# Patient Record
Sex: MALE | Race: WHITE | NOT HISPANIC OR LATINO | Employment: OTHER | URBAN - NONMETROPOLITAN AREA
[De-identification: names, ages, dates, MRNs, and addresses within clinical notes are randomized per-mention and may not be internally consistent; named-entity substitution may affect disease eponyms.]

---

## 2018-01-19 DIAGNOSIS — M25.522 LEFT ELBOW PAIN: Primary | ICD-10-CM

## 2018-01-22 ENCOUNTER — OFFICE VISIT (OUTPATIENT)
Dept: ORTHOPEDIC SURGERY | Facility: CLINIC | Age: 73
End: 2018-01-22

## 2018-01-22 ENCOUNTER — HOSPITAL ENCOUNTER (OUTPATIENT)
Dept: GENERAL RADIOLOGY | Facility: HOSPITAL | Age: 73
Discharge: HOME | End: 2018-01-22
Attending: ORTHOPAEDIC SURGERY | Admitting: ORTHOPAEDIC SURGERY

## 2018-01-22 VITALS
HEART RATE: 61 BPM | SYSTOLIC BLOOD PRESSURE: 169 MMHG | DIASTOLIC BLOOD PRESSURE: 83 MMHG | BODY MASS INDEX: 27.77 KG/M2 | HEIGHT: 72 IN | WEIGHT: 205 LBS

## 2018-01-22 DIAGNOSIS — M77.12 LATERAL EPICONDYLITIS OF LEFT ELBOW: Primary | ICD-10-CM

## 2018-01-22 DIAGNOSIS — M25.522 LEFT ELBOW PAIN: ICD-10-CM

## 2018-01-22 PROCEDURE — 99202 OFFICE O/P NEW SF 15 MIN: CPT | Performed by: ORTHOPAEDIC SURGERY

## 2018-01-22 PROCEDURE — 73080 X-RAY EXAM OF ELBOW: CPT

## 2018-01-22 PROCEDURE — 20550 NJX 1 TENDON SHEATH/LIGAMENT: CPT | Performed by: ORTHOPAEDIC SURGERY

## 2018-01-22 PROCEDURE — 73080 X-RAY EXAM OF ELBOW: CPT | Performed by: RADIOLOGY

## 2018-01-22 RX ORDER — ASPIRIN 81 MG/1
81 TABLET ORAL DAILY
COMMUNITY
End: 2020-08-13

## 2018-01-22 RX ORDER — LIDOCAINE HYDROCHLORIDE 20 MG/ML
2 INJECTION, SOLUTION INFILTRATION; PERINEURAL
Status: COMPLETED | OUTPATIENT
Start: 2018-01-22 | End: 2018-01-22

## 2018-01-22 RX ORDER — PENICILLIN V POTASSIUM 500 MG/1
TABLET ORAL
COMMUNITY
Start: 2018-01-12 | End: 2020-08-13

## 2018-01-22 RX ORDER — METHYLPREDNISOLONE ACETATE 40 MG/ML
40 INJECTION, SUSPENSION INTRA-ARTICULAR; INTRALESIONAL; INTRAMUSCULAR; SOFT TISSUE
Status: COMPLETED | OUTPATIENT
Start: 2018-01-22 | End: 2018-01-22

## 2018-01-22 RX ORDER — LISINOPRIL 40 MG/1
5 TABLET ORAL DAILY
COMMUNITY
Start: 2017-12-08 | End: 2021-06-27

## 2018-01-22 RX ADMIN — METHYLPREDNISOLONE ACETATE 40 MG: 40 INJECTION, SUSPENSION INTRA-ARTICULAR; INTRALESIONAL; INTRAMUSCULAR; SOFT TISSUE at 13:23

## 2018-01-22 RX ADMIN — LIDOCAINE HYDROCHLORIDE 2 ML: 20 INJECTION, SOLUTION INFILTRATION; PERINEURAL at 13:23

## 2018-01-22 NOTE — PROGRESS NOTES
New Patient Visit      Patient: Sandro Argueta  YOB: 1945  Date of Encounter: 01/22/2018      History of Present Illness:     Sandro Argueta is a 72 y.o. male seen today secondary to left elbow pain several months duration with no notable injury.  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  He states that the majority of his pain symptoms jessika to the lateral aspect of his elbow worse upon extension of the wrist.  He has tried relative rest, ice, heat, creams, NSAIDs, as well as a lateral epicondylar strap with some improvement however this seems to be progressively becoming more prominent.  Patient denies any loss of strength or paraesthesias.       Patient Active Problem List   Diagnosis   • Left elbow pain   • Hypertension   • Borderline diabetes     Past Medical History:   Diagnosis Date   • Borderline diabetes    • Hypertension    • Left elbow pain      Past Surgical History:   Procedure Laterality Date   • PACEMAKER IMPLANTATION       Social History     Occupational History   • Not on file.     Social History Main Topics   • Smoking status: Never Smoker   • Smokeless tobacco: Never Used   • Alcohol use No   • Drug use: No   • Sexual activity: Not on file      Social History     Social History Narrative   • No narrative on file     Family History   Problem Relation Age of Onset   • Diabetes Mother    • Diabetes Sister    • Diabetes Brother      Current Outpatient Prescriptions   Medication Sig Dispense Refill   • aspirin 81 MG EC tablet Take 81 mg by mouth Daily.     • lisinopril (PRINIVIL,ZESTRIL) 40 MG tablet      • penicillin v potassium (VEETID) 500 MG tablet        No current facility-administered medications for this visit.      No Known Allergies     Review of Systems   Constitution: Negative.   HENT: Positive for hearing loss.    Eyes: Negative.    Cardiovascular: Negative.   "  Respiratory: Negative.    Endocrine: Negative.    Hematologic/Lymphatic: Negative.    Skin: Negative.    Musculoskeletal: Positive for joint pain.        Pertinent positives listed in HPI   Gastrointestinal: Negative.    Genitourinary: Positive for frequency and urgency.   Neurological: Negative.    Psychiatric/Behavioral: Negative.    Allergic/Immunologic: Negative.        Vitals:    01/22/18 1005   BP: 169/83   BP Location: Right arm   Patient Position: Sitting   Cuff Size: Adult   Pulse: 61   Weight: 93 kg (205 lb)   Height: 182.9 cm (72\")       Physical Exam: 72 y.o. male .  General Appearance:    Well appearing, cooperative, in no acute distress.       Patient is alert and oriented x 3.            Musculoskeletal: Examination the patient's left elbow reveals he has tenderness to palpation along the lateral epicondyle.  Pain with oppositional extension of the wrist.  There is no significant swelling, ecchymosis, erythema.  Patient's for movement of his wrist and digits.  The tight  strength.  Remainder the neurovascular status grossly intact.    Radiology:        X-rays: 3 views left elbow reveals no acute fractures or dislocations.  There are small osteophyte off of the posterior olecranon.  No significant degenerative changes noted    Procedure:  Left lateral epicondylar injection  Date/Time: 1/22/2018 1:23 PM  Performed by: ALEXANDRIA PORTILLO  Authorized by: ALEXANDRIA PORTILLO   Consent: Verbal consent obtained.  Risks and benefits: risks, benefits and alternatives were discussed  Consent given by: patient  Preparation: Patient was prepped and draped in the usual sterile fashion.  Local anesthesia used: yes  Anesthesia: local infiltration    Anesthesia:  Local anesthesia used: yes  Patient tolerance: Patient tolerated the procedure well with no immediate complications  Medications administered: 2 mL lidocaine 2%; 40 mg methylPREDNISolone acetate 40 MG/ML          Assesment:    ICD-10-CM ICD-9-CM "   1. Lateral epicondylitis of left elbow M77.12 726.32         Plan:    Patient tolerated procedure well and was instructed to allow 2-3 weeks to fully evaluate the efficacy injection.  He was instructed to implement relative rest over the next several days.  Patient will return back on an as-needed basis upon any further complication or worsening of his pain or symptoms.  He may continue bracing as this seems to alleviate his pain symptoms.    Discussion and Summary:    Written by Rio Hooks PA-C, acting as scribe for Dr. Newman    CC: Misti Nguyễn MD

## 2020-07-20 ENCOUNTER — HOSPITAL ENCOUNTER (OUTPATIENT)
Dept: RESPIRATORY THERAPY | Facility: HOSPITAL | Age: 75
Discharge: HOME | End: 2020-07-20
Admitting: INTERNAL MEDICINE

## 2020-07-20 ENCOUNTER — TRANSCRIBE ORDERS (OUTPATIENT)
Dept: ADMINISTRATIVE | Facility: HOSPITAL | Age: 75
End: 2020-07-20

## 2020-07-20 DIAGNOSIS — R06.02 SHORTNESS OF BREATH: ICD-10-CM

## 2020-07-20 DIAGNOSIS — R42 DIZZINESS AND GIDDINESS: Primary | ICD-10-CM

## 2020-07-20 PROCEDURE — 93226 XTRNL ECG REC<48 HR SCAN A/R: CPT

## 2020-07-20 PROCEDURE — 93225 XTRNL ECG REC<48 HRS REC: CPT

## 2020-07-21 ENCOUNTER — TRANSCRIBE ORDERS (OUTPATIENT)
Dept: ADMINISTRATIVE | Facility: HOSPITAL | Age: 75
End: 2020-07-21

## 2020-07-21 DIAGNOSIS — R42 DIZZINESS AND GIDDINESS: Primary | ICD-10-CM

## 2020-07-29 ENCOUNTER — HOSPITAL ENCOUNTER (OUTPATIENT)
Dept: CARDIOLOGY | Facility: HOSPITAL | Age: 75
Discharge: HOME | End: 2020-07-29

## 2020-07-29 ENCOUNTER — HOSPITAL ENCOUNTER (OUTPATIENT)
Dept: CARDIOLOGY | Facility: HOSPITAL | Age: 75
Discharge: HOME | End: 2020-07-29
Admitting: INTERNAL MEDICINE

## 2020-07-29 DIAGNOSIS — R06.02 SHORTNESS OF BREATH: ICD-10-CM

## 2020-07-29 DIAGNOSIS — R42 DIZZINESS AND GIDDINESS: ICD-10-CM

## 2020-07-29 PROCEDURE — 93880 EXTRACRANIAL BILAT STUDY: CPT | Performed by: RADIOLOGY

## 2020-07-29 PROCEDURE — 93306 TTE W/DOPPLER COMPLETE: CPT

## 2020-07-29 PROCEDURE — 93880 EXTRACRANIAL BILAT STUDY: CPT

## 2020-07-30 LAB
BH CV ECHO MEAS - % IVS THICK: 1.2 %
BH CV ECHO MEAS - % LVPW THICK: 9.4 %
BH CV ECHO MEAS - ACS: 2.4 CM
BH CV ECHO MEAS - AI DEC SLOPE: 94.2 CM/SEC^2
BH CV ECHO MEAS - AI MAX PG: 26.4 MMHG
BH CV ECHO MEAS - AI MAX VEL: 257 CM/SEC
BH CV ECHO MEAS - AI P1/2T: 799.1 MSEC
BH CV ECHO MEAS - AO MAX PG: 5.4 MMHG
BH CV ECHO MEAS - AO MEAN PG: 3 MMHG
BH CV ECHO MEAS - AO ROOT AREA (BSA CORRECTED): 1.7
BH CV ECHO MEAS - AO ROOT AREA: 10.2 CM^2
BH CV ECHO MEAS - AO ROOT DIAM: 3.6 CM
BH CV ECHO MEAS - AO V2 MAX: 116 CM/SEC
BH CV ECHO MEAS - AO V2 MEAN: 79 CM/SEC
BH CV ECHO MEAS - AO V2 VTI: 24.2 CM
BH CV ECHO MEAS - BSA(HAYCOCK): 2.2 M^2
BH CV ECHO MEAS - BSA: 2.2 M^2
BH CV ECHO MEAS - BZI_BMI: 27.8 KILOGRAMS/M^2
BH CV ECHO MEAS - BZI_METRIC_HEIGHT: 182.9 CM
BH CV ECHO MEAS - BZI_METRIC_WEIGHT: 93 KG
BH CV ECHO MEAS - EDV(CUBED): 128.4 ML
BH CV ECHO MEAS - EDV(MOD-SP4): 115 ML
BH CV ECHO MEAS - EDV(TEICH): 120.7 ML
BH CV ECHO MEAS - EF(CUBED): 86.6 %
BH CV ECHO MEAS - EF(MOD-SP4): 67.7 %
BH CV ECHO MEAS - EF(TEICH): 80 %
BH CV ECHO MEAS - ESV(CUBED): 17.2 ML
BH CV ECHO MEAS - ESV(MOD-SP4): 37.2 ML
BH CV ECHO MEAS - ESV(TEICH): 24.1 ML
BH CV ECHO MEAS - FS: 48.9 %
BH CV ECHO MEAS - IVS/LVPW: 0.91
BH CV ECHO MEAS - IVSD: 1.2 CM
BH CV ECHO MEAS - IVSS: 1.2 CM
BH CV ECHO MEAS - LA DIMENSION: 2.9 CM
BH CV ECHO MEAS - LA/AO: 0.79
BH CV ECHO MEAS - LV DIASTOLIC VOL/BSA (35-75): 53.4 ML/M^2
BH CV ECHO MEAS - LV MASS(C)D: 258.3 GRAMS
BH CV ECHO MEAS - LV MASS(C)DI: 120 GRAMS/M^2
BH CV ECHO MEAS - LV MASS(C)S: 108.1 GRAMS
BH CV ECHO MEAS - LV MASS(C)SI: 50.2 GRAMS/M^2
BH CV ECHO MEAS - LV SYSTOLIC VOL/BSA (12-30): 17.3 ML/M^2
BH CV ECHO MEAS - LVIDD: 5 CM
BH CV ECHO MEAS - LVIDS: 2.6 CM
BH CV ECHO MEAS - LVLD AP4: 7.4 CM
BH CV ECHO MEAS - LVLS AP4: 6.1 CM
BH CV ECHO MEAS - LVOT AREA (M): 3.8 CM^2
BH CV ECHO MEAS - LVOT AREA: 3.8 CM^2
BH CV ECHO MEAS - LVOT DIAM: 2.2 CM
BH CV ECHO MEAS - LVPWD: 1.3 CM
BH CV ECHO MEAS - LVPWS: 1.5 CM
BH CV ECHO MEAS - MV A MAX VEL: 60.8 CM/SEC
BH CV ECHO MEAS - MV E MAX VEL: 48 CM/SEC
BH CV ECHO MEAS - MV E/A: 0.79
BH CV ECHO MEAS - PA ACC TIME: 0.13 SEC
BH CV ECHO MEAS - PA PR(ACCEL): 18.7 MMHG
BH CV ECHO MEAS - RAP SYSTOLE: 10 MMHG
BH CV ECHO MEAS - RVSP: 34.8 MMHG
BH CV ECHO MEAS - SI(AO): 114.4 ML/M^2
BH CV ECHO MEAS - SI(CUBED): 51.7 ML/M^2
BH CV ECHO MEAS - SI(MOD-SP4): 36.1 ML/M^2
BH CV ECHO MEAS - SI(TEICH): 44.9 ML/M^2
BH CV ECHO MEAS - SV(AO): 246.3 ML
BH CV ECHO MEAS - SV(CUBED): 111.2 ML
BH CV ECHO MEAS - SV(MOD-SP4): 77.8 ML
BH CV ECHO MEAS - SV(TEICH): 96.6 ML
BH CV ECHO MEAS - TR MAX VEL: 249 CM/SEC

## 2020-08-13 ENCOUNTER — OFFICE VISIT (OUTPATIENT)
Dept: UROLOGY | Facility: CLINIC | Age: 75
End: 2020-08-13

## 2020-08-13 VITALS — BODY MASS INDEX: 27.17 KG/M2 | TEMPERATURE: 97.8 F | WEIGHT: 200.6 LBS | HEIGHT: 72 IN

## 2020-08-13 DIAGNOSIS — N42.9 DISORDER OF PROSTATE: Primary | ICD-10-CM

## 2020-08-13 DIAGNOSIS — R97.20 ELEVATED PROSTATE SPECIFIC ANTIGEN (PSA): ICD-10-CM

## 2020-08-13 PROCEDURE — 84154 ASSAY OF PSA FREE: CPT | Performed by: UROLOGY

## 2020-08-13 PROCEDURE — 99203 OFFICE O/P NEW LOW 30 MIN: CPT | Performed by: UROLOGY

## 2020-08-13 PROCEDURE — 36415 COLL VENOUS BLD VENIPUNCTURE: CPT | Performed by: UROLOGY

## 2020-08-13 PROCEDURE — 84153 ASSAY OF PSA TOTAL: CPT | Performed by: UROLOGY

## 2020-08-13 NOTE — PROGRESS NOTES
Chief Complaint:          Chief Complaint   Patient presents with   • Elevated PSA     New Patient        HPI:   75 y.o. male referred for evaluation of an elevated PSA noted to be 4.2.  He has no family history of bladder, ovarian, breast or family history of prostate cancer he has no exposure to agent orange he has lifelong frequency.  He has no dysuria.  He is a diabetic.  He is well controlled he has no burning, blood in the urine, discharge, fevers, chills.  His exam was unremarkable have a free and total PSA currently pending at the time of this dictation      Past Medical History:        Past Medical History:   Diagnosis Date   • Borderline diabetes    • Hypertension    • Left elbow pain          Current Meds:     Current Outpatient Medications   Medication Sig Dispense Refill   • lisinopril (PRINIVIL,ZESTRIL) 40 MG tablet      • metFORMIN (GLUCOPHAGE) 500 MG tablet Take 500 mg by mouth 2 (Two) Times a Day With Meals.       No current facility-administered medications for this visit.         Allergies:      No Known Allergies     Past Surgical History:     Past Surgical History:   Procedure Laterality Date   • PACEMAKER IMPLANTATION           Social History:     Social History     Socioeconomic History   • Marital status:      Spouse name: Not on file   • Number of children: Not on file   • Years of education: Not on file   • Highest education level: Not on file   Tobacco Use   • Smoking status: Never Smoker   • Smokeless tobacco: Never Used   Substance and Sexual Activity   • Alcohol use: No   • Drug use: No       Family History:     Family History   Problem Relation Age of Onset   • Diabetes Mother    • Diabetes Sister    • Diabetes Brother        Review of Systems:     Review of Systems   Constitutional: Negative.    HENT: Negative.    Eyes: Negative.    Respiratory: Negative.    Cardiovascular: Negative.    Gastrointestinal: Negative.    Endocrine: Negative.    Musculoskeletal: Negative.     Allergic/Immunologic: Negative.    Neurological: Negative.    Hematological: Negative.    Psychiatric/Behavioral: Negative.        Physical Exam:     Physical Exam   Constitutional: He is oriented to person, place, and time. He appears well-developed and well-nourished.   HENT:   Head: Normocephalic and atraumatic.   Eyes: Pupils are equal, round, and reactive to light. Conjunctivae and EOM are normal.   Neck: Normal range of motion.   Cardiovascular: Normal rate, regular rhythm, normal heart sounds and intact distal pulses.   Pulmonary/Chest: Effort normal and breath sounds normal.   Abdominal: Soft. Bowel sounds are normal.   Genitourinary:   Genitourinary Comments: Soft nontender abdomen with no organomegaly, rigidity, or tenderness.  He has normal external genitalia and uncircumcised phallus with a freely movable foreskin bilaterally descended testes without masses there is no inguinal hernias adenopathy or abnormalities he had good rectal tone and a large smooth firm prostate.  There is no nodularity or any suspicious rectal abnormalities     Musculoskeletal: Normal range of motion.   Neurological: He is alert and oriented to person, place, and time. He has normal reflexes.   Skin: Skin is warm and dry.   Psychiatric: He has a normal mood and affect. His behavior is normal. Judgment and thought content normal.   Nursing note and vitals reviewed.      I have reviewed the following portions of the patient's history: allergies, current medications, past family history, past medical history, past social history, past surgical history, problem list and ROS and confirm it's accurate.      Procedure:       Assessment/Plan:   Elevated prostate specific antigen-we discussed the diagnosis of elevated prostate-specific antigen.  I explained the pathophysiology of PSA.  It is a serine protease that's function in the male reproductive tract is to facilitate the liquefaction of semen.  It is for this reason the body does  not want it freely floating in the serum and why typically bound tightly to albumin.  We discussed why we used both a PSA free and total to determine the need for more aggressive therapy I discussed the normal range.  Additionally it was in the range of 1-4 but more recently has been downgraded to something less than 2 or even approaching 1.  I discussed the risk of family history particularly the fact that the average male has a 14% risk of prostate cancer and that in the face of a positive diagnosis in a father it will tablet and any other first-generation relative continued tablet insofar that a father and brother with prostate cancer will produce almost a 50% risk of prostate cancer.  I discussed the use of the temporal use of PSA as the best option for monitoring.  We also discussed the fact that an elevated PSA is an isolated event does not mean that this is prostate cancer and should not engender worry in this regard. I discussed other things that can elevate PSA including constipation, prostatitis, infection, recent intercourse etc. as well as the risks and benefits associated with this.  Also discussed the fact that this is with a dilutional test and as a consequence of such were present produce slight variations on a single specimen.  Further discussed the risks and benefits of a prostate biopsy as well.            Patient's Body mass index is 27.21 kg/m². BMI is above normal parameters. Recommendations include: educational material.              This document has been electronically signed by KESHA AMBROSE MD August 13, 2020 10:48

## 2020-08-15 LAB
PSA FREE MFR SERPL: 25.9 %
PSA FREE SERPL-MCNC: 1.06 NG/ML
PSA SERPL-MCNC: 4.1 NG/ML (ref 0–4)

## 2020-08-17 PROBLEM — R97.20 ELEVATED PROSTATE SPECIFIC ANTIGEN (PSA): Status: ACTIVE | Noted: 2020-08-17

## 2020-09-14 ENCOUNTER — OFFICE VISIT (OUTPATIENT)
Dept: UROLOGY | Facility: CLINIC | Age: 75
End: 2020-09-14

## 2020-09-14 VITALS — BODY MASS INDEX: 27.22 KG/M2 | TEMPERATURE: 98.3 F | WEIGHT: 201 LBS | HEIGHT: 72 IN

## 2020-09-14 DIAGNOSIS — R97.20 ELEVATED PROSTATE SPECIFIC ANTIGEN (PSA): Primary | ICD-10-CM

## 2020-09-14 PROCEDURE — 99213 OFFICE O/P EST LOW 20 MIN: CPT | Performed by: UROLOGY

## 2020-09-14 NOTE — PROGRESS NOTES
Chief Complaint:          Chief Complaint   Patient presents with   • Elevated PSA       HPI:   75 y.o. male returns today.  His PSA was 2.1 with a free PSA of 25.9%.  It was previously 4.2 he is a controlled diabetic.  He is hypertensive.  This is great news this is his baseline I will see him back in 6 months he is very hard of hearing      Past Medical History:        Past Medical History:   Diagnosis Date   • Borderline diabetes    • Coronary artery disease    • Diabetes mellitus (CMS/HCC)    • Elevated prostate specific antigen (PSA) 8/17/2020   • Hypertension    • Left elbow pain          Current Meds:     Current Outpatient Medications   Medication Sig Dispense Refill   • lisinopril (PRINIVIL,ZESTRIL) 40 MG tablet Take 40 mg by mouth Daily.     • metFORMIN (GLUCOPHAGE) 500 MG tablet Take 500 mg by mouth 2 (Two) Times a Day With Meals.       No current facility-administered medications for this visit.         Allergies:      No Known Allergies     Past Surgical History:     Past Surgical History:   Procedure Laterality Date   • PACEMAKER IMPLANTATION           Social History:     Social History     Socioeconomic History   • Marital status:      Spouse name: Not on file   • Number of children: Not on file   • Years of education: Not on file   • Highest education level: Not on file   Tobacco Use   • Smoking status: Never Smoker   • Smokeless tobacco: Never Used   Substance and Sexual Activity   • Alcohol use: No   • Drug use: No   • Sexual activity: Defer       Family History:     Family History   Problem Relation Age of Onset   • Diabetes Mother    • Diabetes Sister    • Diabetes Brother        Review of Systems:     Review of Systems   Constitutional: Negative.    HENT: Negative.    Eyes: Negative.    Respiratory: Negative.    Cardiovascular: Negative.    Gastrointestinal: Negative.    Endocrine: Negative.    Musculoskeletal: Negative.    Allergic/Immunologic: Negative.    Neurological: Negative.     Hematological: Negative.    Psychiatric/Behavioral: Negative.        Physical Exam:     Physical Exam  Vitals signs and nursing note reviewed.   Constitutional:       Appearance: He is well-developed.   HENT:      Head: Normocephalic and atraumatic.   Eyes:      Conjunctiva/sclera: Conjunctivae normal.      Pupils: Pupils are equal, round, and reactive to light.   Neck:      Musculoskeletal: Normal range of motion.   Cardiovascular:      Rate and Rhythm: Normal rate and regular rhythm.      Heart sounds: Normal heart sounds.   Pulmonary:      Effort: Pulmonary effort is normal.      Breath sounds: Normal breath sounds.   Abdominal:      General: Bowel sounds are normal.      Palpations: Abdomen is soft.   Musculoskeletal: Normal range of motion.   Skin:     General: Skin is warm and dry.   Neurological:      Mental Status: He is alert and oriented to person, place, and time.      Deep Tendon Reflexes: Reflexes are normal and symmetric.   Psychiatric:         Behavior: Behavior normal.         Thought Content: Thought content normal.         Judgment: Judgment normal.         I have reviewed the following portions of the patient's history: allergies, current medications, past family history, past medical history, past social history, past surgical history, problem list and ROS and confirm it's accurate.      Procedure:       Assessment/Plan:   Elevated prostate specific antigen-we discussed the diagnosis of elevated prostate-specific antigen.  I explained the pathophysiology of PSA.  It is a serine protease that's function in the male reproductive tract is to facilitate the liquefaction of semen.  It is for this reason the body does not want it freely floating in the serum and why typically bound tightly to albumin.  We discussed why we used both a PSA free and total to determine the need for more aggressive therapy I discussed the normal range.  Additionally it was in the range of 1-4 but more recently has been  downgraded to something less than 2 or even approaching 1.  I discussed the risk of family history particularly the fact that the average male has a 14% risk of prostate cancer and that in the face of a positive diagnosis in a father it will tablet and any other first-generation relative continued tablet insofar that a father and brother with prostate cancer will produce almost a 50% risk of prostate cancer.  I discussed the use of the temporal use of PSA as the best option for monitoring.  We also discussed the fact that an elevated PSA is an isolated event does not mean that this is prostate cancer and should not engender worry in this regard. I discussed other things that can elevate PSA including constipation, prostatitis, infection, recent intercourse etc. as well as the risks and benefits associated with this.  Also discussed the fact that this is with a dilutional test and as a consequence of such were present produce slight variations on a single specimen.  Further discussed the risks and benefits of a prostate biopsy as well.  His repeat PSA is 4.2 with a free PSA of 26% making prostate cancer and extremely low likelihood             Patient's Body mass index is 27.26 kg/m². BMI is above normal parameters. Recommendations include: educational material.              This document has been electronically signed by KESHA AMBROSE MD September 14, 2020 14:44 EDT

## 2021-02-03 ENCOUNTER — OFFICE VISIT (OUTPATIENT)
Dept: GASTROENTEROLOGY | Facility: CLINIC | Age: 76
End: 2021-02-03

## 2021-02-03 VITALS
HEIGHT: 60 IN | HEART RATE: 77 BPM | BODY MASS INDEX: 39.46 KG/M2 | WEIGHT: 201 LBS | DIASTOLIC BLOOD PRESSURE: 72 MMHG | SYSTOLIC BLOOD PRESSURE: 121 MMHG | TEMPERATURE: 97.1 F

## 2021-02-03 DIAGNOSIS — K21.9 GASTROESOPHAGEAL REFLUX DISEASE, UNSPECIFIED WHETHER ESOPHAGITIS PRESENT: ICD-10-CM

## 2021-02-03 DIAGNOSIS — R10.13 EPIGASTRIC PAIN: Primary | ICD-10-CM

## 2021-02-03 PROCEDURE — 99203 OFFICE O/P NEW LOW 30 MIN: CPT | Performed by: INTERNAL MEDICINE

## 2021-02-03 RX ORDER — OMEPRAZOLE 20 MG/1
20 CAPSULE, DELAYED RELEASE ORAL 2 TIMES DAILY
COMMUNITY
End: 2021-02-03 | Stop reason: SDUPTHER

## 2021-02-03 RX ORDER — OMEPRAZOLE 40 MG/1
40 CAPSULE, DELAYED RELEASE ORAL DAILY
Qty: 30 CAPSULE | Refills: 5 | Status: SHIPPED | OUTPATIENT
Start: 2021-02-03 | End: 2022-01-23 | Stop reason: HOSPADM

## 2021-02-03 NOTE — H&P (VIEW-ONLY)
Subjective     Sandro Argueta is a 75 y.o. male who presents to the office today as a consultation from [facility] for evaluation of Heartburn        History of Present Illness:  The patient states he began having a burning pain in the past week.  He states the only thing different that he has done recently is to take ibuprofen 2 pills every morning for 5 days.  He quit taking this over a week ago.  He denies heartburn.  No nausea or vomiting.  No weight loss.  No hematemesis.  He states about 3 months ago he had passed blood after lifting a 5 gallon bucket of paint.  He has had colonoscopies in the past. The last colonoscopy was about 4 year ago.  He states his urine is darker. He has not been drinking much water.  He has been taking Omeprazole for the past days.  He states up until last night he could not sleep b/c of the pain.  He states the pain has really calmed down since starting the medication.  No trouble swallowing.  His appetite is good but has trouble eating because of pain.      Review of Systems:  Review of Systems   Constitutional: Negative for chills, fatigue and fever.   HENT: Negative for trouble swallowing.    Eyes: Negative.    Respiratory: Negative for cough, choking, chest tightness and shortness of breath.    Cardiovascular: Negative for chest pain.   Gastrointestinal: Positive for abdominal pain and constipation. Negative for abdominal distention, anal bleeding, blood in stool, diarrhea, nausea and vomiting.   Endocrine: Negative.    Genitourinary: Negative for difficulty urinating and dysuria.   Musculoskeletal: Negative for back pain and neck pain.   Skin: Negative.    Allergic/Immunologic: Negative for environmental allergies and food allergies.   Neurological: Negative for dizziness, light-headedness and headaches.   Hematological: Bruises/bleeds easily.   Psychiatric/Behavioral: Negative.        Past Medical History:  Past Medical History:   Diagnosis Date   • Borderline diabetes     "  • Coronary artery disease    • Diabetes mellitus (CMS/HCC)    • Elevated prostate specific antigen (PSA) 8/17/2020   • Hypertension    • Left elbow pain        Past Surgical History:  Past Surgical History:   Procedure Laterality Date   • PACEMAKER IMPLANTATION         Family History:  Family History   Problem Relation Age of Onset   • Diabetes Mother    • Diabetes Sister    • Diabetes Brother        Social History:  Social History     Socioeconomic History   • Marital status:      Spouse name: Not on file   • Number of children: Not on file   • Years of education: Not on file   • Highest education level: Not on file   Tobacco Use   • Smoking status: Never Smoker   • Smokeless tobacco: Never Used   Substance and Sexual Activity   • Alcohol use: No   • Drug use: No   • Sexual activity: Defer       Current Medication List:    Current Outpatient Medications:   •  lisinopril (PRINIVIL,ZESTRIL) 40 MG tablet, Take 40 mg by mouth Daily., Disp: , Rfl:   •  metFORMIN (GLUCOPHAGE) 500 MG tablet, Take 500 mg by mouth 2 (Two) Times a Day With Meals., Disp: , Rfl:   •  omeprazole (priLOSEC) 40 MG capsule, Take 1 capsule by mouth Daily., Disp: 30 capsule, Rfl: 5    Allergies:   Patient has no known allergies.    Vitals:  /72 (BP Location: Left arm, Patient Position: Sitting, Cuff Size: Adult)   Pulse 77   Temp 97.1 °F (36.2 °C)   Ht 72 cm (28.35\")   Wt 91.2 kg (201 lb)   .88 kg/m²     Physical Exam:  Physical Exam  Constitutional:       Appearance: He is normal weight.   HENT:      Head: Normocephalic and atraumatic.      Nose: Nose normal. No congestion or rhinorrhea.   Eyes:      General: No scleral icterus.     Extraocular Movements: Extraocular movements intact.      Conjunctiva/sclera: Conjunctivae normal.      Pupils: Pupils are equal, round, and reactive to light.   Neck:      Musculoskeletal: Normal range of motion and neck supple.   Cardiovascular:      Rate and Rhythm: Normal rate and regular " rhythm.      Pulses: Normal pulses.      Heart sounds: Normal heart sounds.   Pulmonary:      Effort: Pulmonary effort is normal.      Breath sounds: Normal breath sounds.   Abdominal:      General: Abdomen is flat. Bowel sounds are normal. There is no distension.      Palpations: Abdomen is soft. There is no shifting dullness, fluid wave, hepatomegaly, splenomegaly, mass or pulsatile mass.      Tenderness: There is no abdominal tenderness. There is no guarding or rebound.      Hernia: No hernia is present.   Musculoskeletal:         General: No swelling or tenderness.   Skin:     General: Skin is warm and dry.      Coloration: Skin is not jaundiced.   Neurological:      General: No focal deficit present.      Mental Status: He is alert and oriented to person, place, and time.   Psychiatric:         Mood and Affect: Mood normal.         Behavior: Behavior normal.         Results Review:  Lab Results:   No visits with results within 1 Month(s) from this visit.   Latest known visit with results is:   Office Visit on 08/13/2020   Component Date Value Ref Range Status   • PSA 08/13/2020 4.1* 0.0 - 4.0 ng/mL Final    Roche ECLIA methodology.  According to the American U[employer] Association, Serum PSA should  decrease and remain at undetectable levels after radical  prostatectomy. The AUA defines biochemical recurrence as an initial  PSA value 0.2 ng/mL or greater followed by a subsequent confirmatory  PSA value 0.2 ng/mL or greater.  Values obtained with different assay methods or kits cannot be used  interchangeably. Results cannot be interpreted as absolute evidence  of the presence or absence of malignant disease.   • PSA, Free 08/13/2020 1.06  N/A ng/mL Final    Roche ECLIA methodology.   • PSA, Free % 08/13/2020 25.9  % Final    The table below lists the probability of prostate cancer for  men with non-suspicious WEST results and total PSA between  4 and 10 ng/mL, by patient age (Geraldine et al, MANI  1998,  279:[id number]).                    % Free PSA       50-64 yr        65-75 yr                    0.00-10.00%        56%             55%                   10.01-15.00%        24%             35%                   15.01-20.00%        17%             23%                   20.01-25.00%        10%             20%                        >25.00%         5%              9%  Please note:  Geraldine et al did not make specific                recommendations regarding the use of                percent free PSA for any other population                of men.       Assessment/Plan     Visit Diagnoses:    ICD-10-CM ICD-9-CM   1. Epigastric pain  R10.13 789.06   2. Gastroesophageal reflux disease, unspecified whether esophagitis present  K21.9 530.81       Plan:  No orders of the defined types were placed in this encounter.    The patient is doing much better on omeprazole 20 mg p.o. daily.  He has been taking this for 5 days.  He did sleep well last night.  He declines upper endoscopy at this point.  I will prescribe omeprazole 40 mg p.o. every morning 30 minutes before eating.  He will let me know if he has not improved over the next couple of weeks.  At this point, we we will likely proceed with EGD if he is in agreement.  Otherwise, he will follow-up in 4 to 6 weeks.  * Surgery not found *      MEDICATION ISSUES:  Discussed medication options and treatment plan of prescribed medication as well as the risks, benefits, and side effects including potential falls, possible impaired driving and metabolic adversities among others. Patient is agreeable to call the office with any worsening of symptoms or onset of side effects. Patient is agreeable to call 911 or go to the nearest ER should he/she begin having SI/HI.     MEDS ORDERED DURING VISIT:  New Medications Ordered This Visit   Medications   • omeprazole (priLOSEC) 40 MG capsule     Sig: Take 1 capsule by mouth Daily.     Dispense:  30 capsule     Refill:  5       No follow-ups  on file.             This document has been electronically signed by Bela Mcdaniel MD   February 3, 2021 14:21 EST        Part of this note may be an electronic transcription/translation of spoken language to printed text using the Dragon Dictation System.

## 2021-02-08 ENCOUNTER — TELEPHONE (OUTPATIENT)
Dept: GASTROENTEROLOGY | Facility: CLINIC | Age: 76
End: 2021-02-08

## 2021-02-08 ENCOUNTER — LAB (OUTPATIENT)
Dept: LAB | Facility: HOSPITAL | Age: 76
End: 2021-02-08

## 2021-02-08 DIAGNOSIS — K21.9 GASTROESOPHAGEAL REFLUX DISEASE, UNSPECIFIED WHETHER ESOPHAGITIS PRESENT: ICD-10-CM

## 2021-02-08 DIAGNOSIS — R10.13 EPIGASTRIC PAIN: ICD-10-CM

## 2021-02-08 DIAGNOSIS — Z01.818 PREOPERATIVE CLEARANCE: ICD-10-CM

## 2021-02-08 DIAGNOSIS — Z01.818 PREOPERATIVE CLEARANCE: Primary | ICD-10-CM

## 2021-02-08 LAB
FLUAV RNA RESP QL NAA+PROBE: NOT DETECTED
FLUBV RNA RESP QL NAA+PROBE: NOT DETECTED
SARS-COV-2 RNA RESP QL NAA+PROBE: NOT DETECTED

## 2021-02-08 PROCEDURE — C9803 HOPD COVID-19 SPEC COLLECT: HCPCS | Performed by: INTERNAL MEDICINE

## 2021-02-08 PROCEDURE — 87636 SARSCOV2 & INF A&B AMP PRB: CPT | Performed by: INTERNAL MEDICINE

## 2021-02-08 NOTE — TELEPHONE ENCOUNTER
Spoke with patient's son-in-law and scheduled him for EGD on 2-9-21. He is going to [facility] to have a rapid COVID test before 3:30. I spoke with Priscilla in scheduling. And also made the scheduling department aware he was arriving. I told patient he needed to be NPO after midnight.

## 2021-02-08 NOTE — TELEPHONE ENCOUNTER
Patient's son-in-law called (Rosita) and said that Ke was not doing good at all. Since his last visit. He stated he has just felt worse. He had mention that Dr. Griffith had spoke to him about having a EGD. I can put him on for tomorrow if you want me to. I explained to Rosita that he would just have to come for a rapid COVID test today and he said that he would make sure to get him on. Just let me know and please put a case request in for it and I will call Rosita back and get him taken care of.

## 2021-02-09 ENCOUNTER — HOSPITAL ENCOUNTER (OUTPATIENT)
Facility: HOSPITAL | Age: 76
Setting detail: HOSPITAL OUTPATIENT SURGERY
Discharge: HOME | End: 2021-02-09
Attending: INTERNAL MEDICINE | Admitting: INTERNAL MEDICINE

## 2021-02-09 ENCOUNTER — ANESTHESIA (OUTPATIENT)
Dept: PERIOP | Facility: HOSPITAL | Age: 76
End: 2021-02-09

## 2021-02-09 ENCOUNTER — ANESTHESIA EVENT (OUTPATIENT)
Dept: PERIOP | Facility: HOSPITAL | Age: 76
End: 2021-02-09

## 2021-02-09 ENCOUNTER — DOCUMENTATION (OUTPATIENT)
Dept: GASTROENTEROLOGY | Facility: CLINIC | Age: 76
End: 2021-02-09

## 2021-02-09 VITALS
SYSTOLIC BLOOD PRESSURE: 134 MMHG | TEMPERATURE: 98.7 F | BODY MASS INDEX: 25.73 KG/M2 | WEIGHT: 190 LBS | HEIGHT: 72 IN | DIASTOLIC BLOOD PRESSURE: 76 MMHG | HEART RATE: 61 BPM | OXYGEN SATURATION: 100 % | RESPIRATION RATE: 20 BRPM

## 2021-02-09 DIAGNOSIS — R10.84 GENERALIZED ABDOMINAL PAIN: Primary | ICD-10-CM

## 2021-02-09 DIAGNOSIS — R10.13 EPIGASTRIC PAIN: ICD-10-CM

## 2021-02-09 LAB
ALBUMIN SERPL-MCNC: 3.81 G/DL (ref 3.5–5.2)
ALBUMIN/GLOB SERPL: 1 G/DL
ALP SERPL-CCNC: 357 U/L (ref 39–117)
ALT SERPL W P-5'-P-CCNC: 417 U/L (ref 1–41)
ANION GAP SERPL CALCULATED.3IONS-SCNC: 10 MMOL/L (ref 5–15)
AST SERPL-CCNC: 191 U/L (ref 1–40)
BASOPHILS # BLD AUTO: 0.06 10*3/MM3 (ref 0–0.2)
BASOPHILS NFR BLD AUTO: 1 % (ref 0–1.5)
BILIRUB SERPL-MCNC: 1.8 MG/DL (ref 0–1.2)
BUN SERPL-MCNC: 31 MG/DL (ref 8–23)
BUN/CREAT SERPL: 24.4 (ref 7–25)
CALCIUM SPEC-SCNC: 9.4 MG/DL (ref 8.6–10.5)
CHLORIDE SERPL-SCNC: 99 MMOL/L (ref 98–107)
CO2 SERPL-SCNC: 24 MMOL/L (ref 22–29)
CREAT SERPL-MCNC: 1.27 MG/DL (ref 0.76–1.27)
DEPRECATED RDW RBC AUTO: 44.3 FL (ref 37–54)
EOSINOPHIL # BLD AUTO: 0.31 10*3/MM3 (ref 0–0.4)
EOSINOPHIL NFR BLD AUTO: 5.2 % (ref 0.3–6.2)
ERYTHROCYTE [DISTWIDTH] IN BLOOD BY AUTOMATED COUNT: 12.9 % (ref 12.3–15.4)
GFR SERPL CREATININE-BSD FRML MDRD: 55 ML/MIN/1.73
GLOBULIN UR ELPH-MCNC: 3.9 GM/DL
GLUCOSE SERPL-MCNC: 260 MG/DL (ref 65–99)
HCT VFR BLD AUTO: 43.1 % (ref 37.5–51)
HGB BLD-MCNC: 13.4 G/DL (ref 13–17.7)
IMM GRANULOCYTES # BLD AUTO: 0.01 10*3/MM3 (ref 0–0.05)
IMM GRANULOCYTES NFR BLD AUTO: 0.2 % (ref 0–0.5)
LYMPHOCYTES # BLD AUTO: 1.2 10*3/MM3 (ref 0.7–3.1)
LYMPHOCYTES NFR BLD AUTO: 20.2 % (ref 19.6–45.3)
MCH RBC QN AUTO: 28.8 PG (ref 26.6–33)
MCHC RBC AUTO-ENTMCNC: 31.1 G/DL (ref 31.5–35.7)
MCV RBC AUTO: 92.7 FL (ref 79–97)
MONOCYTES # BLD AUTO: 0.53 10*3/MM3 (ref 0.1–0.9)
MONOCYTES NFR BLD AUTO: 8.9 % (ref 5–12)
NEUTROPHILS NFR BLD AUTO: 3.84 10*3/MM3 (ref 1.7–7)
NEUTROPHILS NFR BLD AUTO: 64.5 % (ref 42.7–76)
NRBC BLD AUTO-RTO: 0 /100 WBC (ref 0–0.2)
PLATELET # BLD AUTO: 343 10*3/MM3 (ref 140–450)
PMV BLD AUTO: 10.4 FL (ref 6–12)
POTASSIUM SERPL-SCNC: 4.6 MMOL/L (ref 3.5–5.2)
PROT SERPL-MCNC: 7.7 G/DL (ref 6–8.5)
RBC # BLD AUTO: 4.65 10*6/MM3 (ref 4.14–5.8)
SODIUM SERPL-SCNC: 133 MMOL/L (ref 136–145)
TSH SERPL DL<=0.05 MIU/L-ACNC: 2.52 UIU/ML (ref 0.27–4.2)
WBC # BLD AUTO: 5.95 10*3/MM3 (ref 3.4–10.8)

## 2021-02-09 PROCEDURE — 25010000002 PROPOFOL 10 MG/ML EMULSION: Performed by: NURSE ANESTHETIST, CERTIFIED REGISTERED

## 2021-02-09 PROCEDURE — 88305 TISSUE EXAM BY PATHOLOGIST: CPT | Performed by: INTERNAL MEDICINE

## 2021-02-09 PROCEDURE — 84443 ASSAY THYROID STIM HORMONE: CPT | Performed by: PHYSICIAN ASSISTANT

## 2021-02-09 PROCEDURE — 85025 COMPLETE CBC W/AUTO DIFF WBC: CPT | Performed by: PHYSICIAN ASSISTANT

## 2021-02-09 PROCEDURE — 43239 EGD BIOPSY SINGLE/MULTIPLE: CPT | Performed by: INTERNAL MEDICINE

## 2021-02-09 PROCEDURE — 88312 SPECIAL STAINS GROUP 1: CPT | Performed by: INTERNAL MEDICINE

## 2021-02-09 PROCEDURE — 80053 COMPREHEN METABOLIC PANEL: CPT | Performed by: PHYSICIAN ASSISTANT

## 2021-02-09 RX ORDER — SODIUM CHLORIDE, SODIUM LACTATE, POTASSIUM CHLORIDE, CALCIUM CHLORIDE 600; 310; 30; 20 MG/100ML; MG/100ML; MG/100ML; MG/100ML
100 INJECTION, SOLUTION INTRAVENOUS ONCE AS NEEDED
Status: DISCONTINUED | OUTPATIENT
Start: 2021-02-09 | End: 2021-02-09 | Stop reason: HOSPADM

## 2021-02-09 RX ORDER — MEPERIDINE HYDROCHLORIDE 25 MG/ML
12.5 INJECTION INTRAMUSCULAR; INTRAVENOUS; SUBCUTANEOUS
Status: DISCONTINUED | OUTPATIENT
Start: 2021-02-09 | End: 2021-02-09 | Stop reason: HOSPADM

## 2021-02-09 RX ORDER — MIDAZOLAM HYDROCHLORIDE 1 MG/ML
1 INJECTION INTRAMUSCULAR; INTRAVENOUS
Status: DISCONTINUED | OUTPATIENT
Start: 2021-02-09 | End: 2021-02-09 | Stop reason: HOSPADM

## 2021-02-09 RX ORDER — DROPERIDOL 2.5 MG/ML
0.62 INJECTION, SOLUTION INTRAMUSCULAR; INTRAVENOUS ONCE AS NEEDED
Status: DISCONTINUED | OUTPATIENT
Start: 2021-02-09 | End: 2021-02-09 | Stop reason: HOSPADM

## 2021-02-09 RX ORDER — SODIUM CHLORIDE, SODIUM LACTATE, POTASSIUM CHLORIDE, CALCIUM CHLORIDE 600; 310; 30; 20 MG/100ML; MG/100ML; MG/100ML; MG/100ML
125 INJECTION, SOLUTION INTRAVENOUS ONCE
Status: COMPLETED | OUTPATIENT
Start: 2021-02-09 | End: 2021-02-09

## 2021-02-09 RX ORDER — SODIUM CHLORIDE 0.9 % (FLUSH) 0.9 %
10 SYRINGE (ML) INJECTION EVERY 12 HOURS SCHEDULED
Status: DISCONTINUED | OUTPATIENT
Start: 2021-02-09 | End: 2021-02-09 | Stop reason: HOSPADM

## 2021-02-09 RX ORDER — ONDANSETRON 2 MG/ML
4 INJECTION INTRAMUSCULAR; INTRAVENOUS AS NEEDED
Status: DISCONTINUED | OUTPATIENT
Start: 2021-02-09 | End: 2021-02-09 | Stop reason: HOSPADM

## 2021-02-09 RX ORDER — LIDOCAINE HYDROCHLORIDE 20 MG/ML
INJECTION, SOLUTION INFILTRATION; PERINEURAL AS NEEDED
Status: DISCONTINUED | OUTPATIENT
Start: 2021-02-09 | End: 2021-02-09 | Stop reason: SURG

## 2021-02-09 RX ORDER — IPRATROPIUM BROMIDE AND ALBUTEROL SULFATE 2.5; .5 MG/3ML; MG/3ML
3 SOLUTION RESPIRATORY (INHALATION) ONCE AS NEEDED
Status: DISCONTINUED | OUTPATIENT
Start: 2021-02-09 | End: 2021-02-09 | Stop reason: HOSPADM

## 2021-02-09 RX ORDER — FENTANYL CITRATE 50 UG/ML
50 INJECTION, SOLUTION INTRAMUSCULAR; INTRAVENOUS
Status: DISCONTINUED | OUTPATIENT
Start: 2021-02-09 | End: 2021-02-09 | Stop reason: HOSPADM

## 2021-02-09 RX ORDER — MIDAZOLAM HYDROCHLORIDE 1 MG/ML
0.5 INJECTION INTRAMUSCULAR; INTRAVENOUS
Status: DISCONTINUED | OUTPATIENT
Start: 2021-02-09 | End: 2021-02-09 | Stop reason: HOSPADM

## 2021-02-09 RX ORDER — SODIUM CHLORIDE 0.9 % (FLUSH) 0.9 %
10 SYRINGE (ML) INJECTION AS NEEDED
Status: DISCONTINUED | OUTPATIENT
Start: 2021-02-09 | End: 2021-02-09 | Stop reason: HOSPADM

## 2021-02-09 RX ORDER — OXYCODONE HYDROCHLORIDE AND ACETAMINOPHEN 5; 325 MG/1; MG/1
1 TABLET ORAL ONCE AS NEEDED
Status: DISCONTINUED | OUTPATIENT
Start: 2021-02-09 | End: 2021-02-09 | Stop reason: HOSPADM

## 2021-02-09 RX ADMIN — SODIUM CHLORIDE, POTASSIUM CHLORIDE, SODIUM LACTATE AND CALCIUM CHLORIDE: 600; 310; 30; 20 INJECTION, SOLUTION INTRAVENOUS at 07:56

## 2021-02-09 RX ADMIN — PROPOFOL 140 MCG/KG/MIN: 10 INJECTION, EMULSION INTRAVENOUS at 08:01

## 2021-02-09 RX ADMIN — LIDOCAINE HYDROCHLORIDE 60 MG: 20 INJECTION, SOLUTION INFILTRATION; PERINEURAL at 08:01

## 2021-02-09 NOTE — ANESTHESIA POSTPROCEDURE EVALUATION
Patient: Sandro Argueta    Procedure Summary     Date: 02/09/21 Room / Location: [facility] OR  /  [facility] OR    Anesthesia Start: 0756 Anesthesia Stop: 0815    Procedure: ESOPHAGOGASTRODUODENOSCOPY WITH BIOPSY dilitation (N/A Esophagus) Diagnosis:       Epigastric pain      (Epigastric pain [R10.13])    Surgeon: Bela Mcdaniel MD Provider: Sudheer Taylor MD    Anesthesia Type: general ASA Status: 3          Anesthesia Type: general    Vitals  No vitals data found for the desired time range.          Post Anesthesia Care and Evaluation    Patient location during evaluation: bedside  Patient participation: complete - patient participated  Level of consciousness: awake and alert  Pain score: 1  Pain management: adequate  Airway patency: patent  Anesthetic complications: No anesthetic complications  PONV Status: none  Cardiovascular status: acceptable  Respiratory status: acceptable  Hydration status: acceptable

## 2021-02-09 NOTE — ANESTHESIA PREPROCEDURE EVALUATION
Anesthesia Evaluation     no history of anesthetic complications:  NPO Solid Status: > 8 hours  NPO Liquid Status: > 8 hours           Airway   Mallampati: II  TM distance: >3 FB  Neck ROM: full  No difficulty expected  Dental    (+) partials    Pulmonary - normal exam   Cardiovascular - normal exam    (+) pacemaker, hypertension, CAD,       Neuro/Psych  GI/Hepatic/Renal/Endo    (+)  GERD,  diabetes mellitus,     Musculoskeletal     Abdominal  - normal exam   Substance History      OB/GYN          Other                      Anesthesia Plan    ASA 3     general       Anesthetic plan, all risks, benefits, and alternatives have been provided, discussed and informed consent has been obtained with: patient.

## 2021-02-09 NOTE — PROGRESS NOTES
Spoke with patient's daughter and told her we scheduled Ke for his Colonoscopy on 2-16-21 to arrive at 10:00. And I told her he would need to go have his COVID test this coming Friday and she voiced understanding. Oscar wanted patient to have Goyltely prep and his daughter said he would not be able to drink that and I called in 2 bottles of magnesium citrate and 4 dulcolax tablets to his pharmacy. I sent Oscar a message to let her know this information.

## 2021-02-09 NOTE — OP NOTE
ESOPHAGOGASTRODUODENOSCOPY PROCEDURE REPORT    Sandro Argueta  2/9/2021    GASTROENTEROLOGIST:  Bela Mcdaniel MD     PRE-P 1.  ROCEDURE DIAGNOSIS:  Epigastric pain [R10.13]    POST-PROCEDURE DIAGNOSIS:  1.  Esophagitis  2.  Gastritis  Hiatal hernia    Procedure(s):  ESOPHAGOGASTRODUODENOSCOPY WITH BIOPSY dilitation    ANESTHESIA:  Propofol administered by anesthesia.  See anesthesia notes for ASA classification    STAFF:  Circulator: Elisa Negron RN  Endo Technician: Graeme Ibrahim Jeannie S    FINDINGS:  1.  Patchy white plaques in the mid esophagus.  This was biopsied.  2.  Normal-appearing distal esophagus.  Biopsies were taken.  3.  Small hiatal hernia.  4.  Mild gastritis involving the antrum of the stomach.  Biopsies were taken for H. pylori.  5.  Normal-appearing duodenal bulb to second and third portion of the duodenum.  Biopsies were taken.    OPERATIVE PROCEDURE:  After proper informed consent was obtained, patient was transferred to the OR/endoscopy suite.  Patient was then placed in left lateral decubitus position. The Olympus 180 series video gastroscope was inserted orally under direct visualization.  Esophagus, stomach, and duodenum were inspected.  The endoscope was passed to the third portion of the duodenum.  Scope was retroflexed for visualization of the cardia and incisuraThe endoscope was then withdrawn. Patient tolerated the procedure well. There were no immediate complications.    ESTIMATED BLOOD LOSS:  None    COMPLICATIONS;  None    RECOMMENDATIONS/ PLAN:  1.  Follow-up biopsy results.  2.  Proceed with colonoscopy.  3.  I will have the patient follow-up in GI clinic after his colonoscopy.  4.  Continue omeprazole 40 mg p.o. every morning.    Bela Mcdaniel MD     02/09/21 08:13 EST

## 2021-02-10 ENCOUNTER — TELEPHONE (OUTPATIENT)
Dept: GASTROENTEROLOGY | Facility: CLINIC | Age: 76
End: 2021-02-10

## 2021-02-10 DIAGNOSIS — R10.13 EPIGASTRIC PAIN: ICD-10-CM

## 2021-02-10 DIAGNOSIS — K62.5 RECTAL BLEEDING: Primary | ICD-10-CM

## 2021-02-10 DIAGNOSIS — R10.13 EPIGASTRIC ABDOMINAL PAIN: ICD-10-CM

## 2021-02-10 DIAGNOSIS — Z01.818 PREOPERATIVE CLEARANCE: Primary | ICD-10-CM

## 2021-02-10 RX ORDER — BISACODYL 5 MG
TABLET, DELAYED RELEASE (ENTERIC COATED) ORAL
Qty: 4 TABLET | Refills: 0 | Status: ON HOLD | OUTPATIENT
Start: 2021-02-10 | End: 2021-06-27

## 2021-02-10 NOTE — TELEPHONE ENCOUNTER
Can you please put a case request in for patient to have a Colonoscopy on 2-16-21 and send mag. And dulcolax to his pharmacy. He is scheduled with Dr. Griffith.    Please

## 2021-02-11 ENCOUNTER — HOSPITAL ENCOUNTER (OUTPATIENT)
Facility: HOSPITAL | Age: 76
Setting detail: HOSPITAL OUTPATIENT SURGERY
End: 2021-02-11
Attending: INTERNAL MEDICINE | Admitting: INTERNAL MEDICINE

## 2021-02-11 PROBLEM — R10.13 EPIGASTRIC ABDOMINAL PAIN: Status: ACTIVE | Noted: 2021-02-11

## 2021-02-11 PROBLEM — K62.5 RECTAL BLEEDING: Status: ACTIVE | Noted: 2021-02-11

## 2021-02-11 LAB
LAB AP CASE REPORT: NORMAL
PATH REPORT.FINAL DX SPEC: NORMAL

## 2021-02-12 ENCOUNTER — TELEPHONE (OUTPATIENT)
Dept: GASTROENTEROLOGY | Facility: CLINIC | Age: 76
End: 2021-02-12

## 2021-02-12 ENCOUNTER — LAB (OUTPATIENT)
Dept: LAB | Facility: HOSPITAL | Age: 76
End: 2021-02-12

## 2021-02-12 DIAGNOSIS — R10.13 EPIGASTRIC PAIN: ICD-10-CM

## 2021-02-12 DIAGNOSIS — Z01.818 PREOPERATIVE CLEARANCE: ICD-10-CM

## 2021-02-12 NOTE — PROGRESS NOTES
Called patient to discuss finding elevated liver enzymes on labs.  He will come to the office 2/19/21 for workup.

## 2021-02-12 NOTE — TELEPHONE ENCOUNTER
Spoke with patient and he canceled his Colonoscopy scheduled with you next Tuesday due to stating he feels much better now. but, wanted to know the results of his EGD?    Please and Thank you

## 2021-02-12 NOTE — TELEPHONE ENCOUNTER
Let's schedule him for 2/19/21 Friday for office appt.  He is expecting your call.  He has elevated liver enzymes.

## 2021-02-17 ENCOUNTER — HOSPITAL ENCOUNTER (OUTPATIENT)
Dept: CT IMAGING | Facility: HOSPITAL | Age: 76
Discharge: HOME | End: 2021-02-17
Admitting: INTERNAL MEDICINE

## 2021-02-17 ENCOUNTER — TELEMEDICINE (OUTPATIENT)
Dept: GASTROENTEROLOGY | Facility: CLINIC | Age: 76
End: 2021-02-17

## 2021-02-17 VITALS
HEIGHT: 72 IN | WEIGHT: 182.2 LBS | BODY MASS INDEX: 24.68 KG/M2 | DIASTOLIC BLOOD PRESSURE: 64 MMHG | SYSTOLIC BLOOD PRESSURE: 117 MMHG | OXYGEN SATURATION: 98 % | HEART RATE: 74 BPM | TEMPERATURE: 96.1 F

## 2021-02-17 DIAGNOSIS — R63.4 WEIGHT LOSS, ABNORMAL: ICD-10-CM

## 2021-02-17 DIAGNOSIS — R74.8 ELEVATED LIVER ENZYMES: ICD-10-CM

## 2021-02-17 DIAGNOSIS — Z23 IMMUNIZATION DUE: ICD-10-CM

## 2021-02-17 DIAGNOSIS — R74.8 ELEVATED LIVER ENZYMES: Primary | ICD-10-CM

## 2021-02-17 PROCEDURE — 83516 IMMUNOASSAY NONANTIBODY: CPT | Performed by: INTERNAL MEDICINE

## 2021-02-17 PROCEDURE — 25010000002 IOPAMIDOL 61 % SOLUTION: Performed by: INTERNAL MEDICINE

## 2021-02-17 PROCEDURE — 82728 ASSAY OF FERRITIN: CPT | Performed by: INTERNAL MEDICINE

## 2021-02-17 PROCEDURE — 99214 OFFICE O/P EST MOD 30 MIN: CPT | Performed by: INTERNAL MEDICINE

## 2021-02-17 PROCEDURE — 83540 ASSAY OF IRON: CPT | Performed by: INTERNAL MEDICINE

## 2021-02-17 PROCEDURE — 74178 CT ABD&PLV WO CNTR FLWD CNTR: CPT

## 2021-02-17 PROCEDURE — 85025 COMPLETE CBC W/AUTO DIFF WBC: CPT | Performed by: INTERNAL MEDICINE

## 2021-02-17 PROCEDURE — 84466 ASSAY OF TRANSFERRIN: CPT | Performed by: INTERNAL MEDICINE

## 2021-02-17 PROCEDURE — 80053 COMPREHEN METABOLIC PANEL: CPT | Performed by: INTERNAL MEDICINE

## 2021-02-17 PROCEDURE — 74178 CT ABD&PLV WO CNTR FLWD CNTR: CPT | Performed by: RADIOLOGY

## 2021-02-17 RX ADMIN — IOPAMIDOL 100 ML: 612 INJECTION, SOLUTION INTRAVENOUS at 13:49

## 2021-02-17 NOTE — PROGRESS NOTES
GASTROENTEROLOGY TELEMEDICINE OFFICE NOTE  Sandro Argueta  [id number]  1945    CARE TEAM  Patient Care Team:  Misti Nguyễn MD as PCP - General (Geriatric Medicine)    No ref. provider found     No chief complaint on file.       HISTORY OF PRESENT ILLNESS:  The patient presents today with complaint of jaundice.  He has a vague discomfort in his abdomen.  He was having epigastric pain but this has improved somewhat.  I recently performed a CMP and found that he had elevated liver enzymes with a bilirubin of 1.8.  He has noticed recently that his eyes are turning yellow.  Of note, he also developed a rash on his back which looks to be as if it is resolving.  He has lost approximately 20 pounds in the past month per his report.  He also notes bright red blood per rectum.  However, he states he has become more constipated.  He states his stool is like pellets.  His appetite is currently good and he is eating but losing weight despite improvement in his appetite.  He is currently on omeprazole.  Is also complaining of the inability to sleep at night.      PAST MEDICAL HISTORY  Past Medical History:   Diagnosis Date   • Arthritis    • Borderline diabetes    • Coronary artery disease    • Diabetes mellitus (CMS/HCC)    • Elevated prostate specific antigen (PSA) 8/17/2020   • GERD (gastroesophageal reflux disease)    • Hypertension    • Left elbow pain         PAST SURGICAL HISTORY  Past Surgical History:   Procedure Laterality Date   • COLONOSCOPY     • ENDOSCOPY N/A 2/9/2021    Procedure: ESOPHAGOGASTRODUODENOSCOPY WITH BIOPSY dilitation;  Surgeon: Bela Mcdaniel MD;  [facility];  Service: Gastroenterology;  Laterality: N/A;   • PACEMAKER IMPLANTATION          MEDICATIONS:    Current Outpatient Medications:   •  lisinopril (PRINIVIL,ZESTRIL) 40 MG tablet, Take 40 mg by mouth Daily., Disp: , Rfl:   •  metFORMIN (GLUCOPHAGE) 500 MG tablet, Take 500 mg by mouth 2 (Two) Times a Day With Meals.,  Disp: , Rfl:   •  omeprazole (priLOSEC) 40 MG capsule, Take 1 capsule by mouth Daily., Disp: 30 capsule, Rfl: 5  •  bisacodyl (Dulcolax) 5 MG EC tablet, Take 4 tablets at 4pm the day prior to procedure with a full glass of water., Disp: 4 tablet, Rfl: 0  •  magnesium citrate solution, Take 296 mL by mouth Take As Directed. Follow bowel prep instructions given at office, Disp: 592 mL, Rfl: 0    ALLERGIES  Allergies   Allergen Reactions   • Other Anaphylaxis and Swelling     Patient had a reaction to the COVID vaccine. Pt states his tongue and lips started swelling       FAMILY HISTORY:  Family History   Problem Relation Age of Onset   • Diabetes Mother    • Diabetes Sister    • Diabetes Brother        SOCIAL HISTORY  Social History     Socioeconomic History   • Marital status:      Spouse name: Not on file   • Number of children: Not on file   • Years of education: Not on file   • Highest education level: Not on file   Tobacco Use   • Smoking status: Never Smoker   • Smokeless tobacco: Never Used   Substance and Sexual Activity   • Alcohol use: No   • Drug use: No   • Sexual activity: Defer       REVIEW OF SYSTEMS  Review of Systems   Constitutional: Negative.    HENT: Negative for sore throat and trouble swallowing.    Eyes: Negative.    Respiratory: Negative for chest tightness.    Cardiovascular: Negative for chest pain.   Gastrointestinal: Positive for anal bleeding, blood in stool and constipation. Negative for abdominal distention, abdominal pain, nausea and vomiting.   Endocrine: Negative.    Genitourinary: Negative for difficulty urinating.   Allergic/Immunologic: Negative for environmental allergies and food allergies.   Neurological: Negative for dizziness and headache.   Hematological: Does not bruise/bleed easily.   Psychiatric/Behavioral: Positive for agitation and sleep disturbance. The patient is nervous/anxious.          PHYSICAL EXAM   @ROS@    Results for orders placed or performed during the  hospital encounter of 02/09/21   TSH    Specimen: Blood   Result Value Ref Range    TSH 2.520 0.270 - 4.200 uIU/mL   Comprehensive Metabolic Panel    Specimen: Blood   Result Value Ref Range    Glucose 260 (H) 65 - 99 mg/dL    BUN 31 (H) 8 - 23 mg/dL    Creatinine 1.27 0.76 - 1.27 mg/dL    Sodium 133 (L) 136 - 145 mmol/L    Potassium 4.6 3.5 - 5.2 mmol/L    Chloride 99 98 - 107 mmol/L    CO2 24.0 22.0 - 29.0 mmol/L    Calcium 9.4 8.6 - 10.5 mg/dL    Total Protein 7.7 6.0 - 8.5 g/dL    Albumin 3.81 3.50 - 5.20 g/dL    ALT (SGPT) 417 (H) 1 - 41 U/L    AST (SGOT) 191 (H) 1 - 40 U/L    Alkaline Phosphatase 357 (H) 39 - 117 U/L    Total Bilirubin 1.8 (H) 0.0 - 1.2 mg/dL    eGFR Non African Amer 55 (L) >60 mL/min/1.73    Globulin 3.9 gm/dL    A/G Ratio 1.0 g/dL    BUN/Creatinine Ratio 24.4 7.0 - 25.0    Anion Gap 10.0 5.0 - 15.0 mmol/L   CBC Auto Differential    Specimen: Blood   Result Value Ref Range    WBC 5.95 3.40 - 10.80 10*3/mm3    RBC 4.65 4.14 - 5.80 10*6/mm3    Hemoglobin 13.4 13.0 - 17.7 g/dL    Hematocrit 43.1 37.5 - 51.0 %    MCV 92.7 79.0 - 97.0 fL    MCH 28.8 26.6 - 33.0 pg    MCHC 31.1 (L) 31.5 - 35.7 g/dL    RDW 12.9 12.3 - 15.4 %    RDW-SD 44.3 37.0 - 54.0 fl    MPV 10.4 6.0 - 12.0 fL    Platelets 343 140 - 450 10*3/mm3    Neutrophil % 64.5 42.7 - 76.0 %    Lymphocyte % 20.2 19.6 - 45.3 %    Monocyte % 8.9 5.0 - 12.0 %    Eosinophil % 5.2 0.3 - 6.2 %    Basophil % 1.0 0.0 - 1.5 %    Immature Grans % 0.2 0.0 - 0.5 %    Neutrophils, Absolute 3.84 1.70 - 7.00 10*3/mm3    Lymphocytes, Absolute 1.20 0.70 - 3.10 10*3/mm3    Monocytes, Absolute 0.53 0.10 - 0.90 10*3/mm3    Eosinophils, Absolute 0.31 0.00 - 0.40 10*3/mm3    Basophils, Absolute 0.06 0.00 - 0.20 10*3/mm3    Immature Grans, Absolute 0.01 0.00 - 0.05 10*3/mm3    nRBC 0.0 0.0 - 0.2 /100 WBC   Tissue Pathology Exam    Specimen: A: Small Intestine, Duodenum; Tissue    B: Gastric, Antrum; Tissue    C: Esophagus, Distal; Tissue    D: Esophagus, Mid;  Tissue   Result Value Ref Range    Case Report       Surgical Pathology Report                         Case: [id number]                                  Authorizing Provider:  Bela Mcdaniel, Collected:           02/09/2021 08:09 AM                                 MD                                                                           Ordering Location:     [facility]      Received:            02/09/2021 10:08 AM                                 OPERATING ROOM DEPARTMENT                                                    Pathologist:           Shireen Delong MD                                                       Specimens:   1) - Small Intestine, Duodenum                                                                      2) - Gastric, Antrum                                                                                3) - Esophagus, Distal                                                                              4) - Esophagus, Mid                                                                        Final Diagnosis       See Scanned Report            Results Review:  I reviewed the patient's new clinical results.  I reviewed the patient's other test results and agree with the interpretation      ASSESSMENT  1. jaundice   2.  Abnormal weight loss  3.  Abdominal pain  4.  Constipation  5.  Elevated liver enzymes    PLAN  I will proceed with CT scan of the abdomen and pelvis.  With the patient's recent weight loss over the past month and elevated liver enzymes, I am concerned of possible neoplasm involving either the head of the pancreas or the bile ducts of the liver.  I will obtain labs today for possible autoimmune causes for elevated liver enzymes.  The patient will follow up in 2 to 4 weeks.    You have chosen to receive care through a telehealth visit.  Do you consent to use a video/audio connection for your medical care today? Yes     I discussed the patients findings  and my recommendations with patient        This document has been electronically signed by Bela Mauricio May   February 17, 2021 11:24 EST      Part of this note may be an electronic transcription/translation of spoken language to printed text using the Dragon Dictation System.

## 2021-02-18 ENCOUNTER — TELEPHONE (OUTPATIENT)
Dept: GASTROENTEROLOGY | Facility: CLINIC | Age: 76
End: 2021-02-18

## 2021-02-18 ENCOUNTER — HOSPITAL ENCOUNTER (OUTPATIENT)
Facility: HOSPITAL | Age: 76
Setting detail: HOSPITAL OUTPATIENT SURGERY
End: 2021-02-18
Attending: INTERNAL MEDICINE | Admitting: INTERNAL MEDICINE

## 2021-02-18 DIAGNOSIS — R10.13 EPIGASTRIC PAIN: ICD-10-CM

## 2021-02-18 DIAGNOSIS — K62.5 RECTAL BLEEDING: ICD-10-CM

## 2021-02-18 DIAGNOSIS — Z01.818 PREOPERATIVE CLEARANCE: ICD-10-CM

## 2021-02-18 DIAGNOSIS — R74.8 ELEVATED LIVER ENZYMES: Primary | ICD-10-CM

## 2021-02-18 DIAGNOSIS — R10.13 EPIGASTRIC ABDOMINAL PAIN: ICD-10-CM

## 2021-02-18 DIAGNOSIS — R63.4 WEIGHT LOSS, ABNORMAL: ICD-10-CM

## 2021-02-18 LAB
ALBUMIN SERPL-MCNC: 4.1 G/DL (ref 3.5–5.2)
ALBUMIN/GLOB SERPL: 1.3 G/DL
ALP SERPL-CCNC: 383 U/L (ref 39–117)
ALT SERPL W P-5'-P-CCNC: 482 U/L (ref 1–41)
ANION GAP SERPL CALCULATED.3IONS-SCNC: 12.6 MMOL/L (ref 5–15)
AST SERPL-CCNC: 208 U/L (ref 1–40)
BASOPHILS # BLD AUTO: 0.1 10*3/MM3 (ref 0–0.2)
BASOPHILS NFR BLD AUTO: 1.4 % (ref 0–1.5)
BILIRUB SERPL-MCNC: 7.2 MG/DL (ref 0–1.2)
BUN SERPL-MCNC: 19 MG/DL (ref 8–23)
BUN/CREAT SERPL: 16.2 (ref 7–25)
CALCIUM SPEC-SCNC: 9.9 MG/DL (ref 8.6–10.5)
CHLORIDE SERPL-SCNC: 99 MMOL/L (ref 98–107)
CO2 SERPL-SCNC: 25.4 MMOL/L (ref 22–29)
CREAT SERPL-MCNC: 1.17 MG/DL (ref 0.76–1.27)
DEPRECATED RDW RBC AUTO: 40.4 FL (ref 37–54)
EOSINOPHIL # BLD AUTO: 0.53 10*3/MM3 (ref 0–0.4)
EOSINOPHIL NFR BLD AUTO: 7.4 % (ref 0.3–6.2)
ERYTHROCYTE [DISTWIDTH] IN BLOOD BY AUTOMATED COUNT: 12 % (ref 12.3–15.4)
FERRITIN SERPL-MCNC: 1052 NG/ML (ref 30–400)
GFR SERPL CREATININE-BSD FRML MDRD: 61 ML/MIN/1.73
GLOBULIN UR ELPH-MCNC: 3.1 GM/DL
GLUCOSE SERPL-MCNC: 180 MG/DL (ref 65–99)
HCT VFR BLD AUTO: 41.4 % (ref 37.5–51)
HGB BLD-MCNC: 13.3 G/DL (ref 13–17.7)
IMM GRANULOCYTES # BLD AUTO: 0.02 10*3/MM3 (ref 0–0.05)
IMM GRANULOCYTES NFR BLD AUTO: 0.3 % (ref 0–0.5)
IRON 24H UR-MRATE: 130 MCG/DL (ref 59–158)
IRON SATN MFR SERPL: 35 % (ref 20–50)
LYMPHOCYTES # BLD AUTO: 1.35 10*3/MM3 (ref 0.7–3.1)
LYMPHOCYTES NFR BLD AUTO: 18.7 % (ref 19.6–45.3)
MCH RBC QN AUTO: 29.2 PG (ref 26.6–33)
MCHC RBC AUTO-ENTMCNC: 32.1 G/DL (ref 31.5–35.7)
MCV RBC AUTO: 91 FL (ref 79–97)
MONOCYTES # BLD AUTO: 0.68 10*3/MM3 (ref 0.1–0.9)
MONOCYTES NFR BLD AUTO: 9.4 % (ref 5–12)
NEUTROPHILS NFR BLD AUTO: 4.53 10*3/MM3 (ref 1.7–7)
NEUTROPHILS NFR BLD AUTO: 62.8 % (ref 42.7–76)
NRBC BLD AUTO-RTO: 0 /100 WBC (ref 0–0.2)
PLATELET # BLD AUTO: 383 10*3/MM3 (ref 140–450)
PMV BLD AUTO: 11.6 FL (ref 6–12)
POTASSIUM SERPL-SCNC: 4.7 MMOL/L (ref 3.5–5.2)
PROT SERPL-MCNC: 7.2 G/DL (ref 6–8.5)
RBC # BLD AUTO: 4.55 10*6/MM3 (ref 4.14–5.8)
SODIUM SERPL-SCNC: 137 MMOL/L (ref 136–145)
TIBC SERPL-MCNC: 367 MCG/DL (ref 298–536)
TRANSFERRIN SERPL-MCNC: 246 MG/DL (ref 200–360)
WBC # BLD AUTO: 7.21 10*3/MM3 (ref 3.4–10.8)

## 2021-02-18 NOTE — PROGRESS NOTES
Labs discussed with the patient and family.  Patient desires son in law to be in on discussions of labs and test results.  He will be scheduled for ERCP on Monday.

## 2021-02-19 ENCOUNTER — LAB (OUTPATIENT)
Dept: LAB | Facility: HOSPITAL | Age: 76
End: 2021-02-19

## 2021-02-19 DIAGNOSIS — R63.4 WEIGHT LOSS, ABNORMAL: ICD-10-CM

## 2021-02-19 DIAGNOSIS — R10.13 EPIGASTRIC ABDOMINAL PAIN: ICD-10-CM

## 2021-02-19 DIAGNOSIS — Z01.818 PREOPERATIVE CLEARANCE: ICD-10-CM

## 2021-02-19 DIAGNOSIS — K62.5 RECTAL BLEEDING: ICD-10-CM

## 2021-02-19 DIAGNOSIS — R10.13 EPIGASTRIC PAIN: ICD-10-CM

## 2021-02-19 DIAGNOSIS — R74.8 ELEVATED LIVER ENZYMES: ICD-10-CM

## 2021-02-19 LAB — MITOCHONDRIA M2 IGG SER-ACNC: <20 UNITS (ref 0–20)

## 2021-02-19 PROCEDURE — U0004 COV-19 TEST NON-CDC HGH THRU: HCPCS

## 2021-02-19 PROCEDURE — U0005 INFEC AGEN DETEC AMPLI PROBE: HCPCS

## 2021-02-19 PROCEDURE — C9803 HOPD COVID-19 SPEC COLLECT: HCPCS

## 2021-02-20 ENCOUNTER — APPOINTMENT (OUTPATIENT)
Dept: GENERAL RADIOLOGY | Facility: HOSPITAL | Age: 76
End: 2021-02-20

## 2021-02-20 ENCOUNTER — HOSPITAL ENCOUNTER (INPATIENT)
Facility: HOSPITAL | Age: 76
LOS: 2 days | Discharge: HOME | End: 2021-02-22
Attending: EMERGENCY MEDICINE | Admitting: INTERNAL MEDICINE

## 2021-02-20 DIAGNOSIS — R42 LIGHTHEADED: ICD-10-CM

## 2021-02-20 DIAGNOSIS — R53.1 GENERALIZED WEAKNESS: ICD-10-CM

## 2021-02-20 DIAGNOSIS — K83.1 OBSTRUCTIVE JAUNDICE: Primary | ICD-10-CM

## 2021-02-20 DIAGNOSIS — K83.1 BILIARY STRICTURE: ICD-10-CM

## 2021-02-20 DIAGNOSIS — R63.4 UNINTENDED WEIGHT LOSS: ICD-10-CM

## 2021-02-20 PROBLEM — D64.9 ANEMIA: Status: ACTIVE | Noted: 2021-02-20

## 2021-02-20 PROBLEM — B17.9 ACUTE HEPATITIS: Status: ACTIVE | Noted: 2021-02-20

## 2021-02-20 PROBLEM — N39.0 ACUTE UTI (URINARY TRACT INFECTION): Status: ACTIVE | Noted: 2021-02-20

## 2021-02-20 LAB
ALBUMIN SERPL-MCNC: 3.7 G/DL (ref 3.5–5.2)
ALBUMIN/GLOB SERPL: 1.1 G/DL
ALP SERPL-CCNC: 420 U/L (ref 39–117)
ALT SERPL W P-5'-P-CCNC: 413 U/L (ref 1–41)
ANION GAP SERPL CALCULATED.3IONS-SCNC: 8 MMOL/L (ref 5–15)
AST SERPL-CCNC: 193 U/L (ref 1–40)
BACTERIA UR QL AUTO: ABNORMAL /HPF
BASOPHILS # BLD AUTO: 0.08 10*3/MM3 (ref 0–0.2)
BASOPHILS NFR BLD AUTO: 1.2 % (ref 0–1.5)
BILIRUB SERPL-MCNC: 9 MG/DL (ref 0–1.2)
BILIRUB UR QL STRIP: ABNORMAL
BUN SERPL-MCNC: 17 MG/DL (ref 8–23)
BUN/CREAT SERPL: 15.6 (ref 7–25)
CALCIUM SPEC-SCNC: 9 MG/DL (ref 8.6–10.5)
CHLORIDE SERPL-SCNC: 100 MMOL/L (ref 98–107)
CLARITY UR: CLEAR
CO2 SERPL-SCNC: 27 MMOL/L (ref 22–29)
COLOR UR: ABNORMAL
CREAT SERPL-MCNC: 1.09 MG/DL (ref 0.76–1.27)
DEPRECATED RDW RBC AUTO: 50.4 FL (ref 37–54)
EOSINOPHIL # BLD AUTO: 0.46 10*3/MM3 (ref 0–0.4)
EOSINOPHIL NFR BLD AUTO: 6.9 % (ref 0.3–6.2)
ERYTHROCYTE [DISTWIDTH] IN BLOOD BY AUTOMATED COUNT: 15.6 % (ref 12.3–15.4)
FLUAV RNA RESP QL NAA+PROBE: NOT DETECTED
FLUBV RNA RESP QL NAA+PROBE: NOT DETECTED
GFR SERPL CREATININE-BSD FRML MDRD: 66 ML/MIN/1.73
GLOBULIN UR ELPH-MCNC: 3.5 GM/DL
GLUCOSE SERPL-MCNC: 241 MG/DL (ref 65–99)
GLUCOSE UR STRIP-MCNC: ABNORMAL MG/DL
HAV IGM SERPL QL IA: NORMAL
HBV CORE IGM SERPL QL IA: NORMAL
HBV SURFACE AG SERPL QL IA: NORMAL
HCT VFR BLD AUTO: 36.8 % (ref 37.5–51)
HCV AB SER DONR QL: NORMAL
HGB BLD-MCNC: 12 G/DL (ref 13–17.7)
HGB UR QL STRIP.AUTO: NEGATIVE
HOLD SPECIMEN: NORMAL
HYALINE CASTS UR QL AUTO: ABNORMAL /LPF
IMM GRANULOCYTES # BLD AUTO: 0.02 10*3/MM3 (ref 0–0.05)
IMM GRANULOCYTES NFR BLD AUTO: 0.3 % (ref 0–0.5)
INR PPP: 1.18 (ref 0.85–1.16)
KETONES UR QL STRIP: NEGATIVE
LEUKOCYTE ESTERASE UR QL STRIP.AUTO: ABNORMAL
LIPASE SERPL-CCNC: 125 U/L (ref 13–60)
LYMPHOCYTES # BLD AUTO: 1.32 10*3/MM3 (ref 0.7–3.1)
LYMPHOCYTES NFR BLD AUTO: 19.8 % (ref 19.6–45.3)
MAGNESIUM SERPL-MCNC: 2 MG/DL (ref 1.6–2.4)
MCH RBC QN AUTO: 28.9 PG (ref 26.6–33)
MCHC RBC AUTO-ENTMCNC: 32.6 G/DL (ref 31.5–35.7)
MCV RBC AUTO: 88.7 FL (ref 79–97)
MONOCYTES # BLD AUTO: 0.66 10*3/MM3 (ref 0.1–0.9)
MONOCYTES NFR BLD AUTO: 9.9 % (ref 5–12)
NEUTROPHILS NFR BLD AUTO: 4.14 10*3/MM3 (ref 1.7–7)
NEUTROPHILS NFR BLD AUTO: 61.9 % (ref 42.7–76)
NITRITE UR QL STRIP: POSITIVE
NRBC BLD AUTO-RTO: 0 /100 WBC (ref 0–0.2)
PH UR STRIP.AUTO: 6 [PH] (ref 5–8)
PHOSPHATE SERPL-MCNC: 3 MG/DL (ref 2.5–4.5)
PLATELET # BLD AUTO: 308 10*3/MM3 (ref 140–450)
PMV BLD AUTO: 10.5 FL (ref 6–12)
POTASSIUM SERPL-SCNC: 4.2 MMOL/L (ref 3.5–5.2)
PROT SERPL-MCNC: 7.2 G/DL (ref 6–8.5)
PROT UR QL STRIP: ABNORMAL
PROTHROMBIN TIME: 14.7 SECONDS (ref 11.5–14)
RBC # BLD AUTO: 4.15 10*6/MM3 (ref 4.14–5.8)
RBC # UR: ABNORMAL /HPF
REF LAB TEST METHOD: ABNORMAL
SARS-COV-2 RNA RESP QL NAA+PROBE: NOT DETECTED
SARS-COV-2 RNA RESP QL NAA+PROBE: NOT DETECTED
SODIUM SERPL-SCNC: 135 MMOL/L (ref 136–145)
SP GR UR STRIP: >=1.03 (ref 1–1.03)
SQUAMOUS #/AREA URNS HPF: ABNORMAL /HPF
TROPONIN T SERPL-MCNC: <0.01 NG/ML (ref 0–0.03)
UROBILINOGEN UR QL STRIP: ABNORMAL
WBC # BLD AUTO: 6.68 10*3/MM3 (ref 3.4–10.8)
WBC UR QL AUTO: ABNORMAL /HPF
WHOLE BLOOD HOLD SPECIMEN: NORMAL
WHOLE BLOOD HOLD SPECIMEN: NORMAL

## 2021-02-20 PROCEDURE — 99284 EMERGENCY DEPT VISIT MOD MDM: CPT

## 2021-02-20 PROCEDURE — 85610 PROTHROMBIN TIME: CPT | Performed by: EMERGENCY MEDICINE

## 2021-02-20 PROCEDURE — 71045 X-RAY EXAM CHEST 1 VIEW: CPT

## 2021-02-20 PROCEDURE — 93005 ELECTROCARDIOGRAM TRACING: CPT | Performed by: EMERGENCY MEDICINE

## 2021-02-20 PROCEDURE — 81001 URINALYSIS AUTO W/SCOPE: CPT | Performed by: EMERGENCY MEDICINE

## 2021-02-20 PROCEDURE — 84100 ASSAY OF PHOSPHORUS: CPT | Performed by: EMERGENCY MEDICINE

## 2021-02-20 PROCEDURE — 99223 1ST HOSP IP/OBS HIGH 75: CPT | Performed by: INTERNAL MEDICINE

## 2021-02-20 PROCEDURE — 87086 URINE CULTURE/COLONY COUNT: CPT | Performed by: PHYSICIAN ASSISTANT

## 2021-02-20 PROCEDURE — 85025 COMPLETE CBC W/AUTO DIFF WBC: CPT | Performed by: EMERGENCY MEDICINE

## 2021-02-20 PROCEDURE — 80053 COMPREHEN METABOLIC PANEL: CPT | Performed by: EMERGENCY MEDICINE

## 2021-02-20 PROCEDURE — 83690 ASSAY OF LIPASE: CPT | Performed by: EMERGENCY MEDICINE

## 2021-02-20 PROCEDURE — 83735 ASSAY OF MAGNESIUM: CPT | Performed by: EMERGENCY MEDICINE

## 2021-02-20 PROCEDURE — 87636 SARSCOV2 & INF A&B AMP PRB: CPT | Performed by: EMERGENCY MEDICINE

## 2021-02-20 PROCEDURE — 80074 ACUTE HEPATITIS PANEL: CPT | Performed by: PHYSICIAN ASSISTANT

## 2021-02-20 PROCEDURE — 25010000002 CEFTRIAXONE PER 250 MG: Performed by: PHYSICIAN ASSISTANT

## 2021-02-20 PROCEDURE — 84484 ASSAY OF TROPONIN QUANT: CPT | Performed by: EMERGENCY MEDICINE

## 2021-02-20 RX ORDER — SODIUM CHLORIDE 0.9 % (FLUSH) 0.9 %
10 SYRINGE (ML) INJECTION EVERY 12 HOURS SCHEDULED
Status: DISCONTINUED | OUTPATIENT
Start: 2021-02-21 | End: 2021-02-22 | Stop reason: HOSPADM

## 2021-02-20 RX ORDER — SODIUM CHLORIDE 0.9 % (FLUSH) 0.9 %
10 SYRINGE (ML) INJECTION AS NEEDED
Status: DISCONTINUED | OUTPATIENT
Start: 2021-02-20 | End: 2021-02-22 | Stop reason: HOSPADM

## 2021-02-20 RX ORDER — SODIUM CHLORIDE 9 MG/ML
75 INJECTION, SOLUTION INTRAVENOUS CONTINUOUS
Status: ACTIVE | OUTPATIENT
Start: 2021-02-21 | End: 2021-02-21

## 2021-02-20 RX ORDER — DEXTROSE MONOHYDRATE 25 G/50ML
25 INJECTION, SOLUTION INTRAVENOUS
Status: DISCONTINUED | OUTPATIENT
Start: 2021-02-20 | End: 2021-02-22 | Stop reason: HOSPADM

## 2021-02-20 RX ORDER — ONDANSETRON 2 MG/ML
4 INJECTION INTRAMUSCULAR; INTRAVENOUS EVERY 6 HOURS PRN
Status: DISCONTINUED | OUTPATIENT
Start: 2021-02-20 | End: 2021-02-22 | Stop reason: HOSPADM

## 2021-02-20 RX ORDER — NICOTINE POLACRILEX 4 MG
15 LOZENGE BUCCAL
Status: DISCONTINUED | OUTPATIENT
Start: 2021-02-20 | End: 2021-02-22 | Stop reason: HOSPADM

## 2021-02-20 RX ORDER — CHOLECALCIFEROL (VITAMIN D3) 125 MCG
5 CAPSULE ORAL NIGHTLY PRN
Status: DISCONTINUED | OUTPATIENT
Start: 2021-02-20 | End: 2021-02-22 | Stop reason: HOSPADM

## 2021-02-20 RX ORDER — LISINOPRIL 40 MG/1
40 TABLET ORAL DAILY
Status: DISCONTINUED | OUTPATIENT
Start: 2021-02-21 | End: 2021-02-22 | Stop reason: HOSPADM

## 2021-02-20 RX ADMIN — SODIUM CHLORIDE 1 G: 900 INJECTION INTRAVENOUS at 22:19

## 2021-02-20 RX ADMIN — SODIUM CHLORIDE, PRESERVATIVE FREE 10 ML: 5 INJECTION INTRAVENOUS at 23:53

## 2021-02-20 RX ADMIN — SODIUM CHLORIDE 75 ML/HR: 9 INJECTION, SOLUTION INTRAVENOUS at 23:53

## 2021-02-21 ENCOUNTER — ANESTHESIA EVENT (OUTPATIENT)
Dept: GASTROENTEROLOGY | Facility: HOSPITAL | Age: 76
End: 2021-02-21

## 2021-02-21 ENCOUNTER — ANESTHESIA (OUTPATIENT)
Dept: GASTROENTEROLOGY | Facility: HOSPITAL | Age: 76
End: 2021-02-21

## 2021-02-21 ENCOUNTER — APPOINTMENT (OUTPATIENT)
Dept: GENERAL RADIOLOGY | Facility: HOSPITAL | Age: 76
End: 2021-02-21

## 2021-02-21 LAB
ALBUMIN SERPL-MCNC: 3.2 G/DL (ref 3.5–5.2)
ALBUMIN/GLOB SERPL: 1.1 G/DL
ALP SERPL-CCNC: 383 U/L (ref 39–117)
ALT SERPL W P-5'-P-CCNC: 363 U/L (ref 1–41)
ANION GAP SERPL CALCULATED.3IONS-SCNC: 7 MMOL/L (ref 5–15)
AST SERPL-CCNC: 200 U/L (ref 1–40)
BILIRUB SERPL-MCNC: 9.2 MG/DL (ref 0–1.2)
BUN SERPL-MCNC: 15 MG/DL (ref 8–23)
BUN/CREAT SERPL: 16.9 (ref 7–25)
CALCIUM SPEC-SCNC: 8.7 MG/DL (ref 8.6–10.5)
CHLORIDE SERPL-SCNC: 103 MMOL/L (ref 98–107)
CO2 SERPL-SCNC: 25 MMOL/L (ref 22–29)
CREAT SERPL-MCNC: 0.89 MG/DL (ref 0.76–1.27)
DEPRECATED RDW RBC AUTO: 49.1 FL (ref 37–54)
ERYTHROCYTE [DISTWIDTH] IN BLOOD BY AUTOMATED COUNT: 15.7 % (ref 12.3–15.4)
FERRITIN SERPL-MCNC: 1306 NG/ML (ref 30–400)
GFR SERPL CREATININE-BSD FRML MDRD: 84 ML/MIN/1.73
GLOBULIN UR ELPH-MCNC: 3 GM/DL
GLUCOSE BLDC GLUCOMTR-MCNC: 132 MG/DL (ref 70–130)
GLUCOSE BLDC GLUCOMTR-MCNC: 202 MG/DL (ref 70–130)
GLUCOSE BLDC GLUCOMTR-MCNC: 224 MG/DL (ref 70–130)
GLUCOSE BLDC GLUCOMTR-MCNC: 263 MG/DL (ref 70–130)
GLUCOSE SERPL-MCNC: 184 MG/DL (ref 65–99)
HBA1C MFR BLD: 7.2 % (ref 4.8–5.6)
HCT VFR BLD AUTO: 33.3 % (ref 37.5–51)
HEMOCCULT STL QL: NEGATIVE
HGB BLD-MCNC: 11.1 G/DL (ref 13–17.7)
INR PPP: 1.22 (ref 0.85–1.16)
IRON 24H UR-MRATE: 113 MCG/DL (ref 59–158)
IRON SATN MFR SERPL: 38 % (ref 20–50)
MCH RBC QN AUTO: 28.8 PG (ref 26.6–33)
MCHC RBC AUTO-ENTMCNC: 33.3 G/DL (ref 31.5–35.7)
MCV RBC AUTO: 86.3 FL (ref 79–97)
PLATELET # BLD AUTO: 278 10*3/MM3 (ref 140–450)
PMV BLD AUTO: 10.7 FL (ref 6–12)
POTASSIUM SERPL-SCNC: 4 MMOL/L (ref 3.5–5.2)
PROT SERPL-MCNC: 6.2 G/DL (ref 6–8.5)
PROTHROMBIN TIME: 15.1 SECONDS (ref 11.5–14)
QT INTERVAL: 434 MS
QTC INTERVAL: 465 MS
RBC # BLD AUTO: 3.86 10*6/MM3 (ref 4.14–5.8)
SODIUM SERPL-SCNC: 135 MMOL/L (ref 136–145)
TIBC SERPL-MCNC: 297 MCG/DL (ref 298–536)
TRANSFERRIN SERPL-MCNC: 199 MG/DL (ref 200–360)
WBC # BLD AUTO: 6.16 10*3/MM3 (ref 3.4–10.8)

## 2021-02-21 PROCEDURE — 25010000002 PROPOFOL 10 MG/ML EMULSION: Performed by: NURSE ANESTHETIST, CERTIFIED REGISTERED

## 2021-02-21 PROCEDURE — 82728 ASSAY OF FERRITIN: CPT | Performed by: PHYSICIAN ASSISTANT

## 2021-02-21 PROCEDURE — C1874 STENT, COATED/COV W/DEL SYS: HCPCS | Performed by: INTERNAL MEDICINE

## 2021-02-21 PROCEDURE — 82962 GLUCOSE BLOOD TEST: CPT

## 2021-02-21 PROCEDURE — 43274 ERCP DUCT STENT PLACEMENT: CPT | Performed by: INTERNAL MEDICINE

## 2021-02-21 PROCEDURE — 74330 X-RAY BILE/PANC ENDOSCOPY: CPT

## 2021-02-21 PROCEDURE — 25010000002 HYDRALAZINE PER 20 MG: Performed by: INTERNAL MEDICINE

## 2021-02-21 PROCEDURE — 0F798DZ DILATION OF COMMON BILE DUCT WITH INTRALUMINAL DEVICE, VIA NATURAL OR ARTIFICIAL OPENING ENDOSCOPIC: ICD-10-PCS | Performed by: INTERNAL MEDICINE

## 2021-02-21 PROCEDURE — C1769 GUIDE WIRE: HCPCS | Performed by: INTERNAL MEDICINE

## 2021-02-21 PROCEDURE — 63710000001 INSULIN LISPRO (HUMAN) PER 5 UNITS: Performed by: INTERNAL MEDICINE

## 2021-02-21 PROCEDURE — 83036 HEMOGLOBIN GLYCOSYLATED A1C: CPT | Performed by: PHYSICIAN ASSISTANT

## 2021-02-21 PROCEDURE — 80053 COMPREHEN METABOLIC PANEL: CPT | Performed by: PHYSICIAN ASSISTANT

## 2021-02-21 PROCEDURE — 25010000002 IOPAMIDOL 61 % SOLUTION: Performed by: INTERNAL MEDICINE

## 2021-02-21 PROCEDURE — 83540 ASSAY OF IRON: CPT | Performed by: PHYSICIAN ASSISTANT

## 2021-02-21 PROCEDURE — 84466 ASSAY OF TRANSFERRIN: CPT | Performed by: PHYSICIAN ASSISTANT

## 2021-02-21 PROCEDURE — 85610 PROTHROMBIN TIME: CPT | Performed by: PHYSICIAN ASSISTANT

## 2021-02-21 PROCEDURE — 85027 COMPLETE CBC AUTOMATED: CPT | Performed by: PHYSICIAN ASSISTANT

## 2021-02-21 PROCEDURE — 99222 1ST HOSP IP/OBS MODERATE 55: CPT | Performed by: PHYSICIAN ASSISTANT

## 2021-02-21 PROCEDURE — 63710000001 INSULIN LISPRO (HUMAN) PER 5 UNITS: Performed by: PHYSICIAN ASSISTANT

## 2021-02-21 PROCEDURE — 82272 OCCULT BLD FECES 1-3 TESTS: CPT | Performed by: INTERNAL MEDICINE

## 2021-02-21 PROCEDURE — 25010000002 CEFTRIAXONE PER 250 MG: Performed by: INTERNAL MEDICINE

## 2021-02-21 PROCEDURE — 99232 SBSQ HOSP IP/OBS MODERATE 35: CPT | Performed by: INTERNAL MEDICINE

## 2021-02-21 PROCEDURE — BF141ZZ FLUOROSCOPY OF GALLBLADDER, BILE DUCTS AND PANCREATIC DUCTS USING LOW OSMOLAR CONTRAST: ICD-10-PCS | Performed by: INTERNAL MEDICINE

## 2021-02-21 DEVICE — STENT SYSTEM RMV
Type: IMPLANTABLE DEVICE | Site: BILE DUCT | Status: FUNCTIONAL
Brand: WALLFLEX BILIARY

## 2021-02-21 RX ORDER — SODIUM CHLORIDE, SODIUM LACTATE, POTASSIUM CHLORIDE, CALCIUM CHLORIDE 600; 310; 30; 20 MG/100ML; MG/100ML; MG/100ML; MG/100ML
20 INJECTION, SOLUTION INTRAVENOUS ONCE
Status: COMPLETED | OUTPATIENT
Start: 2021-02-21 | End: 2021-02-21

## 2021-02-21 RX ORDER — HYDROMORPHONE HYDROCHLORIDE 1 MG/ML
0.5 INJECTION, SOLUTION INTRAMUSCULAR; INTRAVENOUS; SUBCUTANEOUS
Status: DISCONTINUED | OUTPATIENT
Start: 2021-02-21 | End: 2021-02-21 | Stop reason: HOSPADM

## 2021-02-21 RX ORDER — LIDOCAINE HYDROCHLORIDE 10 MG/ML
INJECTION, SOLUTION EPIDURAL; INFILTRATION; INTRACAUDAL; PERINEURAL AS NEEDED
Status: DISCONTINUED | OUTPATIENT
Start: 2021-02-21 | End: 2021-02-21 | Stop reason: SURG

## 2021-02-21 RX ORDER — HYDRALAZINE HYDROCHLORIDE 20 MG/ML
10 INJECTION INTRAMUSCULAR; INTRAVENOUS EVERY 6 HOURS PRN
Status: DISCONTINUED | OUTPATIENT
Start: 2021-02-21 | End: 2021-02-22 | Stop reason: HOSPADM

## 2021-02-21 RX ORDER — PROPOFOL 10 MG/ML
VIAL (ML) INTRAVENOUS AS NEEDED
Status: DISCONTINUED | OUTPATIENT
Start: 2021-02-21 | End: 2021-02-21 | Stop reason: SURG

## 2021-02-21 RX ORDER — ACETAMINOPHEN 325 MG/1
650 TABLET ORAL EVERY 6 HOURS PRN
Status: DISCONTINUED | OUTPATIENT
Start: 2021-02-21 | End: 2021-02-22 | Stop reason: HOSPADM

## 2021-02-21 RX ORDER — PANTOPRAZOLE SODIUM 40 MG/1
40 TABLET, DELAYED RELEASE ORAL
Status: DISCONTINUED | OUTPATIENT
Start: 2021-02-22 | End: 2021-02-22 | Stop reason: HOSPADM

## 2021-02-21 RX ORDER — SODIUM CHLORIDE, SODIUM LACTATE, POTASSIUM CHLORIDE, CALCIUM CHLORIDE 600; 310; 30; 20 MG/100ML; MG/100ML; MG/100ML; MG/100ML
INJECTION, SOLUTION INTRAVENOUS CONTINUOUS PRN
Status: DISCONTINUED | OUTPATIENT
Start: 2021-02-21 | End: 2021-02-21 | Stop reason: SURG

## 2021-02-21 RX ORDER — FENTANYL CITRATE 50 UG/ML
50 INJECTION, SOLUTION INTRAMUSCULAR; INTRAVENOUS
Status: DISCONTINUED | OUTPATIENT
Start: 2021-02-21 | End: 2021-02-21 | Stop reason: HOSPADM

## 2021-02-21 RX ORDER — ONDANSETRON 2 MG/ML
4 INJECTION INTRAMUSCULAR; INTRAVENOUS ONCE AS NEEDED
Status: DISCONTINUED | OUTPATIENT
Start: 2021-02-21 | End: 2021-02-21 | Stop reason: HOSPADM

## 2021-02-21 RX ORDER — FAMOTIDINE 10 MG/ML
20 INJECTION, SOLUTION INTRAVENOUS ONCE
Status: COMPLETED | OUTPATIENT
Start: 2021-02-21 | End: 2021-02-21

## 2021-02-21 RX ADMIN — PROPOFOL 150 MCG/KG/MIN: 10 INJECTION, EMULSION INTRAVENOUS at 11:55

## 2021-02-21 RX ADMIN — SODIUM CHLORIDE, PRESERVATIVE FREE 10 ML: 5 INJECTION INTRAVENOUS at 20:45

## 2021-02-21 RX ADMIN — FAMOTIDINE 20 MG: 10 INJECTION INTRAVENOUS at 11:36

## 2021-02-21 RX ADMIN — SODIUM CHLORIDE, POTASSIUM CHLORIDE, SODIUM LACTATE AND CALCIUM CHLORIDE: 600; 310; 30; 20 INJECTION, SOLUTION INTRAVENOUS at 11:46

## 2021-02-21 RX ADMIN — INSULIN LISPRO 4 UNITS: 100 INJECTION, SOLUTION INTRAVENOUS; SUBCUTANEOUS at 17:03

## 2021-02-21 RX ADMIN — SODIUM CHLORIDE, PRESERVATIVE FREE 10 ML: 5 INJECTION INTRAVENOUS at 08:10

## 2021-02-21 RX ADMIN — HYDRALAZINE HYDROCHLORIDE 10 MG: 20 INJECTION INTRAMUSCULAR; INTRAVENOUS at 15:55

## 2021-02-21 RX ADMIN — INSULIN LISPRO 3 UNITS: 100 INJECTION, SOLUTION INTRAVENOUS; SUBCUTANEOUS at 08:10

## 2021-02-21 RX ADMIN — LIDOCAINE HYDROCHLORIDE 100 MG: 10 INJECTION, SOLUTION EPIDURAL; INFILTRATION; INTRACAUDAL; PERINEURAL at 11:55

## 2021-02-21 RX ADMIN — LISINOPRIL 40 MG: 40 TABLET ORAL at 08:10

## 2021-02-21 RX ADMIN — ACETAMINOPHEN 650 MG: 325 TABLET ORAL at 14:31

## 2021-02-21 RX ADMIN — SODIUM CHLORIDE, POTASSIUM CHLORIDE, SODIUM LACTATE AND CALCIUM CHLORIDE 20 ML/HR: 600; 310; 30; 20 INJECTION, SOLUTION INTRAVENOUS at 11:37

## 2021-02-21 RX ADMIN — PROPOFOL 60 MG: 10 INJECTION, EMULSION INTRAVENOUS at 11:55

## 2021-02-21 RX ADMIN — SODIUM CHLORIDE 1 G: 900 INJECTION INTRAVENOUS at 20:44

## 2021-02-21 NOTE — ANESTHESIA PREPROCEDURE EVALUATION
Anesthesia Evaluation     Patient summary reviewed and Nursing notes reviewed   no history of anesthetic complications:  NPO Solid Status: > 8 hours  NPO Liquid Status: > 2 hours           Airway   Mallampati: II  TM distance: >3 FB  Neck ROM: full  No difficulty expected  Dental    (+) lower dentures    Pulmonary - negative pulmonary ROS and normal exam    breath sounds clear to auscultation  Cardiovascular - normal exam    ECG reviewed  Rhythm: regular  Rate: normal    (+) pacemaker (Placed for bradycardia - replaced within past year) pacemaker interrogated 3-6 months ago, hypertension,     ROS comment: 7/20 Echo:  · Left ventricular wall thickness is consistent with mild concentric hypertrophy.  · Left ventricular systolic function is normal.  · Estimated EF appears to be in the range of 61 - 65%.  · Left ventricular diastolic dysfunction (grade I) consistent with impaired relaxation.  · Normal right ventricular cavity size and systolic function noted. Electronic lead present in the ventricle.  · Normal cardiac chambers dimensions.  · Mild aortic valve regurgitation is present.  · Mild mitral valve regurgitation is present  · Mild tricuspid valve regurgitation is present. No evidence of pulmonary hypertension is present.  · There is no evidence of pericardial effusion.    Neuro/Psych- negative ROS  GI/Hepatic/Renal/Endo    (+)  GERD,  diabetes mellitus type 2,     Musculoskeletal     Abdominal    Substance History      OB/GYN          Other   arthritis,                      Anesthesia Plan    ASA 3     general     intravenous induction     Anesthetic plan, all risks, benefits, and alternatives have been provided, discussed and informed consent has been obtained with: patient.    Plan discussed with CRNA.

## 2021-02-21 NOTE — ANESTHESIA POSTPROCEDURE EVALUATION
Patient: Sandro Argueta    Procedure Summary     Date: 02/21/21 [facility] END[facility]COPY [facility] /  [facility]    Anesthesia Start: 1146 Anesthesia Stop:     Procedure: ENDOSCOPIC RETROGRADE CHOLANGIOPANCREATOGRAPHY (N/A ) Diagnosis:     Surgeon: Matthew Moreira MD Provider: Edgar Santana MD    Anesthesia Type: general ASA Status: 3          Anesthesia Type: general    Vitals  No vitals data found for the desired time range.          Post Anesthesia Care and Evaluation    Patient location during evaluation: PACU  Patient participation: complete - patient participated  Level of consciousness: awake  Pain score: 0  Pain management: adequate  Airway patency: patent  Anesthetic complications: No anesthetic complications  PONV Status: none  Cardiovascular status: acceptable and stable  Respiratory status: nasal cannula, unassisted, acceptable and spontaneous ventilation  Hydration status: acceptable

## 2021-02-22 VITALS
SYSTOLIC BLOOD PRESSURE: 151 MMHG | DIASTOLIC BLOOD PRESSURE: 82 MMHG | OXYGEN SATURATION: 97 % | HEART RATE: 66 BPM | BODY MASS INDEX: 24.66 KG/M2 | WEIGHT: 182.1 LBS | HEIGHT: 72 IN | TEMPERATURE: 97.6 F | RESPIRATION RATE: 16 BRPM

## 2021-02-22 PROBLEM — K83.1 OBSTRUCTIVE JAUNDICE: Status: ACTIVE | Noted: 2021-02-22

## 2021-02-22 LAB
ALBUMIN SERPL-MCNC: 3.3 G/DL (ref 3.5–5.2)
ALBUMIN/GLOB SERPL: 1.1 G/DL
ALP SERPL-CCNC: 359 U/L (ref 39–117)
ALT SERPL W P-5'-P-CCNC: 280 U/L (ref 1–41)
ANION GAP SERPL CALCULATED.3IONS-SCNC: 6 MMOL/L (ref 5–15)
AST SERPL-CCNC: 97 U/L (ref 1–40)
BACTERIA SPEC AEROBE CULT: NO GROWTH
BILIRUB SERPL-MCNC: 4.4 MG/DL (ref 0–1.2)
BUN SERPL-MCNC: 14 MG/DL (ref 8–23)
BUN/CREAT SERPL: 15.6 (ref 7–25)
CALCIUM SPEC-SCNC: 8.9 MG/DL (ref 8.6–10.5)
CHLORIDE SERPL-SCNC: 101 MMOL/L (ref 98–107)
CO2 SERPL-SCNC: 26 MMOL/L (ref 22–29)
CREAT SERPL-MCNC: 0.9 MG/DL (ref 0.76–1.27)
DEPRECATED RDW RBC AUTO: 48.4 FL (ref 37–54)
ERYTHROCYTE [DISTWIDTH] IN BLOOD BY AUTOMATED COUNT: 14.9 % (ref 12.3–15.4)
GFR SERPL CREATININE-BSD FRML MDRD: 82 ML/MIN/1.73
GLOBULIN UR ELPH-MCNC: 3.1 GM/DL
GLUCOSE BLDC GLUCOMTR-MCNC: 172 MG/DL (ref 70–130)
GLUCOSE BLDC GLUCOMTR-MCNC: 285 MG/DL (ref 70–130)
GLUCOSE SERPL-MCNC: 190 MG/DL (ref 65–99)
HCT VFR BLD AUTO: 34.8 % (ref 37.5–51)
HGB BLD-MCNC: 11.5 G/DL (ref 13–17.7)
LIPASE SERPL-CCNC: 257 U/L (ref 13–60)
MCH RBC QN AUTO: 29.5 PG (ref 26.6–33)
MCHC RBC AUTO-ENTMCNC: 33 G/DL (ref 31.5–35.7)
MCV RBC AUTO: 89.2 FL (ref 79–97)
PLATELET # BLD AUTO: 289 10*3/MM3 (ref 140–450)
PMV BLD AUTO: 10.9 FL (ref 6–12)
POTASSIUM SERPL-SCNC: 4 MMOL/L (ref 3.5–5.2)
PROT SERPL-MCNC: 6.4 G/DL (ref 6–8.5)
RBC # BLD AUTO: 3.9 10*6/MM3 (ref 4.14–5.8)
SODIUM SERPL-SCNC: 133 MMOL/L (ref 136–145)
WBC # BLD AUTO: 8.91 10*3/MM3 (ref 3.4–10.8)

## 2021-02-22 PROCEDURE — G0108 DIAB MANAGE TRN  PER INDIV: HCPCS

## 2021-02-22 PROCEDURE — 85027 COMPLETE CBC AUTOMATED: CPT | Performed by: INTERNAL MEDICINE

## 2021-02-22 PROCEDURE — 63710000001 INSULIN LISPRO (HUMAN) PER 5 UNITS: Performed by: INTERNAL MEDICINE

## 2021-02-22 PROCEDURE — 88112 CYTOPATH CELL ENHANCE TECH: CPT | Performed by: INTERNAL MEDICINE

## 2021-02-22 PROCEDURE — 99239 HOSP IP/OBS DSCHRG MGMT >30: CPT | Performed by: INTERNAL MEDICINE

## 2021-02-22 PROCEDURE — 97161 PT EVAL LOW COMPLEX 20 MIN: CPT

## 2021-02-22 PROCEDURE — 80053 COMPREHEN METABOLIC PANEL: CPT | Performed by: INTERNAL MEDICINE

## 2021-02-22 PROCEDURE — 83690 ASSAY OF LIPASE: CPT | Performed by: INTERNAL MEDICINE

## 2021-02-22 PROCEDURE — 82962 GLUCOSE BLOOD TEST: CPT

## 2021-02-22 RX ORDER — SUCRALFATE 1 G/1
1 TABLET ORAL 4 TIMES DAILY
Qty: 120 TABLET | Refills: 0 | Status: SHIPPED | OUTPATIENT
Start: 2021-02-22 | End: 2021-03-24

## 2021-02-22 RX ORDER — CEFDINIR 300 MG/1
300 CAPSULE ORAL EVERY 12 HOURS SCHEDULED
Status: DISCONTINUED | OUTPATIENT
Start: 2021-02-23 | End: 2021-02-22 | Stop reason: HOSPADM

## 2021-02-22 RX ADMIN — INSULIN LISPRO 4 UNITS: 100 INJECTION, SOLUTION INTRAVENOUS; SUBCUTANEOUS at 12:23

## 2021-02-22 RX ADMIN — PANTOPRAZOLE SODIUM 40 MG: 40 TABLET, DELAYED RELEASE ORAL at 05:13

## 2021-02-22 RX ADMIN — INSULIN LISPRO 2 UNITS: 100 INJECTION, SOLUTION INTRAVENOUS; SUBCUTANEOUS at 09:20

## 2021-02-22 RX ADMIN — SODIUM CHLORIDE, PRESERVATIVE FREE 10 ML: 5 INJECTION INTRAVENOUS at 09:20

## 2021-02-22 RX ADMIN — LISINOPRIL 40 MG: 40 TABLET ORAL at 09:20

## 2021-02-23 ENCOUNTER — READMISSION MANAGEMENT (OUTPATIENT)
Dept: CALL CENTER | Facility: HOSPITAL | Age: 76
End: 2021-02-23

## 2021-02-23 LAB
LAB AP CASE REPORT: NORMAL
PATH REPORT.FINAL DX SPEC: NORMAL

## 2021-02-23 NOTE — OUTREACH NOTE
Prep Survey      Responses   [facility] patient discharged from?  [place]   Is LACE score < 7 ?  No   Emergency Room discharge w/ pulse ox?  No   Eligibility  Readm Mgmt   Discharge diagnosis  Acute Hepatitis,  Obstructive jaundice,  ERCP,  Acute UTI   Does the patient have one of the following disease processes/diagnoses(primary or secondary)?  Other   Does the patient have Home health ordered?  No   Is there a DME ordered?  No   Prep survey completed?  Yes          Anjali Herzog RN

## 2021-02-24 ENCOUNTER — TELEPHONE (OUTPATIENT)
Dept: GASTROENTEROLOGY | Facility: CLINIC | Age: 76
End: 2021-02-24

## 2021-02-24 ENCOUNTER — READMISSION MANAGEMENT (OUTPATIENT)
Dept: CALL CENTER | Facility: HOSPITAL | Age: 76
End: 2021-02-24

## 2021-02-24 DIAGNOSIS — Z71.9 ENCOUNTER FOR CONSULTATION: ICD-10-CM

## 2021-02-24 DIAGNOSIS — K83.1 BILIARY STRICTURE: Primary | ICD-10-CM

## 2021-02-24 NOTE — TELEPHONE ENCOUNTER
Called and notified patient of his appointment with Edward Hazel on 03/01/2021 at 9:30AM.    Arrival Time: 9:00AM  Address: [address] [place] [id number]  Phone: [phone]    Confirmed they understood and had no additional questions.

## 2021-02-24 NOTE — TELEPHONE ENCOUNTER
----- Message from Matthew Moreira MD sent at 2/22/2021  8:39 AM EST -----  Patient needs to be scheduled to see Dr. Drew Crisostomo or Dr. Edward Hazel at  for possible [facility].  Status post ERCP with biliary stricture and suspected pancreatic cancer.

## 2021-02-24 NOTE — OUTREACH NOTE
Medical Week 1 Survey      Responses   [facility] patient discharged from?  [place]   Does the patient have one of the following disease processes/diagnoses(primary or secondary)?  Other   Week 1 attempt successful?  Yes   Call start time  1329   Call end time  1338   Discharge diagnosis  Acute Hepatitis,  Obstructive jaundice,  ERCP,  Acute UTI   Is patient permission given to speak with other caregiver?  Yes   List who call center can speak with  son-Daquan   Person spoke with today (if not patient) and relationship  son and pt   Meds reviewed with patient/caregiver?  Yes   Is the patient having any side effects they believe may be caused by any medication additions or changes?  Yes   Side effects comments   Pt got dizzy and blacked out yesterday- he feels it is bc the carafate.   Does the patient have all medications ordered at discharge?  Yes   Is the patient taking all medications as directed (includes completed medication regime)?  No   What is preventing the patient from taking all medications as directed?  Side effects   Nursing Interventions  Advised patient to call provider   Medication comments  Pt is now taking Prilosec instead of carafate.   Comments regarding appointments  [facility] specialist 3/1   Does the patient have a primary care provider?   Yes   Does the patient have an appointment with their PCP within 7 days of discharge?  No   What is preventing the patient from scheduling follow up appointments within 7 days of discharge?  Haven't had time   Nursing Interventions  Educated patient on importance of making appointment   Has the patient kept scheduled appointments due by today?  N/A   Psychosocial issues?  No   Comments  Pt denies N/V/D or abd pain. Appetite is WNL, still losing wt he reports. He denies hitting head or having any injuries from fall. Per son, they have been doing orthostatic BP's and the BP is dropping low when standing.    Did the patient receive a copy of their  discharge instructions?  Yes   Nursing interventions  Reviewed instructions with patient   What is the patient's perception of their health status since discharge?  Improving   Is the patient/caregiver able to teach back signs and symptoms related to disease process for when to call PCP?  Yes   Is the patient/caregiver able to teach back signs and symptoms related to disease process for when to call 911?  Yes   Is the patient/caregiver able to teach back the hierarchy of who to call/visit for symptoms/problems? PCP, Specialist, Home health nurse, Urgent Care, ED, 911  Yes   Week 1 call completed?  Yes          Kacey Thomas RN

## 2021-06-27 ENCOUNTER — HOSPITAL ENCOUNTER (INPATIENT)
Facility: HOSPITAL | Age: 76
LOS: 5 days | Discharge: HOME | End: 2021-07-02
Attending: GENERAL ACUTE CARE HOSPITAL | Admitting: HOSPITALIST

## 2021-06-27 ENCOUNTER — APPOINTMENT (OUTPATIENT)
Dept: CT IMAGING | Facility: HOSPITAL | Age: 76
End: 2021-06-27

## 2021-06-27 ENCOUNTER — APPOINTMENT (OUTPATIENT)
Dept: GENERAL RADIOLOGY | Facility: HOSPITAL | Age: 76
End: 2021-06-27

## 2021-06-27 DIAGNOSIS — E11.9 TYPE 2 DIABETES MELLITUS WITHOUT COMPLICATION, WITHOUT LONG-TERM CURRENT USE OF INSULIN (HCC): ICD-10-CM

## 2021-06-27 DIAGNOSIS — R50.9 FEVER, UNSPECIFIED FEVER CAUSE: Primary | ICD-10-CM

## 2021-06-27 DIAGNOSIS — G44.209 ACUTE NON INTRACTABLE TENSION-TYPE HEADACHE: ICD-10-CM

## 2021-06-27 DIAGNOSIS — D69.6 THROMBOCYTOPENIA (HCC): ICD-10-CM

## 2021-06-27 PROBLEM — A41.9 SEPSIS (HCC): Status: ACTIVE | Noted: 2021-06-27

## 2021-06-27 LAB
ALBUMIN SERPL-MCNC: 3.37 G/DL (ref 3.5–5.2)
ALBUMIN/GLOB SERPL: 1.1 G/DL
ALP SERPL-CCNC: 128 U/L (ref 39–117)
ALT SERPL W P-5'-P-CCNC: 168 U/L (ref 1–41)
ANION GAP SERPL CALCULATED.3IONS-SCNC: 10.3 MMOL/L (ref 5–15)
APTT PPP: 35.5 SECONDS (ref 25.5–35.4)
AST SERPL-CCNC: 152 U/L (ref 1–40)
BACTERIA UR QL AUTO: ABNORMAL /HPF
BILIRUB SERPL-MCNC: 0.5 MG/DL (ref 0–1.2)
BILIRUB UR QL STRIP: ABNORMAL
BUN SERPL-MCNC: 18 MG/DL (ref 8–23)
BUN/CREAT SERPL: 18 (ref 7–25)
CALCIUM SPEC-SCNC: 8.5 MG/DL (ref 8.6–10.5)
CHLORIDE SERPL-SCNC: 104 MMOL/L (ref 98–107)
CLARITY UR: CLEAR
CO2 SERPL-SCNC: 23.7 MMOL/L (ref 22–29)
COLOR UR: ABNORMAL
CREAT SERPL-MCNC: 1 MG/DL (ref 0.76–1.27)
CRP SERPL-MCNC: 3.9 MG/DL (ref 0–0.5)
D-LACTATE SERPL-SCNC: 1.4 MMOL/L (ref 0.5–2)
DEPRECATED RDW RBC AUTO: 45.5 FL (ref 37–54)
ERYTHROCYTE [DISTWIDTH] IN BLOOD BY AUTOMATED COUNT: 14.2 % (ref 12.3–15.4)
FLUAV RNA RESP QL NAA+PROBE: NOT DETECTED
FLUBV RNA RESP QL NAA+PROBE: NOT DETECTED
GFR SERPL CREATININE-BSD FRML MDRD: 73 ML/MIN/1.73
GLOBULIN UR ELPH-MCNC: 3 GM/DL
GLUCOSE BLDC GLUCOMTR-MCNC: 200 MG/DL (ref 70–130)
GLUCOSE SERPL-MCNC: 131 MG/DL (ref 65–99)
GLUCOSE UR STRIP-MCNC: NEGATIVE MG/DL
HBA1C MFR BLD: 6.6 % (ref 4.8–5.6)
HCT VFR BLD AUTO: 42.9 % (ref 37.5–51)
HGB BLD-MCNC: 13.7 G/DL (ref 13–17.7)
HGB UR QL STRIP.AUTO: NEGATIVE
HYALINE CASTS UR QL AUTO: ABNORMAL /LPF
INR PPP: 1.07 (ref 0.9–1.1)
KETONES UR QL STRIP: NEGATIVE
LEUKOCYTE ESTERASE UR QL STRIP.AUTO: NEGATIVE
LIPASE SERPL-CCNC: 20 U/L (ref 13–60)
LYMPHOCYTES # BLD MANUAL: 1.19 10*3/MM3 (ref 0.7–3.1)
LYMPHOCYTES NFR BLD MANUAL: 29 % (ref 19.6–45.3)
LYMPHOCYTES NFR BLD MANUAL: 9 % (ref 5–12)
MAGNESIUM SERPL-MCNC: 1.8 MG/DL (ref 1.6–2.4)
MCH RBC QN AUTO: 28 PG (ref 26.6–33)
MCHC RBC AUTO-ENTMCNC: 31.9 G/DL (ref 31.5–35.7)
MCV RBC AUTO: 87.6 FL (ref 79–97)
MONOCYTES # BLD AUTO: 0.37 10*3/MM3 (ref 0.1–0.9)
NEUTROPHILS # BLD AUTO: 2.54 10*3/MM3 (ref 1.7–7)
NEUTROPHILS NFR BLD MANUAL: 52 % (ref 42.7–76)
NEUTS BAND NFR BLD MANUAL: 10 % (ref 0–5)
NITRITE UR QL STRIP: NEGATIVE
NT-PROBNP SERPL-MCNC: 274 PG/ML (ref 0–1800)
PH UR STRIP.AUTO: 5.5 [PH] (ref 5–8)
PLAT MORPH BLD: NORMAL
PLATELET # BLD AUTO: 53 10*3/MM3 (ref 140–450)
PMV BLD AUTO: 12.2 FL (ref 6–12)
POTASSIUM SERPL-SCNC: 3.8 MMOL/L (ref 3.5–5.2)
PROT SERPL-MCNC: 6.4 G/DL (ref 6–8.5)
PROT UR QL STRIP: ABNORMAL
PROTHROMBIN TIME: 14.3 SECONDS (ref 12.8–14.5)
QT INTERVAL: 392 MS
QTC INTERVAL: 471 MS
RBC # BLD AUTO: 4.9 10*6/MM3 (ref 4.14–5.8)
RBC # UR: ABNORMAL /HPF
RBC MORPH BLD: NORMAL
REF LAB TEST METHOD: ABNORMAL
SARS-COV-2 RNA RESP QL NAA+PROBE: NOT DETECTED
SCAN SLIDE: NORMAL
SODIUM SERPL-SCNC: 138 MMOL/L (ref 136–145)
SP GR UR STRIP: 1.03 (ref 1–1.03)
SQUAMOUS #/AREA URNS HPF: ABNORMAL /HPF
TROPONIN T SERPL-MCNC: <0.01 NG/ML (ref 0–0.03)
TSH SERPL DL<=0.05 MIU/L-ACNC: 1.62 UIU/ML (ref 0.27–4.2)
UROBILINOGEN UR QL STRIP: ABNORMAL
WBC # BLD AUTO: 4.09 10*3/MM3 (ref 3.4–10.8)
WBC UR QL AUTO: ABNORMAL /HPF

## 2021-06-27 PROCEDURE — 70450 CT HEAD/BRAIN W/O DYE: CPT

## 2021-06-27 PROCEDURE — 85730 THROMBOPLASTIN TIME PARTIAL: CPT | Performed by: GENERAL ACUTE CARE HOSPITAL

## 2021-06-27 PROCEDURE — 99223 1ST HOSP IP/OBS HIGH 75: CPT | Performed by: PHYSICIAN ASSISTANT

## 2021-06-27 PROCEDURE — 82962 GLUCOSE BLOOD TEST: CPT

## 2021-06-27 PROCEDURE — 85610 PROTHROMBIN TIME: CPT | Performed by: GENERAL ACUTE CARE HOSPITAL

## 2021-06-27 PROCEDURE — 80053 COMPREHEN METABOLIC PANEL: CPT | Performed by: GENERAL ACUTE CARE HOSPITAL

## 2021-06-27 PROCEDURE — 25010000002 FENTANYL CITRATE (PF) 50 MCG/ML SOLUTION: Performed by: GENERAL ACUTE CARE HOSPITAL

## 2021-06-27 PROCEDURE — 25010000002 MAGNESIUM SULFATE 2 GM/50ML SOLUTION: Performed by: HOSPITALIST

## 2021-06-27 PROCEDURE — 84443 ASSAY THYROID STIM HORMONE: CPT | Performed by: PHYSICIAN ASSISTANT

## 2021-06-27 PROCEDURE — 86618 LYME DISEASE ANTIBODY: CPT | Performed by: GENERAL ACUTE CARE HOSPITAL

## 2021-06-27 PROCEDURE — 85007 BL SMEAR W/DIFF WBC COUNT: CPT | Performed by: GENERAL ACUTE CARE HOSPITAL

## 2021-06-27 PROCEDURE — 71045 X-RAY EXAM CHEST 1 VIEW: CPT

## 2021-06-27 PROCEDURE — 86140 C-REACTIVE PROTEIN: CPT | Performed by: HOSPITALIST

## 2021-06-27 PROCEDURE — 84484 ASSAY OF TROPONIN QUANT: CPT | Performed by: GENERAL ACUTE CARE HOSPITAL

## 2021-06-27 PROCEDURE — 81001 URINALYSIS AUTO W/SCOPE: CPT | Performed by: GENERAL ACUTE CARE HOSPITAL

## 2021-06-27 PROCEDURE — 71250 CT THORAX DX C-: CPT | Performed by: RADIOLOGY

## 2021-06-27 PROCEDURE — 85060 BLOOD SMEAR INTERPRETATION: CPT | Performed by: GENERAL ACUTE CARE HOSPITAL

## 2021-06-27 PROCEDURE — 84145 PROCALCITONIN (PCT): CPT | Performed by: HOSPITALIST

## 2021-06-27 PROCEDURE — 85025 COMPLETE CBC W/AUTO DIFF WBC: CPT | Performed by: GENERAL ACUTE CARE HOSPITAL

## 2021-06-27 PROCEDURE — 83605 ASSAY OF LACTIC ACID: CPT | Performed by: GENERAL ACUTE CARE HOSPITAL

## 2021-06-27 PROCEDURE — 93005 ELECTROCARDIOGRAM TRACING: CPT | Performed by: GENERAL ACUTE CARE HOSPITAL

## 2021-06-27 PROCEDURE — 83735 ASSAY OF MAGNESIUM: CPT | Performed by: HOSPITALIST

## 2021-06-27 PROCEDURE — 86666 EHRLICHIA ANTIBODY: CPT | Performed by: GENERAL ACUTE CARE HOSPITAL

## 2021-06-27 PROCEDURE — 99285 EMERGENCY DEPT VISIT HI MDM: CPT

## 2021-06-27 PROCEDURE — 87040 BLOOD CULTURE FOR BACTERIA: CPT | Performed by: GENERAL ACUTE CARE HOSPITAL

## 2021-06-27 PROCEDURE — 83690 ASSAY OF LIPASE: CPT | Performed by: GENERAL ACUTE CARE HOSPITAL

## 2021-06-27 PROCEDURE — 71045 X-RAY EXAM CHEST 1 VIEW: CPT | Performed by: RADIOLOGY

## 2021-06-27 PROCEDURE — 86753 PROTOZOA ANTIBODY NOS: CPT | Performed by: GENERAL ACUTE CARE HOSPITAL

## 2021-06-27 PROCEDURE — 71250 CT THORAX DX C-: CPT

## 2021-06-27 PROCEDURE — 86757 RICKETTSIA ANTIBODY: CPT | Performed by: GENERAL ACUTE CARE HOSPITAL

## 2021-06-27 PROCEDURE — 83880 ASSAY OF NATRIURETIC PEPTIDE: CPT | Performed by: HOSPITALIST

## 2021-06-27 PROCEDURE — 83036 HEMOGLOBIN GLYCOSYLATED A1C: CPT | Performed by: HOSPITALIST

## 2021-06-27 PROCEDURE — 86668 FRANCISELLA TULARENSIS: CPT | Performed by: GENERAL ACUTE CARE HOSPITAL

## 2021-06-27 PROCEDURE — 87636 SARSCOV2 & INF A&B AMP PRB: CPT | Performed by: GENERAL ACUTE CARE HOSPITAL

## 2021-06-27 RX ORDER — SODIUM CHLORIDE 0.9 % (FLUSH) 0.9 %
10 SYRINGE (ML) INJECTION AS NEEDED
Status: DISCONTINUED | OUTPATIENT
Start: 2021-06-27 | End: 2021-07-02 | Stop reason: HOSPADM

## 2021-06-27 RX ORDER — AMLODIPINE BESYLATE 5 MG/1
5 TABLET ORAL DAILY
Status: CANCELLED | OUTPATIENT
Start: 2021-06-27

## 2021-06-27 RX ORDER — NITROGLYCERIN 0.4 MG/1
0.4 TABLET SUBLINGUAL
Status: DISCONTINUED | OUTPATIENT
Start: 2021-06-27 | End: 2021-07-02 | Stop reason: HOSPADM

## 2021-06-27 RX ORDER — PANTOPRAZOLE SODIUM 40 MG/1
40 TABLET, DELAYED RELEASE ORAL EVERY MORNING
Refills: 5 | Status: DISCONTINUED | OUTPATIENT
Start: 2021-06-28 | End: 2021-07-02 | Stop reason: HOSPADM

## 2021-06-27 RX ORDER — IBUPROFEN 400 MG/1
400 TABLET ORAL EVERY 6 HOURS PRN
Status: DISCONTINUED | OUTPATIENT
Start: 2021-06-27 | End: 2021-06-27

## 2021-06-27 RX ORDER — REPAGLINIDE 0.5 MG/1
0.5 TABLET ORAL
Status: ON HOLD | COMMUNITY
End: 2022-01-22

## 2021-06-27 RX ORDER — SODIUM CHLORIDE 0.9 % (FLUSH) 0.9 %
10 SYRINGE (ML) INJECTION EVERY 12 HOURS SCHEDULED
Status: DISCONTINUED | OUTPATIENT
Start: 2021-06-27 | End: 2021-07-02 | Stop reason: HOSPADM

## 2021-06-27 RX ORDER — AMLODIPINE BESYLATE 5 MG/1
5 TABLET ORAL DAILY
Status: DISCONTINUED | OUTPATIENT
Start: 2021-06-28 | End: 2021-07-02 | Stop reason: HOSPADM

## 2021-06-27 RX ORDER — CHOLECALCIFEROL (VITAMIN D3) 1250 MCG
50000 CAPSULE ORAL
Status: DISCONTINUED | OUTPATIENT
Start: 2021-07-02 | End: 2021-07-02 | Stop reason: HOSPADM

## 2021-06-27 RX ORDER — LISINOPRIL 5 MG/1
5 TABLET ORAL DAILY
Status: ON HOLD | COMMUNITY
End: 2021-06-27

## 2021-06-27 RX ORDER — HYDROCODONE BITARTRATE AND ACETAMINOPHEN 5; 325 MG/1; MG/1
1 TABLET ORAL EVERY 6 HOURS PRN
Status: DISCONTINUED | OUTPATIENT
Start: 2021-06-27 | End: 2021-07-02 | Stop reason: HOSPADM

## 2021-06-27 RX ORDER — MAGNESIUM SULFATE HEPTAHYDRATE 40 MG/ML
2 INJECTION, SOLUTION INTRAVENOUS ONCE
Status: COMPLETED | OUTPATIENT
Start: 2021-06-27 | End: 2021-06-27

## 2021-06-27 RX ORDER — ACETAMINOPHEN 500 MG
1000 TABLET ORAL ONCE
Status: COMPLETED | OUTPATIENT
Start: 2021-06-27 | End: 2021-06-27

## 2021-06-27 RX ORDER — DOXYCYCLINE 100 MG/1
100 CAPSULE ORAL ONCE
Status: COMPLETED | OUTPATIENT
Start: 2021-06-27 | End: 2021-06-27

## 2021-06-27 RX ORDER — FENTANYL CITRATE 50 UG/ML
50 INJECTION, SOLUTION INTRAMUSCULAR; INTRAVENOUS ONCE
Status: COMPLETED | OUTPATIENT
Start: 2021-06-27 | End: 2021-06-27

## 2021-06-27 RX ORDER — SODIUM CHLORIDE 0.9 % (FLUSH) 0.9 %
1-10 SYRINGE (ML) INJECTION AS NEEDED
Status: DISCONTINUED | OUTPATIENT
Start: 2021-06-27 | End: 2021-07-02 | Stop reason: HOSPADM

## 2021-06-27 RX ORDER — DEXTROSE MONOHYDRATE 25 G/50ML
25 INJECTION, SOLUTION INTRAVENOUS
Status: DISCONTINUED | OUTPATIENT
Start: 2021-06-27 | End: 2021-07-02 | Stop reason: HOSPADM

## 2021-06-27 RX ORDER — SODIUM CHLORIDE 9 MG/ML
100 INJECTION, SOLUTION INTRAVENOUS CONTINUOUS
Status: ACTIVE | OUTPATIENT
Start: 2021-06-27 | End: 2021-06-28

## 2021-06-27 RX ORDER — L.ACID,PARA/B.BIFIDUM/S.THERM 8B CELL
1 CAPSULE ORAL DAILY
Status: DISCONTINUED | OUTPATIENT
Start: 2021-06-27 | End: 2021-07-02 | Stop reason: HOSPADM

## 2021-06-27 RX ORDER — CHOLECALCIFEROL (VITAMIN D3) 1250 MCG
50000 CAPSULE ORAL
Status: ON HOLD | COMMUNITY
End: 2022-01-22

## 2021-06-27 RX ORDER — CHOLECALCIFEROL (VITAMIN D3) 1250 MCG
50000 CAPSULE ORAL
Status: CANCELLED | OUTPATIENT
Start: 2021-06-27

## 2021-06-27 RX ORDER — NICOTINE POLACRILEX 4 MG
15 LOZENGE BUCCAL
Status: DISCONTINUED | OUTPATIENT
Start: 2021-06-27 | End: 2021-07-02 | Stop reason: HOSPADM

## 2021-06-27 RX ORDER — PANTOPRAZOLE SODIUM 40 MG/1
40 TABLET, DELAYED RELEASE ORAL EVERY MORNING
Status: CANCELLED | OUTPATIENT
Start: 2021-06-28

## 2021-06-27 RX ORDER — AMLODIPINE BESYLATE 5 MG/1
5 TABLET ORAL DAILY
COMMUNITY
End: 2021-07-21

## 2021-06-27 RX ORDER — PROMETHAZINE HYDROCHLORIDE 12.5 MG/1
12.5 TABLET ORAL EVERY 6 HOURS PRN
Status: DISCONTINUED | OUTPATIENT
Start: 2021-06-27 | End: 2021-07-02 | Stop reason: HOSPADM

## 2021-06-27 RX ORDER — REPAGLINIDE 1 MG/1
0.5 TABLET ORAL
Status: CANCELLED | OUTPATIENT
Start: 2021-06-27

## 2021-06-27 RX ORDER — LIDOCAINE HYDROCHLORIDE 20 MG/ML
INJECTION, SOLUTION INFILTRATION; PERINEURAL
Status: DISCONTINUED
Start: 2021-06-27 | End: 2021-06-27 | Stop reason: WASHOUT

## 2021-06-27 RX ORDER — INSULIN GLARGINE 300 U/ML
15 INJECTION, SOLUTION SUBCUTANEOUS EVERY MORNING
COMMUNITY
End: 2022-01-23 | Stop reason: HOSPADM

## 2021-06-27 RX ORDER — ACETAMINOPHEN 650 MG/1
650 SUPPOSITORY RECTAL ONCE
Status: DISCONTINUED | OUTPATIENT
Start: 2021-06-27 | End: 2021-06-27

## 2021-06-27 RX ADMIN — MAGNESIUM SULFATE HEPTAHYDRATE 2 G: 40 INJECTION, SOLUTION INTRAVENOUS at 20:53

## 2021-06-27 RX ADMIN — ACETAMINOPHEN 1000 MG: 500 TABLET ORAL at 12:03

## 2021-06-27 RX ADMIN — FENTANYL CITRATE 50 MCG: 50 INJECTION INTRAMUSCULAR; INTRAVENOUS at 13:39

## 2021-06-27 RX ADMIN — DOXYCYCLINE 100 MG: 100 INJECTION, POWDER, LYOPHILIZED, FOR SOLUTION INTRAVENOUS at 17:17

## 2021-06-27 RX ADMIN — DOXYCYCLINE 100 MG: 100 CAPSULE ORAL at 11:55

## 2021-06-27 RX ADMIN — Medication 1 CAPSULE: at 20:53

## 2021-06-27 RX ADMIN — SODIUM CHLORIDE 125 ML/HR: 9 INJECTION, SOLUTION INTRAVENOUS at 16:43

## 2021-06-27 RX ADMIN — SODIUM CHLORIDE, PRESERVATIVE FREE 10 ML: 5 INJECTION INTRAVENOUS at 20:55

## 2021-06-27 RX ADMIN — HYDROCODONE BITARTRATE AND ACETAMINOPHEN 1 TABLET: 5; 325 TABLET ORAL at 17:54

## 2021-06-28 LAB
ALBUMIN SERPL-MCNC: 2.79 G/DL (ref 3.5–5.2)
ALBUMIN/GLOB SERPL: 1.1 G/DL
ALP SERPL-CCNC: 106 U/L (ref 39–117)
ALT SERPL W P-5'-P-CCNC: 130 U/L (ref 1–41)
ANION GAP SERPL CALCULATED.3IONS-SCNC: 7.8 MMOL/L (ref 5–15)
AST SERPL-CCNC: 94 U/L (ref 1–40)
BASOPHILS # BLD MANUAL: 0.03 10*3/MM3 (ref 0–0.2)
BASOPHILS NFR BLD AUTO: 1 % (ref 0–1.5)
BILIRUB SERPL-MCNC: 0.5 MG/DL (ref 0–1.2)
BUN SERPL-MCNC: 14 MG/DL (ref 8–23)
BUN/CREAT SERPL: 18.7 (ref 7–25)
CALCIUM SPEC-SCNC: 8.2 MG/DL (ref 8.6–10.5)
CHLORIDE SERPL-SCNC: 106 MMOL/L (ref 98–107)
CO2 SERPL-SCNC: 23.2 MMOL/L (ref 22–29)
CREAT SERPL-MCNC: 0.75 MG/DL (ref 0.76–1.27)
CRP SERPL-MCNC: 3.35 MG/DL (ref 0–0.5)
DEPRECATED RDW RBC AUTO: 45.4 FL (ref 37–54)
ERYTHROCYTE [DISTWIDTH] IN BLOOD BY AUTOMATED COUNT: 14.5 % (ref 12.3–15.4)
GFR SERPL CREATININE-BSD FRML MDRD: 102 ML/MIN/1.73
GLOBULIN UR ELPH-MCNC: 2.5 GM/DL
GLUCOSE BLDC GLUCOMTR-MCNC: 165 MG/DL (ref 70–130)
GLUCOSE BLDC GLUCOMTR-MCNC: 165 MG/DL (ref 70–130)
GLUCOSE BLDC GLUCOMTR-MCNC: 181 MG/DL (ref 70–130)
GLUCOSE BLDC GLUCOMTR-MCNC: 88 MG/DL (ref 70–130)
GLUCOSE SERPL-MCNC: 102 MG/DL (ref 65–99)
HAV IGM SERPL QL IA: NORMAL
HBV CORE IGM SERPL QL IA: NORMAL
HBV SURFACE AG SERPL QL IA: NORMAL
HCT VFR BLD AUTO: 37.4 % (ref 37.5–51)
HCV AB SER DONR QL: NORMAL
HGB BLD-MCNC: 12.2 G/DL (ref 13–17.7)
HIV1+2 AB SER QL: NORMAL
HYPOCHROMIA BLD QL: ABNORMAL
LYMPHOCYTES # BLD MANUAL: 1.65 10*3/MM3 (ref 0.7–3.1)
LYMPHOCYTES NFR BLD MANUAL: 50 % (ref 19.6–45.3)
LYMPHOCYTES NFR BLD MANUAL: 8 % (ref 5–12)
MCH RBC QN AUTO: 28 PG (ref 26.6–33)
MCHC RBC AUTO-ENTMCNC: 32.6 G/DL (ref 31.5–35.7)
MCV RBC AUTO: 85.8 FL (ref 79–97)
MONOCYTES # BLD AUTO: 0.26 10*3/MM3 (ref 0.1–0.9)
NEUTROPHILS # BLD AUTO: 1.35 10*3/MM3 (ref 1.7–7)
NEUTROPHILS NFR BLD MANUAL: 39 % (ref 42.7–76)
NEUTS BAND NFR BLD MANUAL: 2 % (ref 0–5)
PLATELET # BLD AUTO: 54 10*3/MM3 (ref 140–450)
PMV BLD AUTO: 12.2 FL (ref 6–12)
POTASSIUM SERPL-SCNC: 3.9 MMOL/L (ref 3.5–5.2)
PROCALCITONIN SERPL-MCNC: 0.26 NG/ML (ref 0–0.25)
PROT SERPL-MCNC: 5.3 G/DL (ref 6–8.5)
RBC # BLD AUTO: 4.36 10*6/MM3 (ref 4.14–5.8)
SCAN SLIDE: NORMAL
SMALL PLATELETS BLD QL SMEAR: ABNORMAL
SODIUM SERPL-SCNC: 137 MMOL/L (ref 136–145)
WBC # BLD AUTO: 3.3 10*3/MM3 (ref 3.4–10.8)

## 2021-06-28 PROCEDURE — 63710000001 PROMETHAZINE PER 25 MG: Performed by: PHYSICIAN ASSISTANT

## 2021-06-28 PROCEDURE — 94799 UNLISTED PULMONARY SVC/PX: CPT

## 2021-06-28 PROCEDURE — 85007 BL SMEAR W/DIFF WBC COUNT: CPT | Performed by: PHYSICIAN ASSISTANT

## 2021-06-28 PROCEDURE — 85025 COMPLETE CBC W/AUTO DIFF WBC: CPT | Performed by: PHYSICIAN ASSISTANT

## 2021-06-28 PROCEDURE — 80074 ACUTE HEPATITIS PANEL: CPT | Performed by: HOSPITALIST

## 2021-06-28 PROCEDURE — 99232 SBSQ HOSP IP/OBS MODERATE 35: CPT | Performed by: INTERNAL MEDICINE

## 2021-06-28 PROCEDURE — 80053 COMPREHEN METABOLIC PANEL: CPT | Performed by: HOSPITALIST

## 2021-06-28 PROCEDURE — 86140 C-REACTIVE PROTEIN: CPT | Performed by: HOSPITALIST

## 2021-06-28 PROCEDURE — G0432 EIA HIV-1/HIV-2 SCREEN: HCPCS | Performed by: INTERNAL MEDICINE

## 2021-06-28 PROCEDURE — 82962 GLUCOSE BLOOD TEST: CPT

## 2021-06-28 PROCEDURE — 63710000001 INSULIN ASPART PER 5 UNITS: Performed by: HOSPITALIST

## 2021-06-28 RX ADMIN — Medication 1 CAPSULE: at 11:38

## 2021-06-28 RX ADMIN — SODIUM CHLORIDE 100 ML/HR: 9 INJECTION, SOLUTION INTRAVENOUS at 14:17

## 2021-06-28 RX ADMIN — INSULIN ASPART 2 UNITS: 100 INJECTION, SOLUTION INTRAVENOUS; SUBCUTANEOUS at 17:15

## 2021-06-28 RX ADMIN — DOXYCYCLINE 100 MG: 100 INJECTION, POWDER, LYOPHILIZED, FOR SOLUTION INTRAVENOUS at 05:11

## 2021-06-28 RX ADMIN — HYDROCODONE BITARTRATE AND ACETAMINOPHEN 1 TABLET: 5; 325 TABLET ORAL at 14:16

## 2021-06-28 RX ADMIN — HYDROCODONE BITARTRATE AND ACETAMINOPHEN 1 TABLET: 5; 325 TABLET ORAL at 08:30

## 2021-06-28 RX ADMIN — INSULIN ASPART 2 UNITS: 100 INJECTION, SOLUTION INTRAVENOUS; SUBCUTANEOUS at 11:38

## 2021-06-28 RX ADMIN — HYDROCODONE BITARTRATE AND ACETAMINOPHEN 1 TABLET: 5; 325 TABLET ORAL at 21:16

## 2021-06-28 RX ADMIN — SODIUM CHLORIDE, PRESERVATIVE FREE 10 ML: 5 INJECTION INTRAVENOUS at 08:24

## 2021-06-28 RX ADMIN — SODIUM CHLORIDE 100 ML/HR: 9 INJECTION, SOLUTION INTRAVENOUS at 02:33

## 2021-06-28 RX ADMIN — PANTOPRAZOLE SODIUM 40 MG: 40 TABLET, DELAYED RELEASE ORAL at 05:15

## 2021-06-28 RX ADMIN — DOXYCYCLINE 100 MG: 100 INJECTION, POWDER, LYOPHILIZED, FOR SOLUTION INTRAVENOUS at 17:38

## 2021-06-28 RX ADMIN — SODIUM CHLORIDE, PRESERVATIVE FREE 10 ML: 5 INJECTION INTRAVENOUS at 21:13

## 2021-06-28 RX ADMIN — PROMETHAZINE HYDROCHLORIDE 12.5 MG: 12.5 TABLET ORAL at 14:16

## 2021-06-28 RX ADMIN — AMLODIPINE BESYLATE 5 MG: 5 TABLET ORAL at 08:24

## 2021-06-28 RX ADMIN — HYDROCODONE BITARTRATE AND ACETAMINOPHEN 1 TABLET: 5; 325 TABLET ORAL at 02:38

## 2021-06-29 LAB
ALBUMIN SERPL-MCNC: 2.65 G/DL (ref 3.5–5.2)
ALBUMIN/GLOB SERPL: 1.1 G/DL
ALP SERPL-CCNC: 105 U/L (ref 39–117)
ALT SERPL W P-5'-P-CCNC: 90 U/L (ref 1–41)
ANION GAP SERPL CALCULATED.3IONS-SCNC: 4.5 MMOL/L (ref 5–15)
AST SERPL-CCNC: 60 U/L (ref 1–40)
B BURGDOR IGG+IGM SER-ACNC: <0.91 ISR (ref 0–0.9)
BILIRUB SERPL-MCNC: 0.3 MG/DL (ref 0–1.2)
BUN SERPL-MCNC: 12 MG/DL (ref 8–23)
BUN/CREAT SERPL: 14.1 (ref 7–25)
CALCIUM SPEC-SCNC: 8.4 MG/DL (ref 8.6–10.5)
CHLORIDE SERPL-SCNC: 108 MMOL/L (ref 98–107)
CO2 SERPL-SCNC: 27.5 MMOL/L (ref 22–29)
CREAT SERPL-MCNC: 0.85 MG/DL (ref 0.76–1.27)
CRP SERPL-MCNC: 1.77 MG/DL (ref 0–0.5)
DEPRECATED RDW RBC AUTO: 47 FL (ref 37–54)
ERYTHROCYTE [DISTWIDTH] IN BLOOD BY AUTOMATED COUNT: 14.5 % (ref 12.3–15.4)
GFR SERPL CREATININE-BSD FRML MDRD: 88 ML/MIN/1.73
GLOBULIN UR ELPH-MCNC: 2.5 GM/DL
GLUCOSE BLDC GLUCOMTR-MCNC: 140 MG/DL (ref 70–130)
GLUCOSE BLDC GLUCOMTR-MCNC: 168 MG/DL (ref 70–130)
GLUCOSE BLDC GLUCOMTR-MCNC: 204 MG/DL (ref 70–130)
GLUCOSE BLDC GLUCOMTR-MCNC: 95 MG/DL (ref 70–130)
GLUCOSE SERPL-MCNC: 109 MG/DL (ref 65–99)
HCT VFR BLD AUTO: 35.1 % (ref 37.5–51)
HGB BLD-MCNC: 11.2 G/DL (ref 13–17.7)
HYPOCHROMIA BLD QL: ABNORMAL
LYMPHOCYTES # BLD MANUAL: 4.2 10*3/MM3 (ref 0.7–3.1)
LYMPHOCYTES NFR BLD MANUAL: 6 % (ref 5–12)
LYMPHOCYTES NFR BLD MANUAL: 74 % (ref 19.6–45.3)
MCH RBC QN AUTO: 28.1 PG (ref 26.6–33)
MCHC RBC AUTO-ENTMCNC: 31.9 G/DL (ref 31.5–35.7)
MCV RBC AUTO: 88.2 FL (ref 79–97)
MONOCYTES # BLD AUTO: 0.34 10*3/MM3 (ref 0.1–0.9)
NEUTROPHILS # BLD AUTO: 1.14 10*3/MM3 (ref 1.7–7)
NEUTROPHILS NFR BLD MANUAL: 20 % (ref 42.7–76)
PLATELET # BLD AUTO: 67 10*3/MM3 (ref 140–450)
PMV BLD AUTO: 12 FL (ref 6–12)
POTASSIUM SERPL-SCNC: 4 MMOL/L (ref 3.5–5.2)
PROT SERPL-MCNC: 5.1 G/DL (ref 6–8.5)
RBC # BLD AUTO: 3.98 10*6/MM3 (ref 4.14–5.8)
SCAN SLIDE: NORMAL
SMALL PLATELETS BLD QL SMEAR: ABNORMAL
SODIUM SERPL-SCNC: 140 MMOL/L (ref 136–145)
WBC # BLD AUTO: 5.68 10*3/MM3 (ref 3.4–10.8)

## 2021-06-29 PROCEDURE — 85025 COMPLETE CBC W/AUTO DIFF WBC: CPT | Performed by: PHYSICIAN ASSISTANT

## 2021-06-29 PROCEDURE — 80053 COMPREHEN METABOLIC PANEL: CPT | Performed by: INTERNAL MEDICINE

## 2021-06-29 PROCEDURE — 94799 UNLISTED PULMONARY SVC/PX: CPT

## 2021-06-29 PROCEDURE — 86140 C-REACTIVE PROTEIN: CPT | Performed by: NURSE PRACTITIONER

## 2021-06-29 PROCEDURE — 99233 SBSQ HOSP IP/OBS HIGH 50: CPT | Performed by: INTERNAL MEDICINE

## 2021-06-29 PROCEDURE — 63710000001 INSULIN ASPART PER 5 UNITS: Performed by: HOSPITALIST

## 2021-06-29 PROCEDURE — 82962 GLUCOSE BLOOD TEST: CPT

## 2021-06-29 PROCEDURE — 85007 BL SMEAR W/DIFF WBC COUNT: CPT | Performed by: PHYSICIAN ASSISTANT

## 2021-06-29 RX ORDER — SODIUM CHLORIDE 9 MG/ML
50 INJECTION, SOLUTION INTRAVENOUS CONTINUOUS
Status: DISCONTINUED | OUTPATIENT
Start: 2021-06-29 | End: 2021-06-30

## 2021-06-29 RX ADMIN — Medication 1 CAPSULE: at 08:03

## 2021-06-29 RX ADMIN — SODIUM CHLORIDE 50 ML/HR: 9 INJECTION, SOLUTION INTRAVENOUS at 18:21

## 2021-06-29 RX ADMIN — PANTOPRAZOLE SODIUM 40 MG: 40 TABLET, DELAYED RELEASE ORAL at 05:36

## 2021-06-29 RX ADMIN — AMLODIPINE BESYLATE 5 MG: 5 TABLET ORAL at 08:03

## 2021-06-29 RX ADMIN — INSULIN ASPART 2 UNITS: 100 INJECTION, SOLUTION INTRAVENOUS; SUBCUTANEOUS at 11:31

## 2021-06-29 RX ADMIN — DOXYCYCLINE 100 MG: 100 INJECTION, POWDER, LYOPHILIZED, FOR SOLUTION INTRAVENOUS at 17:21

## 2021-06-29 RX ADMIN — SODIUM CHLORIDE, PRESERVATIVE FREE 10 ML: 5 INJECTION INTRAVENOUS at 08:03

## 2021-06-29 RX ADMIN — SODIUM CHLORIDE 100 ML/HR: 9 INJECTION, SOLUTION INTRAVENOUS at 01:59

## 2021-06-29 RX ADMIN — SODIUM CHLORIDE, PRESERVATIVE FREE 10 ML: 5 INJECTION INTRAVENOUS at 21:41

## 2021-06-29 RX ADMIN — HYDROCODONE BITARTRATE AND ACETAMINOPHEN 1 TABLET: 5; 325 TABLET ORAL at 21:41

## 2021-06-29 RX ADMIN — HYDROCODONE BITARTRATE AND ACETAMINOPHEN 1 TABLET: 5; 325 TABLET ORAL at 06:44

## 2021-06-29 RX ADMIN — DOXYCYCLINE 100 MG: 100 INJECTION, POWDER, LYOPHILIZED, FOR SOLUTION INTRAVENOUS at 05:36

## 2021-06-29 RX ADMIN — HYDROCODONE BITARTRATE AND ACETAMINOPHEN 1 TABLET: 5; 325 TABLET ORAL at 15:25

## 2021-06-30 ENCOUNTER — APPOINTMENT (OUTPATIENT)
Dept: GENERAL RADIOLOGY | Facility: HOSPITAL | Age: 76
End: 2021-06-30

## 2021-06-30 LAB
ALBUMIN SERPL-MCNC: 2.6 G/DL (ref 3.5–5.2)
ALBUMIN/GLOB SERPL: 1 G/DL
ALP SERPL-CCNC: 107 U/L (ref 39–117)
ALT SERPL W P-5'-P-CCNC: 75 U/L (ref 1–41)
ANION GAP SERPL CALCULATED.3IONS-SCNC: 2.6 MMOL/L (ref 5–15)
AST SERPL-CCNC: 44 U/L (ref 1–40)
BILIRUB SERPL-MCNC: 0.3 MG/DL (ref 0–1.2)
BUN SERPL-MCNC: 15 MG/DL (ref 8–23)
BUN/CREAT SERPL: 17.9 (ref 7–25)
CALCIUM SPEC-SCNC: 8.4 MG/DL (ref 8.6–10.5)
CHLORIDE SERPL-SCNC: 106 MMOL/L (ref 98–107)
CO2 SERPL-SCNC: 27.4 MMOL/L (ref 22–29)
CREAT SERPL-MCNC: 0.84 MG/DL (ref 0.76–1.27)
CRP SERPL-MCNC: 0.99 MG/DL (ref 0–0.5)
CYTOLOGIST CVX/VAG CYTO: NORMAL
DEPRECATED RDW RBC AUTO: 46.6 FL (ref 37–54)
ERYTHROCYTE [DISTWIDTH] IN BLOOD BY AUTOMATED COUNT: 14.6 % (ref 12.3–15.4)
GFR SERPL CREATININE-BSD FRML MDRD: 89 ML/MIN/1.73
GLOBULIN UR ELPH-MCNC: 2.5 GM/DL
GLUCOSE BLDC GLUCOMTR-MCNC: 115 MG/DL (ref 70–130)
GLUCOSE BLDC GLUCOMTR-MCNC: 156 MG/DL (ref 70–130)
GLUCOSE BLDC GLUCOMTR-MCNC: 170 MG/DL (ref 70–130)
GLUCOSE BLDC GLUCOMTR-MCNC: 175 MG/DL (ref 70–130)
GLUCOSE SERPL-MCNC: 158 MG/DL (ref 65–99)
HCT VFR BLD AUTO: 35.5 % (ref 37.5–51)
HGB BLD-MCNC: 11.5 G/DL (ref 13–17.7)
HYPOCHROMIA BLD QL: ABNORMAL
LYMPHOCYTES # BLD MANUAL: 6.51 10*3/MM3 (ref 0.7–3.1)
LYMPHOCYTES NFR BLD MANUAL: 2 % (ref 5–12)
LYMPHOCYTES NFR BLD MANUAL: 73 % (ref 19.6–45.3)
MCH RBC QN AUTO: 28 PG (ref 26.6–33)
MCHC RBC AUTO-ENTMCNC: 32.4 G/DL (ref 31.5–35.7)
MCV RBC AUTO: 86.6 FL (ref 79–97)
METAMYELOCYTES NFR BLD MANUAL: 3 % (ref 0–0)
MONOCYTES # BLD AUTO: 0.18 10*3/MM3 (ref 0.1–0.9)
NEUTROPHILS # BLD AUTO: 1.96 10*3/MM3 (ref 1.7–7)
NEUTROPHILS NFR BLD MANUAL: 21 % (ref 42.7–76)
NEUTS BAND NFR BLD MANUAL: 1 % (ref 0–5)
PATH INTERP BLD-IMP: NORMAL
PLAT MORPH BLD: NORMAL
PLATELET # BLD AUTO: 94 10*3/MM3 (ref 140–450)
PMV BLD AUTO: 11.1 FL (ref 6–12)
POTASSIUM SERPL-SCNC: 3.9 MMOL/L (ref 3.5–5.2)
PROT SERPL-MCNC: 5.1 G/DL (ref 6–8.5)
RBC # BLD AUTO: 4.1 10*6/MM3 (ref 4.14–5.8)
SCAN SLIDE: NORMAL
SODIUM SERPL-SCNC: 136 MMOL/L (ref 136–145)
WBC # BLD AUTO: 8.92 10*3/MM3 (ref 3.4–10.8)

## 2021-06-30 PROCEDURE — 80053 COMPREHEN METABOLIC PANEL: CPT | Performed by: INTERNAL MEDICINE

## 2021-06-30 PROCEDURE — 85025 COMPLETE CBC W/AUTO DIFF WBC: CPT | Performed by: PHYSICIAN ASSISTANT

## 2021-06-30 PROCEDURE — 63710000001 INSULIN ASPART PER 5 UNITS: Performed by: HOSPITALIST

## 2021-06-30 PROCEDURE — 85007 BL SMEAR W/DIFF WBC COUNT: CPT | Performed by: PHYSICIAN ASSISTANT

## 2021-06-30 PROCEDURE — 63710000001 PROMETHAZINE PER 12.5 MG: Performed by: PHYSICIAN ASSISTANT

## 2021-06-30 PROCEDURE — 86140 C-REACTIVE PROTEIN: CPT | Performed by: NURSE PRACTITIONER

## 2021-06-30 PROCEDURE — 74018 RADEX ABDOMEN 1 VIEW: CPT

## 2021-06-30 PROCEDURE — 82962 GLUCOSE BLOOD TEST: CPT

## 2021-06-30 PROCEDURE — 99233 SBSQ HOSP IP/OBS HIGH 50: CPT | Performed by: INTERNAL MEDICINE

## 2021-06-30 PROCEDURE — 74018 RADEX ABDOMEN 1 VIEW: CPT | Performed by: RADIOLOGY

## 2021-06-30 RX ORDER — ONDANSETRON 2 MG/ML
4 INJECTION INTRAMUSCULAR; INTRAVENOUS EVERY 6 HOURS PRN
Status: DISCONTINUED | OUTPATIENT
Start: 2021-06-30 | End: 2021-07-02 | Stop reason: HOSPADM

## 2021-06-30 RX ADMIN — SODIUM CHLORIDE, PRESERVATIVE FREE 10 ML: 5 INJECTION INTRAVENOUS at 21:13

## 2021-06-30 RX ADMIN — HYDROCODONE BITARTRATE AND ACETAMINOPHEN 1 TABLET: 5; 325 TABLET ORAL at 05:51

## 2021-06-30 RX ADMIN — HYDROCODONE BITARTRATE AND ACETAMINOPHEN 1 TABLET: 5; 325 TABLET ORAL at 23:46

## 2021-06-30 RX ADMIN — PANTOPRAZOLE SODIUM 40 MG: 40 TABLET, DELAYED RELEASE ORAL at 05:47

## 2021-06-30 RX ADMIN — SODIUM CHLORIDE, PRESERVATIVE FREE 10 ML: 5 INJECTION INTRAVENOUS at 08:32

## 2021-06-30 RX ADMIN — AMLODIPINE BESYLATE 5 MG: 5 TABLET ORAL at 08:32

## 2021-06-30 RX ADMIN — Medication 1 CAPSULE: at 08:32

## 2021-06-30 RX ADMIN — PROMETHAZINE HYDROCHLORIDE 12.5 MG: 12.5 TABLET ORAL at 12:39

## 2021-06-30 RX ADMIN — INSULIN ASPART 2 UNITS: 100 INJECTION, SOLUTION INTRAVENOUS; SUBCUTANEOUS at 12:32

## 2021-06-30 RX ADMIN — DOXYCYCLINE 100 MG: 100 INJECTION, POWDER, LYOPHILIZED, FOR SOLUTION INTRAVENOUS at 05:47

## 2021-06-30 RX ADMIN — HYDROCODONE BITARTRATE AND ACETAMINOPHEN 1 TABLET: 5; 325 TABLET ORAL at 12:39

## 2021-06-30 RX ADMIN — INSULIN ASPART 2 UNITS: 100 INJECTION, SOLUTION INTRAVENOUS; SUBCUTANEOUS at 17:37

## 2021-06-30 RX ADMIN — DOXYCYCLINE 100 MG: 100 INJECTION, POWDER, LYOPHILIZED, FOR SOLUTION INTRAVENOUS at 17:38

## 2021-07-01 LAB
ALBUMIN SERPL-MCNC: 2.97 G/DL (ref 3.5–5.2)
ALBUMIN/GLOB SERPL: 1.2 G/DL
ALP SERPL-CCNC: 105 U/L (ref 39–117)
ALT SERPL W P-5'-P-CCNC: 73 U/L (ref 1–41)
ANION GAP SERPL CALCULATED.3IONS-SCNC: 6.1 MMOL/L (ref 5–15)
AST SERPL-CCNC: 46 U/L (ref 1–40)
BILIRUB SERPL-MCNC: 0.5 MG/DL (ref 0–1.2)
BUN SERPL-MCNC: 12 MG/DL (ref 8–23)
BUN/CREAT SERPL: 16.7 (ref 7–25)
CALCIUM SPEC-SCNC: 8.5 MG/DL (ref 8.6–10.5)
CHLORIDE SERPL-SCNC: 103 MMOL/L (ref 98–107)
CO2 SERPL-SCNC: 27.9 MMOL/L (ref 22–29)
CREAT SERPL-MCNC: 0.72 MG/DL (ref 0.76–1.27)
CRP SERPL-MCNC: 0.52 MG/DL (ref 0–0.5)
DEPRECATED RDW RBC AUTO: 45.6 FL (ref 37–54)
ERYTHROCYTE [DISTWIDTH] IN BLOOD BY AUTOMATED COUNT: 14.3 % (ref 12.3–15.4)
GFR SERPL CREATININE-BSD FRML MDRD: 106 ML/MIN/1.73
GLOBULIN UR ELPH-MCNC: 2.5 GM/DL
GLUCOSE BLDC GLUCOMTR-MCNC: 116 MG/DL (ref 70–130)
GLUCOSE BLDC GLUCOMTR-MCNC: 129 MG/DL (ref 70–130)
GLUCOSE BLDC GLUCOMTR-MCNC: 138 MG/DL (ref 70–130)
GLUCOSE BLDC GLUCOMTR-MCNC: 155 MG/DL (ref 70–130)
GLUCOSE SERPL-MCNC: 150 MG/DL (ref 65–99)
HCT VFR BLD AUTO: 37.5 % (ref 37.5–51)
HGB BLD-MCNC: 12.1 G/DL (ref 13–17.7)
HYPOCHROMIA BLD QL: ABNORMAL
LYMPHOCYTES # BLD MANUAL: 6.96 10*3/MM3 (ref 0.7–3.1)
LYMPHOCYTES NFR BLD MANUAL: 5 % (ref 5–12)
LYMPHOCYTES NFR BLD MANUAL: 59 % (ref 19.6–45.3)
MCH RBC QN AUTO: 27.8 PG (ref 26.6–33)
MCHC RBC AUTO-ENTMCNC: 32.3 G/DL (ref 31.5–35.7)
MCV RBC AUTO: 86.2 FL (ref 79–97)
MONOCYTES # BLD AUTO: 0.59 10*3/MM3 (ref 0.1–0.9)
NEUTROPHILS # BLD AUTO: 4.25 10*3/MM3 (ref 1.7–7)
NEUTROPHILS NFR BLD MANUAL: 35 % (ref 42.7–76)
NEUTS BAND NFR BLD MANUAL: 1 % (ref 0–5)
PLATELET # BLD AUTO: 133 10*3/MM3 (ref 140–450)
PMV BLD AUTO: 10.5 FL (ref 6–12)
POTASSIUM SERPL-SCNC: 3.8 MMOL/L (ref 3.5–5.2)
PROT SERPL-MCNC: 5.5 G/DL (ref 6–8.5)
R RICKETTSI IGG SER QL IA: POSITIVE
R RICKETTSI IGG TITR SER IF: NORMAL {TITER}
R RICKETTSI IGM SER-ACNC: 0.22 INDEX (ref 0–0.89)
RBC # BLD AUTO: 4.35 10*6/MM3 (ref 4.14–5.8)
RICK TYPHUS IGG TITR SER IF: NORMAL {TITER}
RICK TYPHUS IGM TITR SER IF: NORMAL {TITER}
SCAN SLIDE: NORMAL
SMALL PLATELETS BLD QL SMEAR: ABNORMAL
SODIUM SERPL-SCNC: 137 MMOL/L (ref 136–145)
WBC # BLD AUTO: 11.8 10*3/MM3 (ref 3.4–10.8)

## 2021-07-01 PROCEDURE — 80053 COMPREHEN METABOLIC PANEL: CPT | Performed by: INTERNAL MEDICINE

## 2021-07-01 PROCEDURE — 99233 SBSQ HOSP IP/OBS HIGH 50: CPT | Performed by: INTERNAL MEDICINE

## 2021-07-01 PROCEDURE — 86140 C-REACTIVE PROTEIN: CPT | Performed by: NURSE PRACTITIONER

## 2021-07-01 PROCEDURE — 85025 COMPLETE CBC W/AUTO DIFF WBC: CPT | Performed by: INTERNAL MEDICINE

## 2021-07-01 PROCEDURE — 82962 GLUCOSE BLOOD TEST: CPT

## 2021-07-01 PROCEDURE — 25010000002 ONDANSETRON PER 1 MG: Performed by: INTERNAL MEDICINE

## 2021-07-01 PROCEDURE — 63710000001 PROMETHAZINE PER 25 MG: Performed by: PHYSICIAN ASSISTANT

## 2021-07-01 PROCEDURE — 63710000001 INSULIN ASPART PER 5 UNITS: Performed by: HOSPITALIST

## 2021-07-01 PROCEDURE — 85007 BL SMEAR W/DIFF WBC COUNT: CPT | Performed by: INTERNAL MEDICINE

## 2021-07-01 RX ORDER — LACTULOSE 10 G/15ML
20 SOLUTION ORAL ONCE
Status: COMPLETED | OUTPATIENT
Start: 2021-07-01 | End: 2021-07-01

## 2021-07-01 RX ORDER — BISACODYL 10 MG
10 SUPPOSITORY, RECTAL RECTAL DAILY
Status: DISCONTINUED | OUTPATIENT
Start: 2021-07-01 | End: 2021-07-02 | Stop reason: HOSPADM

## 2021-07-01 RX ORDER — DOCUSATE SODIUM 100 MG/1
100 CAPSULE, LIQUID FILLED ORAL 2 TIMES DAILY
Status: DISCONTINUED | OUTPATIENT
Start: 2021-07-01 | End: 2021-07-02 | Stop reason: HOSPADM

## 2021-07-01 RX ADMIN — Medication 1 CAPSULE: at 08:16

## 2021-07-01 RX ADMIN — PROMETHAZINE HYDROCHLORIDE 12.5 MG: 12.5 TABLET ORAL at 11:34

## 2021-07-01 RX ADMIN — SODIUM CHLORIDE, PRESERVATIVE FREE 10 ML: 5 INJECTION INTRAVENOUS at 08:16

## 2021-07-01 RX ADMIN — DOCUSATE SODIUM 100 MG: 100 CAPSULE, LIQUID FILLED ORAL at 11:34

## 2021-07-01 RX ADMIN — BISACODYL 10 MG: 10 SUPPOSITORY RECTAL at 11:34

## 2021-07-01 RX ADMIN — DOXYCYCLINE 100 MG: 100 INJECTION, POWDER, LYOPHILIZED, FOR SOLUTION INTRAVENOUS at 06:05

## 2021-07-01 RX ADMIN — ONDANSETRON 4 MG: 2 INJECTION INTRAMUSCULAR; INTRAVENOUS at 16:55

## 2021-07-01 RX ADMIN — LACTULOSE 20 G: 10 SOLUTION ORAL at 11:34

## 2021-07-01 RX ADMIN — AMLODIPINE BESYLATE 5 MG: 5 TABLET ORAL at 08:16

## 2021-07-01 RX ADMIN — SODIUM CHLORIDE, PRESERVATIVE FREE 10 ML: 5 INJECTION INTRAVENOUS at 22:12

## 2021-07-01 RX ADMIN — HYDROCODONE BITARTRATE AND ACETAMINOPHEN 1 TABLET: 5; 325 TABLET ORAL at 17:30

## 2021-07-01 RX ADMIN — DOCUSATE SODIUM 100 MG: 100 CAPSULE, LIQUID FILLED ORAL at 22:12

## 2021-07-01 RX ADMIN — DOXYCYCLINE 100 MG: 100 INJECTION, POWDER, LYOPHILIZED, FOR SOLUTION INTRAVENOUS at 16:57

## 2021-07-01 RX ADMIN — INSULIN ASPART 2 UNITS: 100 INJECTION, SOLUTION INTRAVENOUS; SUBCUTANEOUS at 11:34

## 2021-07-01 RX ADMIN — ONDANSETRON 4 MG: 2 INJECTION INTRAMUSCULAR; INTRAVENOUS at 08:21

## 2021-07-01 RX ADMIN — PANTOPRAZOLE SODIUM 40 MG: 40 TABLET, DELAYED RELEASE ORAL at 06:05

## 2021-07-01 RX ADMIN — DOXYCYCLINE 100 MG: 100 INJECTION, POWDER, LYOPHILIZED, FOR SOLUTION INTRAVENOUS at 22:12

## 2021-07-01 NOTE — PLAN OF CARE
Goal Outcome Evaluation:  Plan of Care Reviewed With: patient        Progress: no change  Outcome Summary: pt c/o pain, PRN pain meds given x1. VSS

## 2021-07-01 NOTE — PROGRESS NOTES
PROGRESS NOTE         Patient Identification:  Name:  Sandro Argueta  Age:  76 y.o.  Sex:  male  :  1945  MRN:  [id number]  Visit Number:  [id number]  Primary Care Provider:  Misti Nguyễn MD         LOS: 4 days       ----------------------------------------------------------------------------------------------------------------------  Subjective       Chief Complaints:    Headache and Weakness - Generalized        Interval History:      The patient is resting in bed this morning in no apparent distress.  Admits to one episode of vomiting this morning and one episode of vomiting yesterday.  Patient reports a previous history of Whipple procedure.  Patient reports improvement of headache and neck pain, although both persist.  Afebrile, no diarrhea.  CRP is improving at 0.52.  WBC is slightly worsened at 11.80.  KUB on 2021 showed abundant colonic stool.    Review of Systems:    Constitutional: no fever, chills and night sweats. No appetite change or unexpected weight change. No fatigue.  Eyes: no eye drainage, itching or redness.  HEENT: no mouth sores, dysphagia or nose bleed.  Respiratory: no for shortness of breath, cough or production of sputum.  Cardiovascular: no chest pain, no palpitations, no orthopnea.  Gastrointestinal: no nausea or diarrhea. No abdominal pain, hematemesis or rectal bleeding.  Vomiting.  Genitourinary: no dysuria or polyuria.  Hematologic/lymphatic: no lymph node abnormalities, no easy bruising or easy bleeding.  Musculoskeletal: Neck pain.  Skin: No rash and no itching.  Neurological: no loss of consciousness, no seizure.  Headache.  Behavioral/Psych: no depression or suicidal ideation.  Endocrine: no hot flashes.  Immunologic: negative.    ----------------------------------------------------------------------------------------------------------------------      Objective       Current [facility] Meds:  amLODIPine, 5 mg, Oral, Daily  bisacodyl, 10 mg, Rectal,  Daily  docusate sodium, 100 mg, Oral, BID  doxycycline, 100 mg, Intravenous, Q12H  insulin aspart, 0-7 Units, Subcutaneous, TID AC  lactobacillus acidophilus, 1 capsule, Oral, Daily  pantoprazole, 40 mg, Oral, QAM  sodium chloride, 10 mL, Intravenous, Q12H  [START ON 7/2/2021] Vitamin D3, 50,000 Units, Oral, Q7 Days         ----------------------------------------------------------------------------------------------------------------------    Vital Signs:  Temp:  [97.2 °F (36.2 °C)-98.3 °F (36.8 °C)] 97.6 °F (36.4 °C)  Heart Rate:  [62-67] 62  Resp:  [18-20] 18  BP: (142-170)/(64-82) 142/75  No data found.  SpO2 Percentage    06/30/21 1900 07/01/21 0633 07/01/21 1040   SpO2: 97% 98%  Comment: 2284 99%     SpO2:  [97 %-99 %] 99 %  on   ;   Device (Oxygen Therapy): room air    Body mass index is 22.38 kg/m².  Wt Readings from Last 3 Encounters:   06/27/21 74.8 kg (165 lb)   02/20/21 82.6 kg (182 lb 1.6 oz)   02/17/21 82.6 kg (182 lb 3.2 oz)        Intake/Output Summary (Last 24 hours) at 7/1/2021 1245  Last data filed at 7/1/2021 0900  Gross per 24 hour   Intake 565.03 ml   Output --   Net 565.03 ml     Diet Regular; GI Soft, Consistent Carbohydrate, Cardiac  ----------------------------------------------------------------------------------------------------------------------      Physical Exam:    Constitutional:  Well-developed and well-nourished  male is resting comfortably in bed in no apparent distress.    HENT:  Head: Normocephalic and atraumatic.  Mouth:  Moist mucous membranes.    Eyes:  Conjunctivae and EOM are normal.  No scleral icterus.  Neck:  Neck supple.  No JVD present.    Cardiovascular:  Normal rate, regular rhythm and normal heart sounds with no murmur. No edema.  Pulmonary/Chest:  No respiratory distress, no wheezes, no crackles, with normal breath sounds and good air movement.  Abdominal:  Soft.  Bowel sounds are normal.  No distension and mild tenderness to palpation.  Musculoskeletal:   No edema, no tenderness, and no deformity.  No swelling or redness of joints.  Neurological:  Alert and oriented to person, place, and time.  No facial droop.  No slurred speech.   Skin:  Skin is warm and dry.  No rash noted.  No pallor.   Psychiatric:  Normal mood and affect.  Behavior is normal.    ----------------------------------------------------------------------------------------------------------------------  Results from last 7 days   Lab Units 06/27/21  1005   TROPONIN T ng/mL <0.010     Results from last 7 days   Lab Units 06/27/21  1005   PROBNP pg/mL 274.0         Results from last 7 days   Lab Units 07/01/21 0106 06/30/21 0103 06/29/21 0247 06/27/21  1318 06/27/21  1052   CRP mg/dL 0.52* 0.99* 1.77*  --   --    LACTATE mmol/L  --   --   --  1.4  --    WBC 10*3/mm3 11.80* 8.92 5.68  --  4.09   HEMOGLOBIN g/dL 12.1* 11.5* 11.2*  --  13.7   HEMATOCRIT % 37.5 35.5* 35.1*  --  42.9   MCV fL 86.2 86.6 88.2  --  87.6   MCHC g/dL 32.3 32.4 31.9  --  31.9   PLATELETS 10*3/mm3 133* 94* 67*  --  53*   INR   --   --   --   --  1.07     Results from last 7 days   Lab Units 07/01/21 0106 06/30/21 0103 06/29/21  0247 06/27/21  1005   SODIUM mmol/L 137 136 140 138   POTASSIUM mmol/L 3.8 3.9 4.0 3.8   MAGNESIUM mg/dL  --   --   --  1.8   CHLORIDE mmol/L 103 106 108* 104   CO2 mmol/L 27.9 27.4 27.5 23.7   BUN mg/dL 12 15 12 18   CREATININE mg/dL 0.72* 0.84 0.85 1.00   EGFR IF NONAFRICN AM mL/min/1.73 106 89 88 73   CALCIUM mg/dL 8.5* 8.4* 8.4* 8.5*   GLUCOSE mg/dL 150* 158* 109* 131*   ALBUMIN g/dL 2.97* 2.60* 2.65* 3.37*   BILIRUBIN mg/dL 0.5 0.3 0.3 0.5   ALK PHOS U/L 105 107 105 128*   AST (SGOT) U/L 46* 44* 60* 152*   ALT (SGPT) U/L 73* 75* 90* 168*   Estimated Creatinine Clearance: 84.4 mL/min (A) (by C-G formula based on SCr of 0.72 mg/dL (L)).  No results found for: AMMONIA    Glucose   Date/Time Value Ref Range Status   07/01/2021 1037 155 (H) 70 - 130 mg/dL Final   07/01/2021 0638 129 70 - 130 mg/dL Final    06/30/2021 2116 156 (H) 70 - 130 mg/dL Final   06/30/2021 1630 170 (H) 70 - 130 mg/dL Final   06/30/2021 1038 175 (H) 70 - 130 mg/dL Final   06/30/2021 0650 115 70 - 130 mg/dL Final   06/29/2021 2143 204 (H) 70 - 130 mg/dL Final   06/29/2021 1654 140 (H) 70 - 130 mg/dL Final     Lab Results   Component Value Date    HGBA1C 6.60 (H) 06/27/2021     Lab Results   Component Value Date    TSH 1.620 06/27/2021       Blood Culture   Date Value Ref Range Status   06/27/2021 No growth at 24 hours  Preliminary   06/27/2021 No growth at 24 hours  Preliminary     No results found for: URINECX  No results found for: WOUNDCX  No results found for: STOOLCX  No results found for: RESPCX  Pain Management Panel    There is no flowsheet data to display.           ----------------------------------------------------------------------------------------------------------------------  Imaging Results (Last 24 Hours)       Procedure Component Value Units Date/Time    XR Abdomen KUB [[id number]] Collected: 06/30/21 1839     Updated: 06/30/21 1850    Narrative:      EXAM:    XR Abdomen, 1 View     EXAM DATE:    6/30/2021 6:24 PM     CLINICAL HISTORY:    nausea, abdominal pain; R50.9-Fever, unspecified;  D69.6-Thrombocytopenia, unspecified     TECHNIQUE:    Frontal supine view of the abdomen/pelvis.     COMPARISON:    No relevant prior studies available.     FINDINGS:    GASTROINTESTINAL TRACT:  Abundant colonic stool.  No dilation.    BONES/JOINTS:  Unremarkable.       Impression:        Abundant colonic stool.     This report was finalized on 6/30/2021 6:39 PM by Dr. Jarek Flores MD.               ----------------------------------------------------------------------------------------------------------------------    Assessment/Plan       Assessment/Plan     ASSESSMENT:    1.  Sepsis  2.  Tickborne illness    PLAN:    The patient is resting in bed this morning in no apparent distress.  Admits to one episode of vomiting this morning and  one episode of vomiting yesterday.  Patient reports a previous history of Whipple procedure.  Patient reports improvement of headache and neck pain, although both persist.  Afebrile, no diarrhea.  CRP is improving at 0.52.  WBC is slightly worsened at 11.80.  KUB on 6/30/2021 showed abundant colonic stool.    Chest x-ray reports right suprahilar airspace disease.  Urinalysis unremarkable.  COVID-19 and influenza PCR negative.  CT of the head unremarkable.  CT of the chest reports cardiomegaly with vascular congestion, no airspace disease.  Blood cultures show no growth thus far.  Rickettsial serologies pending.     In the setting of tickborne illness outbreak, Ehrlichiosis is the most likely culprit in the setting of elevated AST and ALT, combined with thrombocytopenia and leukopenia.  For now recommend to continue doxycycline IV or PO for a full 10-day course through 7/7/21. We will continue to follow closely and adjust antibiotic therapy as needed.     As the patient is now reporting vomiting with mild abdominal tenderness and a history of a Whipple procedure, would recommend to acquire CT abdomen pelvis, amylase, and lipase.    Code Status:   Code Status and Medical Interventions:   Ordered at: 06/27/21 1611     Code Status:    CPR     Medical Interventions (Level of Support Prior to Arrest):    TRACY Rubalcava  07/01/21  12:45 EDT

## 2021-07-01 NOTE — PROGRESS NOTES
HOSPITALIST PROGRESS NOTE    Patient Identification:  Name:  Sandro Argueta  Age:  76 y.o.  Sex:  male  :  1945  MRN:  [id number]  Visit Number:  [id number]  Primary Care Provider:  Misti Nguyễn MD    Length of stay:  4     HPI: 77 yo male being seen in follow up for headache, poor appetite    Subjective:   The patient was seen and examined at bedside today.  He complained of nausea earlier today and had an episode of vomiting. BM last night - none since then. Otherwise feels well - no cough, no significant cough. Headache stable and overall improved.    Present during exam: PARAMJIT Steven, patient's daughter and son in-law    Current Hospital Meds:  amLODIPine, 5 mg, Oral, Daily  bisacodyl, 10 mg, Rectal, Daily  docusate sodium, 100 mg, Oral, BID  doxycycline, 100 mg, Intravenous, Q12H  insulin aspart, 0-7 Units, Subcutaneous, TID AC  lactobacillus acidophilus, 1 capsule, Oral, Daily  pantoprazole, 40 mg, Oral, QAM  sodium chloride, 10 mL, Intravenous, Q12H  [START ON 2021] Vitamin D3, 50,000 Units, Oral, Q7 Days      Vital Signs  Temp:  [97.2 °F (36.2 °C)-98.1 °F (36.7 °C)] 98 °F (36.7 °C)  Heart Rate:  [62-67] 67  Resp:  [17-20] 17  BP: (133-170)/(64-82) 133/67      21  0832 21  1659   Weight: 76.6 kg (168 lb 12.8 oz) 74.8 kg (165 lb)     Body mass index is 22.38 kg/m².    Physical exam:  Physical Exam  Constitutional:       General: He is not in acute distress.     Appearance: He is well-developed.   HENT:      Head: Normocephalic and atraumatic.   Eyes:      Conjunctiva/sclera: Conjunctivae normal.   Neck:      Trachea: No tracheal deviation.   Cardiovascular:      Rate and Rhythm: Normal rate and regular rhythm.      Heart sounds: No murmur heard.   No friction rub. No gallop.    Pulmonary:      Effort: No respiratory distress.      Breath sounds: Normal breath sounds. No wheezing or rales.   Abdominal:      General: Bowel sounds are normal. There is no distension.      Palpations:  Abdomen is soft.      Tenderness: There is no abdominal tenderness. There is no guarding.   Musculoskeletal:         General: No tenderness.   Skin:     General: Skin is warm and dry.      Findings: No erythema or rash.   Neurological:      Mental Status: He is alert and oriented to person, place, and time.      Cranial Nerves: No cranial nerve deficit.         Results Review:  Results from last 7 days   Lab Units 07/01/21  0106 06/30/21  0103 06/29/21  0247 06/28/21  0230 06/27/21  1052   WBC 10*3/mm3 11.80* 8.92 5.68 3.30* 4.09   HEMOGLOBIN g/dL 12.1* 11.5* 11.2* 12.2* 13.7   HEMATOCRIT % 37.5 35.5* 35.1* 37.4* 42.9   PLATELETS 10*3/mm3 133* 94* 67* 54* 53*     Results from last 7 days   Lab Units 07/01/21  0106 06/30/21  0103 06/29/21  0247 06/28/21  0230 06/27/21  1005   SODIUM mmol/L 137 136 140 137 138   POTASSIUM mmol/L 3.8 3.9 4.0 3.9 3.8   CHLORIDE mmol/L 103 106 108* 106 104   CO2 mmol/L 27.9 27.4 27.5 23.2 23.7   BUN mg/dL 12 15 12 14 18   CREATININE mg/dL 0.72* 0.84 0.85 0.75* 1.00   CALCIUM mg/dL 8.5* 8.4* 8.4* 8.2* 8.5*   GLUCOSE mg/dL 150* 158* 109* 102* 131*     Results from last 7 days   Lab Units 07/01/21  0106 06/30/21  0103 06/29/21  0247 06/28/21  0230 06/27/21  1005   BILIRUBIN mg/dL 0.5 0.3 0.3 0.5 0.5   ALK PHOS U/L 105 107 105 106 128*   AST (SGOT) U/L 46* 44* 60* 94* 152*   ALT (SGPT) U/L 73* 75* 90* 130* 168*     Results from last 7 days   Lab Units 06/27/21  1005   MAGNESIUM mg/dL 1.8     Results from last 7 days   Lab Units 06/27/21  1052   INR  1.07     Results from last 7 days   Lab Units 06/27/21  1005   TROPONIN T ng/mL <0.010           Assessment/Plan     -Nausea and diaphoresis with eating  -Constipation/Abdundant stool on CT  -Dizziness, question positional versus orthostatic; orthostatic vital signs negative  -Sepsis, present on admission and concern for tickborne illness given exposure  -Thrombocytopenia, secondary to above and increased to 133 today  -Acute transaminitis,  likely secondary to #2 +/- in the setting of recent Whipple; AST/ALT stable today at 46/73  -Mild hypoalbuminemia  -Headache, due to #4; stable/improved  -Pseudohypocalcemia due to hypoalbuminemia  -Generalized weakness  -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 With vomiting and inability to take PO, will order bisacodyl suppository.    Continue to encourage oral intake.     Overall, blood pressures are improving after discontinuation of intravenous fluids. Continue amlodipine, 5 mg.    Continue doxycycline. Follow up culture results and tick panel. Peripheral blood smear results reviewed and noted above. Blood cultures with no growth to date. Repeat CBC and CMP in am. Continue as needed analgesics for headache.    The patient is high risk due to the following diagnoses/reasons:  Sepsis, thrombocytopenia    I discussed the patients findings and my recommendations with patient, family and nursing staff.        Disposition  Home when medically stable; hopefully tomorrow pending nausea/vomiting    Ruth Sharp DO  07/01/21  19:07 EDT

## 2021-07-01 NOTE — PLAN OF CARE
Goal Outcome Evaluation:  Plan of Care Reviewed With: patient        Progress: no change  Outcome Summary: Patient has had 1x episode of vomiting this morning PRN nausea meds have been given no other vomiting today, patient has had multiple BMs this evening, patient complains of headache but does not want to take pain medication at this time due to fear of more vomiting, [facility] Family remains at bedside

## 2021-07-02 VITALS
TEMPERATURE: 98 F | OXYGEN SATURATION: 97 % | HEIGHT: 72 IN | WEIGHT: 165 LBS | BODY MASS INDEX: 22.35 KG/M2 | HEART RATE: 64 BPM | RESPIRATION RATE: 16 BRPM | SYSTOLIC BLOOD PRESSURE: 134 MMHG | DIASTOLIC BLOOD PRESSURE: 69 MMHG

## 2021-07-02 PROBLEM — A41.9 SEPSIS: Status: RESOLVED | Noted: 2021-06-27 | Resolved: 2021-07-02

## 2021-07-02 LAB
ALBUMIN SERPL-MCNC: 3.13 G/DL (ref 3.5–5.2)
ALBUMIN/GLOB SERPL: 1.1 G/DL
ALP SERPL-CCNC: 103 U/L (ref 39–117)
ALT SERPL W P-5'-P-CCNC: 72 U/L (ref 1–41)
ANION GAP SERPL CALCULATED.3IONS-SCNC: 7.9 MMOL/L (ref 5–15)
AST SERPL-CCNC: 46 U/L (ref 1–40)
B MICROTI IGG TITR SER: NORMAL {TITER}
B MICROTI IGM TITR SER: NORMAL {TITER}
BACTERIA SPEC AEROBE CULT: NORMAL
BACTERIA SPEC AEROBE CULT: NORMAL
BASOPHILS # BLD AUTO: 0.02 10*3/MM3 (ref 0–0.2)
BASOPHILS NFR BLD AUTO: 0.2 % (ref 0–1.5)
BILIRUB SERPL-MCNC: 0.5 MG/DL (ref 0–1.2)
BUN SERPL-MCNC: 12 MG/DL (ref 8–23)
BUN/CREAT SERPL: 16 (ref 7–25)
CALCIUM SPEC-SCNC: 8.9 MG/DL (ref 8.6–10.5)
CHLORIDE SERPL-SCNC: 102 MMOL/L (ref 98–107)
CO2 SERPL-SCNC: 27.1 MMOL/L (ref 22–29)
CREAT SERPL-MCNC: 0.75 MG/DL (ref 0.76–1.27)
CRP SERPL-MCNC: 0.33 MG/DL (ref 0–0.5)
DEPRECATED RDW RBC AUTO: 45.4 FL (ref 37–54)
EOSINOPHIL # BLD AUTO: 0.1 10*3/MM3 (ref 0–0.4)
EOSINOPHIL NFR BLD AUTO: 0.9 % (ref 0.3–6.2)
ERYTHROCYTE [DISTWIDTH] IN BLOOD BY AUTOMATED COUNT: 14.3 % (ref 12.3–15.4)
GFR SERPL CREATININE-BSD FRML MDRD: 102 ML/MIN/1.73
GLOBULIN UR ELPH-MCNC: 2.8 GM/DL
GLUCOSE BLDC GLUCOMTR-MCNC: 128 MG/DL (ref 70–130)
GLUCOSE BLDC GLUCOMTR-MCNC: 176 MG/DL (ref 70–130)
GLUCOSE SERPL-MCNC: 140 MG/DL (ref 65–99)
HCT VFR BLD AUTO: 38.3 % (ref 37.5–51)
HGB BLD-MCNC: 12.2 G/DL (ref 13–17.7)
HYPOCHROMIA BLD QL: NORMAL
IMM GRANULOCYTES # BLD AUTO: 0.02 10*3/MM3 (ref 0–0.05)
IMM GRANULOCYTES NFR BLD AUTO: 0.2 % (ref 0–0.5)
LARGE PLATELETS: NORMAL
LIPASE SERPL-CCNC: 74 U/L (ref 13–60)
LYMPHOCYTES # BLD AUTO: 5.07 10*3/MM3 (ref 0.7–3.1)
LYMPHOCYTES NFR BLD AUTO: 47.4 % (ref 19.6–45.3)
MCH RBC QN AUTO: 27.4 PG (ref 26.6–33)
MCHC RBC AUTO-ENTMCNC: 31.9 G/DL (ref 31.5–35.7)
MCV RBC AUTO: 86.1 FL (ref 79–97)
MONOCYTES # BLD AUTO: 0.98 10*3/MM3 (ref 0.1–0.9)
MONOCYTES NFR BLD AUTO: 9.2 % (ref 5–12)
NEUTROPHILS NFR BLD AUTO: 4.51 10*3/MM3 (ref 1.7–7)
NEUTROPHILS NFR BLD AUTO: 42.1 % (ref 42.7–76)
NRBC BLD AUTO-RTO: 0 /100 WBC (ref 0–0.2)
PLATELET # BLD AUTO: 213 10*3/MM3 (ref 140–450)
PMV BLD AUTO: 10.4 FL (ref 6–12)
POTASSIUM SERPL-SCNC: 4.1 MMOL/L (ref 3.5–5.2)
PROT SERPL-MCNC: 5.9 G/DL (ref 6–8.5)
RBC # BLD AUTO: 4.45 10*6/MM3 (ref 4.14–5.8)
SODIUM SERPL-SCNC: 137 MMOL/L (ref 136–145)
WBC # BLD AUTO: 10.7 10*3/MM3 (ref 3.4–10.8)

## 2021-07-02 PROCEDURE — 80053 COMPREHEN METABOLIC PANEL: CPT | Performed by: INTERNAL MEDICINE

## 2021-07-02 PROCEDURE — 99239 HOSP IP/OBS DSCHRG MGMT >30: CPT | Performed by: INTERNAL MEDICINE

## 2021-07-02 PROCEDURE — 82962 GLUCOSE BLOOD TEST: CPT

## 2021-07-02 PROCEDURE — 85025 COMPLETE CBC W/AUTO DIFF WBC: CPT | Performed by: INTERNAL MEDICINE

## 2021-07-02 PROCEDURE — 85007 BL SMEAR W/DIFF WBC COUNT: CPT | Performed by: INTERNAL MEDICINE

## 2021-07-02 PROCEDURE — 63710000001 INSULIN ASPART PER 5 UNITS: Performed by: HOSPITALIST

## 2021-07-02 PROCEDURE — 86140 C-REACTIVE PROTEIN: CPT | Performed by: NURSE PRACTITIONER

## 2021-07-02 PROCEDURE — 83690 ASSAY OF LIPASE: CPT | Performed by: INTERNAL MEDICINE

## 2021-07-02 RX ORDER — PSEUDOEPHEDRINE HCL 30 MG
100 TABLET ORAL 2 TIMES DAILY
Qty: 60 CAPSULE | Refills: 0 | Status: ON HOLD | OUTPATIENT
Start: 2021-07-02 | End: 2022-01-22

## 2021-07-02 RX ORDER — DOXYCYCLINE HYCLATE 100 MG/1
100 CAPSULE ORAL 2 TIMES DAILY
Qty: 10 CAPSULE | Refills: 0 | Status: SHIPPED | OUTPATIENT
Start: 2021-07-02 | End: 2021-07-07

## 2021-07-02 RX ORDER — HYDROCODONE BITARTRATE AND ACETAMINOPHEN 5; 325 MG/1; MG/1
1 TABLET ORAL EVERY 8 HOURS PRN
Qty: 6 TABLET | Refills: 0 | Status: SHIPPED | OUTPATIENT
Start: 2021-07-02 | End: 2021-07-04

## 2021-07-02 RX ORDER — PROMETHAZINE HYDROCHLORIDE 12.5 MG/1
12.5 TABLET ORAL EVERY 8 HOURS PRN
Qty: 12 TABLET | Refills: 0 | Status: ON HOLD | OUTPATIENT
Start: 2021-07-02 | End: 2022-01-22

## 2021-07-02 RX ORDER — L.ACID,PARA/B.BIFIDUM/S.THERM 8B CELL
1 CAPSULE ORAL DAILY
Qty: 5 CAPSULE | Refills: 0 | Status: SHIPPED | OUTPATIENT
Start: 2021-07-03 | End: 2021-07-08

## 2021-07-02 RX ADMIN — INSULIN ASPART 2 UNITS: 100 INJECTION, SOLUTION INTRAVENOUS; SUBCUTANEOUS at 11:12

## 2021-07-02 RX ADMIN — Medication 1 CAPSULE: at 08:08

## 2021-07-02 RX ADMIN — OFLOXACIN 50000 UNITS: 300 TABLET, COATED ORAL at 10:26

## 2021-07-02 RX ADMIN — DOCUSATE SODIUM 100 MG: 100 CAPSULE, LIQUID FILLED ORAL at 08:08

## 2021-07-02 RX ADMIN — DOXYCYCLINE 100 MG: 100 INJECTION, POWDER, LYOPHILIZED, FOR SOLUTION INTRAVENOUS at 08:07

## 2021-07-02 RX ADMIN — AMLODIPINE BESYLATE 5 MG: 5 TABLET ORAL at 08:08

## 2021-07-02 RX ADMIN — PANTOPRAZOLE SODIUM 40 MG: 40 TABLET, DELAYED RELEASE ORAL at 06:10

## 2021-07-02 RX ADMIN — SODIUM CHLORIDE, PRESERVATIVE FREE 10 ML: 5 INJECTION INTRAVENOUS at 08:08

## 2021-07-02 NOTE — PROGRESS NOTES
PROGRESS NOTE         Patient Identification:  Name:  Sandro Argueta  Age:  76 y.o.  Sex:  male  :  1945  MRN:  [id number]  Visit Number:  [id number]  Primary Care Provider:  Misti Nguyễn MD         LOS: 5 days       ----------------------------------------------------------------------------------------------------------------------  Subjective       Chief Complaints:    Headache and Weakness - Generalized        Interval History:      The patient is resting in bed this morning in no apparent distress.  Patient reports he feels much better today and his headache is improving.  Denies any episodes of vomiting since yesterday.  Afebrile, no diarrhea.  CRP is now normalized.  Leukocytosis is resolved.  [place] spotted fever IgG was positive, but IgM was negative at 0.22.  Typhus fever group IgM and IgG were negative.    Review of Systems:    Constitutional: no fever, chills and night sweats. No appetite change or unexpected weight change. No fatigue.  Eyes: no eye drainage, itching or redness.  HEENT: no mouth sores, dysphagia or nose bleed.  Respiratory: no for shortness of breath, cough or production of sputum.  Cardiovascular: no chest pain, no palpitations, no orthopnea.  Gastrointestinal: no nausea, vomiting, or diarrhea. No abdominal pain, hematemesis or rectal bleeding.   Genitourinary: no dysuria or polyuria.  Hematologic/lymphatic: no lymph node abnormalities, no easy bruising or easy bleeding.  Musculoskeletal: Neck pain.  Skin: No rash and no itching.  Neurological: no loss of consciousness, no seizure.  Headache.  Behavioral/Psych: no depression or suicidal ideation.  Endocrine: no hot flashes.  Immunologic: negative.    ----------------------------------------------------------------------------------------------------------------------      Objective       [facility] Meds:  amLODIPine, 5 mg, Oral, Daily  bisacodyl, 10 mg, Rectal, Daily  docusate sodium, 100 mg,  Oral, BID  doxycycline, 100 mg, Intravenous, Q12H  insulin aspart, 0-7 Units, Subcutaneous, TID AC  lactobacillus acidophilus, 1 capsule, Oral, Daily  pantoprazole, 40 mg, Oral, QAM  sodium chloride, 10 mL, Intravenous, Q12H  Vitamin D3, 50,000 Units, Oral, Q7 Days         ----------------------------------------------------------------------------------------------------------------------    Vital Signs:  Temp:  [97.9 °F (36.6 °C)-98 °F (36.7 °C)] 98 °F (36.7 °C)  Heart Rate:  [64-67] 64  Resp:  [16-18] 16  BP: (133-144)/(67-75) 134/69  No data found.  SpO2 Percentage    07/01/21 1850 07/02/21 0615 07/02/21 1027   SpO2: 99% 97% 97%     SpO2:  [97 %-99 %] 97 %  on   ;   Device (Oxygen Therapy): room air    Body mass index is 22.38 kg/m².  Wt Readings from Last 3 Encounters:   06/27/21 74.8 kg (165 lb)   02/20/21 82.6 kg (182 lb 1.6 oz)   02/17/21 82.6 kg (182 lb 3.2 oz)        Intake/Output Summary (Last 24 hours) at 7/2/2021 1103  Last data filed at 7/2/2021 1016  Gross per 24 hour   Intake 1023.76 ml   Output --   Net 1023.76 ml     Diet Regular; GI Soft, Consistent Carbohydrate, Cardiac  ----------------------------------------------------------------------------------------------------------------------      Physical Exam:    Constitutional:  Well-developed and well-nourished  male is resting comfortably in bed in no apparent distress.    HENT:  Head: Normocephalic and atraumatic.  Mouth:  Moist mucous membranes.    Eyes:  Conjunctivae and EOM are normal.  No scleral icterus.  Neck:  Neck supple.  No JVD present.    Cardiovascular:  Normal rate, regular rhythm and normal heart sounds with no murmur. No edema.  Pulmonary/Chest:  No respiratory distress, no wheezes, no crackles, with normal breath sounds and good air movement.  Abdominal:  Soft.  Bowel sounds are normal.  No distension and mild tenderness to palpation.  Musculoskeletal:  No edema, no tenderness, and no deformity.  No swelling or redness  of joints.  Neurological:  Alert and oriented to person, place, and time.  No facial droop.  No slurred speech.   Skin:  Skin is warm and dry.  No rash noted.  No pallor.   Psychiatric:  Normal mood and affect.  Behavior is normal.    ----------------------------------------------------------------------------------------------------------------------  Results from last 7 days   Lab Units 06/27/21  1005   TROPONIN T ng/mL <0.010     Results from last 7 days   Lab Units 06/27/21  1005   PROBNP pg/mL 274.0         Results from last 7 days   Lab Units 07/02/21 0450 07/02/21 0448 07/01/21 0106 06/30/21 0103 06/27/21  1318 06/27/21  1052   CRP mg/dL  --  0.33 0.52* 0.99*  --   --    LACTATE mmol/L  --   --   --   --  1.4  --    WBC 10*3/mm3 10.70  --  11.80* 8.92  --  4.09   HEMOGLOBIN g/dL 12.2*  --  12.1* 11.5*  --  13.7   HEMATOCRIT % 38.3  --  37.5 35.5*  --  42.9   MCV fL 86.1  --  86.2 86.6  --  87.6   MCHC g/dL 31.9  --  32.3 32.4  --  31.9   PLATELETS 10*3/mm3 213  --  133* 94*  --  53*   INR   --   --   --   --   --  1.07     Results from last 7 days   Lab Units 07/02/21 0448 07/01/21 0106 06/30/21 0103 06/27/21  1005   SODIUM mmol/L 137 137 136 138   POTASSIUM mmol/L 4.1 3.8 3.9 3.8   MAGNESIUM mg/dL  --   --   --  1.8   CHLORIDE mmol/L 102 103 106 104   CO2 mmol/L 27.1 27.9 27.4 23.7   BUN mg/dL 12 12 15 18   CREATININE mg/dL 0.75* 0.72* 0.84 1.00   EGFR IF NONAFRICN AM mL/min/1.73 102 106 89 73   CALCIUM mg/dL 8.9 8.5* 8.4* 8.5*   GLUCOSE mg/dL 140* 150* 158* 131*   ALBUMIN g/dL 3.13* 2.97* 2.60* 3.37*   BILIRUBIN mg/dL 0.5 0.5 0.3 0.5   ALK PHOS U/L 103 105 107 128*   AST (SGOT) U/L 46* 46* 44* 152*   ALT (SGPT) U/L 72* 73* 75* 168*   Estimated Creatinine Clearance: 84.4 mL/min (A) (by C-G formula based on SCr of 0.75 mg/dL (L)).  No results found for: AMMONIA    Glucose   Date/Time Value Ref Range Status   07/02/2021 1028 176 (H) 70 - 130 mg/dL Final     Comment:     Meter: [id number] :  [id number] Roddy Mendoza   07/02/2021 0619 128 70 - 130 mg/dL Final     Comment:     Meter: [id number] : [id number] Mikala Petty   07/01/2021 2217 138 (H) 70 - 130 mg/dL Final     Comment:     Meter: [id number] : [id number] Telly Ordonez   07/01/2021 1539 116 70 - 130 mg/dL Final     Comment:     Meter: [id number] : [id number] ENID BRAMBILA   07/01/2021 1037 155 (H) 70 - 130 mg/dL Final   07/01/2021 0638 129 70 - 130 mg/dL Final   06/30/2021 2116 156 (H) 70 - 130 mg/dL Final   06/30/2021 1630 170 (H) 70 - 130 mg/dL Final     Lab Results   Component Value Date    HGBA1C 6.60 (H) 06/27/2021     Lab Results   Component Value Date    TSH 1.620 06/27/2021       Blood Culture   Date Value Ref Range Status   06/27/2021 No growth at 24 hours  Preliminary   06/27/2021 No growth at 24 hours  Preliminary     No results found for: URINECX  No results found for: WOUNDCX  No results found for: STOOLCX  No results found for: RESPCX  Pain Management Panel    There is no flowsheet data to display.           ----------------------------------------------------------------------------------------------------------------------  Imaging Results (Last 24 Hours)       ** No results found for the last 24 hours. **            ----------------------------------------------------------------------------------------------------------------------    Assessment/Plan       Assessment/Plan     ASSESSMENT:    1.  Sepsis  2.  Tickborne illness    PLAN:    The patient is resting in bed this morning in no apparent distress.  Patient reports he feels much better today and his headache is improving.  Denies any episodes of vomiting since yesterday.  Afebrile, no diarrhea.  CRP is now normalized.  Leukocytosis is resolved.  [place] spotted fever IgG was positive, but IgM was negative at 0.22.  Typhus fever group IgM and IgG were negative. KUB on 6/30/2021 showed abundant colonic stool.    Chest x-ray reports right suprahilar airspace  disease.  Urinalysis unremarkable.  COVID-19 and influenza PCR negative.  CT of the head unremarkable.  CT of the chest reports cardiomegaly with vascular congestion, no airspace disease.  Blood cultures show no growth thus far.      Positive [place] spotted fever IgG is likely related to a previous infection.     In the setting of tickborne illness outbreak, Ehrlichiosis is the most likely culprit in the setting of elevated AST and ALT, combined with thrombocytopenia and leukopenia.  For now recommend to continue doxycycline IV or PO for a full 10-day course through 7/7/21. We will continue to follow closely and adjust antibiotic therapy as needed.    Code Status:   Code Status and Medical Interventions:   Ordered at: 06/27/21 1611     Code Status:    CPR     Medical Interventions (Level of Support Prior to Arrest):    Full       TRACY Roberts  07/02/21  11:03 EDT

## 2021-07-02 NOTE — CASE MANAGEMENT/SOCIAL WORK
Discharge Planning Assessment  MAUREEN Hidalgo     Patient Name: Sandro Argueta  MRN: [id number]  Today's Date: 7/2/2021    Admit Date: 6/27/2021    Discharge Needs Assessment    No documentation.       Discharge Plan     Row Name 07/02/21 1135       Plan    Final Discharge Disposition Code  01 - home or self-care    Final Note  Pt is being d/c home. CM spoke with pt and he has a glucometer and b/p cuff at home. Pt is to hold b/p meds if < 110/60. Pt verbalized understanding. Encouraged pt to take all medications as prescribed and f/u with appts.      Jennifer Guadalupe RN

## 2021-07-02 NOTE — PLAN OF CARE
Goal Outcome Evaluation:  Plan of Care Reviewed With: patient        Progress: no change  Outcome Summary: No conplaints of nausea and vomiting. VSS. No complaints of dizzyness. Will continue to monitor.

## 2021-07-02 NOTE — DISCHARGE SUMMARY
Discharge Summary:    Date of Admission: 6/27/2021  Date of Discharge:  7/2/2021    PCP: Misti Nguyễn MD    DISCHARGE DIAGNOSIS  -Sepsis, present upon admission and secondary to tickborne illness; suspect ehrlichiosis with final result pending at this time  -[place] spotted fever, IgG positive  -Headache likely secondary to #1 and improved  -Thrombocytopenia and acute transaminitis, both thought to be secondary to #1  -Nausea and diaphoresis usually exacerbated with oral intake and thought to be related to underlying constipation, improved  -Generalized weakness  -Diabetes mellitus, type II and uncontrolled  -Cervicalgia, suspect musculoskeletal in nature and improved  -Uncontrolled hypertension, improved  -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 male presented to [facility] complaining of headache, generalized weakness and anorexia.  Please see the admitting history and physical for further details.      Patient was admitted to the hospital medicine service.  Patient did report recent tick exposure.  Patient was noted to have thrombocytopenia and liver function test elevation.  The tickborne panel was obtained upon admission and patient was placed on empiric antibiotic therapy with doxycycline.  Patient was seen and evaluated by infectious disease who agreed with doxycycline monotherapy through July 7, 2021.  Patient's recommended spotted fever IgG G level was found to be positive but his IgM level was negative.  Patient is  suspected to have ehrlichiosis but the final result is currently pending.  Patient's thrombocytopenia has resolved and his liver function tests are much improved.  Patient continues with intermittent headache but overall much improved from the time of admission.    Patient did have several bouts of intermittent nausea with occasional vomiting during his hospitalization that was thought to possibly be related to oral intake.  Patient did not have any abdominal pain and his abdominal exam remained benign throughout his hospital stay.  A lipase level was obtained and was found to be minimally elevated at 74 which is likely nonsignificant given that he is status post whipple.  A KUB was obtained and patient was found to have abundant stool throughout.  The patient was started on a bowel regimen and has had bowel movements since that time.    On the day of discharge, the patient is resting comfortably in bed.  He states that he ate eggs and toast for breakfast without any nausea or vomiting.  Patient had no other acute complaints or complications during his hospital stay.  It is felt that the patient has maximized the benefit of his inpatient hospital stay and will be discharged home today with his family in a hemodynamically stable condition.  Patient is to follow-up with his primary care provider in approximately 1 week and is to follow-up with his surgeon as previously scheduled.    Patient encouraged to return to the hospital should he have worsening or recurring symptoms.    Results from last 7 days   Lab Units 07/02/21  0450 07/01/21 0106 06/30/21 0103 06/29/21  0247   WBC 10*3/mm3 10.70 11.80* 8.92 5.68   HEMOGLOBIN g/dL 12.2* 12.1* 11.5* 11.2*   HEMATOCRIT % 38.3 37.5 35.5* 35.1*   PLATELETS 10*3/mm3 213 133* 94* 67*   MONOCYTES % %  --  5.0 2.0* 6.0       CMP:      Lab 07/02/21  0448 07/01/21  0106 06/30/21  0103 06/29/21  0247 06/28/21  0230 06/27/21  1005   SODIUM 137 137 136 140 137 138   POTASSIUM 4.1 3.8  "3.9 4.0 3.9 3.8   CHLORIDE 102 103 106 108* 106 104   CO2 27.1 27.9 27.4 27.5 23.2 23.7   ANION GAP 7.9 6.1 2.6* 4.5* 7.8 10.3   BUN 12 12 15 12 14 18   CREATININE 0.75* 0.72* 0.84 0.85 0.75* 1.00   GLUCOSE 140* 150* 158* 109* 102* 131*   ALT (SGPT) 72* 73* 75* 90* 130* 168*   AST (SGOT) 46* 46* 44* 60* 94* 152*   LIPASE 74*  --   --   --   --  20             CONDITION ON DISCHARGE  Stable.      VITAL SIGNS  /69 (BP Location: Right arm, Patient Position: Sitting)   Pulse 64   Temp 98 °F (36.7 °C) (Oral)   Resp 16   Ht 182.9 cm (72\")   Wt 74.8 kg (165 lb)   SpO2 97%   BMI 22.38 kg/m²   Objective:  General Appearance:  Comfortable, well-appearing and in no acute distress.    Vital signs: (most recent): Blood pressure 134/69, pulse 64, temperature 98 °F (36.7 °C), temperature source Oral, resp. rate 16, height 182.9 cm (72\"), weight 74.8 kg (165 lb), SpO2 97 %.  Vital signs are normal.    HEENT: Normal HEENT exam.    Lungs:  Normal effort.  Breath sounds clear to auscultation.  No rales, wheezes or rhonchi.    Heart: Normal rate.  S1 normal and S2 normal.  No murmur, gallop or friction rub.   Chest: Symmetric chest wall expansion.   Abdomen: Abdomen is soft and non-distended.  Bowel sounds are normal.   There is no abdominal tenderness.     Extremities: Normal range of motion.  There is no deformity or dependent edema.    Pulses: Distal pulses are intact.    Neurological: Patient is alert and oriented to person, place and time.  Normal strength.    Skin:  Warm and dry.  No rash or ulceration.         Present during visit: PARAMJIT Mendoza, patient's daughter and son in-law      DISCHARGE DISPOSITION   Home or Self Care    DISCHARGE MEDICATIONS     Discharge Medications      New Medications      Instructions Start Date   docusate sodium 100 MG capsule   100 mg, Oral, 2 Times Daily      doxycycline 100 MG capsule  Commonly known as: VIBRAMYCIN   100 mg, Oral, 2 Times Daily      HYDROcodone-acetaminophen 5-325 MG " per tablet  Commonly known as: NORCO   1 tablet, Oral, Every 8 Hours PRN      lactobacillus acidophilus capsule capsule   1 capsule, Oral, Daily   Start Date: July 3, 2021     promethazine 12.5 MG tablet  Commonly known as: PHENERGAN   12.5 mg, Oral, Every 8 Hours PRN         Continue These Medications      Instructions Start Date   amLODIPine 5 MG tablet  Commonly known as: NORVASC   5 mg, Oral, Daily      omeprazole 40 MG capsule  Commonly known as: priLOSEC   40 mg, Oral, Daily      repaglinide 0.5 MG tablet  Commonly known as: PRANDIN   0.5 mg, Oral, 3 Times Daily Before Meals      Toujeo Max SoloStar 300 UNIT/ML solution pen-injector injection  Generic drug: Insulin Glargine (2 Unit Dial)   15 Units, Subcutaneous      Vitamin D3 1.25 MG (58793 UT) capsule   50,000 Units, Oral, Every 7 Days, Prior to [facility] Admission, Patient was on: Patient takes on Fridays.              DISCHARGE DIET  cardiac diet, diabetic diet    ACTIVITY AT DISCHARGE   activity as tolerated    Future Appointments   Date Time Provider Department Center   9/14/2021  8:50 AM Mario Guerra MD [facility]       Additional Instructions for the Follow-ups that You Need to Schedule     Discharge Follow-up with PCP   As directed       Currently Documented PCP:    Misti Nguyễn MD    PCP Phone Number:    [phone]     Follow Up Details: 1 week with CBC and CMP               TEST  RESULTS PENDING AT DISCHARGE  Pending Labs     Order Current Status    Babesia Microti Antibody Panel In process    Ehrlichia Antibody Panel In process    Tularemia Antibody In process    Blood Culture - Blood, Arm, Right Preliminary result    Blood Culture - Blood, Hand, Left Preliminary result             Ruth Sharp DO  07/02/21  11:17 EDT      Time: greater than 30 minutes.

## 2021-07-03 ENCOUNTER — READMISSION MANAGEMENT (OUTPATIENT)
Dept: CALL CENTER | Facility: HOSPITAL | Age: 76
End: 2021-07-03

## 2021-07-03 RX ORDER — AMLODIPINE BESYLATE 5 MG/1
5 TABLET ORAL DAILY
Qty: 30 TABLET | Refills: 0 | Status: SHIPPED | OUTPATIENT
Start: 2021-07-03 | End: 2022-01-23 | Stop reason: HOSPADM

## 2021-07-03 NOTE — OUTREACH NOTE
Prep Survey      Responses   [facility]?  [facility]   Is LACE score < 7 ?  No   Emergency Room discharge w/ pulse ox?  No   Eligibility  Readm Mgmt   Discharge diagnosis  Sepsis,/[place] spotted fever   Does the patient have one of the following disease processes/diagnoses(primary or secondary)?  Other   Does the patient have Home health ordered?  No   Is there a DME ordered?  No   Prep survey completed?  Yes          Pauline Lamar RN

## 2021-07-05 ENCOUNTER — READMISSION MANAGEMENT (OUTPATIENT)
Dept: CALL CENTER | Facility: HOSPITAL | Age: 76
End: 2021-07-05

## 2021-07-05 NOTE — OUTREACH NOTE
Medical Week 1 Survey      Responses   [facility] patient discharged from?  [facility]   Does the patient have one of the following disease processes/diagnoses(primary or secondary)?  Other   Week 1 attempt successful?  Yes   Call end time  1019   Discharge diagnosis  Sepsis,/[place] spotted fever   Is patient permission given to speak with other caregiver?  Yes   Meds reviewed with patient/caregiver?  Yes   Is the patient having any side effects they believe may be caused by any medication additions or changes?  No   Does the patient have all medications ordered at discharge?  Yes   Is the patient taking all medications as directed (includes completed medication regime)?  Yes   Comments regarding appointments  July 9th- 2021   Does the patient have a primary care provider?   Yes   Does the patient have an appointment with their PCP within 7 days of discharge?  Greater than 7 days   What is preventing the patient from scheduling follow up appointments within 7 days of discharge?  Earlier appointment not available   Nursing Interventions  Verified appointment date/time/provider   Has home health visited the patient within 72 hours of discharge?  N/A   Psychosocial issues?  No   Did the patient receive a copy of their discharge instructions?  Yes   Nursing interventions  Reviewed instructions with patient   What is the patient's perception of their health status since discharge?  Improving [He reports no fever and feels much better.]   Is the patient/caregiver able to teach back signs and symptoms related to disease process for when to call PCP?  Yes   Is the patient/caregiver able to teach back signs and symptoms related to disease process for when to call 911?  Yes   Is the patient/caregiver able to teach back the hierarchy of who to call/visit for symptoms/problems? PCP, Specialist, Home health nurse, Urgent Care, ED, 911  Yes   If the patient is a current smoker, are they able to teach back resources for  cessation?  Not a smoker   Week 1 call completed?  Yes          Cece Dash RN

## 2021-07-06 LAB
A PHAGOCYTOPH IGG TITR SER IF: NEGATIVE {TITER}
A PHAGOCYTOPH IGM TITR SER IF: NEGATIVE {TITER}
E CHAFFEENSIS IGG TITR SER IF: NEGATIVE {TITER}
E CHAFFEENSIS IGM TITR SER IF: NEGATIVE {TITER}

## 2021-07-08 LAB
F TULAR IGG TITR SER: NEGATIVE {TITER}
F TULAR IGM TITR SER: NEGATIVE {TITER}

## 2021-07-12 ENCOUNTER — READMISSION MANAGEMENT (OUTPATIENT)
Dept: CALL CENTER | Facility: HOSPITAL | Age: 76
End: 2021-07-12

## 2021-07-12 NOTE — OUTREACH NOTE
Medical Week 2 Survey      Responses   [facility] patient discharged from?  [facility]   Does the patient have one of the following disease processes/diagnoses(primary or secondary)?  Other   Week 2 attempt successful?  Yes   Call start time  1444   Discharge diagnosis  Sepsis,/[place] spotted fever   Call end time  1449   Is patient permission given to speak with other caregiver?  Yes   List who call center can speak with  Wife-Alesha   Meds reviewed with patient/caregiver?  Yes   Is the patient having any side effects they believe may be caused by any medication additions or changes?  No   Does the patient have all medications ordered at discharge?  Yes   Is the patient taking all medications as directed (includes completed medication regime)?  Yes   Medication comments  Finished Antibiotics   Comments regarding appointments  Saw a PA last week   Does the patient have a primary care provider?   Yes   Does the patient have an appointment with their PCP within 7 days of discharge?  Greater than 7 days   What is preventing the patient from scheduling follow up appointments within 7 days of discharge?  Earlier appointment not available   Has the patient kept scheduled appointments due by today?  N/A   Has home health visited the patient within 72 hours of discharge?  N/A   Psychosocial issues?  No   Did the patient receive a copy of their discharge instructions?  Yes   What is the patient's perception of their health status since discharge?  Improving   Is the patient/caregiver able to teach back signs and symptoms related to disease process for when to call PCP?  Yes   Is the patient/caregiver able to teach back signs and symptoms related to disease process for when to call 911?  Yes   Is the patient/caregiver able to teach back the hierarchy of who to call/visit for symptoms/problems? PCP, Specialist, Home health nurse, Urgent Care, ED, 911  Yes   If the patient is a current smoker, are they able to teach  back resources for cessation?  Not a smoker   Week 2 Call Completed?  Yes   Wrap up additional comments  Pt states he is feeling better. Has finished antibiotics.           Nettie Hernandez RN

## 2021-07-19 ENCOUNTER — READMISSION MANAGEMENT (OUTPATIENT)
Dept: CALL CENTER | Facility: HOSPITAL | Age: 76
End: 2021-07-19

## 2021-07-19 NOTE — OUTREACH NOTE
Medical Week 3 Survey      Responses   [facility] patient discharged from?  [facility]   Does the patient have one of the following disease processes/diagnoses(primary or secondary)?  Other   Week 3 attempt successful?  Yes   Call start time  1407   Call end time  1411   Discharge diagnosis  Sepsis,/[place] spotted fever   Meds reviewed with patient/caregiver?  Yes   Is the patient having any side effects they believe may be caused by any medication additions or changes?  No   Does the patient have all medications ordered at discharge?  Yes   Is the patient taking all medications as directed (includes completed medication regime)?  Yes   Medication comments  Finished Antibiotics   Does the patient have a primary care provider?   Yes   Has the patient kept scheduled appointments due by today?  N/A   Comments  Has been to  appointments following his Whipple surgery   Has home health visited the patient within 72 hours of discharge?  N/A   Psychosocial issues?  No   Did the patient receive a copy of their discharge instructions?  Yes   What is the patient's perception of their health status since discharge?  Improving   Is the patient/caregiver able to teach back signs and symptoms related to disease process for when to call PCP?  Yes   Is the patient/caregiver able to teach back signs and symptoms related to disease process for when to call 911?  Yes   Is the patient/caregiver able to teach back the hierarchy of who to call/visit for symptoms/problems? PCP, Specialist, Home health nurse, Urgent Care, ED, 911  Yes   If the patient is a current smoker, are they able to teach back resources for cessation?  Not a smoker   Week 3 Call Completed?  Yes   Wrap up additional comments  States he is doing ok. Needs to gain weight back from his Whipple surgery.           Nettie Hernandez RN

## 2021-07-21 ENCOUNTER — OFFICE VISIT (OUTPATIENT)
Dept: GASTROENTEROLOGY | Facility: CLINIC | Age: 76
End: 2021-07-21

## 2021-07-21 VITALS
DIASTOLIC BLOOD PRESSURE: 66 MMHG | HEART RATE: 69 BPM | HEIGHT: 72 IN | BODY MASS INDEX: 21.81 KG/M2 | WEIGHT: 161 LBS | SYSTOLIC BLOOD PRESSURE: 130 MMHG

## 2021-07-21 DIAGNOSIS — R19.7 DIARRHEA, UNSPECIFIED TYPE: Primary | ICD-10-CM

## 2021-07-21 DIAGNOSIS — R53.81 MALAISE AND FATIGUE: ICD-10-CM

## 2021-07-21 DIAGNOSIS — R53.83 MALAISE AND FATIGUE: ICD-10-CM

## 2021-07-21 DIAGNOSIS — R63.4 WEIGHT LOSS, ABNORMAL: ICD-10-CM

## 2021-07-21 DIAGNOSIS — K86.1 CHRONIC PANCREATITIS, UNSPECIFIED PANCREATITIS TYPE (HCC): ICD-10-CM

## 2021-07-21 DIAGNOSIS — R74.8 ELEVATED LIVER ENZYMES: ICD-10-CM

## 2021-07-21 PROCEDURE — 85025 COMPLETE CBC W/AUTO DIFF WBC: CPT | Performed by: INTERNAL MEDICINE

## 2021-07-21 PROCEDURE — 99213 OFFICE O/P EST LOW 20 MIN: CPT | Performed by: INTERNAL MEDICINE

## 2021-07-21 PROCEDURE — 82652 VIT D 1 25-DIHYDROXY: CPT | Performed by: INTERNAL MEDICINE

## 2021-07-21 PROCEDURE — 80053 COMPREHEN METABOLIC PANEL: CPT | Performed by: INTERNAL MEDICINE

## 2021-07-21 PROCEDURE — 82746 ASSAY OF FOLIC ACID SERUM: CPT | Performed by: INTERNAL MEDICINE

## 2021-07-21 PROCEDURE — 82607 VITAMIN B-12: CPT | Performed by: INTERNAL MEDICINE

## 2021-07-21 NOTE — PROGRESS NOTES
Subjective   Sandro Argueta is a 76 y.o. male who presents to the office today as a follow up appointment regarding Pancreatitis      History of Present Illness:  The patient presents for follow up Whipple for suspected panc cancer.  He was found to have chronic pancreatitis.  He was admitted recently for possible tick borne illness due to HAs and chills.  Currently, he is having 3 to 4 bm daily.  His stool is a type [place] score type 4.  No urgency.  Energy level is not like it was prior to the Whipple.  He has not gained weight since the Whipple.  He had lost quite a bit a weight prior to the Whipple approximately 30-40lbs.  Interestingly, the patient never had issues with his pancreas prior to the event this past winter.  CT scan and ERCP suggested a pancreatic mass as well as his markedly elevated CEA level.      Review of Systems:  Review of Systems   Constitutional: Negative for chills, fatigue and fever.   HENT: Negative for trouble swallowing.    Eyes: Negative.    Respiratory: Negative for cough, choking, chest tightness and shortness of breath.    Cardiovascular: Negative for chest pain.   Gastrointestinal: Positive for abdominal distention, diarrhea and nausea. Negative for abdominal pain, anal bleeding, blood in stool, constipation and vomiting.   Endocrine: Negative.    Genitourinary: Negative for difficulty urinating and dysuria.   Musculoskeletal: Negative for back pain and neck pain.   Skin: Negative.    Allergic/Immunologic: Negative for environmental allergies and food allergies.   Neurological: Negative for dizziness, light-headedness and headaches.   Hematological: Bruises/bleeds easily.   Psychiatric/Behavioral: Negative.        Past Medical History:  Past Medical History:   Diagnosis Date   • Acute UTI (urinary tract infection) 2/20/2021   • Arthritis    • Borderline diabetes    • Coronary artery disease    • Diabetes mellitus (CMS/HCC)    • Elevated prostate specific antigen (PSA) 8/17/2020    • GERD (gastroesophageal reflux disease)    • Hypertension    • Left elbow pain        Past Surgical History:  Past Surgical History:   Procedure Laterality Date   • COLONOSCOPY     • ENDOSCOPY N/A 2/9/2021    Procedure: ESOPHAGOGASTRODUODENOSCOPY WITH BIOPSY dilitation;  Surgeon: Bela Mcdaniel MD;  Location: [facility] OR;  Service: Gastroenterology;  Laterality: N/A;   • ERCP N/A 2/21/2021    Procedure: ENDOSCOPIC RETROGRADE CHOLANGIOPANCREATOGRAPHY WITH STENT PLACEMENT;  Surgeon: Matthew Moreira MD;  Location: [facility] END[facility]COPY;  Service: Gastroenterology;  Laterality: N/A;  with sphincterotomy, and brushing of common bile duct, and placement of [id number] wall stent     • PACEMAKER IMPLANTATION         Family History:  Family History   Problem Relation Age of Onset   • Diabetes Mother    • Diabetes Sister    • Diabetes Brother        Social History:  Social History     Socioeconomic History   • Marital status:      Spouse name: Not on file   • Number of children: Not on file   • Years of education: Not on file   • Highest education level: Not on file   Tobacco Use   • Smoking status: Never Smoker   • Smokeless tobacco: Never Used   Vaping Use   • Vaping Use: Never used   Substance and Sexual Activity   • Alcohol use: No   • Drug use: No   • Sexual activity: Defer       Current Medication List:    Current Outpatient Medications:   •  amLODIPine (NORVASC) 5 MG tablet, Take 1 tablet by mouth Daily. Do not take if blood pressure is less than 110/60, Disp: 30 tablet, Rfl: 0  •  Cholecalciferol (Vitamin D3) 1.25 MG (64675 UT) capsule, Take 50,000 Units by mouth Every 7 (Seven) Days. Prior to [facility] Admission, Patient was on: Patient takes on Fridays., Disp: , Rfl:   •  docusate sodium 100 MG capsule, Take 1 capsule by mouth 2 (Two) Times a Day., Disp: 60 capsule, Rfl: 0  •  Insulin Glargine, 2 Unit Dial, (Toujeo Max SoloStar) 300 UNIT/ML solution pen-injector injection, Inject 15 Units  "under the skin into the appropriate area as directed., Disp: , Rfl:   •  omeprazole (priLOSEC) 40 MG capsule, Take 1 capsule by mouth Daily., Disp: 30 capsule, Rfl: 5  •  promethazine (PHENERGAN) 12.5 MG tablet, Take 1 tablet by mouth Every 8 (Eight) Hours As Needed for Nausea or Vomiting., Disp: 12 tablet, Rfl: 0  •  repaglinide (PRANDIN) 0.5 MG tablet, Take 0.5 mg by mouth 3 (Three) Times a Day Before Meals., Disp: , Rfl:     Allergies:   Other    Vitals:  /66 (BP Location: Left arm, Patient Position: Sitting, Cuff Size: Adult)   Pulse 69   Ht 182.9 cm (72\")   Wt 73 kg (161 lb)   BMI 21.84 kg/m²     Physical Exam:  Physical Exam  Constitutional:       Appearance: He is normal weight.   HENT:      Head: Normocephalic and atraumatic.      Nose: Nose normal. No congestion or rhinorrhea.   Eyes:      General: No scleral icterus.     Extraocular Movements: Extraocular movements intact.      Conjunctiva/sclera: Conjunctivae normal.      Pupils: Pupils are equal, round, and reactive to light.   Cardiovascular:      Rate and Rhythm: Normal rate and regular rhythm.      Pulses: Normal pulses.      Heart sounds: Normal heart sounds.   Pulmonary:      Effort: Pulmonary effort is normal.      Breath sounds: Normal breath sounds.   Abdominal:      General: Abdomen is flat. Bowel sounds are normal. There is no distension.      Palpations: Abdomen is soft. There is no shifting dullness, fluid wave, hepatomegaly, splenomegaly, mass or pulsatile mass.      Tenderness: There is no abdominal tenderness. There is no guarding or rebound.      Hernia: No hernia is present.   Musculoskeletal:         General: No swelling or tenderness.      Cervical back: Normal range of motion and neck supple.   Skin:     General: Skin is warm and dry.      Coloration: Skin is not jaundiced.   Neurological:      General: No focal deficit present.      Mental Status: He is alert and oriented to person, place, and time.   Psychiatric:         " Mood and Affect: Mood normal.         Behavior: Behavior normal.         Results Review:  Lab Results:   No results displayed because visit has over 200 results.          Assessment/Plan    I have given the patient samples on Creon.  He is to take 2 prior to eating large meals and 1 pill prior to a snack.  Pancreatic fecal elastase will be obtained.  He has had recent elevated LFTs.  I will check for normalization - CMP today.    Visit Diagnoses:    ICD-10-CM ICD-9-CM   1. Diarrhea, unspecified type  R19.7 787.91   2. Weight loss, abnormal  R63.4 783.21   3. Malaise and fatigue  R53.81 780.79    R53.83    4. Elevated liver enzymes  R74.8 790.5   5. Chronic pancreatitis, unspecified pancreatitis type (CMS/HCC)  K86.1 577.1       Plan:  Orders Placed This Encounter   Procedures   • Vitamin B12 & Folate   • Vitamin D 1,25 Dihydroxy   • Comprehensive Metabolic Panel   • Pancreatic Elastase, Fecal - Stool, Per Rectum   • Fecal Fat, Qualitative - Stool, Per Rectum   • CBC Auto Differential   • CBC & Differential       * Surgery not found *      MEDICATION ISSUES:  Discussed medication options and treatment plan of prescribed medication as well as the risks, benefits, and side effects including potential falls, possible impaired driving and metabolic adversities among others. Patient is agreeable to call the office with any worsening of symptoms or onset of side effects. Patient is agreeable to call 911 or go to the nearest ER should he/she begin having SI/HI.     MEDS ORDERED DURING VISIT:  No orders of the defined types were placed in this encounter.      No follow-ups on file.             This document has been electronically signed by Bela Mcdaniel MD   July 21, 2021 22:19 EDT      Part of this note may be an electronic transcription/translation of spoken language to printed text using the Dragon Dictation System.

## 2021-07-22 LAB
ALBUMIN SERPL-MCNC: 4.1 G/DL (ref 3.5–5.2)
ALBUMIN/GLOB SERPL: 1.5 G/DL
ALP SERPL-CCNC: 105 U/L (ref 39–117)
ALT SERPL W P-5'-P-CCNC: 26 U/L (ref 1–41)
ANION GAP SERPL CALCULATED.3IONS-SCNC: 10 MMOL/L (ref 5–15)
AST SERPL-CCNC: 24 U/L (ref 1–40)
BASOPHILS # BLD AUTO: 0.04 10*3/MM3 (ref 0–0.2)
BASOPHILS NFR BLD AUTO: 0.6 % (ref 0–1.5)
BILIRUB SERPL-MCNC: 0.3 MG/DL (ref 0–1.2)
BUN SERPL-MCNC: 10 MG/DL (ref 8–23)
BUN/CREAT SERPL: 9.7 (ref 7–25)
CALCIUM SPEC-SCNC: 9 MG/DL (ref 8.6–10.5)
CHLORIDE SERPL-SCNC: 102 MMOL/L (ref 98–107)
CO2 SERPL-SCNC: 25 MMOL/L (ref 22–29)
CREAT SERPL-MCNC: 1.03 MG/DL (ref 0.76–1.27)
DEPRECATED RDW RBC AUTO: 41.4 FL (ref 37–54)
EOSINOPHIL # BLD AUTO: 0.26 10*3/MM3 (ref 0–0.4)
EOSINOPHIL NFR BLD AUTO: 3.6 % (ref 0.3–6.2)
ERYTHROCYTE [DISTWIDTH] IN BLOOD BY AUTOMATED COUNT: 13.2 % (ref 12.3–15.4)
FOLATE SERPL-MCNC: 17.4 NG/ML (ref 4.78–24.2)
GFR SERPL CREATININE-BSD FRML MDRD: 70 ML/MIN/1.73
GLOBULIN UR ELPH-MCNC: 2.8 GM/DL
GLUCOSE SERPL-MCNC: 135 MG/DL (ref 65–99)
HCT VFR BLD AUTO: 41 % (ref 37.5–51)
HGB BLD-MCNC: 13.3 G/DL (ref 13–17.7)
IMM GRANULOCYTES # BLD AUTO: 0.02 10*3/MM3 (ref 0–0.05)
IMM GRANULOCYTES NFR BLD AUTO: 0.3 % (ref 0–0.5)
LYMPHOCYTES # BLD AUTO: 2.97 10*3/MM3 (ref 0.7–3.1)
LYMPHOCYTES NFR BLD AUTO: 41.4 % (ref 19.6–45.3)
MCH RBC QN AUTO: 28.1 PG (ref 26.6–33)
MCHC RBC AUTO-ENTMCNC: 32.4 G/DL (ref 31.5–35.7)
MCV RBC AUTO: 86.7 FL (ref 79–97)
MONOCYTES # BLD AUTO: 0.57 10*3/MM3 (ref 0.1–0.9)
MONOCYTES NFR BLD AUTO: 7.9 % (ref 5–12)
NEUTROPHILS NFR BLD AUTO: 3.31 10*3/MM3 (ref 1.7–7)
NEUTROPHILS NFR BLD AUTO: 46.2 % (ref 42.7–76)
NRBC BLD AUTO-RTO: 0 /100 WBC (ref 0–0.2)
PLATELET # BLD AUTO: 253 10*3/MM3 (ref 140–450)
PMV BLD AUTO: 11.1 FL (ref 6–12)
POTASSIUM SERPL-SCNC: 4.2 MMOL/L (ref 3.5–5.2)
PROT SERPL-MCNC: 6.9 G/DL (ref 6–8.5)
RBC # BLD AUTO: 4.73 10*6/MM3 (ref 4.14–5.8)
SODIUM SERPL-SCNC: 137 MMOL/L (ref 136–145)
VIT B12 BLD-MCNC: 690 PG/ML (ref 211–946)
WBC # BLD AUTO: 7.17 10*3/MM3 (ref 3.4–10.8)

## 2021-07-22 NOTE — PROGRESS NOTES
Your labs look great.  Your liver enzymes have normalized.  You are no longer anemic.  B12 and folate levels were normal.  I am waiting on your vitamin D level.  I hope that you have a good response to the pancreatic enzymes.  Please do not start these until you do the stool test.

## 2021-07-26 ENCOUNTER — LAB (OUTPATIENT)
Dept: LAB | Facility: HOSPITAL | Age: 76
End: 2021-07-26

## 2021-07-26 LAB — 1,25(OH)2D SERPL-MCNC: 39.1 PG/ML (ref 19.9–79.3)

## 2021-07-26 PROCEDURE — 82705 FATS/LIPIDS FECES QUAL: CPT | Performed by: INTERNAL MEDICINE

## 2021-07-26 PROCEDURE — 82656 EL-1 FECAL QUAL/SEMIQ: CPT | Performed by: INTERNAL MEDICINE

## 2021-07-27 LAB
FA STL QL: NORMAL
NEUTRAL FAT STL QL: NORMAL

## 2021-07-28 LAB — ELASTASE PANC STL-MCNT: <50 UG ELAST./G

## 2021-07-28 NOTE — PROGRESS NOTES
Your vitamin D level was normal.  Your stool for fecal fat was positive.  I hope that the pancreatic enzymes are helping your digestion.  Please keep your follow-up appointment.

## 2021-07-29 ENCOUNTER — READMISSION MANAGEMENT (OUTPATIENT)
Dept: CALL CENTER | Facility: HOSPITAL | Age: 76
End: 2021-07-29

## 2021-07-29 DIAGNOSIS — K86.89 PANCREATIC INSUFFICIENCY: Primary | ICD-10-CM

## 2021-07-29 RX ORDER — PANCRELIPASE 36000; 180000; 114000 [USP'U]/1; [USP'U]/1; [USP'U]/1
CAPSULE, DELAYED RELEASE PELLETS ORAL
Qty: 240 CAPSULE | Refills: 3 | Status: SHIPPED | OUTPATIENT
Start: 2021-07-29 | End: 2021-07-29 | Stop reason: SDUPTHER

## 2021-07-29 RX ORDER — PANCRELIPASE 36000; 180000; 114000 [USP'U]/1; [USP'U]/1; [USP'U]/1
CAPSULE, DELAYED RELEASE PELLETS ORAL
Qty: 240 CAPSULE | Refills: 3 | Status: SHIPPED | OUTPATIENT
Start: 2021-07-29 | End: 2021-09-02 | Stop reason: SDUPTHER

## 2021-07-29 NOTE — PROGRESS NOTES
I sent the Creon order through your mail order pharmacy.  Please let me know if you need more samples before your Creon arrives in the mail.

## 2021-07-29 NOTE — PROGRESS NOTES
Your pancreatic fecal elastase level was less than 50 indicating severe pancreatic insufficiency.  Please continue your pancreatic enzymes I am going to send in a prescription for pancreatic enzymes.  Please let me know if you have further problems.  I can increase the enzyme amount.

## 2021-07-29 NOTE — OUTREACH NOTE
Medical Week 4 Survey      Responses   [facility], [place], operated by [facility] patient discharged from?  [facility]   Does the patient have one of the following disease processes/diagnoses(primary or secondary)?  Other   Week 4 attempt successful?  Yes   Call start time  1602   Call end time  1612   Discharge diagnosis  Sepsis,/[place] spotted fever   Is patient permission given to speak with other caregiver?  Yes   List who call center can speak with  Wife-Alesha   Person spoke with today (if not patient) and relationship  patient    Meds reviewed with patient/caregiver?  Yes   Is the patient taking all medications as directed (includes completed medication regime)?  Yes   Medication comments  Patient now on Creon   Has the patient kept scheduled appointments due by today?  Yes   Is the patient still receiving Home Health Services?  N/A   Psychosocial issues?  No   What is the patient's perception of their health status since discharge?  Improving   Is the patient/caregiver able to teach back signs and symptoms related to disease process for when to call PCP?  Yes   Is the patient/caregiver able to teach back signs and symptoms related to disease process for when to call 911?  Yes   Is the patient/caregiver able to teach back the hierarchy of who to call/visit for symptoms/problems? PCP, Specialist, Home health nurse, Urgent Care, ED, 911  Yes   If the patient is a current smoker, are they able to teach back resources for cessation?  Not a smoker   Week 4 Call Completed?  Yes   Would the patient like one additional call?  No   Graduated  Yes   Is the patient interested in additional calls from an ambulatory ?  NOTE:  applies to high risk patients requiring additional follow-up.  No   Did the patient feel the follow up calls were helpful during their recovery period?  Yes   Was the number of calls appropriate?  Yes          Nettie Mccabe RN

## 2021-09-02 DIAGNOSIS — K86.89 PANCREATIC INSUFFICIENCY: ICD-10-CM

## 2021-09-02 RX ORDER — PANCRELIPASE 36000; 180000; 114000 [USP'U]/1; [USP'U]/1; [USP'U]/1
CAPSULE, DELAYED RELEASE PELLETS ORAL
Qty: 240 CAPSULE | Refills: 11 | Status: SHIPPED | OUTPATIENT
Start: 2021-09-02 | End: 2022-01-23 | Stop reason: HOSPADM

## 2021-09-20 ENCOUNTER — TELEPHONE (OUTPATIENT)
Dept: GASTROENTEROLOGY | Facility: CLINIC | Age: 76
End: 2021-09-20

## 2021-09-22 ENCOUNTER — OFFICE VISIT (OUTPATIENT)
Dept: GASTROENTEROLOGY | Facility: CLINIC | Age: 76
End: 2021-09-22

## 2021-09-22 VITALS
DIASTOLIC BLOOD PRESSURE: 69 MMHG | WEIGHT: 167 LBS | HEART RATE: 66 BPM | SYSTOLIC BLOOD PRESSURE: 114 MMHG | BODY MASS INDEX: 22.62 KG/M2 | HEIGHT: 72 IN

## 2021-09-22 DIAGNOSIS — K86.89 PANCREATIC INSUFFICIENCY: Primary | ICD-10-CM

## 2021-09-22 DIAGNOSIS — R63.4 WEIGHT LOSS, ABNORMAL: ICD-10-CM

## 2021-09-22 DIAGNOSIS — R10.13 EPIGASTRIC ABDOMINAL PAIN: ICD-10-CM

## 2021-09-22 PROCEDURE — 99213 OFFICE O/P EST LOW 20 MIN: CPT | Performed by: INTERNAL MEDICINE

## 2021-09-22 NOTE — PROGRESS NOTES
Subjective   Sandro Argueta is a 76 y.o. 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  Interestingly, the patient does not recall ever having pancreatitis.  He is on Creon.  He takes 1 pill with snacks and 2 pills with meals.  He generally will have only have a snack or two b/w meals.  He feels well.  No diarrhea.  No abdominal pain.   He has put on some weight for which he is very happy.      Review of Systems:  Review of Systems   Constitutional: Negative for chills, fatigue and fever.   HENT: Negative for trouble swallowing.    Eyes: Negative.    Respiratory: Negative for cough, choking, chest tightness and shortness of breath.    Cardiovascular: Negative for chest pain.   Gastrointestinal: Positive for diarrhea. Negative for abdominal distention, abdominal pain, anal bleeding, blood in stool, constipation, nausea and vomiting.   Endocrine: Negative.    Genitourinary: Negative for difficulty urinating and dysuria.   Musculoskeletal: Negative for back pain and neck pain.   Skin: Negative.    Allergic/Immunologic: Negative for environmental allergies and food allergies.   Neurological: Negative for dizziness, light-headedness and headaches.   Hematological: Bruises/bleeds easily.   Psychiatric/Behavioral: Negative.        Past Medical History:  Past Medical History:   Diagnosis Date   • Acute UTI (urinary tract infection) 2/20/2021   • Arthritis    • Borderline diabetes    • Coronary artery disease    • Diabetes mellitus (CMS/HCC)    • Elevated prostate specific antigen (PSA) 8/17/2020   • GERD (gastroesophageal reflux disease)    • Hypertension    • Left elbow pain        Past Surgical History:  Past Surgical History:   Procedure Laterality Date   • COLONOSCOPY     • ENDOSCOPY N/A 2/9/2021     Procedure: ESOPHAGOGASTRODUODENOSCOPY WITH BIOPSY dilitation;  Surgeon: Bela Mcdaniel MD;  Location: [facility] OR;  Service: Gastroenterology;  Laterality: N/A;   • ERCP N/A 2/21/2021    Procedure: ENDOSCOPIC RETROGRADE CHOLANGIOPANCREATOGRAPHY WITH STENT PLACEMENT;  Surgeon: Matthew Moreira MD;  Location: [facility] ENDOSCOPY;  Service: Gastroenterology;  Laterality: N/A;  with sphincterotomy, and brushing of common bile duct, and placement of [id number] wall stent     • PACEMAKER IMPLANTATION         Family History:  Family History   Problem Relation Age of Onset   • Diabetes Mother    • Diabetes Sister    • Diabetes Brother        Social History:  Social History     Socioeconomic History   • Marital status:      Spouse name: Not on file   • Number of children: Not on file   • Years of education: Not on file   • Highest education level: Not on file   Tobacco Use   • Smoking status: Never Smoker   • Smokeless tobacco: Never Used   Vaping Use   • Vaping Use: Never used   Substance and Sexual Activity   • Alcohol use: No   • Drug use: No   • Sexual activity: Defer       Current Medication List:    Current Outpatient Medications:   •  amLODIPine (NORVASC) 5 MG tablet, Take 1 tablet by mouth Daily. Do not take if blood pressure is less than 110/60, Disp: 30 tablet, Rfl: 0  •  Cholecalciferol (Vitamin D3) 1.25 MG (29682 UT) capsule, Take 50,000 Units by mouth Every 7 (Seven) Days. Prior to [facility] Admission, Patient was on: Patient takes on Fridays., Disp: , Rfl:   •  docusate sodium 100 MG capsule, Take 1 capsule by mouth 2 (Two) Times a Day., Disp: 60 capsule, Rfl: 0  •  Insulin Glargine, 2 Unit Dial, (Toujeo Max SoloStar) 300 UNIT/ML solution pen-injector injection, Inject 15 Units under the skin into the appropriate area as directed., Disp: , Rfl:   •  omeprazole (priLOSEC) 40 MG capsule, Take 1 capsule by mouth Daily., Disp: 30 capsule, Rfl: 5  •  Pancrelipase, Lip-Prot-Amyl, (Creon)  "[id number] units capsule delayed-release particles capsule, Take 2 caps with breakfast, lunch and dinner. Then 1 cap with each snack (up to two daily). Take a max of 8 caps per day., Disp: 240 capsule, Rfl: 11  •  promethazine (PHENERGAN) 12.5 MG tablet, Take 1 tablet by mouth Every 8 (Eight) Hours As Needed for Nausea or Vomiting., Disp: 12 tablet, Rfl: 0  •  repaglinide (PRANDIN) 0.5 MG tablet, Take 0.5 mg by mouth 3 (Three) Times a Day Before Meals., Disp: , Rfl:     Allergies:   Other    Vitals:  /69 (BP Location: Left arm, Patient Position: Sitting, Cuff Size: Adult)   Pulse 66   Ht 182.9 cm (72\")   Wt 75.8 kg (167 lb)   BMI 22.65 kg/m²     Physical Exam:  Physical Exam  Constitutional:       Appearance: He is normal weight.   HENT:      Head: Normocephalic and atraumatic.      Nose: Nose normal. No congestion or rhinorrhea.   Eyes:      General: No scleral icterus.     Extraocular Movements: Extraocular movements intact.      Conjunctiva/sclera: Conjunctivae normal.      Pupils: Pupils are equal, round, and reactive to light.   Cardiovascular:      Rate and Rhythm: Normal rate and regular rhythm.      Pulses: Normal pulses.      Heart sounds: Normal heart sounds.   Pulmonary:      Effort: Pulmonary effort is normal.      Breath sounds: Normal breath sounds.   Abdominal:      General: Abdomen is flat. Bowel sounds are normal. There is no distension.      Palpations: Abdomen is soft. There is no shifting dullness, fluid wave, hepatomegaly, splenomegaly, mass or pulsatile mass.      Tenderness: There is no abdominal tenderness. There is no guarding or rebound.      Hernia: No hernia is present.   Musculoskeletal:         General: No swelling or tenderness.      Cervical back: Normal range of motion and neck supple.   Skin:     General: Skin is warm and dry.      Coloration: Skin is not jaundiced.   Neurological:      General: No focal deficit present.      Mental Status: He is alert and oriented to " person, place, and time.   Psychiatric:         Mood and Affect: Mood normal.         Behavior: Behavior normal.         Results Review:  Lab Results:   No visits with results within 1 Month(s) from this visit.   Latest known visit with results is:   Office Visit on 07/21/2021   Component Date Value Ref Range Status   • Folate 07/21/2021 17.40  4.78 - 24.20 ng/mL Final   • Vitamin B-12 07/21/2021 690  211 - 946 pg/mL Final   • 1,25-Dihydroxy, Vitamin D 07/21/2021 39.1  19.9 - 79.3 pg/mL Final   • Glucose 07/21/2021 135* 65 - 99 mg/dL Final   • BUN 07/21/2021 10  8 - 23 mg/dL Final   • Creatinine 07/21/2021 1.03  0.76 - 1.27 mg/dL Final   • Sodium 07/21/2021 137  136 - 145 mmol/L Final   • Potassium 07/21/2021 4.2  3.5 - 5.2 mmol/L Final   • Chloride 07/21/2021 102  98 - 107 mmol/L Final   • CO2 07/21/2021 25.0  22.0 - 29.0 mmol/L Final   • Calcium 07/21/2021 9.0  8.6 - 10.5 mg/dL Final   • Total Protein 07/21/2021 6.9  6.0 - 8.5 g/dL Final   • Albumin 07/21/2021 4.10  3.50 - 5.20 g/dL Final   • ALT (SGPT) 07/21/2021 26  1 - 41 U/L Final   • AST (SGOT) 07/21/2021 24  1 - 40 U/L Final   • Alkaline Phosphatase 07/21/2021 105  39 - 117 U/L Final   • Total Bilirubin 07/21/2021 0.3  0.0 - 1.2 mg/dL Final   • eGFR Non  Amer 07/21/2021 70  >60 mL/min/1.73 Final   • Globulin 07/21/2021 2.8  gm/dL Final   • A/G Ratio 07/21/2021 1.5  g/dL Final   • BUN/Creatinine Ratio 07/21/2021 9.7  7.0 - 25.0 Final   • Anion Gap 07/21/2021 10.0  5.0 - 15.0 mmol/L Final   • Pancreatic Fecal 07/26/2021 <50* >200 ug Elast./g Final    **Results verified by repeat testing**         Severe Pancreatic Insufficiency:          <100         Moderate Pancreatic Insufficiency:   100 - 200         Normal:                                   >200   • Fecal Fat Qualitative, Neutral Fats 07/26/2021 Normal   Final                                   Normal (<60 Droplets/HPF)   • Fecal Fats, Qualititative, Total 07/26/2021 Increased   Final                                   Normal (<100 Droplets/HPF)   • WBC 07/21/2021 7.17  3.40 - 10.80 10*3/mm3 Final   • RBC 07/21/2021 4.73  4.14 - 5.80 10*6/mm3 Final   • Hemoglobin 07/21/2021 13.3  13.0 - 17.7 g/dL Final   • Hematocrit 07/21/2021 41.0  37.5 - 51.0 % Final   • MCV 07/21/2021 86.7  79.0 - 97.0 fL Final   • MCH 07/21/2021 28.1  26.6 - 33.0 pg Final   • MCHC 07/21/2021 32.4  31.5 - 35.7 g/dL Final   • RDW 07/21/2021 13.2  12.3 - 15.4 % Final   • RDW-SD 07/21/2021 41.4  37.0 - 54.0 fl Final   • MPV 07/21/2021 11.1  6.0 - 12.0 fL Final   • Platelets 07/21/2021 253  140 - 450 10*3/mm3 Final   • Neutrophil % 07/21/2021 46.2  42.7 - 76.0 % Final   • Lymphocyte % 07/21/2021 41.4  19.6 - 45.3 % Final   • Monocyte % 07/21/2021 7.9  5.0 - 12.0 % Final   • Eosinophil % 07/21/2021 3.6  0.3 - 6.2 % Final   • Basophil % 07/21/2021 0.6  0.0 - 1.5 % Final   • Immature Grans % 07/21/2021 0.3  0.0 - 0.5 % Final   • Neutrophils, Absolute 07/21/2021 3.31  1.70 - 7.00 10*3/mm3 Final   • Lymphocytes, Absolute 07/21/2021 2.97  0.70 - 3.10 10*3/mm3 Final   • Monocytes, Absolute 07/21/2021 0.57  0.10 - 0.90 10*3/mm3 Final   • Eosinophils, Absolute 07/21/2021 0.26  0.00 - 0.40 10*3/mm3 Final   • Basophils, Absolute 07/21/2021 0.04  0.00 - 0.20 10*3/mm3 Final   • Immature Grans, Absolute 07/21/2021 0.02  0.00 - 0.05 10*3/mm3 Final   • nRBC 07/21/2021 0.0  0.0 - 0.2 /100 WBC Final       Assessment/Plan     Visit Diagnoses:    ICD-10-CM ICD-9-CM   1. Pancreatic insufficiency  K86.89 577.8   2. Weight loss, abnormal  R63.4 783.21   3. Epigastric abdominal pain  R10.13 789.06       Plan:  The patient is doing very well on Creon.  He is gaining weight.  No abdominal pain, nausea or vomiting.            MEDICATION ISSUES:  Discussed medication options and treatment plan of prescribed medication as well as the risks, benefits, and side effects including potential falls, possible impaired driving and metabolic adversities among others. Patient is  agreeable to call the office with any worsening of symptoms or onset of side effects. Patient is agreeable to call 911 or go to the nearest ER should he/she begin having SI/HI.     MEDS ORDERED DURING VISIT:  No orders of the defined types were placed in this encounter.      Return in about 3 months (around 12/22/2021).             This document has been electronically signed by Bela Mcdaniel MD   September 22, 2021 15:31 EDT      Part of this note may be an electronic transcription/translation of spoken language to printed text using the Dragon Dictation System.

## 2021-09-23 ENCOUNTER — TELEPHONE (OUTPATIENT)
Dept: GASTROENTEROLOGY | Facility: CLINIC | Age: 76
End: 2021-09-23

## 2021-09-23 DIAGNOSIS — K86.89 PANCREATIC INSUFFICIENCY: Primary | ICD-10-CM

## 2021-09-23 RX ORDER — PANCRELIPASE 36000; 180000; 114000 [USP'U]/1; [USP'U]/1; [USP'U]/1
CAPSULE, DELAYED RELEASE PELLETS ORAL
Qty: 240 CAPSULE | Refills: 3 | Status: ON HOLD | OUTPATIENT
Start: 2021-09-23 | End: 2022-01-22

## 2021-10-22 ENCOUNTER — TELEPHONE (OUTPATIENT)
Dept: GASTROENTEROLOGY | Facility: CLINIC | Age: 76
End: 2021-10-22

## 2021-10-25 NOTE — TELEPHONE ENCOUNTER
I believe that he is talking about creon. I sent patient assistance in for this but they needed his part D insurance card. I told him if he would bring it by I would make a copy of it but he has not brought it by.

## 2021-11-09 ENCOUNTER — TELEPHONE (OUTPATIENT)
Dept: GASTROENTEROLOGY | Facility: CLINIC | Age: 76
End: 2021-11-09

## 2021-11-09 NOTE — TELEPHONE ENCOUNTER
PT NEEDS TO A CALL BACK. STATES HE WAS SUPPOSE TO HAVE HELP WITH HIS MEDS AND NEVER HEARD ANYTHING BACK .

## 2021-12-10 ENCOUNTER — IMMUNIZATION (OUTPATIENT)
Dept: VACCINE CLINIC | Facility: HOSPITAL | Age: 76
End: 2021-12-10

## 2021-12-10 PROCEDURE — 0004A HC ADM SARSCOV2 30MCG/0.3ML BOOSTER: CPT | Performed by: INTERNAL MEDICINE

## 2021-12-10 PROCEDURE — 91300 HC SARSCOV02 VAC 30MCG/0.3ML IM: CPT | Performed by: INTERNAL MEDICINE

## 2021-12-29 ENCOUNTER — OFFICE VISIT (OUTPATIENT)
Dept: GASTROENTEROLOGY | Facility: CLINIC | Age: 76
End: 2021-12-29

## 2021-12-29 VITALS
DIASTOLIC BLOOD PRESSURE: 66 MMHG | OXYGEN SATURATION: 98 % | WEIGHT: 175 LBS | SYSTOLIC BLOOD PRESSURE: 162 MMHG | HEIGHT: 72 IN | HEART RATE: 65 BPM | BODY MASS INDEX: 23.7 KG/M2

## 2021-12-29 DIAGNOSIS — K86.89 PANCREATIC INSUFFICIENCY: Primary | ICD-10-CM

## 2021-12-29 PROCEDURE — 99213 OFFICE O/P EST LOW 20 MIN: CPT | Performed by: INTERNAL MEDICINE

## 2021-12-29 NOTE — PROGRESS NOTES
Subjective   Sandro Argueta is a 76 y.o. male who presents to the office today as a follow up appointment regarding Diarrhea      History of Present Illness:  The patient resents for follow-up of pancreatic insufficiency after Whipple procedure for suspect pancreatic cancer which pathology ultimately revealed as chronic pancreatitis.  Interestingly, the patient does not recall ever having pancreatitis.  He is on Creon.  He takes 1 pill with snacks and 2 pills with meals.  He generally will have only have a snack or two b/w meals.  He feels well.  He does have a bowel movement daily to every other day.  His stool is a Kaycee score 4.  He states he has pretty good evacuation of his bowel but will occasionally have a bowel movement 30 minutes after the initial bowel movement in the morning.  On occasion the patient has noted that he will have episodes of gassiness and belching.  This occurs at least once a week.  He has put on some weight.  Currently, his weight is stable.  Overall, he is pleased with the effects of the Creon.        Review of Systems:  Review of Systems   Constitutional: Negative for chills, fatigue and fever.   HENT: Negative for trouble swallowing.    Eyes: Negative.    Respiratory: Negative for cough, choking, chest tightness and shortness of breath.    Cardiovascular: Negative for chest pain.   Gastrointestinal: Positive for diarrhea. Negative for abdominal distention, abdominal pain, anal bleeding, blood in stool, constipation, nausea, rectal pain and vomiting.   Endocrine: Negative.    Genitourinary: Negative for difficulty urinating and dysuria.   Musculoskeletal: Negative for back pain and neck pain.   Skin: Negative.    Allergic/Immunologic: Negative for environmental allergies and food allergies.   Neurological: Negative for dizziness, light-headedness and headaches.   Hematological: Bruises/bleeds easily.   Psychiatric/Behavioral: Negative.        Past Medical History:  Past Medical  History:   Diagnosis Date   • Acute UTI (urinary tract infection) 2/20/2021   • Arthritis    • Borderline diabetes    • Coronary artery disease    • Diabetes mellitus (HCC)    • Elevated prostate specific antigen (PSA) 8/17/2020   • GERD (gastroesophageal reflux disease)    • Hypertension    • Left elbow pain        Past Surgical History:  Past Surgical History:   Procedure Laterality Date   • COLONOSCOPY     • ENDOSCOPY N/A 2/9/2021    Procedure: ESOPHAGOGASTRODUODENOSCOPY WITH BIOPSY dilitation;  Surgeon: Bela Mcdaniel MD;  [facility];  Service: Gastroenterology;  Laterality: N/A;   • ERCP N/A 2/21/2021    Procedure: ENDOSCOPIC RETROGRADE CHOLANGIOPANCREATOGRAPHY WITH STENT PLACEMENT;  Surgeon: Matthew Moreira MD;  Location: [facility] ENDOSCOPY;  Service: Gastroenterology;  Laterality: N/A;  with sphincterotomy, and brushing of common bile duct, and placement of [id number] wall stent     • PACEMAKER IMPLANTATION         Family History:  Family History   Problem Relation Age of Onset   • Diabetes Mother    • Diabetes Sister    • Diabetes Brother        Social History:  Social History     Socioeconomic History   • Marital status:    Tobacco Use   • Smoking status: Never Smoker   • Smokeless tobacco: Never Used   Vaping Use   • Vaping Use: Never used   Substance and Sexual Activity   • Alcohol use: No   • Drug use: No   • Sexual activity: Defer       Current Medication List:    Current Outpatient Medications:   •  amLODIPine (NORVASC) 5 MG tablet, Take 1 tablet by mouth Daily. Do not take if blood pressure is less than 110/60, Disp: 30 tablet, Rfl: 0  •  Cholecalciferol (Vitamin D3) 1.25 MG (63947 UT) capsule, Take 50,000 Units by mouth Every 7 (Seven) Days. Prior to [facility] Admission, Patient was on: Patient takes on Fridays., Disp: , Rfl:   •  docusate sodium 100 MG capsule, Take 1 capsule by mouth 2 (Two) Times a Day., Disp: 60 capsule, Rfl: 0  •  Insulin Glargine, 2 Unit  "Dial, (Toujeo Max SoloStar) 300 UNIT/ML solution pen-injector injection, Inject 15 Units under the skin into the appropriate area as directed., Disp: , Rfl:   •  omeprazole (priLOSEC) 40 MG capsule, Take 1 capsule by mouth Daily., Disp: 30 capsule, Rfl: 5  •  Pancrelipase, Lip-Prot-Amyl, (Creon) 36824-513087 units capsule delayed-release particles capsule, Take 2 caps with breakfast, lunch and dinner. Then 1 cap with each snack (up to two daily). Take a max of 8 caps per day., Disp: 240 capsule, Rfl: 11  •  Pancrelipase, Lip-Prot-Amyl, (Creon) 66562-486920 units capsule delayed-release particles capsule, Take 2 caps with meals and 1 with snacks., Disp: 240 capsule, Rfl: 3  •  promethazine (PHENERGAN) 12.5 MG tablet, Take 1 tablet by mouth Every 8 (Eight) Hours As Needed for Nausea or Vomiting., Disp: 12 tablet, Rfl: 0  •  repaglinide (PRANDIN) 0.5 MG tablet, Take 0.5 mg by mouth 3 (Three) Times a Day Before Meals., Disp: , Rfl:     Allergies:   Other    Vitals:  /66   Pulse 65   Ht 182.9 cm (72\")   Wt 79.4 kg (175 lb)   SpO2 98%   BMI 23.73 kg/m²     Physical Exam:  Physical Exam  Constitutional:       Appearance: He is normal weight.   HENT:      Head: Normocephalic and atraumatic.      Nose: Nose normal. No congestion or rhinorrhea.   Eyes:      General: No scleral icterus.     Extraocular Movements: Extraocular movements intact.      Conjunctiva/sclera: Conjunctivae normal.      Pupils: Pupils are equal, round, and reactive to light.   Cardiovascular:      Rate and Rhythm: Normal rate and regular rhythm.      Pulses: Normal pulses.      Heart sounds: Normal heart sounds.   Pulmonary:      Effort: Pulmonary effort is normal.      Breath sounds: Normal breath sounds.   Abdominal:      General: Abdomen is flat. Bowel sounds are normal. There is no distension.      Palpations: Abdomen is soft. There is no shifting dullness, fluid wave, hepatomegaly, splenomegaly, mass or pulsatile mass.      Tenderness: " There is no abdominal tenderness. There is no guarding or rebound.      Hernia: No hernia is present.   Musculoskeletal:         General: No swelling or tenderness.      Cervical back: Normal range of motion and neck supple.   Skin:     General: Skin is warm and dry.      Coloration: Skin is not jaundiced.   Neurological:      General: No focal deficit present.      Mental Status: He is alert and oriented to person, place, and time.   Psychiatric:         Mood and Affect: Mood normal.         Behavior: Behavior normal.         Results Review:  Lab Results:   No visits with results within 1 Month(s) from this visit.   Latest known visit with results is:   Office Visit on 07/21/2021   Component Date Value Ref Range Status   • Folate 07/21/2021 17.40  4.78 - 24.20 ng/mL Final   • Vitamin B-12 07/21/2021 690  211 - 946 pg/mL Final   • 1,25-Dihydroxy, Vitamin D 07/21/2021 39.1  19.9 - 79.3 pg/mL Final   • Glucose 07/21/2021 135* 65 - 99 mg/dL Final   • BUN 07/21/2021 10  8 - 23 mg/dL Final   • Creatinine 07/21/2021 1.03  0.76 - 1.27 mg/dL Final   • Sodium 07/21/2021 137  136 - 145 mmol/L Final   • Potassium 07/21/2021 4.2  3.5 - 5.2 mmol/L Final   • Chloride 07/21/2021 102  98 - 107 mmol/L Final   • CO2 07/21/2021 25.0  22.0 - 29.0 mmol/L Final   • Calcium 07/21/2021 9.0  8.6 - 10.5 mg/dL Final   • Total Protein 07/21/2021 6.9  6.0 - 8.5 g/dL Final   • Albumin 07/21/2021 4.10  3.50 - 5.20 g/dL Final   • ALT (SGPT) 07/21/2021 26  1 - 41 U/L Final   • AST (SGOT) 07/21/2021 24  1 - 40 U/L Final   • Alkaline Phosphatase 07/21/2021 105  39 - 117 U/L Final   • Total Bilirubin 07/21/2021 0.3  0.0 - 1.2 mg/dL Final   • eGFR Non  Amer 07/21/2021 70  >60 mL/min/1.73 Final   • Globulin 07/21/2021 2.8  gm/dL Final   • A/G Ratio 07/21/2021 1.5  g/dL Final   • BUN/Creatinine Ratio 07/21/2021 9.7  7.0 - 25.0 Final   • Anion Gap 07/21/2021 10.0  5.0 - 15.0 mmol/L Final   • Pancreatic Fecal 07/26/2021 <50* >200 ug Elast./g Final     **Results verified by repeat testing**         Severe Pancreatic Insufficiency:          <100         Moderate Pancreatic Insufficiency:   100 - 200         Normal:                                   >200   • Fecal Fat Qualitative, Neutral Fats 07/26/2021 Normal   Final                                   Normal (<60 Droplets/HPF)   • Fecal Fats, Qualititative, Total 07/26/2021 Increased   Final                                  Normal (<100 Droplets/HPF)   • WBC 07/21/2021 7.17  3.40 - 10.80 10*3/mm3 Final   • RBC 07/21/2021 4.73  4.14 - 5.80 10*6/mm3 Final   • Hemoglobin 07/21/2021 13.3  13.0 - 17.7 g/dL Final   • Hematocrit 07/21/2021 41.0  37.5 - 51.0 % Final   • MCV 07/21/2021 86.7  79.0 - 97.0 fL Final   • MCH 07/21/2021 28.1  26.6 - 33.0 pg Final   • MCHC 07/21/2021 32.4  31.5 - 35.7 g/dL Final   • RDW 07/21/2021 13.2  12.3 - 15.4 % Final   • RDW-SD 07/21/2021 41.4  37.0 - 54.0 fl Final   • MPV 07/21/2021 11.1  6.0 - 12.0 fL Final   • Platelets 07/21/2021 253  140 - 450 10*3/mm3 Final   • Neutrophil % 07/21/2021 46.2  42.7 - 76.0 % Final   • Lymphocyte % 07/21/2021 41.4  19.6 - 45.3 % Final   • Monocyte % 07/21/2021 7.9  5.0 - 12.0 % Final   • Eosinophil % 07/21/2021 3.6  0.3 - 6.2 % Final   • Basophil % 07/21/2021 0.6  0.0 - 1.5 % Final   • Immature Grans % 07/21/2021 0.3  0.0 - 0.5 % Final   • Neutrophils, Absolute 07/21/2021 3.31  1.70 - 7.00 10*3/mm3 Final   • Lymphocytes, Absolute 07/21/2021 2.97  0.70 - 3.10 10*3/mm3 Final   • Monocytes, Absolute 07/21/2021 0.57  0.10 - 0.90 10*3/mm3 Final   • Eosinophils, Absolute 07/21/2021 0.26  0.00 - 0.40 10*3/mm3 Final   • Basophils, Absolute 07/21/2021 0.04  0.00 - 0.20 10*3/mm3 Final   • Immature Grans, Absolute 07/21/2021 0.02  0.00 - 0.05 10*3/mm3 Final   • nRBC 07/21/2021 0.0  0.0 - 0.2 /100 WBC Final       Assessment/Plan     Visit Diagnoses:    ICD-10-CM ICD-9-CM   1. Pancreatic insufficiency  K86.89 577.8       Plan:  The patient is doing well on Creon.   Periodically, he will have some issues with gassiness and belching.  He is on Prilosec.  The patient may have bacterial overgrowth.  For now he will just watch this.  He is not interested in trying any new medications.  At some point, I may place him on Xifaxan to see if this improves his symptoms.  Certainly if they worsen.  He will follow-up in 3 months.          MEDS ORDERED DURING VISIT:  No orders of the defined types were placed in this encounter.      Return in about 3 months (around 3/29/2022).             This document has been electronically signed by Bela Mcdaniel MD   December 29, 2021 14:17 EST      Part of this note may be an electronic transcription/translation of spoken language to printed text using the Dragon Dictation System.

## 2022-01-21 ENCOUNTER — APPOINTMENT (OUTPATIENT)
Dept: CT IMAGING | Facility: HOSPITAL | Age: 77
End: 2022-01-21

## 2022-01-21 ENCOUNTER — APPOINTMENT (OUTPATIENT)
Dept: GENERAL RADIOLOGY | Facility: HOSPITAL | Age: 77
End: 2022-01-21

## 2022-01-21 ENCOUNTER — HOSPITAL ENCOUNTER (INPATIENT)
Facility: HOSPITAL | Age: 77
LOS: 1 days | Discharge: ANOTHER HOSPITAL | End: 2022-01-23
Attending: EMERGENCY MEDICINE | Admitting: INTERNAL MEDICINE

## 2022-01-21 DIAGNOSIS — K22.3 BOERHAAVE SYNDROME: ICD-10-CM

## 2022-01-21 DIAGNOSIS — U07.1 COVID-19: ICD-10-CM

## 2022-01-21 DIAGNOSIS — J90 PLEURAL EFFUSION, LEFT: ICD-10-CM

## 2022-01-21 DIAGNOSIS — J93.9 PNEUMOTHORAX, LEFT: Primary | ICD-10-CM

## 2022-01-21 DIAGNOSIS — J18.9 PNEUMONIA OF LEFT LUNG DUE TO INFECTIOUS ORGANISM, UNSPECIFIED PART OF LUNG: ICD-10-CM

## 2022-01-21 LAB
A-A DO2: 35.3 MMHG (ref 0–300)
ALBUMIN SERPL-MCNC: 4.22 G/DL (ref 3.5–5.2)
ALBUMIN/GLOB SERPL: 1.1 G/DL
ALP SERPL-CCNC: 130 U/L (ref 39–117)
ALT SERPL W P-5'-P-CCNC: 40 U/L (ref 1–41)
AMYLASE SERPL-CCNC: 42 U/L (ref 28–100)
ANION GAP SERPL CALCULATED.3IONS-SCNC: 17.6 MMOL/L (ref 5–15)
APTT PPP: 24.5 SECONDS (ref 25.5–35.4)
ARTERIAL PATENCY WRIST A: POSITIVE
AST SERPL-CCNC: 39 U/L (ref 1–40)
ATMOSPHERIC PRESS: 736 MMHG
BASE EXCESS BLDA CALC-SCNC: -4.5 MMOL/L (ref 0–2)
BASOPHILS # BLD AUTO: 0.04 10*3/MM3 (ref 0–0.2)
BASOPHILS NFR BLD AUTO: 0.3 % (ref 0–1.5)
BDY SITE: ABNORMAL
BILIRUB SERPL-MCNC: 0.3 MG/DL (ref 0–1.2)
BILIRUB UR QL STRIP: NEGATIVE
BODY TEMPERATURE: 0 C
BUN SERPL-MCNC: 13 MG/DL (ref 8–23)
BUN/CREAT SERPL: 10.1 (ref 7–25)
CALCIUM SPEC-SCNC: 9.1 MG/DL (ref 8.6–10.5)
CHLORIDE SERPL-SCNC: 97 MMOL/L (ref 98–107)
CLARITY UR: CLEAR
CO2 BLDA-SCNC: 21.5 MMOL/L (ref 22–33)
CO2 SERPL-SCNC: 20.4 MMOL/L (ref 22–29)
COHGB MFR BLD: 0.8 % (ref 0–5)
COLOR UR: YELLOW
CREAT SERPL-MCNC: 1.29 MG/DL (ref 0.76–1.27)
CRP SERPL-MCNC: 0.41 MG/DL (ref 0–0.5)
D-LACTATE SERPL-SCNC: 4.3 MMOL/L (ref 0.5–2)
D-LACTATE SERPL-SCNC: 4.3 MMOL/L (ref 0.5–2)
DEPRECATED RDW RBC AUTO: 41.3 FL (ref 37–54)
EOSINOPHIL # BLD AUTO: 0.18 10*3/MM3 (ref 0–0.4)
EOSINOPHIL NFR BLD AUTO: 1.2 % (ref 0.3–6.2)
ERYTHROCYTE [DISTWIDTH] IN BLOOD BY AUTOMATED COUNT: 12.5 % (ref 12.3–15.4)
FLUAV RNA RESP QL NAA+PROBE: NOT DETECTED
FLUBV RNA RESP QL NAA+PROBE: NOT DETECTED
GFR SERPL CREATININE-BSD FRML MDRD: 54 ML/MIN/1.73
GLOBULIN UR ELPH-MCNC: 3.7 GM/DL
GLUCOSE SERPL-MCNC: 348 MG/DL (ref 65–99)
GLUCOSE UR STRIP-MCNC: ABNORMAL MG/DL
HCO3 BLDA-SCNC: 20.4 MMOL/L (ref 20–26)
HCT VFR BLD AUTO: 46.1 % (ref 37.5–51)
HCT VFR BLD CALC: 45.4 % (ref 38–51)
HGB BLD-MCNC: 14.5 G/DL (ref 13–17.7)
HGB BLDA-MCNC: 14.8 G/DL (ref 14–18)
HGB UR QL STRIP.AUTO: NEGATIVE
HOLD SPECIMEN: NORMAL
HOLD SPECIMEN: NORMAL
IMM GRANULOCYTES # BLD AUTO: 0.06 10*3/MM3 (ref 0–0.05)
IMM GRANULOCYTES NFR BLD AUTO: 0.4 % (ref 0–0.5)
INHALED O2 CONCENTRATION: 21 %
INR PPP: 0.99 (ref 0.9–1.1)
KETONES UR QL STRIP: NEGATIVE
LEUKOCYTE ESTERASE UR QL STRIP.AUTO: NEGATIVE
LIPASE SERPL-CCNC: 8 U/L (ref 13–60)
LYMPHOCYTES # BLD AUTO: 3.57 10*3/MM3 (ref 0.7–3.1)
LYMPHOCYTES NFR BLD AUTO: 24 % (ref 19.6–45.3)
Lab: ABNORMAL
MAGNESIUM SERPL-MCNC: 2.1 MG/DL (ref 1.6–2.4)
MCH RBC QN AUTO: 28 PG (ref 26.6–33)
MCHC RBC AUTO-ENTMCNC: 31.5 G/DL (ref 31.5–35.7)
MCV RBC AUTO: 89.2 FL (ref 79–97)
METHGB BLD QL: 0.1 % (ref 0–3)
MODALITY: ABNORMAL
MONOCYTES # BLD AUTO: 0.45 10*3/MM3 (ref 0.1–0.9)
MONOCYTES NFR BLD AUTO: 3 % (ref 5–12)
NEUTROPHILS NFR BLD AUTO: 10.58 10*3/MM3 (ref 1.7–7)
NEUTROPHILS NFR BLD AUTO: 71.1 % (ref 42.7–76)
NITRITE UR QL STRIP: NEGATIVE
NOTE: ABNORMAL
NRBC BLD AUTO-RTO: 0 /100 WBC (ref 0–0.2)
OXYHGB MFR BLDV: 93.4 % (ref 94–99)
PCO2 BLDA: 36.4 MM HG (ref 35–45)
PCO2 TEMP ADJ BLD: ABNORMAL MM[HG]
PH BLDA: 7.36 PH UNITS (ref 7.35–7.45)
PH UR STRIP.AUTO: <=5 [PH] (ref 5–8)
PH, TEMP CORRECTED: ABNORMAL
PLATELET # BLD AUTO: 289 10*3/MM3 (ref 140–450)
PMV BLD AUTO: 9.7 FL (ref 6–12)
PO2 BLDA: 68.3 MM HG (ref 83–108)
PO2 TEMP ADJ BLD: ABNORMAL MM[HG]
POTASSIUM SERPL-SCNC: 4.1 MMOL/L (ref 3.5–5.2)
PROT SERPL-MCNC: 7.9 G/DL (ref 6–8.5)
PROT UR QL STRIP: ABNORMAL
PROTHROMBIN TIME: 13.5 SECONDS (ref 12.8–14.5)
RBC # BLD AUTO: 5.17 10*6/MM3 (ref 4.14–5.8)
SAO2 % BLDCOA: 94.2 % (ref 94–99)
SARS-COV-2 RNA RESP QL NAA+PROBE: DETECTED
SODIUM SERPL-SCNC: 135 MMOL/L (ref 136–145)
SP GR UR STRIP: >1.03 (ref 1–1.03)
TROPONIN T SERPL-MCNC: <0.01 NG/ML (ref 0–0.03)
TROPONIN T SERPL-MCNC: <0.01 NG/ML (ref 0–0.03)
UROBILINOGEN UR QL STRIP: ABNORMAL
VENTILATOR MODE: ABNORMAL
WBC NRBC COR # BLD: 14.88 10*3/MM3 (ref 3.4–10.8)
WHOLE BLOOD HOLD SPECIMEN: NORMAL
WHOLE BLOOD HOLD SPECIMEN: NORMAL

## 2022-01-21 PROCEDURE — 74177 CT ABD & PELVIS W/CONTRAST: CPT

## 2022-01-21 PROCEDURE — 85610 PROTHROMBIN TIME: CPT | Performed by: PHYSICIAN ASSISTANT

## 2022-01-21 PROCEDURE — 81003 URINALYSIS AUTO W/O SCOPE: CPT | Performed by: PHYSICIAN ASSISTANT

## 2022-01-21 PROCEDURE — 87636 SARSCOV2 & INF A&B AMP PRB: CPT | Performed by: PHYSICIAN ASSISTANT

## 2022-01-21 PROCEDURE — 84484 ASSAY OF TROPONIN QUANT: CPT | Performed by: PHYSICIAN ASSISTANT

## 2022-01-21 PROCEDURE — 71045 X-RAY EXAM CHEST 1 VIEW: CPT

## 2022-01-21 PROCEDURE — 71260 CT THORAX DX C+: CPT

## 2022-01-21 PROCEDURE — 87040 BLOOD CULTURE FOR BACTERIA: CPT | Performed by: PHYSICIAN ASSISTANT

## 2022-01-21 PROCEDURE — 0W9B30Z DRAINAGE OF LEFT PLEURAL CAVITY WITH DRAINAGE DEVICE, PERCUTANEOUS APPROACH: ICD-10-PCS | Performed by: EMERGENCY MEDICINE

## 2022-01-21 PROCEDURE — 87070 CULTURE OTHR SPECIMN AEROBIC: CPT | Performed by: PHYSICIAN ASSISTANT

## 2022-01-21 PROCEDURE — 82945 GLUCOSE OTHER FLUID: CPT | Performed by: PHYSICIAN ASSISTANT

## 2022-01-21 PROCEDURE — 84157 ASSAY OF PROTEIN OTHER: CPT | Performed by: PHYSICIAN ASSISTANT

## 2022-01-21 PROCEDURE — 83735 ASSAY OF MAGNESIUM: CPT | Performed by: PHYSICIAN ASSISTANT

## 2022-01-21 PROCEDURE — 85025 COMPLETE CBC W/AUTO DIFF WBC: CPT | Performed by: PHYSICIAN ASSISTANT

## 2022-01-21 PROCEDURE — 83690 ASSAY OF LIPASE: CPT | Performed by: PHYSICIAN ASSISTANT

## 2022-01-21 PROCEDURE — 25010000002 HYDROMORPHONE 1 MG/ML SOLUTION

## 2022-01-21 PROCEDURE — 93010 ELECTROCARDIOGRAM REPORT: CPT | Performed by: SPECIALIST

## 2022-01-21 PROCEDURE — 82805 BLOOD GASES W/O2 SATURATION: CPT

## 2022-01-21 PROCEDURE — 87205 SMEAR GRAM STAIN: CPT | Performed by: PHYSICIAN ASSISTANT

## 2022-01-21 PROCEDURE — 85730 THROMBOPLASTIN TIME PARTIAL: CPT | Performed by: PHYSICIAN ASSISTANT

## 2022-01-21 PROCEDURE — 88112 CYTOPATH CELL ENHANCE TECH: CPT | Performed by: PHYSICIAN ASSISTANT

## 2022-01-21 PROCEDURE — 25010000002 IOPAMIDOL 61 % SOLUTION: Performed by: EMERGENCY MEDICINE

## 2022-01-21 PROCEDURE — 36600 WITHDRAWAL OF ARTERIAL BLOOD: CPT

## 2022-01-21 PROCEDURE — 88305 TISSUE EXAM BY PATHOLOGIST: CPT | Performed by: PHYSICIAN ASSISTANT

## 2022-01-21 PROCEDURE — 83605 ASSAY OF LACTIC ACID: CPT | Performed by: PHYSICIAN ASSISTANT

## 2022-01-21 PROCEDURE — 86140 C-REACTIVE PROTEIN: CPT | Performed by: PHYSICIAN ASSISTANT

## 2022-01-21 PROCEDURE — 83050 HGB METHEMOGLOBIN QUAN: CPT

## 2022-01-21 PROCEDURE — 82150 ASSAY OF AMYLASE: CPT | Performed by: PHYSICIAN ASSISTANT

## 2022-01-21 PROCEDURE — 93005 ELECTROCARDIOGRAM TRACING: CPT | Performed by: PHYSICIAN ASSISTANT

## 2022-01-21 PROCEDURE — 25010000002 VANCOMYCIN 5 G RECONSTITUTED SOLUTION: Performed by: EMERGENCY MEDICINE

## 2022-01-21 PROCEDURE — 89051 BODY FLUID CELL COUNT: CPT | Performed by: PHYSICIAN ASSISTANT

## 2022-01-21 PROCEDURE — 82375 ASSAY CARBOXYHB QUANT: CPT

## 2022-01-21 PROCEDURE — 36415 COLL VENOUS BLD VENIPUNCTURE: CPT

## 2022-01-21 PROCEDURE — 80053 COMPREHEN METABOLIC PANEL: CPT | Performed by: PHYSICIAN ASSISTANT

## 2022-01-21 PROCEDURE — 99285 EMERGENCY DEPT VISIT HI MDM: CPT

## 2022-01-21 RX ORDER — HYDROMORPHONE HYDROCHLORIDE 1 MG/ML
0.5 INJECTION, SOLUTION INTRAMUSCULAR; INTRAVENOUS; SUBCUTANEOUS ONCE
Status: COMPLETED | OUTPATIENT
Start: 2022-01-21 | End: 2022-01-21

## 2022-01-21 RX ORDER — OXYCODONE HYDROCHLORIDE AND ACETAMINOPHEN 5; 325 MG/1; MG/1
1 TABLET ORAL ONCE
Status: DISCONTINUED | OUTPATIENT
Start: 2022-01-21 | End: 2022-01-21

## 2022-01-21 RX ORDER — SODIUM CHLORIDE 9 MG/ML
150 INJECTION, SOLUTION INTRAVENOUS CONTINUOUS
Status: DISCONTINUED | OUTPATIENT
Start: 2022-01-21 | End: 2022-01-23 | Stop reason: HOSPADM

## 2022-01-21 RX ORDER — PROPOFOL 10 MG/ML
VIAL (ML) INTRAVENOUS
Status: DISPENSED
Start: 2022-01-21 | End: 2022-01-22

## 2022-01-21 RX ORDER — LIDOCAINE HYDROCHLORIDE 10 MG/ML
INJECTION, SOLUTION INFILTRATION; PERINEURAL
Status: DISPENSED
Start: 2022-01-21 | End: 2022-01-22

## 2022-01-21 RX ORDER — HYDROMORPHONE HYDROCHLORIDE 1 MG/ML
0.25 INJECTION, SOLUTION INTRAMUSCULAR; INTRAVENOUS; SUBCUTANEOUS ONCE
Status: COMPLETED | OUTPATIENT
Start: 2022-01-21 | End: 2022-01-21

## 2022-01-21 RX ORDER — FLUCONAZOLE 2 MG/ML
400 INJECTION, SOLUTION INTRAVENOUS EVERY 24 HOURS
Status: DISCONTINUED | OUTPATIENT
Start: 2022-01-22 | End: 2022-01-22

## 2022-01-21 RX ORDER — SODIUM CHLORIDE 0.9 % (FLUSH) 0.9 %
10 SYRINGE (ML) INJECTION AS NEEDED
Status: DISCONTINUED | OUTPATIENT
Start: 2022-01-21 | End: 2022-01-23 | Stop reason: HOSPADM

## 2022-01-21 RX ORDER — OXYCODONE HYDROCHLORIDE AND ACETAMINOPHEN 5; 325 MG/1; MG/1
1 TABLET ORAL EVERY 6 HOURS PRN
Status: DISCONTINUED | OUTPATIENT
Start: 2022-01-21 | End: 2022-01-21

## 2022-01-21 RX ORDER — ONDANSETRON 2 MG/ML
INJECTION INTRAMUSCULAR; INTRAVENOUS
Status: DISPENSED
Start: 2022-01-21 | End: 2022-01-22

## 2022-01-21 RX ADMIN — HYDROMORPHONE HYDROCHLORIDE 0.5 MG: 1 INJECTION, SOLUTION INTRAMUSCULAR; INTRAVENOUS; SUBCUTANEOUS at 22:02

## 2022-01-21 RX ADMIN — HYDROMORPHONE HYDROCHLORIDE 0.25 MG: 1 INJECTION, SOLUTION INTRAMUSCULAR; INTRAVENOUS; SUBCUTANEOUS at 19:58

## 2022-01-21 RX ADMIN — SODIUM CHLORIDE 125 ML/HR: 9 INJECTION, SOLUTION INTRAVENOUS at 22:55

## 2022-01-21 RX ADMIN — IOPAMIDOL 85 ML: 612 INJECTION, SOLUTION INTRAVENOUS at 20:24

## 2022-01-21 RX ADMIN — DOXYCYCLINE 100 MG: 100 INJECTION, POWDER, LYOPHILIZED, FOR SOLUTION INTRAVENOUS at 22:05

## 2022-01-21 RX ADMIN — VANCOMYCIN HYDROCHLORIDE 1500 MG: 5 INJECTION, POWDER, LYOPHILIZED, FOR SOLUTION INTRAVENOUS at 22:56

## 2022-01-21 RX ADMIN — SODIUM CHLORIDE 1313 ML: 9 INJECTION, SOLUTION INTRAVENOUS at 23:57

## 2022-01-21 RX ADMIN — SODIUM CHLORIDE 1000 ML: 9 INJECTION, SOLUTION INTRAVENOUS at 20:30

## 2022-01-22 ENCOUNTER — APPOINTMENT (OUTPATIENT)
Dept: ULTRASOUND IMAGING | Facility: HOSPITAL | Age: 77
End: 2022-01-22

## 2022-01-22 ENCOUNTER — APPOINTMENT (OUTPATIENT)
Dept: GENERAL RADIOLOGY | Facility: HOSPITAL | Age: 77
End: 2022-01-22

## 2022-01-22 ENCOUNTER — APPOINTMENT (OUTPATIENT)
Dept: CT IMAGING | Facility: HOSPITAL | Age: 77
End: 2022-01-22

## 2022-01-22 VITALS
WEIGHT: 167.3 LBS | HEART RATE: 105 BPM | HEIGHT: 72 IN | SYSTOLIC BLOOD PRESSURE: 138 MMHG | TEMPERATURE: 98.3 F | DIASTOLIC BLOOD PRESSURE: 72 MMHG | BODY MASS INDEX: 22.66 KG/M2 | RESPIRATION RATE: 20 BRPM | OXYGEN SATURATION: 94 %

## 2022-01-22 PROBLEM — Z01.818 PREOPERATIVE CLEARANCE: Status: RESOLVED | Noted: 2021-02-18 | Resolved: 2022-01-22

## 2022-01-22 PROBLEM — K62.5 RECTAL BLEEDING: Status: RESOLVED | Noted: 2021-02-11 | Resolved: 2022-01-22

## 2022-01-22 PROBLEM — N39.0 ACUTE UTI (URINARY TRACT INFECTION): Status: RESOLVED | Noted: 2021-02-20 | Resolved: 2022-01-22

## 2022-01-22 PROBLEM — J93.9 PNEUMOTHORAX, LEFT: Status: ACTIVE | Noted: 2022-01-22

## 2022-01-22 PROBLEM — D89.831 CYTOKINE RELEASE SYNDROME, GRADE 1: Status: ACTIVE | Noted: 2022-01-22

## 2022-01-22 LAB
ALBUMIN SERPL-MCNC: 3.33 G/DL (ref 3.5–5.2)
ALP SERPL-CCNC: 97 U/L (ref 39–117)
ALT SERPL W P-5'-P-CCNC: 30 U/L (ref 1–41)
APPEARANCE FLD: ABNORMAL
AST SERPL-CCNC: 24 U/L (ref 1–40)
BILIRUB CONJ SERPL-MCNC: <0.2 MG/DL (ref 0–0.3)
BILIRUB INDIRECT SERPL-MCNC: ABNORMAL MG/DL
BILIRUB SERPL-MCNC: 0.3 MG/DL (ref 0–1.2)
COLOR FLD: ABNORMAL
CREAT SERPL-MCNC: 1.22 MG/DL (ref 0.76–1.27)
CRP SERPL-MCNC: 14.46 MG/DL (ref 0–0.5)
D DIMER PPP FEU-MCNC: 3.45 MCGFEU/ML (ref 0–0.5)
GFR SERPL CREATININE-BSD FRML MDRD: 58 ML/MIN/1.73
GLUCOSE BLDC GLUCOMTR-MCNC: 382 MG/DL (ref 70–130)
GLUCOSE BLDC GLUCOMTR-MCNC: 383 MG/DL (ref 70–130)
GLUCOSE FLD-MCNC: <54 MG/DL
LDH SERPL-CCNC: 203 U/L (ref 135–225)
NUC CELL # FLD: 2225 /MM3
PROT FLD-MCNC: 1.7 G/DL
PROT SERPL-MCNC: 6.5 G/DL (ref 6–8.5)
QT INTERVAL: 390 MS
QTC INTERVAL: 508 MS
RBC # FLD AUTO: ABNORMAL 10*3/UL

## 2022-01-22 PROCEDURE — 74220 X-RAY XM ESOPHAGUS 1CNTRST: CPT

## 2022-01-22 PROCEDURE — 85379 FIBRIN DEGRADATION QUANT: CPT | Performed by: EMERGENCY MEDICINE

## 2022-01-22 PROCEDURE — 82565 ASSAY OF CREATININE: CPT | Performed by: EMERGENCY MEDICINE

## 2022-01-22 PROCEDURE — 25010000002 HYDROMORPHONE PER 4 MG: Performed by: EMERGENCY MEDICINE

## 2022-01-22 PROCEDURE — 25010000002 PIPERACILLIN SOD-TAZOBACTAM PER 1 G: Performed by: EMERGENCY MEDICINE

## 2022-01-22 PROCEDURE — 94799 UNLISTED PULMONARY SVC/PX: CPT

## 2022-01-22 PROCEDURE — 71250 CT THORAX DX C-: CPT

## 2022-01-22 PROCEDURE — 63710000001 INSULIN ASPART PER 5 UNITS: Performed by: INTERNAL MEDICINE

## 2022-01-22 PROCEDURE — 86140 C-REACTIVE PROTEIN: CPT | Performed by: INTERNAL MEDICINE

## 2022-01-22 PROCEDURE — 25010000002 ENOXAPARIN PER 10 MG: Performed by: EMERGENCY MEDICINE

## 2022-01-22 PROCEDURE — 82962 GLUCOSE BLOOD TEST: CPT

## 2022-01-22 PROCEDURE — 25010000002 REMDESIVIR 100 MG RECONSTITUTED SOLUTION: Performed by: EMERGENCY MEDICINE

## 2022-01-22 PROCEDURE — 80076 HEPATIC FUNCTION PANEL: CPT | Performed by: EMERGENCY MEDICINE

## 2022-01-22 PROCEDURE — 93970 EXTREMITY STUDY: CPT | Performed by: RADIOLOGY

## 2022-01-22 PROCEDURE — 25010000002 DEXAMETHASONE PER 1 MG: Performed by: EMERGENCY MEDICINE

## 2022-01-22 PROCEDURE — 71045 X-RAY EXAM CHEST 1 VIEW: CPT

## 2022-01-22 PROCEDURE — 99236 HOSP IP/OBS SAME DATE HI 85: CPT | Performed by: INTERNAL MEDICINE

## 2022-01-22 PROCEDURE — 25010000002 FLUCONAZOLE PER 200 MG: Performed by: EMERGENCY MEDICINE

## 2022-01-22 PROCEDURE — 25010000002 MEROPENEM PER 100 MG

## 2022-01-22 PROCEDURE — 83615 LACTATE (LD) (LDH) ENZYME: CPT | Performed by: INTERNAL MEDICINE

## 2022-01-22 PROCEDURE — 25010000002 MICAFUNGIN SODIUM 100 MG RECONSTITUTED SOLUTION 1 EACH VIAL: Performed by: INTERNAL MEDICINE

## 2022-01-22 PROCEDURE — 93970 EXTREMITY STUDY: CPT

## 2022-01-22 RX ORDER — PANTOPRAZOLE SODIUM 40 MG/10ML
40 INJECTION, POWDER, LYOPHILIZED, FOR SOLUTION INTRAVENOUS
Status: DISCONTINUED | OUTPATIENT
Start: 2022-01-22 | End: 2022-01-23 | Stop reason: HOSPADM

## 2022-01-22 RX ORDER — DEXAMETHASONE SODIUM PHOSPHATE 4 MG/ML
6 INJECTION, SOLUTION INTRA-ARTICULAR; INTRALESIONAL; INTRAMUSCULAR; INTRAVENOUS; SOFT TISSUE DAILY
Status: ON HOLD
Start: 2022-01-23 | End: 2022-02-10

## 2022-01-22 RX ORDER — NICOTINE POLACRILEX 4 MG
15 LOZENGE BUCCAL
Status: DISCONTINUED | OUTPATIENT
Start: 2022-01-22 | End: 2022-01-23 | Stop reason: HOSPADM

## 2022-01-22 RX ORDER — NITROGLYCERIN 0.4 MG/1
0.4 TABLET SUBLINGUAL
Status: DISCONTINUED | OUTPATIENT
Start: 2022-01-22 | End: 2022-01-23 | Stop reason: HOSPADM

## 2022-01-22 RX ORDER — PANTOPRAZOLE SODIUM 40 MG/10ML
40 INJECTION, POWDER, LYOPHILIZED, FOR SOLUTION INTRAVENOUS
Status: DISCONTINUED | OUTPATIENT
Start: 2022-01-23 | End: 2022-01-22

## 2022-01-22 RX ORDER — DEXAMETHASONE SODIUM PHOSPHATE 4 MG/ML
6 INJECTION, SOLUTION INTRA-ARTICULAR; INTRALESIONAL; INTRAMUSCULAR; INTRAVENOUS; SOFT TISSUE DAILY
Status: DISCONTINUED | OUTPATIENT
Start: 2022-01-22 | End: 2022-01-22 | Stop reason: SDUPTHER

## 2022-01-22 RX ORDER — PANTOPRAZOLE SODIUM 40 MG/1
40 TABLET, DELAYED RELEASE ORAL EVERY MORNING
Status: CANCELLED | OUTPATIENT
Start: 2022-01-23

## 2022-01-22 RX ORDER — AMLODIPINE BESYLATE 5 MG/1
5 TABLET ORAL DAILY
Status: CANCELLED | OUTPATIENT
Start: 2022-01-23

## 2022-01-22 RX ORDER — ALBUTEROL SULFATE 90 UG/1
2 AEROSOL, METERED RESPIRATORY (INHALATION)
Status: ON HOLD
Start: 2022-01-22 | End: 2022-02-10

## 2022-01-22 RX ORDER — DEXTROSE MONOHYDRATE 25 G/50ML
25 INJECTION, SOLUTION INTRAVENOUS
Status: DISCONTINUED | OUTPATIENT
Start: 2022-01-22 | End: 2022-01-23 | Stop reason: HOSPADM

## 2022-01-22 RX ORDER — SODIUM CHLORIDE 0.9 % (FLUSH) 0.9 %
10 SYRINGE (ML) INJECTION AS NEEDED
Status: DISCONTINUED | OUTPATIENT
Start: 2022-01-22 | End: 2022-01-23 | Stop reason: HOSPADM

## 2022-01-22 RX ORDER — SODIUM CHLORIDE 0.9 % (FLUSH) 0.9 %
10 SYRINGE (ML) INJECTION EVERY 12 HOURS SCHEDULED
Status: DISCONTINUED | OUTPATIENT
Start: 2022-01-22 | End: 2022-01-23 | Stop reason: HOSPADM

## 2022-01-22 RX ORDER — DEXAMETHASONE SODIUM PHOSPHATE 4 MG/ML
6 INJECTION, SOLUTION INTRA-ARTICULAR; INTRALESIONAL; INTRAMUSCULAR; INTRAVENOUS; SOFT TISSUE DAILY
Status: DISCONTINUED | OUTPATIENT
Start: 2022-01-22 | End: 2022-01-23 | Stop reason: HOSPADM

## 2022-01-22 RX ORDER — HYDROMORPHONE HYDROCHLORIDE 1 MG/ML
0.5 INJECTION, SOLUTION INTRAMUSCULAR; INTRAVENOUS; SUBCUTANEOUS
Status: DISCONTINUED | OUTPATIENT
Start: 2022-01-22 | End: 2022-01-23 | Stop reason: HOSPADM

## 2022-01-22 RX ORDER — HYDROMORPHONE HYDROCHLORIDE 1 MG/ML
0.5 INJECTION, SOLUTION INTRAMUSCULAR; INTRAVENOUS; SUBCUTANEOUS ONCE
Status: COMPLETED | OUTPATIENT
Start: 2022-01-22 | End: 2022-01-22

## 2022-01-22 RX ORDER — HYDROMORPHONE HYDROCHLORIDE 1 MG/ML
0.5 INJECTION, SOLUTION INTRAMUSCULAR; INTRAVENOUS; SUBCUTANEOUS
Status: ON HOLD
Start: 2022-01-22 | End: 2022-02-10

## 2022-01-22 RX ORDER — PANTOPRAZOLE SODIUM 40 MG/10ML
40 INJECTION, POWDER, LYOPHILIZED, FOR SOLUTION INTRAVENOUS
Status: ON HOLD
Start: 2022-01-23 | End: 2022-02-10

## 2022-01-22 RX ORDER — ALBUTEROL SULFATE 90 UG/1
2 AEROSOL, METERED RESPIRATORY (INHALATION)
Status: DISCONTINUED | OUTPATIENT
Start: 2022-01-22 | End: 2022-01-23 | Stop reason: HOSPADM

## 2022-01-22 RX ADMIN — DEXAMETHASONE SODIUM PHOSPHATE 6 MG: 4 INJECTION, SOLUTION INTRAMUSCULAR; INTRAVENOUS at 01:34

## 2022-01-22 RX ADMIN — MICAFUNGIN SODIUM 100 MG: 100 INJECTION, POWDER, LYOPHILIZED, FOR SOLUTION INTRAVENOUS at 20:25

## 2022-01-22 RX ADMIN — SODIUM CHLORIDE, PRESERVATIVE FREE 10 ML: 5 INJECTION INTRAVENOUS at 20:28

## 2022-01-22 RX ADMIN — SODIUM CHLORIDE 125 ML/HR: 9 INJECTION, SOLUTION INTRAVENOUS at 18:28

## 2022-01-22 RX ADMIN — INSULIN ASPART 5 UNITS: 100 INJECTION, SOLUTION INTRAVENOUS; SUBCUTANEOUS at 18:51

## 2022-01-22 RX ADMIN — HYDROMORPHONE HYDROCHLORIDE 0.5 MG: 1 INJECTION, SOLUTION INTRAMUSCULAR; INTRAVENOUS; SUBCUTANEOUS at 01:31

## 2022-01-22 RX ADMIN — REMDESIVIR 200 MG: 100 INJECTION, POWDER, LYOPHILIZED, FOR SOLUTION INTRAVENOUS at 02:34

## 2022-01-22 RX ADMIN — PIPERACILLIN SODIUM AND TAZOBACTAM SODIUM 3.38 G: 3; .375 INJECTION, POWDER, LYOPHILIZED, FOR SOLUTION INTRAVENOUS at 06:05

## 2022-01-22 RX ADMIN — HYDROMORPHONE HYDROCHLORIDE 0.5 MG: 1 INJECTION, SOLUTION INTRAMUSCULAR; INTRAVENOUS; SUBCUTANEOUS at 12:31

## 2022-01-22 RX ADMIN — PANTOPRAZOLE SODIUM 40 MG: 40 INJECTION, POWDER, FOR SOLUTION INTRAVENOUS at 18:34

## 2022-01-22 RX ADMIN — HYDROMORPHONE HYDROCHLORIDE 0.5 MG: 1 INJECTION, SOLUTION INTRAMUSCULAR; INTRAVENOUS; SUBCUTANEOUS at 02:43

## 2022-01-22 RX ADMIN — MEROPENEM 1 G: 1 INJECTION, POWDER, FOR SOLUTION INTRAVENOUS at 18:21

## 2022-01-22 RX ADMIN — PIPERACILLIN SODIUM AND TAZOBACTAM SODIUM 3.38 G: 3; .375 INJECTION, POWDER, LYOPHILIZED, FOR SOLUTION INTRAVENOUS at 01:44

## 2022-01-22 RX ADMIN — ENOXAPARIN SODIUM 40 MG: 40 INJECTION SUBCUTANEOUS at 02:34

## 2022-01-22 RX ADMIN — PIPERACILLIN SODIUM AND TAZOBACTAM SODIUM 3.38 G: 3; .375 INJECTION, POWDER, LYOPHILIZED, FOR SOLUTION INTRAVENOUS at 13:59

## 2022-01-22 RX ADMIN — ENOXAPARIN SODIUM 40 MG: 40 INJECTION SUBCUTANEOUS at 14:00

## 2022-01-22 RX ADMIN — HYDROMORPHONE HYDROCHLORIDE 0.5 MG: 1 INJECTION, SOLUTION INTRAMUSCULAR; INTRAVENOUS; SUBCUTANEOUS at 06:06

## 2022-01-22 RX ADMIN — FLUCONAZOLE 400 MG: 400 INJECTION, SOLUTION INTRAVENOUS at 02:09

## 2022-01-22 RX ADMIN — DOXYCYCLINE 100 MG: 100 INJECTION, POWDER, LYOPHILIZED, FOR SOLUTION INTRAVENOUS at 10:25

## 2022-01-23 LAB
BACTERIA FLD CULT: NORMAL
GRAM STN SPEC: NORMAL
GRAM STN SPEC: NORMAL

## 2022-01-26 LAB
BACTERIA SPEC AEROBE CULT: NORMAL
BACTERIA SPEC AEROBE CULT: NORMAL
LAB AP CASE REPORT: NORMAL
PATH REPORT.FINAL DX SPEC: NORMAL

## 2022-02-10 ENCOUNTER — HOSPITAL ENCOUNTER (INPATIENT)
Facility: HOSPITAL | Age: 77
LOS: 16 days | Discharge: HOME HEALTH | End: 2022-02-26
Attending: FAMILY MEDICINE | Admitting: FAMILY MEDICINE

## 2022-02-10 DIAGNOSIS — K22.3 ESOPHAGEAL RUPTURE: Primary | ICD-10-CM

## 2022-02-10 LAB
GLUCOSE BLDC GLUCOMTR-MCNC: 116 MG/DL (ref 70–130)
GLUCOSE BLDC GLUCOMTR-MCNC: 157 MG/DL (ref 70–130)

## 2022-02-10 PROCEDURE — 25010000002 ENOXAPARIN PER 10 MG: Performed by: FAMILY MEDICINE

## 2022-02-10 PROCEDURE — 94799 UNLISTED PULMONARY SVC/PX: CPT

## 2022-02-10 PROCEDURE — 99223 1ST HOSP IP/OBS HIGH 75: CPT | Performed by: FAMILY MEDICINE

## 2022-02-10 PROCEDURE — 63710000001 INSULIN ASPART PER 5 UNITS: Performed by: FAMILY MEDICINE

## 2022-02-10 PROCEDURE — 82962 GLUCOSE BLOOD TEST: CPT

## 2022-02-10 RX ORDER — DEXTROSE AND SODIUM CHLORIDE 5; .45 G/100ML; G/100ML
75 INJECTION, SOLUTION INTRAVENOUS CONTINUOUS
Status: DISCONTINUED | OUTPATIENT
Start: 2022-02-10 | End: 2022-02-11

## 2022-02-10 RX ORDER — PANTOPRAZOLE SODIUM 40 MG/10ML
40 INJECTION, POWDER, LYOPHILIZED, FOR SOLUTION INTRAVENOUS
Status: DISCONTINUED | OUTPATIENT
Start: 2022-02-11 | End: 2022-02-26 | Stop reason: HOSPADM

## 2022-02-10 RX ORDER — OXYCODONE HYDROCHLORIDE 5 MG/1
5 TABLET ORAL EVERY 6 HOURS PRN
Status: DISCONTINUED | OUTPATIENT
Start: 2022-02-10 | End: 2022-02-26 | Stop reason: HOSPADM

## 2022-02-10 RX ORDER — OMEPRAZOLE 40 MG/1
40 CAPSULE, DELAYED RELEASE ORAL DAILY
COMMUNITY
End: 2022-02-26 | Stop reason: HOSPADM

## 2022-02-10 RX ORDER — ACETAMINOPHEN 160 MG/5ML
650 SOLUTION ORAL EVERY 6 HOURS PRN
Status: DISCONTINUED | OUTPATIENT
Start: 2022-02-10 | End: 2022-02-26 | Stop reason: HOSPADM

## 2022-02-10 RX ORDER — NICOTINE POLACRILEX 4 MG
15 LOZENGE BUCCAL
Status: DISCONTINUED | OUTPATIENT
Start: 2022-02-10 | End: 2022-02-22

## 2022-02-10 RX ORDER — DEXTROSE MONOHYDRATE 25 G/50ML
25 INJECTION, SOLUTION INTRAVENOUS
Status: DISCONTINUED | OUTPATIENT
Start: 2022-02-10 | End: 2022-02-22

## 2022-02-10 RX ORDER — AMLODIPINE BESYLATE 5 MG/1
5 TABLET ORAL DAILY
COMMUNITY
End: 2022-02-26 | Stop reason: HOSPADM

## 2022-02-10 RX ORDER — IPRATROPIUM BROMIDE AND ALBUTEROL SULFATE 2.5; .5 MG/3ML; MG/3ML
3 SOLUTION RESPIRATORY (INHALATION)
Status: DISCONTINUED | OUTPATIENT
Start: 2022-02-10 | End: 2022-02-11

## 2022-02-10 RX ADMIN — METOPROLOL TARTRATE 25 MG: 25 TABLET ORAL at 17:21

## 2022-02-10 RX ADMIN — INSULIN ASPART 2 UNITS: 100 INJECTION, SOLUTION INTRAVENOUS; SUBCUTANEOUS at 17:14

## 2022-02-10 RX ADMIN — DEXTROSE AND SODIUM CHLORIDE 75 ML/HR: 5; 450 INJECTION, SOLUTION INTRAVENOUS at 20:44

## 2022-02-10 RX ADMIN — ENOXAPARIN SODIUM 40 MG: 40 INJECTION SUBCUTANEOUS at 17:15

## 2022-02-10 NOTE — PROGRESS NOTES
Rehabilitation Nursing  Inpatient Rehabilitation Admission Assessment of Functional Measures  and Plan of Care    Plan of Care  Copy from POC    Functional Measures  MAGAN Eating:  MAGAN Grooming:  MAGAN Bathing:  MAGAN Upper Body Dressing:  MAGAN Lower Body Dressing:  MAGAN Toileting:    MAGNA Bladder Management  Level of Assistance:  Frequency/Number of Accidents (4 days prior to admission):  Frequency/Number of Accidents this Shift:    MAGAN Bowel Management  Level of Assistance:  Frequency/Number of Accidents (4 days prior to admission):  Frequency/Number of Accidents this Shift:    MAGAN Bed/Chair/Wheelchair Transfer:  MAGAN Toilet Transfer:  MAGAN Tub/Shower Transfer:    Previously Documented Mode of Locomotion at Discharge:  [facility] Expected Mode of Locomotion at Discharge:  MAGAN Walk/Wheelchair:  MAGAN Stairs:    MAGAN Comprehension:  MAGAN Expression:  MAGAN Social Interaction:  [facility] Problem Solving:  MAGAN Memory:    Discharge Functional Goals:    Signed by: Ekta Mc Nurse

## 2022-02-10 NOTE — PROGRESS NOTES
Rehabilitation Nursing  Inpatient Rehabilitation Plan of Care Note    Plan of Care  Pain    Pain Management (Active)  Current Status (2/10/2022 3:38:00 PM): Potential for pain  Weekly Goal: No pain this week  Discharge Goal: No pain    Safety    Potential for Injury (Active)  Current Status (2/10/2022 3:38:00 PM): At risk for injury  Weekly Goal: No injury this week  Discharge Goal: No injuries    Signed by: Melina Mccabe, Supervisor

## 2022-02-10 NOTE — H&P
[facility] HOSPITALIST HISTORY AND PHYSICAL    Patient Identification:  Name:  Sandro Argueta  Age:  76 y.o.  Sex:  male  :  1945  MRN:  [id number]   Visit Number:  [id number]  Room number:  102/2S  Primary Care Physician:  Misti Nguyễn MD     Subjective     2/10/2022   Chief complaint:  No chief complaint on file.      History of presenting illness:  76 y.o. male  This pt is seen today for post admission physician evaluation to the inpatient rehabilitation facility.  Patient is a 76-year-old gentleman with a past history of hypertension, coronary disease, diabetes mellitus, GERD.  Patient approximately a year ago had a Whipple surgeries because of the high suspicion for pancreatic cancer.  It it turned out the patient did not have pancreatic cancer.  However, patient did recover well from Whipple procedure.  States that he got back on his feet has been basically doing what he likes to do and has been very active.  Patient states he did get a tickborne illness which did cause him some difficulty afterwards.  Patient had recently been hospitalized at [facility] after severe vomiting and severe chest pain.  Patient was found to have esophageal perforation.  Patient did have chest tube placed and was getting GI contents from the thoracic lung field.  Patient was transferred to the [facility] where he did undergo EGD, left thoracotomy with primary repair of esophageal perforation and creation of intercostal muscle flap, pulmonary decortication and bronchoscopy on .  Patient was placed on empiric antibiotics including fungal coverage.  Upper GI was performed on  which showed ongoing esophageal leak.  Interventional radiology was consulted on  for chest tube placement as he had persistent loculated fluid collection.  PICC line was placed and was started on TPN.  On  he did undergo EGD with esophageal stent placement and open G and  J-tube placement.  The pleural fluid from his IR drain did grow Candida and he was placed on micafungin.  Patient has gradually improved TPN was weaned off and he was started on tube feeds through his J-tube.  Patient doing better and it was thought we could transfer to inpatient physical rehab for the treatment      Patient continued to progress medically.  Physical therapy and Occupational therapy evaluations were completed with recommended acute inpatient rehabilitation referral for continued functional mobility intervention and reeducation.  Acute rehab referral ordered for continued medical monitoring and management post prolonged hospitalization, continued respiratory status monitoring, lab monitoring, pain mgt needs, bowel/bladder care with new medication education, skin monitoring and breakdown prevention along with ongoing medical comorbidities that require ongoing care.    The preadmission mini-FIM score as assessed by the referring facility as follows: Eating 1; grooming 5; bathing for; dressing upper body for; dressing lower body 2; toileting for; transfers to bed chair and wheelchair 3; transfers to toilet 3; locomotion 3; bladder management 6; bowel management 6; comprehension 7; expression 7; social reaction 7; problem-solving 7; memory 7.  Estimate length of stay 14 days    ---------------------------------------------------------------------------------------------------------------------   Review of Systems:  General: No fever no chills.  Some generalized weakness  HEENT: Negative  Heart: Negative  Lungs: Denies any difficulty breathing at this time.  Does have Kamari drain in place  Abdomen: Patient does have G-tube and J-tube in place at this time along with again Kamari drain  : Negative  Musculoskeletal: Negative  Neuro: Negative  Skin: Negative  ---------------------------------------------------------------------------------------------------------------------   Past Medical History:    Diagnosis Date   • Acute UTI (urinary tract infection) 2/20/2021   • Arthritis    • Borderline diabetes    • Coronary artery disease    • Diabetes mellitus (HCC)    • Elevated prostate specific antigen (PSA) 8/17/2020   • GERD (gastroesophageal reflux disease)    • Hypertension    • Left elbow pain      Past Surgical History:   Procedure Laterality Date   • COLONOSCOPY     • ENDOSCOPY N/A 2/9/2021    Procedure: ESOPHAGOGASTRODUODENOSCOPY WITH BIOPSY dilitation;  Surgeon: Bela Mcdaniel MD;  Location: [facility] OR;  Service: Gastroenterology;  Laterality: N/A;   • ERCP N/A 2/21/2021    Procedure: ENDOSCOPIC RETROGRADE CHOLANGIOPANCREATOGRAPHY WITH STENT PLACEMENT;  Surgeon: Matthew Moreira MD;  Location: [facility] ENDOSCOPY;  Service: Gastroenterology;  Laterality: N/A;  with sphincterotomy, and brushing of common bile duct, and placement of [id number] wall stent     • PACEMAKER IMPLANTATION       Family History   Problem Relation Age of Onset   • Diabetes Mother    • Diabetes Sister    • Diabetes Brother      Social History     Socioeconomic History   • Marital status:    Tobacco Use   • Smoking status: Never Smoker   • Smokeless tobacco: Never Used   Vaping Use   • Vaping Use: Never used   Substance and Sexual Activity   • Alcohol use: No   • Drug use: No   • Sexual activity: Defer     ---------------------------------------------------------------------------------------------------------------------   Allergies:  Other  ---------------------------------------------------------------------------------------------------------------------   Medications below are reported home medications pulling from within the system; at this time, these medications have not been reconciled unless otherwise specified and are in the verification process for further verifcation as current home medications.    Prior to Admission Medications     Prescriptions Last Dose Informant Patient Reported? Taking?     amLODIPine (NORVASC) 5 MG tablet Unknown Pharmacy Yes No    Take 5 mg by mouth Daily.    Insulin Glargine, 1 Unit Dial, (TOUJEO) 300 UNIT/ML solution pen-injector injection Unknown Pharmacy Yes No    Inject 15 Units under the skin into the appropriate area as directed Daily.    omeprazole (priLOSEC) 40 MG capsule Unknown Pharmacy Yes No    Take 40 mg by mouth Daily.    Pancrelipase, Lip-Prot-Amyl, (CREON) [id number] units capsule delayed-release particles capsule Unknown Provider's Office Yes No    Take 72,000 units of lipase by mouth 3 (Three) Times a Day With Meals. Take 2 capsules with meals and 1 capsule with snacks . Max of 8 caps per days    Pancrelipase, Lip-Prot-Amyl, (CREON) [id number] units capsule delayed-release particles capsule Unknown Provider's Office Yes No    Take 36,000 units of lipase by mouth 2 (Two) Times a Day. Take 2 capsules with meals and 1 capsule with snacks . Max of 8 caps per days        Objective     Vital Signs:  Temp:  [98.3 °F (36.8 °C)] 98.3 °F (36.8 °C)  Heart Rate:  [84] 84  Resp:  [18] 18  BP: (150)/(69) 150/69    No data found.  SpO2:  [92 %] 92 %  on   ;      Body mass index is 18.49 kg/m².    Wt Readings from Last 3 Encounters:   02/10/22 66.7 kg (147 lb)   01/22/22 75.9 kg (167 lb 4.8 oz)   12/29/21 79.4 kg (175 lb)      ---------------------------------------------------------------------------------------------------------------------     Physical exam:  Constitutional:   76-year-old gentleman lying in bed no acute distress  HEENT: Normocephalic atraumatic  Neck:    Supple  Cardiovascular: Regular rate and rhythm  Pulmonary/Chest: Clear to auscultation  Abdominal: Positive bowel sounds soft; anterior abdominal incision site is clean with staples in place; does have G-tube and J-tube in place.   Musculoskeletal: No arthropathy  Neurological: No focal deficits  Skin: No rash  Peripheral  vascular:  Genitourinary::  ---------------------------------------------------------------------------------------------------------------------  EKG: EKG from January 21, 2022 showed sinus tachycardia with a right bundle branch block  No orders to display           Last echocardiogram:  Results for orders placed during the hospital encounter of 07/29/20    Adult Transthoracic Echo Complete W/ Cont if Necessary Per Protocol    Interpretation Summary  · Left ventricular wall thickness is consistent with mild concentric hypertrophy.  · Left ventricular systolic function is normal.  · Estimated EF appears to be in the range of 61 - 65%.  · Left ventricular diastolic dysfunction (grade I) consistent with impaired relaxation.  · Normal right ventricular cavity size and systolic function noted. Electronic lead present in the ventricle.  · Normal cardiac chambers dimensions.  · Mild aortic valve regurgitation is present.  · Mild mitral valve regurgitation is present  · Mild tricuspid valve regurgitation is present. No evidence of pulmonary hypertension is present.  · There is no evidence of pericardial effusion.    --------------------------------------------------------------------------------------------------------------------  Labs:                Invalid input(s): PROTEstimated Creatinine Clearance: 49.4 mL/min (by C-G formula based on SCr of 1.22 mg/dL).  No results found for: AMMONIA          Glucose   Date/Time Value Ref Range Status   02/10/2022 1658 157 (H) 70 - 130 mg/dL Final     Comment:     Meter: [id number] : [id number] [employer] art     Lab Results   Component Value Date    TSH 1.620 06/27/2021     No results found for: PREGTESTUR, PREGSERUM, HCG, HCGQUANT  Pain Management Panel    There is no flowsheet data to display.       Brief Urine Lab Results  (Last result in the past 365 days)      Color   Clarity   Blood   Leuk Est   Nitrite   Protein   CREAT   Urine HCG        01/21/22 2149 Yellow   Clear    Negative   Negative   Negative   Trace               No results found for: BLOODCX  No results found for: URINECX  No results found for: WOUNDCX  No results found for: STOOLCX    Last Urine Toxicity    There is no flowsheet data to display.         I have personally looked at the labs and they are summarized above.  ----------------------------------------------------------------------------------------------------------------------  Detailed radiology reports for the last 24 hours:    Imaging Results (Last 24 Hours)     ** No results found for the last 24 hours. **        Final impressions for the last 30 days of radiology reports:    CT Chest Without Contrast Diagnostic    Result Date: 1/22/2022  1.  Transmural perforation of the distal thoracic esophagus communicating with the left basilar pleural space. Orally administered water-soluble contrast fills the left basilar pleural space and is visible within the left side chest tube. 2.  The exact site for esophageal perforation is not demonstrated on this study. There is very little contrast within the esophagus, and only a faint amount of contrast along the left side of the distal thoracic esophagus. 3.  Distal paraesophageal pneumomediastinum. 4.  Small left hydropneumothorax. No right pneumothorax. 5.  Consolidation and volume loss involving basal segments left lower lobe. Right posterior basilar atelectasis. Small right pleural effusion. Signer Name: Celestino Herrera MD  Signed: 1/22/2022 6:54 PM  Workstation Name: [email or web address]-  [facility]    CT Chest With Contrast Diagnostic    Result Date: 1/21/2022  1. There is a small left pneumothorax but there is at least a moderate-sized left-sided pleural effusion and there is a small amount of pneumomediastinum with shift of mediastinal structures from left to right. A chest tube is being placed at time of this dictation as described below. 2. Probable right lung atelectasis. 3. No acute  finding in the upper abdomen. Abdomen and pelvis CT dictated separately. NOTIFICATION: Critical Value/emergent results were called by telephone at the time of interpretation on 1/21/2022 8:55 PM to ASTER Boudreaux who verbally acknowledged these results. Dr. Villafuerte was in the process of placing a chest tube in the patient at the time of my call. Signer Name: Nick Vines MD  Signed: 1/21/2022 8:59 PM  Workstation Name: [facility]  Radiology [facility]    CT Abdomen Pelvis With Contrast    Result Date: 1/21/2022  1. There is a left-sided effusion that is at least moderate in size. There is a small left pneumothorax and there is trace pneumomediastinum and subtle shift of mediastinal structures from left to right. Please see the separately dictated CT chest for further details. The emergency department is aware of these findings and a chest tube was in the process of being placed at the time of interpretation of the CT chest. 2. The stomach is distended with air fluid and debris and there are new postoperative changes of the stomach compared to the prior study. Correlate with any signs or symptoms of gastric outlet obstruction and the patient may benefit from NG tube placement. 3. No evidence of bowel obstruction or drainable fluid collection. 4. Normal appendix. 5. Diverticulosis. 6. Hepatic steatosis. 7. Tiny subcentimeter enhancing lesion in the spleen, favored to be benign but technically indeterminate. 8. Bilateral renal cysts. 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 Signer Name: Nick Vines MD  Signed: 1/21/2022 10:16 PM  Workstation Name: [facility]    FL Esophagram Complete Single Contrast    Result Date: 1/22/2022  1.  Chest x-ray series with water-soluble oral contrast shows evidence of likely distal esophageal rupture with oral contrast appearing in the left basilar pleural space. Please see chest CT to follow. 2.  Left lower lobe  consolidation and atelectasis. Mild infiltrate and volume loss in the right lung base. Small right pleural effusion. 3.  Left-sided hydropneumothorax with chest tube in place. Signer Name: Celestino Herrera MD  Signed: 1/22/2022 6:46 PM  Workstation Name: [facility]  [facility]    XR Chest 1 View    Result Date: 1/22/2022  1. Left sided chest tube remains present along with a small left pneumothorax, stable to slightly larger when compared with the prior study but this may be related to technical factors. 2. Worsening appearance of the left lung base with increasing pleural fluid and consolidation. No right-sided pneumothorax. Signer Name: Nick Vines MD  Signed: 1/22/2022 5:28 PM  Workstation [facility]: [facility]  Radiology [facility]    XR Chest 1 View    Result Date: 1/21/2022  Interval placement of left chest tube. Persistent small left pneumothorax. Persistent airspace disease in the left chest. Signer Name: LIDIA GAY MD  Signed: 1/21/2022 9:32 PM  Workstation Name: [facility]  Radiology [facility]    XR Chest 1 View    Addendum Date: 1/21/2022    //////////// ADDENDUM #1 //////////// ADDENDUM: Favor there is a small subtle left apical pneumothorax. This is confirmed by CT. Spoke directly to Dr. Scott Villafuerte in the ER at 8:36 PM 1/21/2022 to relay this finding. Signer Name: LIDIA GAY MD  Signed: 1/21/2022 8:38 PM  Workstation Name: [facility]  Radiology [facility]////////////  ORIGINAL REPORT //////////// CR Chest 1 Vw INDICATION: Upper back pain COMPARISON:  6/27/2021 FINDINGS: Portable AP view(s) of the chest.  Normal cardiomediastinal contours. Dense consolidation in the left midlung especially the left base, most consistent with a dense lobar pneumonia. Small left pleural effusion.     Result Date: 1/21/2022  Dense consolidation in the left midlung especially the left base, most consistent with a dense lobar pneumonia. Small  left pleural effusion. Signer Name: LIDIA GAY MD  Signed: 1/21/2022 8:10 PM  Workstation Name: [facility]  [facility] [place] Venous Doppler Lower Extremity Bilateral (duplex)    Result Date: 1/22/2022  No deep venous thrombus seen in either lower extremity. Signer Name: Nick Vines MD  Signed: 1/22/2022 3:56 PM  Workstation Name: [facility]  [facility] [place]    I have personally looked at the radiology images and read the final radiology report.    Assessment & Plan    Debility after recent esophageal rupture with Kamari drain in place--patient require physical therapy 90 minutes/day 5 to 6 days/week for help with training, safety, stair navigation, strengthening, therapeutic exercise, range of motion, endurance and gait training.  Require occupational therapy 90 minutes/day 5 to 6 days/week for up with dressing, ADLs, feeding, home skills, safety and toileting.  We expect patient be able to safely perform activities of daily living using adaptive equipment for improved function mobility.  Independent safe bowel bladder function.  Able to navigate home community territory safe without injury or falls.  Patient will go home with assistance likely require home health for PT OT and nursing.  May require bedside commode and walker as well.    Esophageal rupture--patient did have esophageal rupture or perforation repair and eventual esophageal stent placement.  Patient is continue n.p.o. status.  We will restart J-tube feedings when pump is available.  Apparently we have no pumps in house that are available this evening.  We will place on gentle hydration because of this issue.  Patient does have Kamari drain in place which will stay in place until he returns to  for his EGD and stent removal in 4 weeks.  Patient did grow Candida glabrata from the pleural fluid from his IR drain, we will continue his full course of micafungin    Nutrition plan to restart J-tube  feedings.    Diabetes mellitus sliding scale coverage    Hypertension we will continue antihypertensive therapy with metoprolol.    Reactive airways--as needed neb treatments.      VTE Prophylaxis:   Mechanical Order History:     None      Pharmalogical Order History:      Ordered     Dose Route Frequency Stop    02/10/22 9919  enoxaparin (LOVENOX) syringe 40 mg         40 mg SC Every 24 Hours --                Fall risk evaluation: Elevated risk for fall secondary to generalized debility        Elder Alexis MD  [facility] Hospitalist  02/10/22  18:47 EST

## 2022-02-10 NOTE — PROGRESS NOTES
Patient Assessment Instrument  Quality Indicators - Admission    Section B. Hearing, Speech Vision  Expression of Ideas and Wants: Expresses complex messages without difficulty and  with speech that is clear and easy to understand.  Understanding Verbal and Non-Verbal Content: Understands: Clear comprehension  without cues or repetitions.    Section C. Cognitive Patterns      Section [id number]. Prior Functioning      Section MM3166. Prior Device Use      Section TW5903. Self Care Performance      Section VP5592. Self Care Discharge Goals      Section VP9771. Mobility Performance      Section XY5032. Mobility Discharge Goals      Section H. Bladder and Bowel      Section I. Active Diagnosis      Section J. Health Conditions      Section K. Swallowing/Nutritional Status      Section M. Skin Conditions      Section N. Medication      Section O. Special Treatments, Procedures, and Programs      OPTIONAL BRANCH FOR TRACKING FALLS  Fall(s) During Shift:    Signed by: Nurse Willa

## 2022-02-11 LAB
ANION GAP SERPL CALCULATED.3IONS-SCNC: 10.1 MMOL/L (ref 5–15)
BASOPHILS # BLD MANUAL: 0.1 10*3/MM3 (ref 0–0.2)
BASOPHILS NFR BLD MANUAL: 1 % (ref 0–1.5)
BUN SERPL-MCNC: 16 MG/DL (ref 8–23)
BUN/CREAT SERPL: 25.4 (ref 7–25)
CALCIUM SPEC-SCNC: 7.6 MG/DL (ref 8.6–10.5)
CHLORIDE SERPL-SCNC: 96 MMOL/L (ref 98–107)
CO2 SERPL-SCNC: 25.9 MMOL/L (ref 22–29)
CREAT SERPL-MCNC: 0.63 MG/DL (ref 0.76–1.27)
DEPRECATED RDW RBC AUTO: 45.3 FL (ref 37–54)
EOSINOPHIL # BLD MANUAL: 0.41 10*3/MM3 (ref 0–0.4)
EOSINOPHIL NFR BLD MANUAL: 4 % (ref 0.3–6.2)
ERYTHROCYTE [DISTWIDTH] IN BLOOD BY AUTOMATED COUNT: 14.2 % (ref 12.3–15.4)
GFR SERPL CREATININE-BSD FRML MDRD: 124 ML/MIN/1.73
GLUCOSE BLDC GLUCOMTR-MCNC: 171 MG/DL (ref 70–130)
GLUCOSE BLDC GLUCOMTR-MCNC: 176 MG/DL (ref 70–130)
GLUCOSE BLDC GLUCOMTR-MCNC: 223 MG/DL (ref 70–130)
GLUCOSE SERPL-MCNC: 176 MG/DL (ref 65–99)
HCT VFR BLD AUTO: 33.1 % (ref 37.5–51)
HGB BLD-MCNC: 10.4 G/DL (ref 13–17.7)
LYMPHOCYTES # BLD MANUAL: 2.14 10*3/MM3 (ref 0.7–3.1)
LYMPHOCYTES NFR BLD MANUAL: 12 % (ref 5–12)
MCH RBC QN AUTO: 27.4 PG (ref 26.6–33)
MCHC RBC AUTO-ENTMCNC: 31.4 G/DL (ref 31.5–35.7)
MCV RBC AUTO: 87.3 FL (ref 79–97)
MONOCYTES # BLD: 1.22 10*3/MM3 (ref 0.1–0.9)
NEUTROPHILS # BLD AUTO: 6.32 10*3/MM3 (ref 1.7–7)
NEUTROPHILS NFR BLD MANUAL: 57 % (ref 42.7–76)
NEUTS BAND NFR BLD MANUAL: 5 % (ref 0–5)
PLAT MORPH BLD: NORMAL
PLATELET # BLD AUTO: 473 10*3/MM3 (ref 140–450)
PMV BLD AUTO: 9.7 FL (ref 6–12)
POTASSIUM SERPL-SCNC: 4.2 MMOL/L (ref 3.5–5.2)
RBC # BLD AUTO: 3.79 10*6/MM3 (ref 4.14–5.8)
RBC MORPH BLD: NORMAL
SODIUM SERPL-SCNC: 132 MMOL/L (ref 136–145)
VARIANT LYMPHS NFR BLD MANUAL: 21 % (ref 19.6–45.3)
WBC NRBC COR # BLD: 10.2 10*3/MM3 (ref 3.4–10.8)

## 2022-02-11 PROCEDURE — 25010000002 ENOXAPARIN PER 10 MG: Performed by: FAMILY MEDICINE

## 2022-02-11 PROCEDURE — 97161 PT EVAL LOW COMPLEX 20 MIN: CPT

## 2022-02-11 PROCEDURE — 97116 GAIT TRAINING THERAPY: CPT

## 2022-02-11 PROCEDURE — 94799 UNLISTED PULMONARY SVC/PX: CPT

## 2022-02-11 PROCEDURE — 80048 BASIC METABOLIC PNL TOTAL CA: CPT | Performed by: FAMILY MEDICINE

## 2022-02-11 PROCEDURE — 97530 THERAPEUTIC ACTIVITIES: CPT

## 2022-02-11 PROCEDURE — 97110 THERAPEUTIC EXERCISES: CPT

## 2022-02-11 PROCEDURE — 63710000001 INSULIN ASPART PER 5 UNITS: Performed by: FAMILY MEDICINE

## 2022-02-11 PROCEDURE — 99231 SBSQ HOSP IP/OBS SF/LOW 25: CPT | Performed by: FAMILY MEDICINE

## 2022-02-11 PROCEDURE — 25010000002 MICAFUNGIN SODIUM 100 MG RECONSTITUTED SOLUTION 1 EACH VIAL: Performed by: FAMILY MEDICINE

## 2022-02-11 PROCEDURE — 85025 COMPLETE CBC W/AUTO DIFF WBC: CPT | Performed by: FAMILY MEDICINE

## 2022-02-11 PROCEDURE — 85007 BL SMEAR W/DIFF WBC COUNT: CPT | Performed by: FAMILY MEDICINE

## 2022-02-11 PROCEDURE — 97166 OT EVAL MOD COMPLEX 45 MIN: CPT

## 2022-02-11 PROCEDURE — 82962 GLUCOSE BLOOD TEST: CPT

## 2022-02-11 RX ORDER — IPRATROPIUM BROMIDE AND ALBUTEROL SULFATE 2.5; .5 MG/3ML; MG/3ML
3 SOLUTION RESPIRATORY (INHALATION) EVERY 6 HOURS PRN
Status: DISCONTINUED | OUTPATIENT
Start: 2022-02-11 | End: 2022-02-17

## 2022-02-11 RX ADMIN — INSULIN ASPART 2 UNITS: 100 INJECTION, SOLUTION INTRAVENOUS; SUBCUTANEOUS at 08:01

## 2022-02-11 RX ADMIN — PANTOPRAZOLE SODIUM 40 MG: 40 INJECTION, POWDER, FOR SOLUTION INTRAVENOUS at 05:04

## 2022-02-11 RX ADMIN — METOPROLOL TARTRATE 25 MG: 25 TABLET ORAL at 08:01

## 2022-02-11 RX ADMIN — INSULIN ASPART 3 UNITS: 100 INJECTION, SOLUTION INTRAVENOUS; SUBCUTANEOUS at 17:21

## 2022-02-11 RX ADMIN — OXYCODONE 5 MG: 5 TABLET ORAL at 05:05

## 2022-02-11 RX ADMIN — MICAFUNGIN SODIUM 100 MG: 100 INJECTION, POWDER, LYOPHILIZED, FOR SOLUTION INTRAVENOUS at 10:56

## 2022-02-11 RX ADMIN — INSULIN ASPART 2 UNITS: 100 INJECTION, SOLUTION INTRAVENOUS; SUBCUTANEOUS at 12:03

## 2022-02-11 RX ADMIN — ENOXAPARIN SODIUM 40 MG: 40 INJECTION SUBCUTANEOUS at 17:21

## 2022-02-11 RX ADMIN — OXYCODONE 5 MG: 5 TABLET ORAL at 20:56

## 2022-02-11 RX ADMIN — METOPROLOL TARTRATE 25 MG: 25 TABLET ORAL at 20:56

## 2022-02-11 NOTE — PROGRESS NOTES
Patient Assessment Instrument  Quality Indicators - Admission    Section B. Hearing, Speech Vision      Section C. Cognitive Patterns      Section AS2937. Prior Functioning      Section XY5926. Prior Device Use      Section GY5843. Self Care Performance     Oral Hygiene: [place] sets up or cleans up; patient completes activity. [place]  assists only prior to or following the activity.   Toileting Hygiene: [place] does more than half the effort. [place] lifts or holds  trunk or limbs and provides more than half the effort.   Shower/Bathe Self: [place] does less than half the effort. [place] lifts, holds  or supports trunk or limbs but provides less than half the effort.   Upper Body Dressing: [place] does less than half the effort. [place] lifts, holds  or supports trunk or limbs but provides less than half the effort.   Lower Body Dressing: [place] does less than half the effort. [place] lifts, holds  or supports trunk or limbs but provides less than half the effort.   Putting On/Taking Off Footwear: [place] does all of the effort. Patient does  none of the effort to complete the activity. Or, the assistance of 2 or more  helpers is required for the patient to complete the activity.    Section KF2486. Self Care Discharge Goals     Oral Hygiene: [place] sets up or cleans up; patient completes activity. [place]  assists only prior to or following the activity.   Toileting Hygiene: [place] provides verbal cues or touching/steadying assistance  as patient completes activity.   Shower/Bathe Self: [place] provides verbal cues or touching/steadying assistance  as patient completes activity.   Upper Body Dressing: [place] sets up or cleans up; patient completes activity.  [place] assists only prior to or following the activity.   Lower Body Dressing: [place] provides verbal cues or touching/steadying  assistance as patient completes activity.   Putting On/Taking Off Footwear: [place] provides verbal cues or  touching/steadying assistance as  patient completes activity.    Section OE5258. Mobility Performance      Section RA7900. Mobility Discharge Goals      Section H. Bladder and Bowel      Section I. Active Diagnosis      Section J. Health Conditions      Section K. Swallowing/Nutritional Status      Section M. Skin Conditions      Section N. Medication      Section O. Special Treatments, Procedures, and Programs      OPTIONAL BRANCH FOR TRACKING FALLS  Fall(s) During Shift:    Signed by: Elena Pisano, Occupational Therapist

## 2022-02-11 NOTE — PROGRESS NOTES
Patient Assessment Instrument  Quality Indicators - Admission    Section B. Hearing, Speech Vision      Section C. Cognitive Patterns      Section [id number]. Prior Functioning      Section JH2542. Prior Device Use      Section RK3416. Self Care Performance      Section GT1531. Self Care Discharge Goals      Section TF1622. Mobility Performance     Roll Left and Right: [place] provides verbal cues and/or touching/steadying  and/or contact guard assistance as patient completes activity. Assistance may be  provided throughout the activity or intermittently.   Sit to Lying: [place] provides verbal cues and/or touching/steadying and/or  contact guard assistance as patient completes activity. Assistance may be  provided throughout the activity or intermittently.   Lying to Sitting on Side of Bed: [place] provides verbal cues and/or  touching/steadying and/or contact guard assistance as patient completes  activity. Assistance may be provided throughout the activity or intermittently.   Sit to Stand: [place] does less than half the effort. [place] lifts, holds or  supports trunk or limbs but provides less than half the effort.   Chair/Bed to Chair Transfer: [place] does less than half the effort. [place]  lifts, holds or supports trunk or limbs but provides less than half the effort.   Toilet Transfer [place] does less than half the effort. [place] lifts, holds or  supports trunk or limbs but provides less than half the effort.   Car Transfer: [place] does less than half the effort. [place] lifts, holds or  supports trunk or limbs but provides less than half the effort.   Walk 10 Feet:   [place] provides verbal cues and/or touching/steadying and/or  contact guard assistance as patient completes activity. Assistance may be  provided throughout the activity or intermittently.  Walk 50 Feet with 2 Turns:   [place] provides verbal cues and/or  touching/steadying and/or contact guard assistance as patient completes  activity. Assistance may be  provided throughout the activity or intermittently.  Walk 150 Feet:   Not attempted due to medical or safety concerns.  Walking 10 Feet on Uneven Surfaces:   Not attempted due to medical or safety  concerns.  1 Step Over Curb or Up/Down Stair:   Not attempted due to medical or safety  concerns.  Picking up an Object:   Not attempted due to medical or safety concerns.  Uses Wheelchair/Scooter: No    Section [id number]. Mobility Discharge Goals     Sit to Stand: [place] provides verbal cues or touching/steadying assistance as  patient completes activity.   Chair/Bed to Chair Transfer: [place] provides verbal cues or touching/steadying  assistance as patient completes activity.   Walk Discharge Goals:   Walk 150 Feet: [place] provides verbal cues or touching/steadying assistance as  patient completes activity.    Section H. Bladder and Bowel      Section I. Active Diagnosis      Section J. Health Conditions      Section K. Swallowing/Nutritional Status      Section M. Skin Conditions      Section N. Medication      Section O. Special Treatments, Procedures, and Programs      OPTIONAL BRANCH FOR TRACKING FALLS  Fall(s) During Shift:    Signed by: Jerilyn Pal, Physical Therapist

## 2022-02-11 NOTE — PROGRESS NOTES
Patient Assessment Instrument  Quality Indicators - Admission    Section B. Hearing, Speech Vision      Section C. Cognitive Patterns      Section [id number]. Prior Functioning      Section LR8968. Prior Device Use      Section XM8976. Self Care Performance     Eating: Not attempted due to medical or safety concerns.    Section FI0918. Self Care Discharge Goals     Eating: Not attempted due to medical conditions or safety concerns    Section NO5283. Mobility Performance      Section BM7144. Mobility Discharge Goals      Section H. Bladder and Bowel      Section I. Active Diagnosis      Section J. Health Conditions      Section K. Swallowing/Nutritional Status      Section M. Skin Conditions      Section N. Medication      Section O. Special Treatments, Procedures, and Programs      OPTIONAL BRANCH FOR TRACKING FALLS  Fall(s) During Shift:    Signed by: Melina Mccabe, Supervisor

## 2022-02-11 NOTE — PLAN OF CARE
Goal Outcome Evaluation:               New admit to physical rehab. PT and OT consults. Continue current plan of care.

## 2022-02-11 NOTE — PROGRESS NOTES
[facility]  PROGRESS NOTE     Patient Identification:  Name:  Sandro Argueta  Age:  76 y.o.  Sex:  male  :  1945  MRN:  [id number]  Visit Number:  [id number]  ROOM: [facility]     Primary Care Provider:  Misti Nguyễn MD    Length of stay in inpatient status:  1    Subjective     Chief Compliant:  No chief complaint on file.      History of Presenting Illness: Patient 76-year-old gentleman with a past history of hypertension, coronary disease, diabetes mellitus and GERD.  Patient has a past history of Whipple surgery.  More recently had an esophageal perforation requiring repair of the esophagus as well as esophageal stent placement.  Patient also had chest tube secondary to leakage of GI contents into the thoracic cavity.  Patient did have yeast in the IR drain and has been on micafungin.  he does have J-tube placed is currently getting tube feeds through the J-tube.  Patient denies any new difficulties at this time    Objective     Current Hospital Meds:enoxaparin, 40 mg, Subcutaneous, Q24H  insulin aspart, 0-7 Units, Subcutaneous, TID AC  ipratropium-albuterol, 3 mL, Nebulization, 4x Daily - RT  metoprolol tartrate, 25 mg, Per J Tube, Q12H  micafungin (MYCAMINE) IV, 100 mg, Intravenous, Q24H  pantoprazole, 40 mg, Intravenous, Q AM       ----------------------------------------------------------------------------------------------------------------------  Vital Signs:  Temp:  [98 °F (36.7 °C)-99 °F (37.2 °C)] 98 °F (36.7 °C)  Heart Rate:  [82-84] 84  Resp:  [18] 18  BP: (130-152)/(69-82) 130/82  SpO2:  [92 %-99 %] 94 %  on   ;   Device (Oxygen Therapy): room air  Body mass index is 18.49 kg/m².    Wt Readings from Last 3 Encounters:   02/10/22 66.7 kg (147 lb)   22 75.9 kg (167 lb 4.8 oz)   21 79.4 kg (175 lb)     Intake & Output (last 3 days)        0701   0700  0701  02/10 0700 02/10 07 07 0701   07    P.O.   135     I.V. (mL/kg)   1000  (15)     Total Intake(mL/kg)   1135 (17)     Urine (mL/kg/hr)   400     Emesis/NG output   50     Drains   25     Total Output   475     Net   +660             Urine Unmeasured Occurrence   2 x         No diet orders on file  ----------------------------------------------------------------------------------------------------------------------  Physical exam:  Constitutional:   Elderly gentleman in no distress  HEENT: Cephalic atraumatic  Neck: Supple   Cardiovascular: Regular rate and rhythm  Pulmonary/Chest: Clear to auscultation  Abdominal: Positive bowel sounds soft.  G-tube and J-tube in place  Musculoskeletal: No arthropathy  Neurological: No focal deficits  Skin: No rash  Peripheral vascular:  Genitourinary:  ----------------------------------------------------------------------------------------------------------------------    Last echocardiogram:  Results for orders placed during the hospital encounter of 07/29/20    Adult Transthoracic Echo Complete W/ Cont if Necessary Per Protocol    Interpretation Summary  · Left ventricular wall thickness is consistent with mild concentric hypertrophy.  · Left ventricular systolic function is normal.  · Estimated EF appears to be in the range of 61 - 65%.  · Left ventricular diastolic dysfunction (grade I) consistent with impaired relaxation.  · Normal right ventricular cavity size and systolic function noted. Electronic lead present in the ventricle.  · Normal cardiac chambers dimensions.  · Mild aortic valve regurgitation is present.  · Mild mitral valve regurgitation is present  · Mild tricuspid valve regurgitation is present. No evidence of pulmonary hypertension is present.  · There is no evidence of pericardial effusion.    ----------------------------------------------------------------------------------------------------------------------  Results from last 7 days   Lab Units 02/11/22  0109   WBC 10*3/mm3 10.20   HEMOGLOBIN g/dL 10.4*   HEMATOCRIT % 33.1*   MCV  fL 87.3   MCHC g/dL 31.4*   PLATELETS 10*3/mm3 473*         Results from last 7 days   Lab Units 02/11/22  0109   SODIUM mmol/L 132*   POTASSIUM mmol/L 4.2   CHLORIDE mmol/L 96*   CO2 mmol/L 25.9   BUN mg/dL 16   CREATININE mg/dL 0.63*   EGFR IF NONAFRICN AM mL/min/1.73 124   CALCIUM mg/dL 7.6*   GLUCOSE mg/dL 176*   Estimated Creatinine Clearance: 75.3 mL/min (A) (by C-G formula based on SCr of 0.63 mg/dL (L)).  No results found for: AMMONIA              Glucose   Date/Time Value Ref Range Status   02/11/2022 1134 176 (H) 70 - 130 mg/dL Final     Comment:     Meter: [id number] : [id number] crystal art   02/11/2022 0600 171 (H) 70 - 130 mg/dL Final     Comment:     Meter: [id number] : [id number] [place] Bonnie   02/10/2022 2003 116 70 - 130 mg/dL Final     Comment:     Meter: [id number] : [id number] [place] Crystal   02/10/2022 1658 157 (H) 70 - 130 mg/dL Final     Comment:     Meter: [id number] : [id number] crystal art     Lab Results   Component Value Date    TSH 1.620 06/27/2021     No results found for: PREGTESTUR, PREGSERUM, HCG, HCGQUANT  Pain Management Panel    There is no flowsheet data to display.       Brief Urine Lab Results  (Last result in the past 365 days)      Color   Clarity   Blood   Leuk Est   Nitrite   Protein   CREAT   Urine HCG        01/21/22 2149 Yellow   Clear   Negative   Negative   Negative   Trace               No results found for: BLOODCX      No results found for: URINECX  No results found for: WOUNDCX  No results found for: STOOLCX        I have personally looked at the labs and they are summarized above.  ----------------------------------------------------------------------------------------------------------------------  Detailed radiology reports for the last 24 hours:    Imaging Results (Last 24 Hours)     ** No results found for the last 24 hours. **        Final impressions for the last 30 days of radiology reports:    CT Chest Without Contrast  Diagnostic    Result Date: 1/22/2022  1.  Transmural perforation of the distal thoracic esophagus communicating with the left basilar pleural space. Orally administered water-soluble contrast fills the left basilar pleural space and is visible within the left side chest tube. 2.  The exact site for esophageal perforation is not demonstrated on this study. There is very little contrast within the esophagus, and only a faint amount of contrast along the left side of the distal thoracic esophagus. 3.  Distal paraesophageal pneumomediastinum. 4.  Small left hydropneumothorax. No right pneumothorax. 5.  Consolidation and volume loss involving basal segments left lower lobe. Right posterior basilar atelectasis. Small right pleural effusion. Signer Name: Celestino Herrera MD  Signed: 1/22/2022 6:54 PM  Workstation Name: [facility]  Radiology [facility]    CT Chest With Contrast Diagnostic    Result Date: 1/21/2022  1. There is a small left pneumothorax but there is at least a moderate-sized left-sided pleural effusion and there is a small amount of pneumomediastinum with shift of mediastinal structures from left to right. A chest tube is being placed at time of this dictation as described below. 2. Probable right lung atelectasis. 3. No acute finding in the upper abdomen. Abdomen and pelvis CT dictated separately. NOTIFICATION: Critical Value/emergent results were called by telephone at the time of interpretation on 1/21/2022 8:55 PM to ASTER Boudreaux who verbally acknowledged these results. Dr. Villafuerte was in the process of placing a chest tube in the patient at the time of my call. Signer Name: Nick Vines MD  Signed: 1/21/2022 8:59 PM  Workstation Name: [employer]  Radiology [facility]    CT Abdomen Pelvis With Contrast    Result Date: 1/21/2022  1. There is a left-sided effusion that is at least moderate in size. There is a small left pneumothorax and there is trace  pneumomediastinum and subtle shift of mediastinal structures from left to right. Please see the separately dictated CT chest for further details. The emergency department is aware of these findings and a chest tube was in the process of being placed at the time of interpretation of the CT chest. 2. The stomach is distended with air fluid and debris and there are new postoperative changes of the stomach compared to the prior study. Correlate with any signs or symptoms of gastric outlet obstruction and the patient may benefit from NG tube placement. 3. No evidence of bowel obstruction or drainable fluid collection. 4. Normal appendix. 5. Diverticulosis. 6. Hepatic steatosis. 7. Tiny subcentimeter enhancing lesion in the spleen, favored to be benign but technically indeterminate. 8. Bilateral renal cysts. 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 Signer Name: Nick Vines MD  Signed: 1/21/2022 10:16 PM  Workstation Name: [employer]  [facility]    FL Esophagram Complete Single Contrast    Result Date: 1/22/2022  1.  Chest x-ray series with water-soluble oral contrast shows evidence of likely distal esophageal rupture with oral contrast appearing in the left basilar pleural space. Please see chest CT to follow. 2.  Left lower lobe consolidation and atelectasis. Mild infiltrate and volume loss in the right lung base. Small right pleural effusion. 3.  Left-sided hydropneumothorax with chest tube in place. Signer Name: Celestino Herrera MD  Signed: 1/22/2022 6:46 PM  Workstation Name: [facility]  Radiology [facility]    XR Chest 1 View    Result Date: 1/22/2022  1. Left sided chest tube remains present along with a small left pneumothorax, stable to slightly larger when compared with the prior study but this may be related to technical factors. 2. Worsening appearance of the left lung base with increasing pleural fluid and consolidation. No right-sided pneumothorax. Signer  Name: Nick Vines MD  Signed: 1/22/2022 5:28 PM  Workstation Name: [facility]  Radiology [facility]    XR Chest 1 View    Result Date: 1/21/2022  Interval placement of left chest tube. Persistent small left pneumothorax. Persistent airspace disease in the left chest. Signer Name: LIDIA GAY MD  Signed: 1/21/2022 9:32 PM  Workstation Name: [facility]  Radiology [facility]    XR Chest 1 View    Addendum Date: 1/21/2022    //////////// ADDENDUM #1 //////////// ADDENDUM: Favor there is a small subtle left apical pneumothorax. This is confirmed by CT. Spoke directly to Dr. Scott Villafuerte in the ER at 8:36 PM 1/21/2022 to relay this finding. Signer Name: LIDIA GAY MD  Signed: 1/21/2022 8:38 PM  Workstation Name: [facility]  Radiology [facility]////////////  ORIGINAL REPORT //////////// CR Chest 1 Vw INDICATION: Upper back pain COMPARISON:  6/27/2021 FINDINGS: Portable AP view(s) of the chest.  Normal cardiomediastinal contours. Dense consolidation in the left midlung especially the left base, most consistent with a dense lobar pneumonia. Small left pleural effusion.     Result Date: 1/21/2022  Dense consolidation in the left midlung especially the left base, most consistent with a dense lobar pneumonia. Small left pleural effusion. Signer Name: LIDIA GAY MD  Signed: 1/21/2022 8:10 PM  Workstation Name: [facility]  Radiology [facility]    US Venous Doppler Lower Extremity Bilateral (duplex)    Result Date: 1/22/2022  No deep venous thrombus seen in either lower extremity. Signer Name: Nick Vines MD  Signed: 1/22/2022 3:56 PM  Workstation Name: [facility]  Radiology [facility]    I have personally looked at the radiology images and read the final radiology report.    Assessment & Plan    Debility after recent esophageal rupture with Kamari drain in place and recent esophageal stent placement--patient being evaluated by PT and OT  today.    Recent esophageal rupture with perforation repair and esophageal stent placement continue n.p.o.  Patient does have J-tube started tube feedings.  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    Nutrition J-tube feedings    Diabetes mellitus continue [facility]    Hypertension controlled    History of CAD    VTE Prophylaxis:   Mechanical Order History:     None      Pharmalogical Order History:      Ordered     Dose Route Frequency Stop    02/10/22 0238  enoxaparin (LOVENOX) syringe 40 mg         40 mg SC Every 24 Hours --                Elder Alexis MD  [facility]  02/11/22  12:50 EST

## 2022-02-11 NOTE — SIGNIFICANT NOTE
02/11/22 0820   Living Environment   Lives With spouse   Name(s) of Who Lives With Patient Alesha Argueta-spouse   Current Living Arrangements home/apartment/condo   Primary Care Provided by self   Provides Primary Care For no one   Family Caregiver if Needed spouse   Family Caregiver Names Alesha Argueta-spouse   Quality of Family Relationships involved; supportive; helpful   Able to Return to Prior Arrangements yes   Living Arrangement Comments SS spoke to pt and son-in-law Rosita Veloz OT.  Pt lives at home with spouse Alesha.  Pt has two children:  Lupe Veloz ([place]) and Daquan Argueta ([place]).  Pt receives "[employer] FDC pension, [employer] Medicare and [employer].  Pt does not have POA or advance directive.  Pt was independent with mobility and ADL's prior to hospital admission.  PCP is Dr. Misti Nguyễn.  [facility]   Resource/Environmental Concerns   Resource/Environmental Concerns none   Transportation Concerns car, none   Transition Planning   Patient/Family Anticipates Transition to home with family   Patient/Family Anticipated Services at Transition durable medical equipment; home health care   Transportation Anticipated family or friend will provide   Discharge Needs Assessment (IRF)   Current Outpatient/Agency/Support Group   (Pt did not receive home health or outpatient therapy prior to hospital admission.)   Concerns to be Addressed adjustment to diagnosis/illness   Equipment Currently Used at Home walker, rolling; bp cuff; glucometer; shower chair; commode  (Pt did not use any AD for mobility prior to admission.)   Discharge Coordination/Progress Pt plans to return home with spouse providing assistance at discharge.  Pt also has good support from daughter, son and son-in-law.  Team conference will be held on 2-15-22.  SS will follow and assist with discharge planning.

## 2022-02-11 NOTE — PROGRESS NOTES
Case Management  Inpatient Rehabilitation Plan of Care and Discharge Plan Note    Rehabilitation Diagnosis:  debility  Date of Onset:  1-23-22    Medical Summary:  debility, esophageal rupture    Plan of Care      Expected Intensity:  Average of 3 hours of therapy 5 days/week.  Interdisciplinary Team:  Interdisciplinary Team: Medical Supervision and 24 Hour Rehabilitation Nursing.,  Physical Therapy:, Occupational Therapy:, Social Work, Therapeutic Recreation.  Physical Therapy Intensity/Duration: PT 1.5 hours per day/5 days per week  Occupational Therapy Intensity/Duration: OT 1.5 hours per day/5 days per week  Estimated Length of Stay/Anticipated Discharge Date: 14 days  Anticipated Discharge Destination:  Anticipated discharge destination from inpatient rehabilitation is community  discharge with assistance. Pt plans to return home with spouse at discharge.  Spouse, children and son-in-law are supportive and will assist pt with needs.      Based on the patient's medical and functional status, their prognosis and  expected level of functional improvement is:  good    Signed by: JOSIE Schwartz

## 2022-02-11 NOTE — PROGRESS NOTES
Inpatient Rehabilitation Functional Measures Assessment and Plan of Care    Plan of Care      Functional Measures  MAGAN Eating:  MAGAN Grooming:  MAGAN Bathing:  MAGAN Upper Body Dressing:  MAGAN Lower Body Dressing:  MAGAN Toileting:    MAGAN Bladder Management  Level of Assistance:  Frequency/Number of Accidents this Shift:    MAGAN Bowel Management  Level of Assistance:  Frequency/Number of Accidents this Shift:    MAGAN Bed/Chair/Wheelchair Transfer:  Bed/chair/wheelchair Transfer Score = 4.  Patient performs 75% or more of effort and minimal assistance (little/incidental  help/lifting of one limb/steadying) for transferring to and from the  bed/chair/wheelchair, requiring: Patient requires the following assistive  device(s): Walker. Seating system of wheelchair.  MAGAN Toilet Transfer:  MAGAN Tub/Shower Transfer:    Previously Documented Mode of Locomotion at Discharge:  MAGAN Expected Mode of Locomotion at Discharge: The expected mode of most  frequently used locomotion, at discharge, is expected to be walking.  MAGAN Walk/Wheelchair:  WHEELCHAIR OBSERVATION   Wheelchair did not occur.    WALK OBSERVATION   Walk Distance Scale = 2.  Distance walked is 50 -149 feet. Walk Score = 2.  Patient performs 75% or more of effort and requires minimal assistance.  Incidental assistance, contact guard or steadying was provided. Patient walked a  distance of 80 feet. Patient requires the following assistive device(s): Rolling  walker.  MAGAN Stairs:  Stairs did not occur.    MAGAN Comprehension:  MAGAN Expression:  MAGAN Social Interaction:  MAGAN Problem Solving:  MAGAN Memory:    Therapy Mode Minutes  Occupational Therapy:  Physical Therapy: Individual: 90 minutes.  Speech Language Pathology:    Discharge Functional Goals:  Bed, Chair, Wheelchair Transfers: 5  Walk: 5    Signed by: Jerilyn Pal, Physical Therapist

## 2022-02-11 NOTE — THERAPY EVALUATION
Inpatient Rehabilitation - Occupational Therapy Initial Evaluation and Treatment Note     Rocky     Patient Name: Sandro Argueta  : 1945  MRN: [id number]    Today's Date: 2022                 Admit Date: 2/10/2022       No diagnosis found.    Patient Active Problem List   Diagnosis   • Left elbow pain   • Hypertension   • DM2 (diabetes mellitus, type 2) (HCC)   • Elevated prostate specific antigen (PSA)   • Epigastric pain   • Epigastric abdominal pain   • Elevated liver enzymes   • Weight loss, abnormal   • Acute hepatitis   • Anemia   • Obstructive jaundice   • Pneumothorax, left   • Cytokine release syndrome, grade 1   • Esophageal rupture       Past Medical History:   Diagnosis Date   • Acute UTI (urinary tract infection) 2021   • Arthritis    • Borderline diabetes    • Coronary artery disease    • Diabetes mellitus (HCC)    • Elevated prostate specific antigen (PSA) 2020   • GERD (gastroesophageal reflux disease)    • Hypertension    • Left elbow pain        Past Surgical History:   Procedure Laterality Date   • COLONOSCOPY     • ENDOSCOPY N/A 2021    Procedure: ESOPHAGOGASTRODUODENOSCOPY WITH BIOPSY dilitation;  Surgeon: Bela Mcdaniel MD;  Location: [facility];  Service: Gastroenterology;  Laterality: N/A;   • ERCP N/A 2021    Procedure: ENDOSCOPIC RETROGRADE CHOLANGIOPANCREATOGRAPHY WITH STENT PLACEMENT;  Surgeon: Matthew Moreira MD;  Location: [facility] ENDOSCOPY;  Service: Gastroenterology;  Laterality: N/A;  with sphincterotomy, and brushing of common bile duct, and placement of [id number] wall stent     • PACEMAKER IMPLANTATION               IRF OT ASSESSMENT FLOWSHEET (last 12 hours)     IRF OT Evaluation and Treatment     Row Name 22 1502          OT Time and Intention    Document Type initial evaluation; daily treatment  -CJ     Mode of Treatment occupational therapy  -CJ     Symptoms Noted During/After Treatment none  -CJ     Row Name  02/11/22 1502          Relationship/Environment    Lives With spouse  -     Family Caregiver if Needed spouse  -     Row Name 02/11/22 1502          General Information    Patient Profile Reviewed yes  -     Patient/Family/Caregiver Comments/Observations agreeable to therapy  -     Existing Precautions/Restrictions fall  Kamari drain; G and J tubes;  surgical incisions  -     Row Name 02/11/22 1502          Previous Level of Function/Home Environm    BADLs, Premorbid Functional Level independent  -     Row Name 02/11/22 1502          Living Environment    Current Living Arrangements home/apartment/condo  -     Row Name 02/11/22 1502          Home Use of Assistive/Adaptive Equipment    Equipment Currently Used at Home commode; bath bench; walker, rolling  -     Row Name 02/11/22 1502          Sensory    Additional Documentation --  BUE light touch - [facility]     Row Name 02/11/22 1502          Vision Assessment/Intervention    Visual Impairment/Limitations corrective lenses for reading  -     Row Name 02/11/22 1502          Cognition/Psychosocial    Orientation Status (Cognition) oriented x 3  -     Follows Commands (Cognition) [facility]     Row Name 02/11/22 1502          Range of Motion Comprehensive    General Range of Motion bilateral upper extremity ROM [facility]     Row Name 02/11/22 1502          Upper Extremity (Manual Muscle Testing)    Comment, MMT: Upper Extremity BUE - deferred  -     Row Name 02/11/22 1502          Transfers    Bed-Chair [place] (Transfers) minimum assist (75% patient effort); verbal cues  -     Assistive Device (Bed-Chair Transfers) wheelchair  -     [place] Level (Toilet Transfer) minimum assist (75% patient effort); verbal cues  -     Assistive Device (Toilet Transfer) raised toilet seat; grab bars/safety frame  -     Row Name 02/11/22 1502          Motor Skills    Motor Skills functional endurance  -     Motor Control/Coordination Interventions  therapeutic exercise/ROM  BUE ther ex/act, AROM, bilat coord ex;  dowel wrist rolls X 2  -     Row Name 02/11/22 1502          Basic Activities of Daily Living (BADLs)    Basic Activities of Daily Living bathing; upper body dressing; lower body dressing; grooming; toileting; self-feeding  -[facility] Name 02/11/22 1502          Bathing    Comment (Bathing) ModA  -     Row Name 02/11/22 1502          Upper Body Dressing    Comment (Upper Body Dressing) Jonny  -     Row Name 02/11/22 1502          Lower Body Dressing    Comment (Lower Body Dressing) Mod/MaxA  -     Row Name 02/11/22 1502          Grooming    Comment (Grooming) Set up  -[facility] Name 02/11/22 1502          Toileting    Comment (Toileting) Mod/MaxA  -[facility] Name 02/11/22 1502          Self-Feeding    Comment (Self-Feeding) Dependent - tube feeding  -[facility] Name 02/11/22 1502          IRF OT Goals    LB Dressing Goal Selection (OT-IRF) LB dressing, OT goal 1; LB dressing, OT goal 2  -     Toileting Goal Selection (OT-IRF) toileting, OT goal 1; toileting, OT goal 2  -     Row Name 02/11/22 1502          LB Dressing Goal 1 (OT-IRF)    [place] (LB Dressing Goal 1, OT-IRF) moderate assist (50-74% patient effort)  -     Time Frame (LB Dressing Goal 1, OT-IRF) short-term goal (STG)  -[facility] Name 02/11/22 1502          LB Dressing Goal 2 (OT-IRF)    [place] (LB Dressing Goal 2, OT-IRF) minimum assist (75% or more patient effort)  -     Time Frame (LB Dressing Goal 2, OT-IRF) long-term goal (LTG); by discharge  -[facility] Name 02/11/22 1502          Toileting Goal 1 (OT-IRF)    [place] Level (Toileting Goal 1, OT-IRF) moderate assist (50-74% patient effort)  -     Time Frame (Toileting Goal 1, OT-IRF) short-term goal (STG)  -[facility] Name 02/11/22 1502          Toileting Goal 2 (OT-IRF)    [place] Level (Toileting Goal 2, OT-IRF) minimum assist (75% or more patient effort)  -     Time Frame (Toileting Goal  2, OT-IRF) long-term goal (LTG); by discharge  -     Row Name 02/11/22 1502          Positioning and Restraints    Post Treatment Position bed  -     In Bed call light within reach; encouraged to call for assist; notified nsg  pm tx;  seen at bedside in pm  -     In Wheelchair with PT  am tx  -     Row Name 02/11/22 1502          Therapy Assessment/Plan (OT)    Patient's Goals For Discharge return home; take care of myself at home  -     Row Name 02/11/22 1502          Therapy Assessment/Plan (OT)    OT Diagnosis Debility - esophageal rupture s/p repair of perforation and esophageal stent placement  -     Rehab Potential/Prognosis (OT) good, to achieve stated therapy goals  -     Frequency of Treatment (OT) 5 times per week  -     Estimated Duration of Therapy (OT) 2 weeks  -     Problem List (OT) --  impaired ADL's, strength, fxl mobility and activity tolerance  -     Planned Therapy Interventions (OT) activity tolerance training; adaptive equipment training; BADL retraining; ROM/therapeutic exercise; strengthening exercise; transfer/mobility retraining; patient/caregiver education/training  -     Row Name 02/11/22 1502          Therapy Plan Review/Discharge Plan (OT)    Anticipated Equipment Needs At Discharge (OT) --  TBD  -     Expected Discharge Disposition (OT) --  home with assistance  -           User Key  (r) = Recorded By, (t) = Taken By, (c) = Cosigned By    Initials Name Effective Dates     Elena Pisano, OT 06/16/21 -                  Occupational Therapy Education                 Title: PT OT SLP Therapies (Done)     Topic: Occupational Therapy (Done)     Point: ADL training (Done)     Description:   Instruct learner(s) on proper safety adaptation and remediation techniques during self care or transfers.   Instruct in proper use of assistive devices.              Learning Progress Summary           Patient Acceptance, E,D, VU,NR by  at 2/11/2022 3360                    Point: Precautions (Done)     Description:   Instruct learner(s) on prescribed precautions during self-care and functional transfers.              Learning Progress Summary           Patient Acceptance, E,D, VU,NR by  at 2/11/2022 1532                               User Key     Initials Effective Dates Name Provider Type Discipline     06/16/21 -  Elena Pisano OT Occupational Therapist OT                    OT Recommendation and Plan    Planned Therapy Interventions (OT): activity tolerance training, adaptive equipment training, BADL retraining, ROM/therapeutic exercise, strengthening exercise, transfer/mobility retraining, patient/caregiver education/training                    Time Calculation:      Time Calculation- OT     Row Name 02/11/22 1534 02/11/22 1530          Time Calculation- OT    OT Start Time 1330  - 0800  -     OT Stop Time 1345  - 0915  -     OT Time Calculation (min) 15 min  - 75 min  -     Total Timed Code Minutes- OT 15 minute(s)  - 75 minute(s)  -           User Key  (r) = Recorded By, (t) = Taken By, (c) = Cosigned By    Initials Name Provider Type     Elena Pisano OT Occupational Therapist              Therapy Charges for Today     Code Description Service Date Service Provider Modifiers Qty    49493775591 HC OT EVAL MOD COMPLEXITY 3 2/11/2022 Elena Pisano OT GO 1    05729540701 HC OT THER PROC EA 15 MIN 2/11/2022 Elena Pisano OT GO 2    56284432282 HC OT THERAPEUTIC ACT EA 15 MIN 2/11/2022 Elena Pisano OT GO 1                   Elena Pisano OT  2/11/2022

## 2022-02-11 NOTE — PROGRESS NOTES
Rehabilitation Nursing  Inpatient Rehabilitation Plan of Care Note    Plan of Care  Copy from [place]    Pain Management (Active)  Current Status (2/10/2022 3:38:00 PM): Potential for pain  Weekly Goal: No pain this week  Discharge Goal: No pain    Safety    Potential for Injury (Active)  Current Status (2/10/2022 3:38:00 PM): At risk for injury  Weekly Goal: No injury this week  Discharge Goal: No injuries    Signed by: Jessica Dumont, Nurse

## 2022-02-11 NOTE — THERAPY EVALUATION
Inpatient Rehabilitation - Physical Therapy Initial Evaluation        Rocky     Patient Name: Sandro Argueta  : 1945  MRN: [id number]    Today's Date: 2022                    Admit Date: 2/10/2022      Visit Dx:   No diagnosis found.    Patient Active Problem List   Diagnosis   • Left elbow pain   • Hypertension   • DM2 (diabetes mellitus, type 2) (HCC)   • Elevated prostate specific antigen (PSA)   • Epigastric pain   • Epigastric abdominal pain   • Elevated liver enzymes   • Weight loss, abnormal   • Acute hepatitis   • Anemia   • Obstructive jaundice   • Pneumothorax, left   • Cytokine release syndrome, grade 1   • Esophageal rupture       Past Medical History:   Diagnosis Date   • Acute UTI (urinary tract infection) 2021   • Arthritis    • Borderline diabetes    • Coronary artery disease    • Diabetes mellitus (HCC)    • Elevated prostate specific antigen (PSA) 2020   • GERD (gastroesophageal reflux disease)    • Hypertension    • Left elbow pain        Past Surgical History:   Procedure Laterality Date   • COLONOSCOPY     • ENDOSCOPY N/A 2021    Procedure: ESOPHAGOGASTRODUODENOSCOPY WITH BIOPSY dilitation;  Surgeon: Bela Mcdaniel MD;  Location: [facility];  Service: Gastroenterology;  Laterality: N/A;   • ERCP N/A 2021    Procedure: ENDOSCOPIC RETROGRADE CHOLANGIOPANCREATOGRAPHY WITH STENT PLACEMENT;  Surgeon: Matthew Moreira MD;  Location: [facility] ENDOSCOPY;  Service: Gastroenterology;  Laterality: N/A;  with sphincterotomy, and brushing of common bile duct, and placement of [id number] wall stent     • PACEMAKER IMPLANTATION         PT ASSESSMENT (last 12 hours)     IRF PT Evaluation and Treatment     Row Name 22 1523          PT Time and Intention    Document Type initial evaluation; daily treatment  -LB     Mode of Treatment individual therapy; physical therapy  -LB     Row Name 22 1523          General Information    Existing  Precautions/Restrictions fall  ABD incision, G and J tubes, mart drain-all at left ABD  -LB     Row Name 02/11/22 1523          Cognition/Psychosocial    Affect/Mental Status (Cognitive) WFL  -LB     Follows Commands (Cognition) verbal cues/prompting required; physical/tactile prompts required  -LB     Personal Safety Interventions gait belt; nonskid shoes/slippers when out of bed; supervised activity  -LB     Row Name 02/11/22 1523          Pain Scale: FACES Pre/Post-Treatment    Pain: FACES Scale, Pretreatment 4-->hurts little more  -LB     Posttreatment Pain Rating 4-->hurts little more  -LB     Pain Location abdomen  -LB     Pre/Posttreatment Pain Comment rest, reposition  -LB     Row Name 02/11/22 1523          Bed Mobility    Sit-Supine [place] (Bed Mobility) contact guard; verbal cues; nonverbal cues (demo/gesture)  -LB     Row Name 02/11/22 1523          Transfers    Chair-Bed [place] (Transfers) verbal cues; nonverbal cues (demo/gesture); minimum assist (75% patient effort)  -LB     Sit-Stand [place] (Transfers) minimum assist (75% patient effort); verbal cues; nonverbal cues (demo/gesture)  -LB     Stand-Sit [place] (Transfers) minimum assist (75% patient effort); verbal cues; nonverbal cues (demo/gesture)  -LB     Row Name 02/11/22 1523          Chair-Bed Transfer    Assistive Device (Chair-Bed Transfers) wheelchair  -LB     Row Name 02/11/22 1523          Sit-Stand Transfer    Assistive Device (Sit-Stand Transfers) walker, front-wheeled  -LB     Row Name 02/11/22 1523          Stand-Sit Transfer    Assistive Device (Stand-Sit Transfers) walker, front-wheeled  -LB     Row Name 02/11/22 1523          Gait/Stairs (Locomotion)    [place] Level (Gait) contact guard; verbal cues; nonverbal cues (demo/gesture)  -LB     Assistive Device (Gait) walker, front-wheeled  -LB     Distance in Feet (Gait) 80'  -LB     Deviations/Abnormal Patterns (Gait) [place] decreased; gait speed decreased;  stride length decreased  -LB     Bilateral Gait Deviations forward flexed posture  -LB     Comment (Gait/Stairs) cues to stand erect and shift weight forward  -LB     Row Name 02/11/22 1523          Safety Issues, Functional Mobility    Impairments Affecting Function (Mobility) balance; endurance/activity tolerance; pain; postural/trunk control; strength  -LB     Row Name 02/11/22 1523          Balance    Static Standing Balance --  CGA-min A with RW, leans backwards  -LB     Comment, Balance sitting bean bag toss and  with reacher  -LB     Row Name 02/11/22 1523          Motor Skills    Therapeutic Exercise --  sitting ex 2 sets, standing ex in PB  -LB     Additional Documentation --  frequent rest breaks due to fatigue  -LB     Row Name 02/11/22 1523          IRF PT Goals    Bed Mobility Goal Selection (PT-IRF) bed mobility, PT goal 1  -LB     Transfer Goal Selection (PT-IRF) transfers, PT goal 1  -LB     Gait (Walking Locomotion) Goal Selection (PT-IRF) gait, PT goal 1  -LB     Row Name 02/11/22 1523          Bed Mobility Goal 1 (PT-IRF)    Activity/Assistive Device (Bed Mobility Goal 1, PT-IRF) sit to supine/supine to sit  -LB     [place] Level (Bed Mobility Goal 1, PT-IRF) supervision required  -LB     Time Frame (Bed Mobility Goal 1, PT-IRF) by discharge  -LB     Row Name 02/11/22 1523          Transfer Goal 1 (PT-IRF)    Activity/Assistive Device (Transfer Goal 1, PT-IRF) sit-to-stand/stand-to-sit; bed-to-chair/chair-to-bed  -LB     [place] Level (Transfer Goal 1, PT-IRF) supervision required  -LB     Time Frame (Transfer Goal 1, PT-IRF) by discharge  -LB     Row Name 02/11/22 1523          Gait/Walking Locomotion Goal 1 (PT-IRF)    Activity/Assistive Device (Gait/Walking Locomotion Goal 1, PT-IRF) walker, rolling  -LB     Gait/Walking Locomotion Distance Goal 1 (PT-IRF) 300'  -LB     [place] Level (Gait/Walking Locomotion Goal 1, PT-IRF) supervision required  -LB     Time Frame  (Gait/Walking Locomotion Goal 1, PT-IRF) by discharge  -LB     Row Name 02/11/22 1523          Positioning and Restraints    Pre-Treatment Position sitting in chair/recliner  -LB     In Bed call light within reach; encouraged to call for assist; heels elevated  -LB     Row Name 02/11/22 1523          Vital Signs    Intra SpO2 (%) 93  after amb  -LB     O2 Delivery Intra Treatment room air  -LB     Row Name 02/11/22 1523          Therapy Assessment/Plan (PT)    Patient's Goals For Discharge return home  -LB     Row Name 02/11/22 1523          Therapy Assessment/Plan (PT)    PT Diagnosis (PT) Debility-esophageal perforation, extensive ABD and thoracic surgery, history of pancreatic CA. has drains and tube feeding.  -LB     Rehab Potential/Prognosis (PT) adequate, monitor progress closely  -LB     Frequency of Treatment (PT) 5 times per week  -LB     Estimated Duration of Therapy (PT) 2 weeks  -LB     Problem List (PT) balance; mobility; strength; pain; postural control  -LB     Activity Limitations Related to Problem List (PT) unable to ambulate safely; unable to transfer safely  -LB     Row Name 02/11/22 1523          Therapy Plan Review/Discharge Plan (PT)    Therapy Plan Review (PT) evaluation/treatment results reviewed; care plan/treatment goals reviewed; risks/benefits reviewed; participants voiced agreement with care plan  -LB     Anticipated Equipment Needs at Discharge (PT Eval) --  tbd  -LB     Expected Discharge Disposition (PT Eval) home with home health care; home with caregiver  -LB           User Key  (r) = Recorded By, (t) = Taken By, (c) = Cosigned By    Initials Name Provider Type    LB Jerilyn Pal, PT Physical Therapist              Wound 02/10/22 1557 midline abdomen Incision (Active)   Dressing Appearance open to air 02/11/22 0732   Closure [place]; Approximated 02/11/22 0732   Base clean; dry; pink 02/11/22 0732   Drainage Amount none 02/11/22 0732   Dressing Care open to air 02/11/22  0732       Wound 02/10/22 1602 Left upper back Incision (Active)   Dressing Appearance open to air 02/11/22 0732   Closure Liquid skin adhesive 02/11/22 0732   Base pink; dry 02/11/22 0732   Drainage Amount none 02/11/22 0732   Dressing Care open to air 02/11/22 0732     Physical Therapy Education                 Title: PT OT SLP Therapies (Done)     Topic: Physical Therapy (Done)     Point: Mobility training (Done)     Learning Progress Summary           Patient Acceptance, E, VU,NR by LB at 2/11/2022 1543    Acceptance, E,TB, VU by DG at 2/10/2022 2255                   Point: Home exercise program (Done)     Learning Progress Summary           Patient Acceptance, E, VU,NR by LB at 2/11/2022 1543    Acceptance, E,TB, VU by DG at 2/10/2022 2255                   Point: Body mechanics (Done)     Learning Progress Summary           Patient Acceptance, E, VU,NR by LB at 2/11/2022 1543    Acceptance, E,TB, VU by DG at 2/10/2022 2255                   Point: Precautions (Done)     Learning Progress Summary           Patient Acceptance, E, VU,NR by LB at 2/11/2022 1543    Acceptance, E,TB, VU by DG at 2/10/2022 2255                               User Key     Initials Effective Dates Name Provider Type Discipline     06/16/21 -  Jessica Dumont RN Registered Nurse Nurse     06/16/21 -  Jerilyn Pal, PT Physical Therapist PT                PT Recommendation and Plan    Planned Therapy Interventions (PT): balance training, bed mobility training, gait training, patient/family education, strengthening, transfer training  Frequency of Treatment (PT): 5 times per week  Anticipated Equipment Needs at Discharge (PT Eval):  (tbd)                  Time Calculation:      PT Charges     Row Name 02/11/22 1543             Time Calculation    Start Time 0915  -LB      Stop Time 1045  -LB      Time Calculation (min) 90 min  -LB      PT Received On 02/11/22  -LB      PT Goal Re-Cert Due Date 02/18/22  -LB            User Key   (r) = Recorded By, (t) = Taken By, (c) = Cosigned By    Initials Name Provider Type    LB Jerilyn Pal, PT Physical Therapist                Therapy Charges for Today     Code Description Service Date Service Provider Modifiers Qty    89502307875 HC PT EVAL LOW COMPLEXITY 1 2/11/2022 Jerilyn Pal, PT GP 1    17725556644 HC GAIT TRAINING EA 15 MIN 2/11/2022 Jerilyn Pal, PT GP 1    59644560588 HC PT THER PROC EA 15 MIN 2/11/2022 Jerilyn Pal, PT GP 4                   Jerilyn Pal, PT  2/11/2022

## 2022-02-11 NOTE — PROGRESS NOTES
Patient Assessment Instrument  Quality Indicators - Admission    Section B. Hearing, Speech Vision      Section C. Cognitive Patterns      Section IM4395. Prior Functioning      Section BH3417. Prior Device Use  Patient does not use manual or motorized wheelchair or scooter, mechanical lift,  walker, or an orthotic/prosthesis.    Section CI4156. Self Care Performance      Section CW0415. Self Care Discharge Goals      Section HQ2206. Mobility Performance      Section OG6732. Mobility Discharge Goals      Section H. Bladder and Bowel      Section I. Active Diagnosis      Section J. Health Conditions      Section K. Swallowing/Nutritional Status      Section M. Skin Conditions      Section N. Medication      Section O. Special Treatments, Procedures, and Programs      OPTIONAL BRANCH FOR TRACKING FALLS  Fall(s) During Shift:    Signed by: JOSIE Schwartz

## 2022-02-11 NOTE — PROGRESS NOTES
Inpatient Rehabilitation Functional Measures Assessment and Plan of Care    Plan of Care   Self Care    Dressing (Lower) (Active)  Current Status (2/11/2022 8:00:00 AM): Mod/MaxA  Weekly Goal: ModA  Discharge Goal: Jonny    Functional Measures  MAGAN Eating:  MAGAN Grooming:  MAGAN Bathing:  MAGAN Upper Body Dressing:  MAGAN Lower Body Dressing:  MAGAN Toileting:    MAGAN Bladder Management  Level of Assistance:  Frequency/Number of Accidents this Shift:    MAGAN Bowel Management  Level of Assistance:  Frequency/Number of Accidents this Shift:    MAGAN Bed/Chair/Wheelchair Transfer:  MAGAN Toilet Transfer:  MAGAN Tub/Shower Transfer:    Previously Documented Mode of Locomotion at Discharge:  [facility] Expected Mode of Locomotion at Discharge:  MAGAN Walk/Wheelchair:  MAGAN Stairs:    MAGAN Comprehension:  MAGAN Expression:  [facility] Social Interaction:  [facility] Problem Solving:  MAGAN Memory:    Therapy Mode Minutes  Occupational Therapy: Individual: 90 minutes.  Physical Therapy:  Speech Language Pathology:    Discharge Functional Goals:    Signed by: Elena Pisano, Occupational Therapist

## 2022-02-12 LAB
GLUCOSE BLDC GLUCOMTR-MCNC: 175 MG/DL (ref 70–130)
GLUCOSE BLDC GLUCOMTR-MCNC: 192 MG/DL (ref 70–130)
GLUCOSE BLDC GLUCOMTR-MCNC: 215 MG/DL (ref 70–130)

## 2022-02-12 PROCEDURE — 97535 SELF CARE MNGMENT TRAINING: CPT

## 2022-02-12 PROCEDURE — 97110 THERAPEUTIC EXERCISES: CPT

## 2022-02-12 PROCEDURE — 97530 THERAPEUTIC ACTIVITIES: CPT

## 2022-02-12 PROCEDURE — 63710000001 INSULIN ASPART PER 5 UNITS: Performed by: FAMILY MEDICINE

## 2022-02-12 PROCEDURE — 94799 UNLISTED PULMONARY SVC/PX: CPT

## 2022-02-12 PROCEDURE — 82962 GLUCOSE BLOOD TEST: CPT

## 2022-02-12 PROCEDURE — 25010000002 ENOXAPARIN PER 10 MG: Performed by: FAMILY MEDICINE

## 2022-02-12 PROCEDURE — 99231 SBSQ HOSP IP/OBS SF/LOW 25: CPT | Performed by: FAMILY MEDICINE

## 2022-02-12 PROCEDURE — 97116 GAIT TRAINING THERAPY: CPT

## 2022-02-12 PROCEDURE — 25010000002 MICAFUNGIN SODIUM 100 MG RECONSTITUTED SOLUTION 1 EACH VIAL: Performed by: FAMILY MEDICINE

## 2022-02-12 RX ADMIN — INSULIN ASPART 2 UNITS: 100 INJECTION, SOLUTION INTRAVENOUS; SUBCUTANEOUS at 17:33

## 2022-02-12 RX ADMIN — METOPROLOL TARTRATE 25 MG: 25 TABLET ORAL at 09:39

## 2022-02-12 RX ADMIN — ENOXAPARIN SODIUM 40 MG: 40 INJECTION SUBCUTANEOUS at 17:32

## 2022-02-12 RX ADMIN — METOPROLOL TARTRATE 25 MG: 25 TABLET ORAL at 20:32

## 2022-02-12 RX ADMIN — PANTOPRAZOLE SODIUM 40 MG: 40 INJECTION, POWDER, FOR SOLUTION INTRAVENOUS at 05:02

## 2022-02-12 RX ADMIN — INSULIN ASPART 2 UNITS: 100 INJECTION, SOLUTION INTRAVENOUS; SUBCUTANEOUS at 12:24

## 2022-02-12 RX ADMIN — MICAFUNGIN SODIUM 100 MG: 100 INJECTION, POWDER, LYOPHILIZED, FOR SOLUTION INTRAVENOUS at 09:40

## 2022-02-12 RX ADMIN — INSULIN ASPART 3 UNITS: 100 INJECTION, SOLUTION INTRAVENOUS; SUBCUTANEOUS at 09:40

## 2022-02-12 RX ADMIN — OXYCODONE 5 MG: 5 TABLET ORAL at 20:31

## 2022-02-12 NOTE — PROGRESS NOTES
[facility]  PROGRESS NOTE     Patient Identification:  Name:  Sandro Argueta  Age:  76 y.o.  Sex:  male  :  1945  MRN:  [id number]  Visit Number:  [id number]  ROOM: [facility]     Primary Care Provider:  Misti Nguyễn MD    Length of stay in inpatient status:  2    Subjective     Chief Compliant:  No chief complaint on file.      History of Presenting Illness: Patient is 76-year-old gentleman with a history of hypertension, coronary disease, diabetes mellitus and GERD.  Patient had recent episodes of severe vomiting and cyst and sustained esophageal perforation with leakage of GI contents into the thoracic cavity.  Patient required multiple chest tubes and was treated with broad-spectrum antibiotics as well as micafungin.  Patient did have to have repair of the esophagus performed and then eventually because of continued leakage esophageal stent placement.  Patient does have J-tube in place and is getting tube feeds.  Patient states he feels fatigued today but has no other acute complaints.    Objective     Current Hospital Meds:enoxaparin, 40 mg, Subcutaneous, Q24H  insulin aspart, 0-7 Units, Subcutaneous, TID AC  metoprolol tartrate, 25 mg, Per J Tube, Q12H  micafungin (MYCAMINE) IV, 100 mg, Intravenous, Q24H  pantoprazole, 40 mg, Intravenous, Q AM       ----------------------------------------------------------------------------------------------------------------------  Vital Signs:  Temp:  [98 °F (36.7 °C)] 98 °F (36.7 °C)  Heart Rate:  [87-95] 95  Resp:  [18] 18  BP: (128-160)/(66-70) 128/66  SpO2:  [92 %-95 %] 94 %  on   ;   Device (Oxygen Therapy): room air  Body mass index is 18.49 kg/m².    Wt Readings from Last 3 Encounters:   02/10/22 66.7 kg (147 lb)   22 75.9 kg (167 lb 4.8 oz)   21 79.4 kg (175 lb)     Intake & Output (last 3 days)       02/09 0701  02/10 0700 02/10 0701  02/11 07 07 07 07    P.O.  135      I.V. (mL/kg)  1000  (15)      Other   603     NG/GT   1044     IV Piggyback    100    Total Intake(mL/kg)  1135 (17) 1647 (24.7) 100 (1.5)    Urine (mL/kg/hr)  400 200 (0.1)     Emesis/NG output  50 185     Drains  25 25     Total Output  475 410     Net  +660 +1237 +100            Urine Unmeasured Occurrence  2 x 6 x         No diet orders on file  ----------------------------------------------------------------------------------------------------------------------  Physical exam:  Constitutional:   Elderly gentleman no distress  HEENT: Normocephalic atraumatic  Neck: Supple   Cardiovascular: Regular rate and rhythm  Pulmonary/Chest: Clear to auscultation  Abdominal: Positive bowel sounds soft;.  Kamari drain is in place with small amount of fluid removed.  J-tube in place  Musculoskeletal: No arthropathy  Neurological: No focal deficits  Skin: No rash  Peripheral vascular:  Genitourinary:  ----------------------------------------------------------------------------------------------------------------------    Last echocardiogram:  Results for orders placed during the hospital encounter of 07/29/20    Adult Transthoracic Echo Complete W/ Cont if Necessary Per Protocol    Interpretation Summary  · Left ventricular wall thickness is consistent with mild concentric hypertrophy.  · Left ventricular systolic function is normal.  · Estimated EF appears to be in the range of 61 - 65%.  · Left ventricular diastolic dysfunction (grade I) consistent with impaired relaxation.  · Normal right ventricular cavity size and systolic function noted. Electronic lead present in the ventricle.  · Normal cardiac chambers dimensions.  · Mild aortic valve regurgitation is present.  · Mild mitral valve regurgitation is present  · Mild tricuspid valve regurgitation is present. No evidence of pulmonary hypertension is present.  · There is no evidence of pericardial  effusion.    ----------------------------------------------------------------------------------------------------------------------  Results from last 7 days   Lab Units 02/11/22  0109   WBC 10*3/mm3 10.20   HEMOGLOBIN g/dL 10.4*   HEMATOCRIT % 33.1*   MCV fL 87.3   MCHC g/dL 31.4*   PLATELETS 10*3/mm3 473*         Results from last 7 days   Lab Units 02/11/22  0109   SODIUM mmol/L 132*   POTASSIUM mmol/L 4.2   CHLORIDE mmol/L 96*   CO2 mmol/L 25.9   BUN mg/dL 16   CREATININE mg/dL 0.63*   EGFR IF NONAFRICN AM mL/min/1.73 124   CALCIUM mg/dL 7.6*   GLUCOSE mg/dL 176*   Estimated Creatinine Clearance: 75.3 mL/min (A) (by C-G formula based on SCr of 0.63 mg/dL (L)).  No results found for: AMMONIA              Glucose   Date/Time Value Ref Range Status   02/12/2022 0612 215 (H) 70 - 130 mg/dL Final     Comment:     Meter: [id number] : [id number] [employer]   02/11/2022 1655 223 (H) 70 - 130 mg/dL Final     Comment:     Meter: [id number] : [id number] Jane Simon   02/11/2022 1134 176 (H) 70 - 130 mg/dL Final     Comment:     Meter: [id number] : [id number] [employer]   02/11/2022 0600 171 (H) 70 - 130 mg/dL Final     Comment:     Meter: [id number] : [id number] [employer]   02/10/2022 2003 116 70 - 130 mg/dL Final     Comment:     Meter: [id number] : [id number] [employer]   02/10/2022 1658 157 (H) 70 - 130 mg/dL Final     Comment:     Meter: [id number] : [id number] [employer]     Lab Results   Component Value Date    TSH 1.620 06/27/2021     No results found for: PREGTESTUR, PREGSERUM, HCG, HCGQUANT  Pain Management Panel    There is no flowsheet data to display.       Brief Urine Lab Results  (Last result in the past 365 days)      Color   Clarity   Blood   Leuk Est   Nitrite   Protein   CREAT   Urine HCG        01/21/22 2149 Yellow   Clear   Negative   Negative   Negative   Trace               No results found for: BLOODCX      No results found for: URINECX  No results  found for: WOUNDCX  No results found for: STOOLCX        I have personally looked at the labs and they are summarized above.  ----------------------------------------------------------------------------------------------------------------------  Detailed radiology reports for the last 24 hours:    Imaging Results (Last 24 Hours)     ** No results found for the last 24 hours. **        Final impressions for the last 30 days of radiology reports:    CT Chest Without Contrast Diagnostic    Result Date: 1/22/2022  1.  Transmural perforation of the distal thoracic esophagus communicating with the left basilar pleural space. Orally administered water-soluble contrast fills the left basilar pleural space and is visible within the left side chest tube. 2.  The exact site for esophageal perforation is not demonstrated on this study. There is very little contrast within the esophagus, and only a faint amount of contrast along the left side of the distal thoracic esophagus. 3.  Distal paraesophageal pneumomediastinum. 4.  Small left hydropneumothorax. No right pneumothorax. 5.  Consolidation and volume loss involving basal segments left lower lobe. Right posterior basilar atelectasis. Small right pleural effusion. Signer Name: Celestino Herrera MD  Signed: 1/22/2022 6:54 PM  Workstation Name: [email or web address]-  [facility]    CT Chest With Contrast Diagnostic    Result Date: 1/21/2022  1. There is a small left pneumothorax but there is at least a moderate-sized left-sided pleural effusion and there is a small amount of pneumomediastinum with shift of mediastinal structures from left to right. A chest tube is being placed at time of this dictation as described below. 2. Probable right lung atelectasis. 3. No acute finding in the upper abdomen. Abdomen and pelvis CT dictated separately. NOTIFICATION: Critical Value/emergent results were called by telephone at the time of interpretation on 1/21/2022 8:55 PM  to ASTER Boudreaux who verbally acknowledged these results. Dr. Villafuerte was in the process of placing a chest tube in the patient at the time of my call. Signer Name: Nick Vines MD  Signed: 1/21/2022 8:59 PM  Workstation Name: [facility]  [facility]    CT Abdomen Pelvis With Contrast    Result Date: 1/21/2022  1. There is a left-sided effusion that is at least moderate in size. There is a small left pneumothorax and there is trace pneumomediastinum and subtle shift of mediastinal structures from left to right. Please see the separately dictated CT chest for further details. The emergency department is aware of these findings and a chest tube was in the process of being placed at the time of interpretation of the CT chest. 2. The stomach is distended with air fluid and debris and there are new postoperative changes of the stomach compared to the prior study. Correlate with any signs or symptoms of gastric outlet obstruction and the patient may benefit from NG tube placement. 3. No evidence of bowel obstruction or drainable fluid collection. 4. Normal appendix. 5. Diverticulosis. 6. Hepatic steatosis. 7. Tiny subcentimeter enhancing lesion in the spleen, favored to be benign but technically indeterminate. 8. Bilateral renal cysts. 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 Signer Name: iNck Vines MD  Signed: 1/21/2022 10:16 PM  Workstation Name: [facility]  [facility]    FL Esophagram Complete Single Contrast    Result Date: 1/22/2022  1.  Chest x-ray series with water-soluble oral contrast shows evidence of likely distal esophageal rupture with oral contrast appearing in the left basilar pleural space. Please see chest CT to follow. 2.  Left lower lobe consolidation and atelectasis. Mild infiltrate and volume loss in the right lung base. Small right pleural effusion. 3.  Left-sided hydropneumothorax with chest tube in place. Signer Name: Celestino  MD Sharon  Signed: 1/22/2022 6:46 PM  Workstation Name: [email or web address]  [facility]    XR Chest 1 View    Result Date: 1/22/2022  1. Left sided chest tube remains present along with a small left pneumothorax, stable to slightly larger when compared with the prior study but this may be related to technical factors. 2. Worsening appearance of the left lung base with increasing pleural fluid and consolidation. No right-sided pneumothorax. Signer Name: Nick Vines MD  Signed: 1/22/2022 5:28 PM  Workstation Name: [facility]  [facility]    XR Chest 1 View    Result Date: 1/21/2022  Interval placement of left chest tube. Persistent small left pneumothorax. Persistent airspace disease in the left chest. Signer Name: LIDIA GAY MD  Signed: 1/21/2022 9:32 PM  Workstation Name: [facility]    XR Chest 1 View    Addendum Date: 1/21/2022    //////////// ADDENDUM #1 //////////// ADDENDUM: Favor there is a small subtle left apical pneumothorax. This is confirmed by CT. Spoke directly to Dr. Scott Villafuerte in the ER at 8:36 PM 1/21/2022 to relay this finding. Signer Name: LIDIA GAY MD  Signed: 1/21/2022 8:38 PM  Workstation Name: [facility]////////////  ORIGINAL REPORT //////////// CR Chest 1 Vw INDICATION: Upper back pain COMPARISON:  6/27/2021 FINDINGS: Portable AP view(s) of the chest.  Normal cardiomediastinal contours. Dense consolidation in the left midlung especially the left base, most consistent with a dense lobar pneumonia. Small left pleural effusion.     Result Date: 1/21/2022  Dense consolidation in the left midlung especially the left base, most consistent with a dense lobar pneumonia. Small left pleural effusion. Signer Name: LIDIA GAY MD  Signed: 1/21/2022 8:10 PM  Workstation Name: [facility]  [facility]    US Venous Doppler Lower Extremity Bilateral  (duplex)    Result Date: 1/22/2022  No deep venous thrombus seen in either lower extremity. Signer Name: Nick Vines MD  Signed: 1/22/2022 3:56 PM  Workstation Name: [email or web address]-  [facility]    I have personally looked at the radiology images and read the final radiology report.    Assessment & Plan    Debility after recent esophageal rupture with Kamari drain in place and recent esophageal stent placement.  Surgeon recommended leaving Kamari tube in place until esophageal stent could be removed in 4 weeks.  Patient is on IV micafungin at this time.  Patient requiring contact-guard for bed mobility; minimum assistance for transfers; ambulated 80 feet with front wheel walker yesterday and contact-guard.  Patient requiring moderate assistance for up with bathing; minimum assistance for upper body dressing; moderate to maximum assistance for lower body dressing; set up for grooming; moderate to maximum assistance for toileting.    Nutrition continue J-tube feeds    Diabetes mellitus continue sliding scale coverage    Hypertension controlled    History of CAD stable    VTE Prophylaxis:   Mechanical Order History:     None      Pharmalogical Order History:      Ordered     Dose Route Frequency Stop    02/10/22 1558  enoxaparin (LOVENOX) syringe 40 mg         40 mg SC Every 24 Hours --                Elder Alexis MD  [facility]  02/12/22  10:31 EST

## 2022-02-12 NOTE — THERAPY TREATMENT NOTE
Inpatient Rehabilitation - Occupational Therapy Treatment Note     [place]     Patient Name: Sandro Argueta  : 1945  MRN: [id number]    Today's Date: 2022                 Admit Date: 2/10/2022       No diagnosis found.    Patient Active Problem List   Diagnosis   • Left elbow pain   • Hypertension   • DM2 (diabetes mellitus, type 2) (HCC)   • Elevated prostate specific antigen (PSA)   • Epigastric pain   • Epigastric abdominal pain   • Elevated liver enzymes   • Weight loss, abnormal   • Acute hepatitis   • Anemia   • Obstructive jaundice   • Pneumothorax, left   • Cytokine release syndrome, grade 1   • Esophageal rupture       Past Medical History:   Diagnosis Date   • Acute UTI (urinary tract infection) 2021   • Arthritis    • Borderline diabetes    • Coronary artery disease    • Diabetes mellitus (HCC)    • Elevated prostate specific antigen (PSA) 2020   • GERD (gastroesophageal reflux disease)    • Hypertension    • Left elbow pain        Past Surgical History:   Procedure Laterality Date   • COLONOSCOPY     • ENDOSCOPY N/A 2021    Procedure: ESOPHAGOGASTRODUODENOSCOPY WITH BIOPSY dilitation;  Surgeon: Bela Mcdaniel MD;  Location: [facility];  Service: Gastroenterology;  Laterality: N/A;   • ERCP N/A 2021    Procedure: ENDOSCOPIC RETROGRADE CHOLANGIOPANCREATOGRAPHY WITH STENT PLACEMENT;  Surgeon: Matthew Moreira MD;  Location: [facility] ENDOSCOPY;  Service: Gastroenterology;  Laterality: N/A;  with sphincterotomy, and brushing of common bile duct, and placement of [id number] wall stent     • PACEMAKER IMPLANTATION               IRF OT ASSESSMENT FLOWSHEET (last 12 hours)     IRF OT Evaluation and Treatment     Row Name 22 1423          OT Time and Intention    Document Type daily treatment  -HB     Mode of Treatment individual therapy; occupational therapy  -HB     Patient Effort good  -HB     Row Name 22 1423          General Information     Patient Profile Reviewed yes  -HB     Patient/Family/Caregiver Comments/Observations Patient and RN okd OT this date.  -HB     General Observations of Patient Patient tolerated OT well. Pt verbalzied not feeling as well today than he did yesterday.  -HB     Existing Precautions/Restrictions --  abd incision, g tube, mart drain; ice chips and popsicles only s  -HB     Limitations/Impairments --  see above  -     Row Name 02/12/22 1423          Cognition/Psychosocial    Affect/Mental Status (Cognitive) WFL  -     Orientation Status (Cognition) oriented x 3  -HB     Follows Commands (Cognition) WFL  -HB     Row Name 02/12/22 1423          Pain Scale: FACES Pre/Post-Treatment    Pain: FACES Scale, Pretreatment 0-->no hurt  -HB     Posttreatment Pain Rating 0-->no hurt  -     Row Name 02/12/22 1423          Bed Mobility    Supine-Sit [place] (Bed Mobility) moderate assist (50% patient effort); verbal cues; nonverbal cues (demo/gesture)  -     Sit-Supine [place] (Bed Mobility) verbal cues; minimum assist (75% patient effort)  -     Row Name 02/12/22 1423          Transfers    Bed-Chair [place] (Transfers) minimum assist (75% patient effort); verbal cues; nonverbal cues (demo/gesture)  -     Chair-Bed [place] (Transfers) verbal cues; nonverbal cues (demo/gesture); minimum assist (75% patient effort)  -     Sit-Stand [place] (Transfers) minimum assist (75% patient effort); verbal cues; nonverbal cues (demo/gesture)  -     Row Name 02/12/22 1423          Motor Skills    Motor Skills coordination; functional endurance; therapeutic exercise; motor control/coordination interventions  -     Functional Endurance PRE 2x 5 min ; rest between; wrist rolls; BUE TA to increase functional status  -     Motor Control/Coordination Interventions fine motor manipulation/dexterity activities; gross motor coordination activities; therapeutic exercise/ROM; occupation/activity based treatment  -      Therapeutic Exercise shoulder; elbow/forearm; wrist; hand  -     Row Name 02/12/22 1423          Grooming    [place] Level (Grooming) grooming skills; set up  -     Position (Grooming) supported sitting  -     Row Name 02/12/22 1423          Toileting    [place] Level (Toileting) toileting skills; minimum assist (75% patient effort); verbal cues  -     Position (Toileting) supported standing  -     Row Name 02/12/22 1423          Positioning and Restraints    Pre-Treatment Position in bed  -HB     Post Treatment Position bed  -HB     In Bed encouraged to call for assist; call light within reach  -           User Key  (r) = Recorded By, (t) = Taken By, (c) = Cosigned By    Initials Name Effective Dates     Radha Banuelos OT 05/25/21 -                  Occupational Therapy Education                 Title: PT OT SLP Therapies (Done)     Topic: Occupational Therapy (Done)     Point: ADL training (Done)     Description:   Instruct learner(s) on proper safety adaptation and remediation techniques during self care or transfers.   Instruct in proper use of assistive devices.              Learning Progress Summary           Patient Acceptance, E, VU,NR by  at 2/12/2022 1429    Acceptance, E,D, VU,NR by  at 2/11/2022 1532                   Point: Precautions (Done)     Description:   Instruct learner(s) on prescribed precautions during self-care and functional transfers.              Learning Progress Summary           Patient Acceptance, E, VU,NR by  at 2/12/2022 1429    Acceptance, E,D, VU,NR by  at 2/11/2022 1532                               User Key     Initials Effective Dates Name Provider Type Discipline     06/16/21 -  Elena Pisano OT Occupational Therapist OT     05/25/21 -  Radha Banuelos OT Occupational Therapist OT                    OT Recommendation and Plan                         Time Calculation:      Time Calculation- OT     Row Name 02/12/22 1429             Time  Calculation- OT    OT Start Time 1245  -HB      OT Stop Time 1415  -HB      OT Time Calculation (min) 90 min  -HB      Total Timed Code Minutes- OT 90 minute(s)  -HB      OT Non-Billable Time (min) 10 min  -HB            User Key  (r) = Recorded By, (t) = Taken By, (c) = Cosigned By    Initials Name Provider Type     Radha Banuelos OT Occupational Therapist              Therapy Charges for Today     Code Description Service Date Service Provider Modifiers Qty    73950170945 HC OT SELF CARE/MGMT/TRAIN EA 15 MIN 2/12/2022 Radha Banuelos OT GO 2    [id number]  OT THER PROC EA 15 MIN 2/12/2022 Radha Banuelos OT GO 1    [id number]  OT THERAPEUTIC ACT EA 15 MIN 2/12/2022 Radha Banuelos OT GO 3                   Radha Banuelos OT  2/12/2022

## 2022-02-12 NOTE — PROGRESS NOTES
Occupational Therapy:    Physical Therapy: Individual: 90 minutes.    Speech Language Pathology:    Signed by: Jerilyn Pal, Physical Therapist

## 2022-02-12 NOTE — THERAPY TREATMENT NOTE
Inpatient Rehabilitation - Physical Therapy Treatment Note       [facility]     Patient Name: Sandro Argueta  : 1945  MRN: [id number]    Today's Date: 2022                    Admit Date: 2/10/2022      Visit Dx:   No diagnosis found.    Patient Active Problem List   Diagnosis   • Left elbow pain   • Hypertension   • DM2 (diabetes mellitus, type 2) (HCC)   • Elevated prostate specific antigen (PSA)   • Epigastric pain   • Epigastric abdominal pain   • Elevated liver enzymes   • Weight loss, abnormal   • Acute hepatitis   • Anemia   • Obstructive jaundice   • Pneumothorax, left   • Cytokine release syndrome, grade 1   • Esophageal rupture       Past Medical History:   Diagnosis Date   • Acute UTI (urinary tract infection) 2021   • Arthritis    • Borderline diabetes    • Coronary artery disease    • Diabetes mellitus (HCC)    • Elevated prostate specific antigen (PSA) 2020   • GERD (gastroesophageal reflux disease)    • Hypertension    • Left elbow pain        Past Surgical History:   Procedure Laterality Date   • COLONOSCOPY     • ENDOSCOPY N/A 2021    Procedure: ESOPHAGOGASTRODUODENOSCOPY WITH BIOPSY dilitation;  Surgeon: Bela Mcdaniel MD;  Location: [facility];  Service: Gastroenterology;  Laterality: N/A;   • ERCP N/A 2021    Procedure: ENDOSCOPIC RETROGRADE CHOLANGIOPANCREATOGRAPHY WITH STENT PLACEMENT;  Surgeon: Matthew Moreira MD;  Location: [facility] ENDOSCOPY;  Service: Gastroenterology;  Laterality: N/A;  with sphincterotomy, and brushing of common bile duct, and placement of [id number] wall stent     • PACEMAKER IMPLANTATION         PT ASSESSMENT (last 12 hours)     IRF PT Evaluation and Treatment     Row Name 22 1151          PT Time and Intention    Document Type daily treatment  -LB     Mode of Treatment individual therapy; physical therapy  -LB     Row Name 22 1151          General Information    Existing Precautions/Restrictions fall  ABD  incision, G and J tubes, mart drain-all at left ABD  -LB     Row Name 02/12/22 1151          Cognition/Psychosocial    Affect/Mental Status (Cognitive) WFL  -LB     Follows Commands (Cognition) WFL  -LB     Personal Safety Interventions gait belt; nonskid shoes/slippers when out of bed; supervised activity  -LB     Row Name 02/12/22 1151          Pain Scale: FACES Pre/Post-Treatment    Pain: FACES Scale, Pretreatment 4-->hurts little more  -LB     Posttreatment Pain Rating 4-->hurts little more  -LB     Pain Location abdomen  -LB     Pre/Posttreatment Pain Comment rest, reposition  -LB     Row Name 02/12/22 1151          Bed Mobility    Supine-Sit [place] (Bed Mobility) moderate assist (50% patient effort); verbal cues; nonverbal cues (demo/gesture)  -LB     Sit-Supine [place] (Bed Mobility) verbal cues; nonverbal cues (demo/gesture); standby assist  -LB     Row Name 02/12/22 1151          Transfers    Bed-Chair [place] (Transfers) minimum assist (75% patient effort); verbal cues; nonverbal cues (demo/gesture)  -LB     Chair-Bed [place] (Transfers) verbal cues; nonverbal cues (demo/gesture); minimum assist (75% patient effort)  -LB     Assistive Device (Bed-Chair Transfers) wheelchair  -LB     Sit-Stand [place] (Transfers) minimum assist (75% patient effort); verbal cues; nonverbal cues (demo/gesture)  -LB     Stand-Sit [place] (Transfers) minimum assist (75% patient effort); verbal cues; nonverbal cues (demo/gesture)  -LB     Row Name 02/12/22 1151          Chair-Bed Transfer    Assistive Device (Chair-Bed Transfers) wheelchair  -LB     Row Name 02/12/22 1151          Sit-Stand Transfer    Assistive Device (Sit-Stand Transfers) walker, front-wheeled  -LB     Row Name 02/12/22 1151          Stand-Sit Transfer    Assistive Device (Stand-Sit Transfers) walker, front-wheeled  -LB     Row Name 02/12/22 1151          Gait/Stairs (Locomotion)    [place] Level (Gait) contact guard;  verbal cues; nonverbal cues (demo/gesture)  -LB     Assistive Device (Gait) walker, front-wheeled  -LB     Distance in Feet (Gait) 160'  -LB     Deviations/Abnormal Patterns (Gait) [place] decreased; gait speed decreased; stride length decreased  -LB     Bilateral Gait Deviations forward flexed posture  -LB     Row Name 02/12/22 1151          Balance    Comment, Balance bean bag toss and  with reacher 2x, sitting position  -LB     Row Name 02/12/22 1151          Motor Skills    Therapeutic Exercise --  sitting ex 2 sets, PB sit to stand 5x SBA  -LB     Row Name 02/12/22 1151          Aerobic Exercise    Type (Aerobic Exercise) recumbent stationary bike  -LB     Time Performed (Aerobic Exercise) level 1.0, 5 min, slow pace  -LB     Row Name 02/12/22 1151          IRF PT Goals    Bed Mobility Goal Selection (PT-IRF) bed mobility, PT goal 1  -LB     Transfer Goal Selection (PT-IRF) transfers, PT goal 1  -LB     Gait (Walking Locomotion) Goal Selection (PT-IRF) gait, PT goal 1  -LB     Row Name 02/12/22 1151          Bed Mobility Goal 1 (PT-IRF)    Activity/Assistive Device (Bed Mobility Goal 1, PT-IRF) sit to supine/supine to sit  -LB     [place] Level (Bed Mobility Goal 1, PT-IRF) supervision required  -LB     Time Frame (Bed Mobility Goal 1, PT-IRF) by discharge  -LB     Row Name 02/12/22 1151          Transfer Goal 1 (PT-IRF)    Activity/Assistive Device (Transfer Goal 1, PT-IRF) sit-to-stand/stand-to-sit; bed-to-chair/chair-to-bed  -LB     [place] Level (Transfer Goal 1, PT-IRF) supervision required  -LB     Time Frame (Transfer Goal 1, PT-IRF) by discharge  -LB     Row Name 02/12/22 1151          Gait/Walking Locomotion Goal 1 (PT-IRF)    Activity/Assistive Device (Gait/Walking Locomotion Goal 1, PT-IRF) walker, rolling  -LB     Gait/Walking Locomotion Distance Goal 1 (PT-IRF) 300'  -LB     [place] Level (Gait/Walking Locomotion Goal 1, PT-IRF) supervision required  -LB     Time Frame  (Gait/Walking Locomotion Goal 1, PT-IRF) by discharge  -LB     Row Name 02/12/22 1151          Positioning and Restraints    Pre-Treatment Position in bed  -LB     In Bed call light within reach; encouraged to call for assist  -LB     Row Name 02/12/22 1151          Therapy Assessment/Plan (PT)    Patient's Goals For Discharge return home  -LB     Row Name 02/12/22 1151          Therapy Assessment/Plan (PT)    Rehab Potential/Prognosis (PT) adequate, monitor progress closely  -LB     Frequency of Treatment (PT) 5 times per week  -LB     Estimated Duration of Therapy (PT) 2 weeks  -LB     Problem List (PT) balance; mobility; strength; pain; postural control  -LB     Activity Limitations Related to Problem List (PT) unable to ambulate safely; unable to transfer safely  -LB     Row Name 02/12/22 1151          Daily Progress Summary (PT)    Recommendations (PT) continue PT  -LB     Row Name 02/12/22 1151          Therapy Plan Review/Discharge Plan (PT)    Anticipated Equipment Needs at Discharge (PT Eval) --  tbd  -LB     Expected Discharge Disposition (PT Eval) home with home health care; home with caregiver  -LB           User Key  (r) = Recorded By, (t) = Taken By, (c) = Cosigned By    Initials Name Provider Type    LB Jerilyn Pal, PT Physical Therapist              Wound 02/10/22 1557 midline abdomen Incision (Active)   Dressing Appearance open to air 02/11/22 1930   Closure Staples; Approximated 02/11/22 1930   Base clean; dry; pink 02/11/22 1930   Drainage Amount none 02/11/22 1930   Care, Wound other (see comments) 02/11/22 1930   Dressing Care open to air 02/11/22 1930       Wound 02/10/22 1602 Left upper back Incision (Active)   Dressing Appearance open to air 02/11/22 1930   Closure Liquid skin adhesive; Approximated 02/11/22 1930   Base clean; dry; pink 02/11/22 1930   Drainage Amount none 02/11/22 1930   Care, Wound other (see comments) 02/11/22 1930   Dressing Care open to air 02/11/22 1930      Physical Therapy Education                 Title: PT OT SLP Therapies (Done)     Topic: Physical Therapy (Done)     Point: Mobility training (Done)     Learning Progress Summary           Patient Acceptance, E, VU,NR by LB at 2/12/2022 1155    Acceptance, E, VU,NR by LB at 2/11/2022 1543    Acceptance, E,TB, VU by DG at 2/10/2022 2255                   Point: Home exercise program (Done)     Learning Progress Summary           Patient Acceptance, E, VU,NR by LB at 2/12/2022 1155    Acceptance, E, VU,NR by LB at 2/11/2022 1543    Acceptance, E,TB, VU by DG at 2/10/2022 2255                   Point: Body mechanics (Done)     Learning Progress Summary           Patient Acceptance, E, VU,NR by LB at 2/12/2022 1155    Acceptance, E, VU,NR by LB at 2/11/2022 1543    Acceptance, E,TB, VU by DG at 2/10/2022 2255                   Point: Precautions (Done)     Learning Progress Summary           Patient Acceptance, E, VU,NR by LB at 2/12/2022 1155    Acceptance, E, VU,NR by LB at 2/11/2022 1543    Acceptance, E,TB, VU by DG at 2/10/2022 2255                               User Key     Initials Effective Dates Name Provider Type Discipline     06/16/21 -  Jessica Dumont RN Registered Nurse Nurse     06/16/21 -  Jerilyn Pal, PT Physical Therapist PT                PT Recommendation and Plan    Planned Therapy Interventions (PT): balance training, bed mobility training, gait training, patient/family education, strengthening, transfer training  Frequency of Treatment (PT): 5 times per week  Anticipated Equipment Needs at Discharge (PT Eval):  (tbd)  Daily Progress Summary (PT)  Recommendations (PT): continue PT               Time Calculation:      PT Charges     Row Name 02/12/22 1155             Time Calculation    Start Time 1000  -LB      Stop Time 1130  -LB      Time Calculation (min) 90 min  -LB      PT Received On 02/12/22  -LB            User Key  (r) = Recorded By, (t) = Taken By, (c) = Cosigned By     Initials Name Provider Type    Jerilyn Riggins, PT Physical Therapist                Therapy Charges for Today     Code Description Service Date Service Provider Modifiers Qty    55408023166 HC PT EVAL LOW COMPLEXITY 1 2/11/2022 Jerilyn Pal, PT GP 1    31029683889 HC GAIT TRAINING EA 15 MIN 2/11/2022 Jerilyn Pal, PT GP 1    57237055697 HC PT THER PROC EA 15 MIN 2/11/2022 Jerilyn Pal, PT GP 4    65119531112 HC GAIT TRAINING EA 15 MIN 2/12/2022 Jerilyn Pal, PT GP 1    31582391021 HC PT THER PROC EA 15 MIN 2/12/2022 Jerilyn Pal, PT GP 3    09221135680 HC PT THERAPEUTIC ACT EA 15 MIN 2/12/2022 Jerilyn Pal, PT GP 2                   Jerilyn Pal, PT  2/12/2022

## 2022-02-12 NOTE — PROGRESS NOTES
Rehabilitation Nursing  Inpatient Rehabilitation Plan of Care Note    Plan of Care  Copy from [place]    Pain Management (Active)  Current Status (2/10/2022 3:38:00 PM): Potential for pain  Weekly Goal: No pain this week  Discharge Goal: No pain    Safety    Potential for Injury (Active)  Current Status (2/10/2022 3:38:00 PM): At risk for injury  Weekly Goal: No injury this week  Discharge Goal: No injuries    Signed by: Dalia Rahseed, Nurse

## 2022-02-12 NOTE — PROGRESS NOTES
Occupational Therapy: Individual: 90 minutes.    Physical Therapy:    Speech Language Pathology:    Signed by: Radha Banuelos OT

## 2022-02-13 LAB
ANION GAP SERPL CALCULATED.3IONS-SCNC: 7.7 MMOL/L (ref 5–15)
BASOPHILS # BLD AUTO: 0.05 10*3/MM3 (ref 0–0.2)
BASOPHILS NFR BLD AUTO: 0.5 % (ref 0–1.5)
BUN SERPL-MCNC: 11 MG/DL (ref 8–23)
BUN/CREAT SERPL: 14.7 (ref 7–25)
CALCIUM SPEC-SCNC: 7.9 MG/DL (ref 8.6–10.5)
CHLORIDE SERPL-SCNC: 90 MMOL/L (ref 98–107)
CO2 SERPL-SCNC: 25.3 MMOL/L (ref 22–29)
CREAT SERPL-MCNC: 0.75 MG/DL (ref 0.76–1.27)
DEPRECATED RDW RBC AUTO: 44.9 FL (ref 37–54)
EOSINOPHIL # BLD AUTO: 0.57 10*3/MM3 (ref 0–0.4)
EOSINOPHIL NFR BLD AUTO: 5.9 % (ref 0.3–6.2)
ERYTHROCYTE [DISTWIDTH] IN BLOOD BY AUTOMATED COUNT: 14 % (ref 12.3–15.4)
GFR SERPL CREATININE-BSD FRML MDRD: 102 ML/MIN/1.73
GLUCOSE BLDC GLUCOMTR-MCNC: 174 MG/DL (ref 70–130)
GLUCOSE BLDC GLUCOMTR-MCNC: 177 MG/DL (ref 70–130)
GLUCOSE BLDC GLUCOMTR-MCNC: 287 MG/DL (ref 70–130)
GLUCOSE SERPL-MCNC: 291 MG/DL (ref 65–99)
HCT VFR BLD AUTO: 30.9 % (ref 37.5–51)
HGB BLD-MCNC: 9.6 G/DL (ref 13–17.7)
IMM GRANULOCYTES # BLD AUTO: 0.08 10*3/MM3 (ref 0–0.05)
IMM GRANULOCYTES NFR BLD AUTO: 0.8 % (ref 0–0.5)
LYMPHOCYTES # BLD AUTO: 1.66 10*3/MM3 (ref 0.7–3.1)
LYMPHOCYTES NFR BLD AUTO: 17.1 % (ref 19.6–45.3)
MCH RBC QN AUTO: 27.3 PG (ref 26.6–33)
MCHC RBC AUTO-ENTMCNC: 31.1 G/DL (ref 31.5–35.7)
MCV RBC AUTO: 87.8 FL (ref 79–97)
MONOCYTES # BLD AUTO: 1.12 10*3/MM3 (ref 0.1–0.9)
MONOCYTES NFR BLD AUTO: 11.5 % (ref 5–12)
NEUTROPHILS NFR BLD AUTO: 6.22 10*3/MM3 (ref 1.7–7)
NEUTROPHILS NFR BLD AUTO: 64.2 % (ref 42.7–76)
NRBC BLD AUTO-RTO: 0 /100 WBC (ref 0–0.2)
PLATELET # BLD AUTO: 353 10*3/MM3 (ref 140–450)
PMV BLD AUTO: 10.9 FL (ref 6–12)
POTASSIUM SERPL-SCNC: 4.5 MMOL/L (ref 3.5–5.2)
RBC # BLD AUTO: 3.52 10*6/MM3 (ref 4.14–5.8)
SODIUM SERPL-SCNC: 123 MMOL/L (ref 136–145)
WBC NRBC COR # BLD: 9.7 10*3/MM3 (ref 3.4–10.8)

## 2022-02-13 PROCEDURE — 94799 UNLISTED PULMONARY SVC/PX: CPT

## 2022-02-13 PROCEDURE — 25010000002 ENOXAPARIN PER 10 MG: Performed by: FAMILY MEDICINE

## 2022-02-13 PROCEDURE — 85025 COMPLETE CBC W/AUTO DIFF WBC: CPT | Performed by: FAMILY MEDICINE

## 2022-02-13 PROCEDURE — 25010000002 MICAFUNGIN SODIUM 100 MG RECONSTITUTED SOLUTION 1 EACH VIAL: Performed by: FAMILY MEDICINE

## 2022-02-13 PROCEDURE — 99231 SBSQ HOSP IP/OBS SF/LOW 25: CPT | Performed by: FAMILY MEDICINE

## 2022-02-13 PROCEDURE — 63710000001 INSULIN ASPART PER 5 UNITS: Performed by: FAMILY MEDICINE

## 2022-02-13 PROCEDURE — 82962 GLUCOSE BLOOD TEST: CPT

## 2022-02-13 PROCEDURE — 80048 BASIC METABOLIC PNL TOTAL CA: CPT | Performed by: FAMILY MEDICINE

## 2022-02-13 RX ADMIN — INSULIN ASPART 2 UNITS: 100 INJECTION, SOLUTION INTRAVENOUS; SUBCUTANEOUS at 17:18

## 2022-02-13 RX ADMIN — ENOXAPARIN SODIUM 40 MG: 40 INJECTION SUBCUTANEOUS at 17:16

## 2022-02-13 RX ADMIN — INSULIN ASPART 4 UNITS: 100 INJECTION, SOLUTION INTRAVENOUS; SUBCUTANEOUS at 09:33

## 2022-02-13 RX ADMIN — METOPROLOL TARTRATE 25 MG: 25 TABLET ORAL at 20:51

## 2022-02-13 RX ADMIN — SODIUM CHLORIDE 500 ML: 9 INJECTION, SOLUTION INTRAVENOUS at 11:31

## 2022-02-13 RX ADMIN — METOPROLOL TARTRATE 25 MG: 25 TABLET ORAL at 09:32

## 2022-02-13 RX ADMIN — PANTOPRAZOLE SODIUM 40 MG: 40 INJECTION, POWDER, FOR SOLUTION INTRAVENOUS at 05:35

## 2022-02-13 RX ADMIN — INSULIN ASPART 2 UNITS: 100 INJECTION, SOLUTION INTRAVENOUS; SUBCUTANEOUS at 11:34

## 2022-02-13 RX ADMIN — MICAFUNGIN SODIUM 100 MG: 100 INJECTION, POWDER, LYOPHILIZED, FOR SOLUTION INTRAVENOUS at 09:33

## 2022-02-13 NOTE — PROGRESS NOTES
Rehabilitation Nursing  Inpatient Rehabilitation Plan of Care Note    Plan of Care  Copy from [place]    Pain Management (Active)  Current Status (2/10/2022 3:38:00 PM): Potential for pain  Weekly Goal: No pain this week  Discharge Goal: No pain    Safety    Potential for Injury (Active)  Current Status (2/10/2022 3:38:00 PM): At risk for injury  Weekly Goal: No injury this week  Discharge Goal: No injuries    Signed by: Bonnie Hope Nurse

## 2022-02-13 NOTE — PROGRESS NOTES
[facility]  PROGRESS NOTE     Patient Identification:  Name:  Sandro Argueta  Age:  76 y.o.  Sex:  male  :  1945  MRN:  [id number]  Visit Number:  [id number]  ROOM: [facility]     Primary Care Provider:  Misti Nguyễn MD    Length of stay in inpatient status:  3    Subjective     Chief Compliant:  No chief complaint on file.      History of Presenting Illness: Patient 76-year-old gentleman history of hypertension, coronary disease, diabetes mellitus and GERD.  Patient had recent esophageal perforation with leakage of GI contents into the thoracic cavity.  Patient did require multiple chest tubes and was treated with broad-spectrum antibiotics as well as micafungin.  Patient did have repair of the esophagus and eventual esophageal stent placement because of continued leakage.  Patient has J-tube in place and also has a Kamari tube in place at this time.  Patient's sodium level was decreased this morning    Objective     Current Hospital Meds:enoxaparin, 40 mg, Subcutaneous, Q24H  insulin aspart, 0-7 Units, Subcutaneous, TID AC  metoprolol tartrate, 25 mg, Per J Tube, Q12H  pantoprazole, 40 mg, Intravenous, Q AM  sodium chloride, 500 mL, Intravenous, Once       ----------------------------------------------------------------------------------------------------------------------  Vital Signs:  Temp:  [98.3 °F (36.8 °C)] 98.3 °F (36.8 °C)  Heart Rate:  [80-92] 83  Resp:  [18] 18  BP: (132-156)/(62-72) 156/72  SpO2:  [91 %-93 %] 91 %  on   ;   Device (Oxygen Therapy): room air  Body mass index is 18.49 kg/m².    Wt Readings from Last 3 Encounters:   02/10/22 66.7 kg (147 lb)   22 75.9 kg (167 lb 4.8 oz)   21 79.4 kg (175 lb)     Intake & Output (last 3 days)       02/10 0701  02/11 07 07 07 07 07    P.O. 135  175     I.V. (mL/kg) 1000 (15)       Other  603 636     NG/GT  1044 860     IV Piggyback   100 100    Total  Intake(mL/kg) 1135 (17) 1647 (24.7) 1771 (26.6) 100 (1.5)    Urine (mL/kg/hr) 400 200 (0.1) 0 (0)     Emesis/NG output 50 185 52     Drains 25 25 30     Stool   0     Total Output 475 410 82     Net +660 +1237 +1689 +100            Urine Unmeasured Occurrence 2 x 6 x 3 x 1 x    Stool Unmeasured Occurrence   1 x         No diet orders on file  ----------------------------------------------------------------------------------------------------------------------  Physical exam:  Constitutional:   No acute distress  HEENT: Normocephalic atraumatic  Neck: Supple   Cardiovascular: Regular rate and rhythm  Pulmonary/Chest: Clear to auscultation; Kamari tube in place  Abdominal: Positive bowel sounds soft; G-tube and J-tube in place.     Musculoskeletal: No arthropathy  Neurological: No focal deficits  Skin: No rash  Peripheral vascular:  Genitourinary:  ----------------------------------------------------------------------------------------------------------------------    Last echocardiogram:  Results for orders placed during the hospital encounter of 07/29/20    Adult Transthoracic Echo Complete W/ Cont if Necessary Per Protocol    Interpretation Summary  · Left ventricular wall thickness is consistent with mild concentric hypertrophy.  · Left ventricular systolic function is normal.  · Estimated EF appears to be in the range of 61 - 65%.  · Left ventricular diastolic dysfunction (grade I) consistent with impaired relaxation.  · Normal right ventricular cavity size and systolic function noted. Electronic lead present in the ventricle.  · Normal cardiac chambers dimensions.  · Mild aortic valve regurgitation is present.  · Mild mitral valve regurgitation is present  · Mild tricuspid valve regurgitation is present. No evidence of pulmonary hypertension is present.  · There is no evidence of pericardial  effusion.    ----------------------------------------------------------------------------------------------------------------------  Results from last 7 days   Lab Units 02/13/22  0010 02/11/22  0109   WBC 10*3/mm3 9.70 10.20   HEMOGLOBIN g/dL 9.6* 10.4*   HEMATOCRIT % 30.9* 33.1*   MCV fL 87.8 87.3   MCHC g/dL 31.1* 31.4*   PLATELETS 10*3/mm3 353 473*         Results from last 7 days   Lab Units 02/13/22 0010 02/11/22  0109   SODIUM mmol/L 123* 132*   POTASSIUM mmol/L 4.5 4.2   CHLORIDE mmol/L 90* 96*   CO2 mmol/L 25.3 25.9   BUN mg/dL 11 16   CREATININE mg/dL 0.75* 0.63*   EGFR IF NONAFRICN AM mL/min/1.73 102 124   CALCIUM mg/dL 7.9* 7.6*   GLUCOSE mg/dL 291* 176*   Estimated Creatinine Clearance: 75.3 mL/min (A) (by C-G formula based on SCr of 0.75 mg/dL (L)).  No results found for: AMMONIA              Glucose   Date/Time Value Ref Range Status   02/13/2022 1058 177 (H) 70 - 130 mg/dL Final     Comment:     Meter: [id number] : [id number] Jane Simon   02/13/2022 0603 287 (H) 70 - 130 mg/dL Final     Comment:     Meter: [id number] : [id number] Herminia Nicole   02/12/2022 1623 175 (H) 70 - 130 mg/dL Final     Comment:     Meter: [id number] : [id number] DEE OSORIO   02/12/2022 1134 192 (H) 70 - 130 mg/dL Final     Comment:     Meter: [id number] : [id number] crystal art   02/12/2022 0612 215 (H) 70 - 130 mg/dL Final     Comment:     Meter: [id number] : [id number] Herminia Bonnie   02/11/2022 1655 223 (H) 70 - 130 mg/dL Final     Comment:     Meter: [id number] : [id number] Jane Simon   02/11/2022 1134 176 (H) 70 - 130 mg/dL Final     Comment:     Meter: [id number] : [id number] crystal art   02/11/2022 0600 171 (H) 70 - 130 mg/dL Final     Comment:     Meter: [id number] : [id number] [employer]     Lab Results   Component Value Date    TSH 1.620 06/27/2021     No results found for: PREGTESTUR, PREGSERUM, HCG, HCGQUANT  Pain Management Panel    There is no  flowsheet data to display.       Brief Urine Lab Results  (Last result in the past 365 days)      Color   Clarity   Blood   Leuk Est   Nitrite   Protein   CREAT   Urine HCG        01/21/22 2149 Yellow   Clear   Negative   Negative   Negative   Trace               No results found for: BLOODCX      No results found for: URINECX  No results found for: WOUNDCX  No results found for: STOOLCX        I have personally looked at the labs and they are summarized above.  ----------------------------------------------------------------------------------------------------------------------  Detailed radiology reports for the last 24 hours:    Imaging Results (Last 24 Hours)     ** No results found for the last 24 hours. **        Final impressions for the last 30 days of radiology reports:    CT Chest Without Contrast Diagnostic    Result Date: 1/22/2022  1.  Transmural perforation of the distal thoracic esophagus communicating with the left basilar pleural space. Orally administered water-soluble contrast fills the left basilar pleural space and is visible within the left side chest tube. 2.  The exact site for esophageal perforation is not demonstrated on this study. There is very little contrast within the esophagus, and only a faint amount of contrast along the left side of the distal thoracic esophagus. 3.  Distal paraesophageal pneumomediastinum. 4.  Small left hydropneumothorax. No right pneumothorax. 5.  Consolidation and volume loss involving basal segments left lower lobe. Right posterior basilar atelectasis. Small right pleural effusion. Signer Name: Celestino Herrera MD  Signed: 1/22/2022 6:54 PM  Workstation Name: [email or web address]  [facility]    CT Chest With Contrast Diagnostic    Result Date: 1/21/2022  1. There is a small left pneumothorax but there is at least a moderate-sized left-sided pleural effusion and there is a small amount of pneumomediastinum with shift of mediastinal  structures from left to right. A chest tube is being placed at time of this dictation as described below. 2. Probable right lung atelectasis. 3. No acute finding in the upper abdomen. Abdomen and pelvis CT dictated separately. NOTIFICATION: Critical Value/emergent results were called by telephone at the time of interpretation on 1/21/2022 8:55 PM to ASTER Boudreaux who verbally acknowledged these results. Dr. Villafuerte was in the process of placing a chest tube in the patient at the time of my call. Signer Name: Nick Vines MD  Signed: 1/21/2022 8:59 PM  Workstation Name: [email or web address]  [facility]    CT Abdomen Pelvis With Contrast    Result Date: 1/21/2022  1. There is a left-sided effusion that is at least moderate in size. There is a small left pneumothorax and there is trace pneumomediastinum and subtle shift of mediastinal structures from left to right. Please see the separately dictated CT chest for further details. The emergency department is aware of these findings and a chest tube was in the process of being placed at the time of interpretation of the CT chest. 2. The stomach is distended with air fluid and debris and there are new postoperative changes of the stomach compared to the prior study. Correlate with any signs or symptoms of gastric outlet obstruction and the patient may benefit from NG tube placement. 3. No evidence of bowel obstruction or drainable fluid collection. 4. Normal appendix. 5. Diverticulosis. 6. Hepatic steatosis. 7. Tiny subcentimeter enhancing lesion in the spleen, favored to be benign but technically indeterminate. 8. Bilateral renal cysts. 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 Signer Name: Nick Vines MD  Signed: 1/21/2022 10:16 PM  Workstation Name: [employer]  [facility]    FL Esophagram Complete Single Contrast    Result Date: 1/22/2022  1.  Chest x-ray series with water-soluble oral contrast shows evidence of  likely distal esophageal rupture with oral contrast appearing in the left basilar pleural space. Please see chest CT to follow. 2.  Left lower lobe consolidation and atelectasis. Mild infiltrate and volume loss in the right lung base. Small right pleural effusion. 3.  Left-sided hydropneumothorax with chest tube in place. Signer Name: Celestino Herrera MD  Signed: 1/22/2022 6:46 PM  Workstation Name: [facility]  [facility]    XR Chest 1 View    Result Date: 1/22/2022  1. Left sided chest tube remains present along with a small left pneumothorax, stable to slightly larger when compared with the prior study but this may be related to technical factors. 2. Worsening appearance of the left lung base with increasing pleural fluid and consolidation. No right-sided pneumothorax. Signer Name: Nick Vines MD  Signed: 1/22/2022 5:28 PM  Workstation Name: [facility]  [facility]    XR Chest 1 View    Result Date: 1/21/2022  Interval placement of left chest tube. Persistent small left pneumothorax. Persistent airspace disease in the left chest. Signer Name: LIDIA GAY MD  Signed: 1/21/2022 9:32 PM  Workstation Name: [facility]    XR Chest 1 View    Addendum Date: 1/21/2022    //////////// ADDENDUM #1 //////////// ADDENDUM: Favor there is a small subtle left apical pneumothorax. This is confirmed by CT. Spoke directly to Dr. Scott Villafuerte in the ER at 8:36 PM 1/21/2022 to relay this finding. Signer Name: LIDIA GAY MD  Signed: 1/21/2022 8:38 PM  Workstation Name: [facility]  [facility]////////////  ORIGINAL REPORT //////////// CR Chest 1 Vw INDICATION: Upper back pain COMPARISON:  6/27/2021 FINDINGS: Portable AP view(s) of the chest.  Normal cardiomediastinal contours. Dense consolidation in the left midlung especially the left base, most consistent with a dense lobar pneumonia. Small left pleural effusion.      Result Date: 1/21/2022  Dense consolidation in the left midlung especially the left base, most consistent with a dense lobar pneumonia. Small left pleural effusion. Signer Name: LIDIA GAY MD  Signed: 1/21/2022 8:10 PM  Workstation Name: [email or web address]  [facility] Venous Doppler Lower Extremity Bilateral (duplex)    Result Date: 1/22/2022  No deep venous thrombus seen in either lower extremity. Signer Name: Nick Vines MD  Signed: 1/22/2022 3:56 PM  Workstation [facility]: [facility]  [facility] [place]    I have personally looked at the radiology images and read the final radiology report.    Assessment & Plan    Debility after recent esophageal rupture with Kamari drain in place and recent esophageal stent placement.  Surgeon recommends leaving Kamari tube in place till esophageal stent can be removed in 4 weeks.  Patient is currently completing course of IV micafungin.  Patient receiving nutrition via J-tube at this time.  Patient requiring moderate assistance for for bed mobility; minimum assistance for transfers; requires minimum assist for toileting.  Set up for grooming.  Did work on motor skills yesterday to improve functional mobility and ADLs.  Ambulated 160 feet with front wheel walker and contact-guard yesterday.    Nutrition continue J-tube feeds    Hyponatremia--we will give patient bolus of normal saline today and will recheck tomorrow.    Diabetes mellitus continue sliding scale coverage    Hypertension controlled    History of CAD stable    VTE Prophylaxis:   Mechanical Order History:     None      Pharmalogical Order History:      Ordered     Dose Route Frequency Stop    02/10/22 6677  enoxaparin (LOVENOX) syringe 40 mg         40 mg SC Every 24 Hours --                    Elder Alexis MD  [facility] Hospitalist  02/13/22  11:28 EST

## 2022-02-13 NOTE — PLAN OF CARE
Goal Outcome Evaluation:  Plan of Care Reviewed With: patient        Progress: no change  Outcome Summary: CONTINUE POC

## 2022-02-13 NOTE — PROGRESS NOTES
Physical Medicine and Rehabilitation  Inpatient Rehabilitation Interdisciplinary Plan of Care    Demographics            Age: 76Y            Gender: Male    Admission Date: 2/10/2022 3:34:00 PM  Rehabilitation Diagnosis:  debility    Plan of Care  Anticipated Discharge Date/Estimated Length of Stay: 14 days  Anticipated Discharge Destination: Community discharge with assistance  Discharge Plan : Pt plans to return home with spouse at discharge.  Spouse,  children and son-in-law are supportive and will assist pt with needs.  Medical Necessity Expected Level Rationale: good  Intensity and Duration: an average of 3 hours/5 days per week  Medical Supervision and 24 Hour Rehab Nursing: x  Physical Therapy: x  PT Intensity/Duration: PT 1.5 hours per day/5 days per week  Occupational Therapy: x  OT Intensity/Duration: OT 1.5 hours per day/5 days per week  Social Work: x  Therapeutic Recreation: x  Updated (if changes indicated)  No changes to plan.    Based on the patient's medical and functional status, their prognosis and  expected level of functional improvement is: good    Interdisciplinary Problem/Goals/Status  Copy from POCMobility    [PT] Bed/Chair/Wheelchair (Active)  Current Status (2/11/2022 12:00:00 AM): min A  Weekly Goal: Sup  Discharge Goal: Sup    [PT] Walk (Active)  Current Status (2/11/2022 12:00:00 AM): amb 80' RW CGA  Weekly Goal: amb 300' RW Sup  Discharge Goal: amb 300' RW Sup    Self Care    [OT] Dressing (Lower) (Active)  Current Status (2/11/2022 8:00:00 AM): Mod/MaxA  Weekly Goal: ModA  Discharge Goal: Jonny    Pain    [RN] Pain Management (Active)  Current Status (2/10/2022 3:38:00 PM): Potential for pain  Weekly Goal: No pain this week  Discharge Goal: No pain    Safety    [RN] Potential for Injury (Active)  Current Status (2/10/2022 3:38:00 PM): At risk for injury  Weekly Goal: No injury this week  Discharge Goal: No injuries    Medical Problems    Comments:    Signed by: Elder Alexis, Physician

## 2022-02-14 LAB
ANION GAP SERPL CALCULATED.3IONS-SCNC: 8.7 MMOL/L (ref 5–15)
BASOPHILS # BLD AUTO: 0.05 10*3/MM3 (ref 0–0.2)
BASOPHILS NFR BLD AUTO: 0.5 % (ref 0–1.5)
BUN SERPL-MCNC: 9 MG/DL (ref 8–23)
BUN/CREAT SERPL: 14.3 (ref 7–25)
CALCIUM SPEC-SCNC: 7.8 MG/DL (ref 8.6–10.5)
CHLORIDE SERPL-SCNC: 94 MMOL/L (ref 98–107)
CO2 SERPL-SCNC: 24.3 MMOL/L (ref 22–29)
CREAT SERPL-MCNC: 0.63 MG/DL (ref 0.76–1.27)
DEPRECATED RDW RBC AUTO: 43.7 FL (ref 37–54)
EOSINOPHIL # BLD AUTO: 0.53 10*3/MM3 (ref 0–0.4)
EOSINOPHIL NFR BLD AUTO: 4.9 % (ref 0.3–6.2)
ERYTHROCYTE [DISTWIDTH] IN BLOOD BY AUTOMATED COUNT: 13.8 % (ref 12.3–15.4)
GFR SERPL CREATININE-BSD FRML MDRD: 124 ML/MIN/1.73
GLUCOSE BLDC GLUCOMTR-MCNC: 163 MG/DL (ref 70–130)
GLUCOSE BLDC GLUCOMTR-MCNC: 203 MG/DL (ref 70–130)
GLUCOSE BLDC GLUCOMTR-MCNC: 215 MG/DL (ref 70–130)
GLUCOSE SERPL-MCNC: 281 MG/DL (ref 65–99)
HCT VFR BLD AUTO: 31.3 % (ref 37.5–51)
HGB BLD-MCNC: 9.9 G/DL (ref 13–17.7)
IMM GRANULOCYTES # BLD AUTO: 0.06 10*3/MM3 (ref 0–0.05)
IMM GRANULOCYTES NFR BLD AUTO: 0.6 % (ref 0–0.5)
LYMPHOCYTES # BLD AUTO: 1.56 10*3/MM3 (ref 0.7–3.1)
LYMPHOCYTES NFR BLD AUTO: 14.4 % (ref 19.6–45.3)
MCH RBC QN AUTO: 27.4 PG (ref 26.6–33)
MCHC RBC AUTO-ENTMCNC: 31.6 G/DL (ref 31.5–35.7)
MCV RBC AUTO: 86.7 FL (ref 79–97)
MONOCYTES # BLD AUTO: 1.09 10*3/MM3 (ref 0.1–0.9)
MONOCYTES NFR BLD AUTO: 10 % (ref 5–12)
NEUTROPHILS NFR BLD AUTO: 69.6 % (ref 42.7–76)
NEUTROPHILS NFR BLD AUTO: 7.57 10*3/MM3 (ref 1.7–7)
NRBC BLD AUTO-RTO: 0 /100 WBC (ref 0–0.2)
PLATELET # BLD AUTO: 442 10*3/MM3 (ref 140–450)
PMV BLD AUTO: 9.5 FL (ref 6–12)
POTASSIUM SERPL-SCNC: 4.8 MMOL/L (ref 3.5–5.2)
RBC # BLD AUTO: 3.61 10*6/MM3 (ref 4.14–5.8)
SODIUM SERPL-SCNC: 127 MMOL/L (ref 136–145)
WBC NRBC COR # BLD: 10.86 10*3/MM3 (ref 3.4–10.8)

## 2022-02-14 PROCEDURE — 63710000001 INSULIN ASPART PER 5 UNITS: Performed by: FAMILY MEDICINE

## 2022-02-14 PROCEDURE — 97110 THERAPEUTIC EXERCISES: CPT

## 2022-02-14 PROCEDURE — 25010000002 ENOXAPARIN PER 10 MG: Performed by: FAMILY MEDICINE

## 2022-02-14 PROCEDURE — 82962 GLUCOSE BLOOD TEST: CPT

## 2022-02-14 PROCEDURE — 80048 BASIC METABOLIC PNL TOTAL CA: CPT | Performed by: FAMILY MEDICINE

## 2022-02-14 PROCEDURE — 97535 SELF CARE MNGMENT TRAINING: CPT

## 2022-02-14 PROCEDURE — 94799 UNLISTED PULMONARY SVC/PX: CPT

## 2022-02-14 PROCEDURE — 97116 GAIT TRAINING THERAPY: CPT

## 2022-02-14 PROCEDURE — 85025 COMPLETE CBC W/AUTO DIFF WBC: CPT | Performed by: FAMILY MEDICINE

## 2022-02-14 PROCEDURE — 97530 THERAPEUTIC ACTIVITIES: CPT

## 2022-02-14 RX ORDER — DIPHENHYDRAMINE HYDROCHLORIDE AND LIDOCAINE HYDROCHLORIDE AND ALUMINUM HYDROXIDE AND MAGNESIUM HYDRO
10 KIT EVERY 6 HOURS
Status: DISPENSED | OUTPATIENT
Start: 2022-02-14 | End: 2022-02-21

## 2022-02-14 RX ORDER — FLUOCINONIDE 0.5 MG/G
CREAM TOPICAL EVERY 12 HOURS SCHEDULED
Status: DISCONTINUED | OUTPATIENT
Start: 2022-02-14 | End: 2022-02-26 | Stop reason: HOSPADM

## 2022-02-14 RX ADMIN — OXYCODONE 5 MG: 5 TABLET ORAL at 20:47

## 2022-02-14 RX ADMIN — DIPHENHYDRAMINE HYDROCHLORIDE AND LIDOCAINE HYDROCHLORIDE AND ALUMINUM HYDROXIDE AND MAGNESIUM HYDRO 10 ML: KIT at 15:01

## 2022-02-14 RX ADMIN — BETAMETHASONE DIPROPIONATE 1 APPLICATION: 0.5 CREAM TOPICAL at 17:39

## 2022-02-14 RX ADMIN — PANTOPRAZOLE SODIUM 40 MG: 40 INJECTION, POWDER, FOR SOLUTION INTRAVENOUS at 06:06

## 2022-02-14 RX ADMIN — INSULIN ASPART 3 UNITS: 100 INJECTION, SOLUTION INTRAVENOUS; SUBCUTANEOUS at 08:25

## 2022-02-14 RX ADMIN — INSULIN ASPART 2 UNITS: 100 INJECTION, SOLUTION INTRAVENOUS; SUBCUTANEOUS at 17:53

## 2022-02-14 RX ADMIN — BETAMETHASONE DIPROPIONATE: 0.5 CREAM TOPICAL at 20:58

## 2022-02-14 RX ADMIN — OXYCODONE 5 MG: 5 TABLET ORAL at 06:14

## 2022-02-14 RX ADMIN — METOPROLOL TARTRATE 25 MG: 25 TABLET ORAL at 08:25

## 2022-02-14 RX ADMIN — ENOXAPARIN SODIUM 40 MG: 40 INJECTION SUBCUTANEOUS at 17:38

## 2022-02-14 RX ADMIN — DIPHENHYDRAMINE HYDROCHLORIDE AND LIDOCAINE HYDROCHLORIDE AND ALUMINUM HYDROXIDE AND MAGNESIUM HYDRO 10 ML: KIT at 09:40

## 2022-02-14 RX ADMIN — DIPHENHYDRAMINE HYDROCHLORIDE AND LIDOCAINE HYDROCHLORIDE AND ALUMINUM HYDROXIDE AND MAGNESIUM HYDRO 10 ML: KIT at 20:48

## 2022-02-14 RX ADMIN — METOPROLOL TARTRATE 25 MG: 25 TABLET ORAL at 20:46

## 2022-02-14 RX ADMIN — INSULIN ASPART 3 UNITS: 100 INJECTION, SOLUTION INTRAVENOUS; SUBCUTANEOUS at 12:11

## 2022-02-14 NOTE — PROGRESS NOTES
PPS CMG Coordinator  Inpatient Rehabilitation Admission    Ethnic Group: White.  Marital Status:  Marital Status: .    IRF Admission Date:  02/10/2022  Admission Class: Initial Rehab.  Admit From:  [facility]    Pre-Hospital Living: Home. Pre-Hospital Living  With: (2) Family/Relatives.    Payment Sources: Primary: Medicare Fee for Service  Secondary: Not Listed.  Impairment Group: 16 Debility (non-cardiac, non-pulmonary)  Date of Onset of Impairment: 01/23/2022    Etiologic Diagnosis Code(s):  Rank Code      Description  1    K22.3     Perforation of esophagus    Comorbidities:      Height on Admission: 75 inches.  Weight on Admission: 147 pounds.    Are there any arthritis conditions recorded for Impairment Group, Etiologic  Diagnosis, or Comorbid Conditions that meet all of the regulatory requirements  for IRF classification (in 42 .29(b)(2)(x), (xi), and xii))?    MAGAN Bladder Accidents:  0 - Accidents.  Bladder Score = 5.  One (1) bladder accident.  MAGAN Bowel Accident: 0 -Accidents.  Bowel Score = 7. Patient has no accidents.    Signed by: Melina Mccabe, Supervisor

## 2022-02-14 NOTE — THERAPY TREATMENT NOTE
Inpatient Rehabilitation - Occupational Therapy Treatment Note    [facility]     Patient Name: Sandro Argueta  : 1945  MRN: [id number]    Today's Date: 2022                 Admit Date: 2/10/2022       No diagnosis found.    Patient Active Problem List   Diagnosis   • Left elbow pain   • Hypertension   • DM2 (diabetes mellitus, type 2) (HCC)   • Elevated prostate specific antigen (PSA)   • Epigastric pain   • Epigastric abdominal pain   • Elevated liver enzymes   • Weight loss, abnormal   • Acute hepatitis   • Anemia   • Obstructive jaundice   • Pneumothorax, left   • Cytokine release syndrome, grade 1   • Esophageal rupture       Past Medical History:   Diagnosis Date   • Acute UTI (urinary tract infection) 2021   • Arthritis    • Borderline diabetes    • Coronary artery disease    • Diabetes mellitus (HCC)    • Elevated prostate specific antigen (PSA) 2020   • GERD (gastroesophageal reflux disease)    • Hypertension    • Left elbow pain        Past Surgical History:   Procedure Laterality Date   • COLONOSCOPY     • ENDOSCOPY N/A 2021    Procedure: ESOPHAGOGASTRODUODENOSCOPY WITH BIOPSY dilitation;  Surgeon: Bela Mcdaniel MD;  Location: [facility];  Service: Gastroenterology;  Laterality: N/A;   • ERCP N/A 2021    Procedure: ENDOSCOPIC RETROGRADE CHOLANGIOPANCREATOGRAPHY WITH STENT PLACEMENT;  Surgeon: Matthew Moreira MD;  Location: [facility] ENDOSCOPY;  Service: Gastroenterology;  Laterality: N/A;  with sphincterotomy, and brushing of common bile duct, and placement of [id number] wall stent     • PACEMAKER IMPLANTATION               IRF OT ASSESSMENT FLOWSHEET (last 12 hours)     IRF OT Evaluation and Treatment     Row Name 22 1448          OT Time and Intention    Document Type daily treatment  -CJ     Mode of Treatment occupational therapy  -CJ     Symptoms Noted During/After Treatment none  -CJ     Row Name 22 1448          General Information     Patient/Family/Caregiver Comments/Observations agreeable to therapy  -     Existing Precautions/Restrictions fall  Kamari drain;  G and J tubes, decreased skin integrity  -     Row Name 02/14/22 1448          Transfers    Bed-Chair [place] (Transfers) minimum assist (75% patient effort); verbal cues  -     Assistive Device (Bed-Chair Transfers) wheelchair  -     Row Name 02/14/22 1448          Motor Skills    Motor Skills functional endurance  -     Motor Control/Coordination Interventions therapeutic exercise/ROM; gross motor coordination activities  BUE ther ex/act, AROM, bilat coord ex, hand exerciser  -     Therapeutic Exercise --  dowel ex- 15 reps X 2, 0 lbs  -     Row Name 02/14/22 1448          Grooming    Preston Level (Grooming) set up  -     Row Name 02/14/22 1448          Positioning and Restraints    Post Treatment Position bed  -     In Bed call light within reach; encouraged to call for assist; notified nsg  pm tx;  seen at EOB in pm  -     In Wheelchair with PT  am tx  -           User Key  (r) = Recorded By, (t) = Taken By, (c) = Cosigned By    Initials Name Effective Dates     Elena Pisano, OT 06/16/21 -                  Occupational Therapy Education                 Title: PT OT SLP Therapies (Done)     Topic: Occupational Therapy (Done)     Point: ADL training (Done)     Description:   Instruct learner(s) on proper safety adaptation and remediation techniques during self care or transfers.   Instruct in proper use of assistive devices.              Learning Progress Summary           Patient Acceptance, E,D, VU,NR by  at 2/14/2022 1454      Show all documentation for this point (2)                 Point: Precautions (Done)     Description:   Instruct learner(s) on prescribed precautions during self-care and functional transfers.              Learning Progress Summary           Patient Acceptance, E,D, VU,NR by  at 2/14/2022 1454      Show all documentation  for this point (2)                             User Key     Initials Effective Dates Name Provider Type Discipline     06/16/21 -  Elena Pisano OT Occupational Therapist OT                    OT Recommendation and Plan    Planned Therapy Interventions (OT): activity tolerance training, adaptive equipment training, BADL retraining, ROM/therapeutic exercise, strengthening exercise, transfer/mobility retraining, patient/caregiver education/training                    Time Calculation:      Time Calculation- OT     Row Name 02/14/22 1454 02/14/22 1453          Time Calculation- OT    OT Start Time 1320  - 0800  -     OT Stop Time 1335  - 0915  -     OT Time Calculation (min) 15 min  - 75 min  -     Total Timed Code Minutes- OT 15 minute(s)  - 75 minute(s)  -           User Key  (r) = Recorded By, (t) = Taken By, (c) = Cosigned By    Initials Name Provider Type     Elena Pisano OT Occupational Therapist              Therapy Charges for Today     Code Description Service Date Service Provider Modifiers Qty    38056351112 HC OT SELF CARE/MGMT/TRAIN EA 15 MIN 2/14/2022 Elena Pisano OT GO 1    24086660615 HC OT THER PROC EA 15 MIN 2/14/2022 Elena Pisano OT GO 3    26559233676 HC OT THERAPEUTIC ACT EA 15 MIN 2/14/2022 Elena Pisano OT GO 2                   Elena Pisano OT  2/14/2022

## 2022-02-14 NOTE — PROGRESS NOTES
Inpatient Rehabilitation Functional Measures Assessment and Plan of Care    Plan of Care  Self Care    [OT] Dressing (Lower)(Active)  Current Status(02/11/2022): Mod/MaxA  Weekly Goal(02/22/2022): ModA  Discharge Goal: Jonny    Performed Intervention(s)  ADL re-trng, AE trng, ther ex/act, education    Functional Measures  MAGAN Eating:  MAGAN Grooming:  MAGAN Bathing:  MAGAN Upper Body Dressing:  MAGAN Lower Body Dressing:  MAGAN Toileting:    MAGAN Bladder Management  Level of Assistance:  Frequency/Number of Accidents this Shift:    MAGAN Bowel Management  Level of Assistance:  Frequency/Number of Accidents this Shift:    MAGAN Bed/Chair/Wheelchair Transfer:  MAGAN Toilet Transfer:  MAGAN Tub/Shower Transfer:    Previously Documented Mode of Locomotion at Discharge:  [facility] Expected Mode of Locomotion at Discharge:  [facility] Walk/Wheelchair:  [facility] Stairs:    [facility] Comprehension:  [facility] Expression:  MAGAN Social Interaction:  [facility] Problem Solving:  MAGAN Memory:    Therapy Mode Minutes  Occupational Therapy: Individual: 90 minutes.  Physical Therapy:  Speech Language Pathology:    Discharge Functional Goals:    Signed by: Elena Pisano Occupational Therapist

## 2022-02-14 NOTE — PROGRESS NOTES
Occupational Therapy:    Physical Therapy: Individual: 90 minutes.    Speech Language Pathology:    Signed by: Tanya Waller PTA

## 2022-02-14 NOTE — PROGRESS NOTES
Rehabilitation Nursing  Inpatient Rehabilitation Plan of Care Note    Plan of Care  Copy from [place]    Pain Management (Active)  Current Status (2/10/2022 3:38:00 PM): Potential for pain  Weekly Goal: No pain this week  Discharge Goal: No pain    Safety    Potential for Injury (Active)  Current Status (2/10/2022 3:38:00 PM): At risk for injury  Weekly Goal: No injury this week  Discharge Goal: No injuries    Signed by: Belen Waite, Nurse

## 2022-02-14 NOTE — PROGRESS NOTES
[facility]  PROGRESS NOTE     Patient Identification:  Name:  Sandro Argueta  Age:  76 y.o.  Sex:  male  :  1945  MRN:  [id number]  Visit Number:  [id number]  ROOM: [facility]     Primary Care Provider:  Misti Nguyễn MD    Length of stay in inpatient status:  4    Subjective     Chief Compliant: Debility    History of Presenting Illness: Patient 76-year-old gentleman history of hypertension, coronary disease, diabetes mellitus and GERD.  Patient had recent esophageal perforation with leakage of GI contents into the thoracic cavity.  Patient did require multiple chest tubes and was treated with broad-spectrum antibiotics as well as micafungin.  Patient did have repair of the esophagus and eventual esophageal stent placement because of continued leakage.  Patient has J-tube in place and also has a Kamari tube in place at this time.       Participated with physical and Occupational Therapy on 2022: Performs bed mobility with moderate assist and verbal/nonverbal cues; perform transfer activities with minimal assist verbal/nonverbal cues; utilized wheelchair/rolled walker for chair to bed, sit to stand/sit transfer; ambulated 160 feet with front wheeled walker and contact-guard assist with verbal/nonverbal cues; required set up assist for grooming activity; minimal assist with verbal cues for toileting        Objective     Current Hospital Meds:enoxaparin, 40 mg, Subcutaneous, Q24H  First Mouthwash (Magic Mouthwash), 10 mL, Swish & Spit, Q6H  insulin aspart, 0-7 Units, Subcutaneous, TID AC  metoprolol tartrate, 25 mg, Per J Tube, Q12H  pantoprazole, 40 mg, Intravenous, Q AM       ----------------------------------------------------------------------------------------------------------------------  Vital Signs:  Temp:  [97.6 °F (36.4 °C)-98.2 °F (36.8 °C)] 97.6 °F (36.4 °C)  Heart Rate:  [85-87] 85  Resp:  [16-18] 16  BP: (129-144)/(74-76) 129/76  SpO2:  [92 %-95 %] 94 %  on   ;   Device  (Oxygen Therapy): room air  Body mass index is 18.49 kg/m².    Wt Readings from Last 3 Encounters:   02/10/22 66.7 kg (147 lb)   01/22/22 75.9 kg (167 lb 4.8 oz)   12/29/21 79.4 kg (175 lb)     Intake & Output (last 3 days)       02/11 0701  02/12 0700 02/12 0701 02/13 0700 02/13 0701 02/14 0700 02/14 0701  02/15 0700    P.O.  175 75     I.V. (mL/kg)        Other 603 636 341     NG/GT 1044 860 724     IV Piggyback  100 600     Total Intake(mL/kg) 1647 (24.7) 1771 (26.6) 1740 (26.1)     Urine (mL/kg/hr) 200 (0.1) 0 (0) 375 (0.2)     Emesis/NG output 185 52 2     Drains 25 30 105     Stool  0 0     Total Output 410 82 482     Net +1237 +1689 +1258             Urine Unmeasured Occurrence 6 x 3 x 5 x     Stool Unmeasured Occurrence  1 x 1 x         No diet orders on file  ----------------------------------------------------------------------------------------------------------------------  Physical exam:  Constitutional:   No acute distress, comfortable and awake.  HEENT: Normocephalic atraumatic  Neck: Supple   Cardiovascular: Regular rate and rhythm  Pulmonary/Chest: Clear to auscultation; Kamari tube in place  Abdominal: Positive bowel sounds soft; G-tube and J-tube in place.     Musculoskeletal: No arthropathy  Neurological: No focal deficits  Skin: No rash    ----------------------------------------------------------------------------------------------------------------------    Last echocardiogram:  Results for orders placed during the hospital encounter of 07/29/20    Adult Transthoracic Echo Complete W/ Cont if Necessary Per Protocol    Interpretation Summary  · Left ventricular wall thickness is consistent with mild concentric hypertrophy.  · Left ventricular systolic function is normal.  · Estimated EF appears to be in the range of 61 - 65%.  · Left ventricular diastolic dysfunction (grade I) consistent with impaired relaxation.  · Normal right ventricular cavity size and systolic function noted. Electronic  lead present in the ventricle.  · Normal cardiac chambers dimensions.  · Mild aortic valve regurgitation is present.  · Mild mitral valve regurgitation is present  · Mild tricuspid valve regurgitation is present. No evidence of pulmonary hypertension is present.  · There is no evidence of pericardial effusion.    ----------------------------------------------------------------------------------------------------------------------  Results from last 7 days   Lab Units 02/14/22 0119 02/13/22 0010 02/11/22 0109   WBC 10*3/mm3 10.86* 9.70 10.20   HEMOGLOBIN g/dL 9.9* 9.6* 10.4*   HEMATOCRIT % 31.3* 30.9* 33.1*   MCV fL 86.7 87.8 87.3   MCHC g/dL 31.6 31.1* 31.4*   PLATELETS 10*3/mm3 442 353 473*         Results from last 7 days   Lab Units 02/14/22  0119 02/13/22 0010 02/11/22  0109   SODIUM mmol/L 127* 123* 132*   POTASSIUM mmol/L 4.8 4.5 4.2   CHLORIDE mmol/L 94* 90* 96*   CO2 mmol/L 24.3 25.3 25.9   BUN mg/dL 9 11 16   CREATININE mg/dL 0.63* 0.75* 0.63*   EGFR IF NONAFRICN AM mL/min/1.73 124 102 124   CALCIUM mg/dL 7.8* 7.9* 7.6*   GLUCOSE mg/dL 281* 291* 176*   Estimated Creatinine Clearance: 75.3 mL/min (A) (by C-G formula based on SCr of 0.63 mg/dL (L)).  No results found for: AMMONIA              Glucose   Date/Time Value Ref Range Status   02/14/2022 0640 215 (H) 70 - 130 mg/dL Final     Comment:     Meter: [id number] : [id number] Dionte Pierce   02/13/2022 1615 174 (H) 70 - 130 mg/dL Final     Comment:     Meter: [id number] : [id number] Lara Debra   02/13/2022 1058 177 (H) 70 - 130 mg/dL Final     Comment:     Meter: [id number] : [id number] Jane Simon   02/13/2022 0603 287 (H) 70 - 130 mg/dL Final     Comment:     Meter: [id number] : [id number] Herminia Nicole   02/12/2022 1623 175 (H) 70 - 130 mg/dL Final     Comment:     Meter: [id number] : [id number] DEE OSORIO   02/12/2022 1134 192 (H) 70 - 130 mg/dL Final     Comment:     Meter: [id number] : [id number] marco a  art   02/12/2022 0612 215 (H) 70 - 130 mg/dL Final     Comment:     Meter: [id number] : [id number] Herminia Nicole   02/11/2022 1655 223 (H) 70 - 130 mg/dL Final     Comment:     Meter: [id number] : [id number] Jane Simon     Lab Results   Component Value Date    TSH 1.620 06/27/2021     No results found for: PREGTESTUR, PREGSERUM, HCG, HCGQUANT  Pain Management Panel    There is no flowsheet data to display.       Brief Urine Lab Results  (Last result in the past 365 days)      Color   Clarity   Blood   Leuk Est   Nitrite   Protein   CREAT   Urine HCG        01/21/22 2149 Yellow   Clear   Negative   Negative   Negative   Trace               No results found for: BLOODCX      No results found for: URINECX  No results found for: WOUNDCX  No results found for: STOOLCX        I have personally looked at the labs and they are summarized above.  ----------------------------------------------------------------------------------------------------------------------  Detailed radiology reports for the last 24 hours:    Imaging Results (Last 24 Hours)     ** No results found for the last 24 hours. **        Final impressions for the last 30 days of radiology reports:    CT Chest Without Contrast Diagnostic    Result Date: 1/22/2022  1.  Transmural perforation of the distal thoracic esophagus communicating with the left basilar pleural space. Orally administered water-soluble contrast fills the left basilar pleural space and is visible within the left side chest tube. 2.  The exact site for esophageal perforation is not demonstrated on this study. There is very little contrast within the esophagus, and only a faint amount of contrast along the left side of the distal thoracic esophagus. 3.  Distal paraesophageal pneumomediastinum. 4.  Small left hydropneumothorax. No right pneumothorax. 5.  Consolidation and volume loss involving basal segments left lower lobe. Right posterior basilar atelectasis. Small right  pleural effusion. Signer Name: Celestino Herrera MD  Signed: 1/22/2022 6:54 PM  Workstation [facility]: [facility]  [facility]    CT Chest With Contrast Diagnostic    Result Date: 1/21/2022  1. There is a small left pneumothorax but there is at least a moderate-sized left-sided pleural effusion and there is a small amount of pneumomediastinum with shift of mediastinal structures from left to right. A chest tube is being placed at time of this dictation as described below. 2. Probable right lung atelectasis. 3. No acute finding in the upper abdomen. Abdomen and pelvis CT dictated separately. NOTIFICATION: Critical Value/emergent results were called by telephone at the time of interpretation on 1/21/2022 8:55 PM to ASTER Boudreaux who verbally acknowledged these results. Dr. Villafuerte was in the process of placing a chest tube in the patient at the time of my call. Signer Name: Nick Vines MD  Signed: 1/21/2022 8:59 PM  Workstation Name: [facility]  Radiology [facility]    CT Abdomen Pelvis With Contrast    Result Date: 1/21/2022  1. There is a left-sided effusion that is at least moderate in size. There is a small left pneumothorax and there is trace pneumomediastinum and subtle shift of mediastinal structures from left to right. Please see the separately dictated CT chest for further details. The emergency department is aware of these findings and a chest tube was in the process of being placed at the time of interpretation of the CT chest. 2. The stomach is distended with air fluid and debris and there are new postoperative changes of the stomach compared to the prior study. Correlate with any signs or symptoms of gastric outlet obstruction and the patient may benefit from NG tube placement. 3. No evidence of bowel obstruction or drainable fluid collection. 4. Normal appendix. 5. Diverticulosis. 6. Hepatic steatosis. 7. Tiny subcentimeter enhancing lesion in the  spleen, favored to be benign but technically indeterminate. 8. Bilateral renal cysts. 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 Signer Name: Nick Vines MD  Signed: 1/21/2022 10:16 PM  Workstation Name: [facility]    FL Esophagram Complete Single Contrast    Result Date: 1/22/2022  1.  Chest x-ray series with water-soluble oral contrast shows evidence of likely distal esophageal rupture with oral contrast appearing in the left basilar pleural space. Please see chest CT to follow. 2.  Left lower lobe consolidation and atelectasis. Mild infiltrate and volume loss in the right lung base. Small right pleural effusion. 3.  Left-sided hydropneumothorax with chest tube in place. Signer Name: Celestino Herrera MD  Signed: 1/22/2022 6:46 PM  [facility]    XR Chest 1 View    Result Date: 1/22/2022  1. Left sided chest tube remains present along with a small left pneumothorax, stable to slightly larger when compared with the prior study but this may be related to technical factors. 2. Worsening appearance of the left lung base with increasing pleural fluid and consolidation. No right-sided pneumothorax. Signer Name: Nick Vines MD  Signed: 1/22/2022 5:28 PM  Workstation Name: [facility]    XR Chest 1 View    Result Date: 1/21/2022  Interval placement of left chest tube. Persistent small left pneumothorax. Persistent airspace disease in the left chest. Signer Name: LIDIA GAY MD  Signed: 1/21/2022 9:32 PM  Workstation Name: [facility]  Radiology [facility]    XR Chest 1 View    Addendum Date: 1/21/2022    //////////// ADDENDUM #1 //////////// ADDENDUM: Favor there is a small subtle left apical pneumothorax. This is confirmed by CT. Spoke directly to Dr. Scott Villafuerte in the ER at 8:36 PM 1/21/2022 to relay this finding. Signer Name: LIDIA GAY MD  Signed: 1/21/2022 8:38  PM  Workstation Name: [facility]  [facility]////////////  ORIGINAL REPORT //////////// CR Chest 1 Vw INDICATION: Upper back pain COMPARISON:  6/27/2021 FINDINGS: Portable AP view(s) of the chest.  Normal cardiomediastinal contours. Dense consolidation in the left midlung especially the left base, most consistent with a dense lobar pneumonia. Small left pleural effusion.     Result Date: 1/21/2022  Dense consolidation in the left midlung especially the left base, most consistent with a dense lobar pneumonia. Small left pleural effusion. Signer Name: LIDIA GAY MD  Signed: 1/21/2022 8:10 PM  Workstation Name: [facility]  [facility]    US Venous Doppler Lower Extremity Bilateral (duplex)    Result Date: 1/22/2022  No deep venous thrombus seen in either lower extremity. Signer Name: Nick Vines MD  Signed: 1/22/2022 3:56 PM  Workstation [facility]: [facility]  [facility]    I have personally looked at the radiology images and read the final radiology report.    Assessment & Plan    Debility after recent esophageal rupture with Kamari drain in place and recent esophageal stent placement.  Surgeon recommends leaving Kamari tube in place till esophageal stent can be removed in 4 weeks.  Patient is currently completing course of IV micafungin.  Patient receiving nutrition via J-tube at this time.  Participated with physical and Occupational Therapy on 2/12/2022: Performs bed mobility with moderate assist and verbal/nonverbal cues; perform transfer activities with minimal assist verbal/nonverbal cues; utilized wheelchair/rolled walker for chair to bed, sit to stand/sit transfer; ambulated 160 feet with front wheeled walker and contact-guard assist with verbal/nonverbal cues; required set up assist for grooming activity; minimal assist with verbal cues for toileting    Dry mouth with thrush, MMW ordered    Nutrition continue J-tube  feeds    Hyponatremia--water restriction ordered, Na improved    Diabetes mellitus continue sliding scale coverage    Hypertension controlled    History of CAD stable    VTE Prophylaxis:   Mechanical Order History:     None      Pharmalogical Order History:      Ordered     Dose Route Frequency Stop    02/10/22 9330  enoxaparin (LOVENOX) syringe 40 mg         40 mg SC Every 24 Hours --              Sylvia Talley MD  02/14/22  12:14 EST

## 2022-02-14 NOTE — PROGRESS NOTES
[facility] Coordinator  Inpatient Rehabilitation Admission    Ethnic Group:  Marital Status:    IRF Admission Date:  02/10/2022  Admission Class:  Admit From:    Pre-Hospital Living:    Payment Sources:  Impairment Group:  Date of Onset of Impairment:    Etiologic Diagnosis Code(s):  Rank Code      Description  1    K22.3     Perforation of esophagus    Comorbidities:  Rank Code      Description    1    I25.10    Atherosclerotic heart disease of native                 coronary artery without angina pectoris  2    I10       Essential (primary) hypertension  3    E11.9     Type 2 diabetes mellitus without complications  4    Z93.4     Other artificial openings of gastrointestinal                 tract status  5    Z95.0     Presence of cardiac pacemaker  6    R53.81    Other malaise    Height on Admission:  Weight on Admission:    Are there any arthritis conditions recorded for Impairment Group, Etiologic  Diagnosis, or Comorbid Conditions that meet all of the regulatory requirements  for IRF classification (in 42 .29(b)(2)(x), (xi), and xii))?  No    MAGAN Bladder Accidents:  0 - Accidents.  Bladder Score = 5.  One (1) bladder accident.  MAGAN Bowel Accident: 0 -Accidents.  Bowel Score = 7. Patient has no accidents.    Signed by: Kari Snyder Nurse

## 2022-02-14 NOTE — THERAPY TREATMENT NOTE
Inpatient Rehabilitation - Physical Therapy Treatment Note        [place]     Patient Name: Sandro Argueta  : 1945  MRN: [id number]    Today's Date: 2022                    Admit Date: 2/10/2022      Visit Dx:   No diagnosis found.    Patient Active Problem List   Diagnosis   • Left elbow pain   • Hypertension   • DM2 (diabetes mellitus, type 2) (HCC)   • Elevated prostate specific antigen (PSA)   • Epigastric pain   • Epigastric abdominal pain   • Elevated liver enzymes   • Weight loss, abnormal   • Acute hepatitis   • Anemia   • Obstructive jaundice   • Pneumothorax, left   • Cytokine release syndrome, grade 1   • Esophageal rupture       Past Medical History:   Diagnosis Date   • Acute UTI (urinary tract infection) 2021   • Arthritis    • Borderline diabetes    • Coronary artery disease    • Diabetes mellitus (HCC)    • Elevated prostate specific antigen (PSA) 2020   • GERD (gastroesophageal reflux disease)    • Hypertension    • Left elbow pain        Past Surgical History:   Procedure Laterality Date   • COLONOSCOPY     • ENDOSCOPY N/A 2021    Procedure: ESOPHAGOGASTRODUODENOSCOPY WITH BIOPSY dilitation;  Surgeon: Bela Mcdaniel MD;  Location: [facility];  Service: Gastroenterology;  Laterality: N/A;   • ERCP N/A 2021    Procedure: ENDOSCOPIC RETROGRADE CHOLANGIOPANCREATOGRAPHY WITH STENT PLACEMENT;  Surgeon: Matthew Moreira MD;  Location: [facility] ENDOSCOPY;  Service: Gastroenterology;  Laterality: N/A;  with sphincterotomy, and brushing of common bile duct, and placement of [id number] wall stent     • PACEMAKER IMPLANTATION         PT ASSESSMENT (last 12 hours)     IRF PT Evaluation and Treatment     Row Name 22 1519          PT Time and Intention    Document Type daily treatment  -LL     Mode of Treatment individual therapy; physical therapy  -LL     Patient/Family/Caregiver Comments/Observations Patient & RN in agreement for participation with PT   -LL     Row Name 02/14/22 1519          General Information    Existing Precautions/Restrictions fall  ABD incision, G and J tubes, mart drain-all at left ABD  -LL     Row Name 02/14/22 1519          Cognition/Psychosocial    Affect/Mental Status (Cognitive) WFL  -LL     Orientation Status (Cognition) oriented x 3  -LL     Follows Commands (Cognition) WFL  -LL     Personal Safety Interventions gait belt; nonskid shoes/slippers when out of bed; supervised activity  -     Row Name 02/14/22 1519          Pain Scale: FACES Pre/Post-Treatment    Pain: FACES Scale, Pretreatment 4-->hurts little more  -LL     Posttreatment Pain Rating 4-->hurts little more  -LL     Row Name 02/14/22 1519          Bed Mobility    Comment (Bed Mobility) Patient up in chair  -     Row Name 02/14/22 1519          Transfers    Sit-Stand [place] (Transfers) minimum assist (75% patient effort); verbal cues; nonverbal cues (demo/gesture); contact guard  -     Stand-Sit [place] (Transfers) minimum assist (75% patient effort); verbal cues; nonverbal cues (demo/gesture); contact guard  -[facility] Name 02/14/22 1519          Sit-Stand Transfer    Assistive Device (Sit-Stand Transfers) walker, front-wheeled  in // bars x 5 reps  -     Row Name 02/14/22 1519          Stand-Sit Transfer    Assistive Device (Stand-Sit Transfers) walker, front-wheeled  -[facility] Name 02/14/22 1519          Gait/Stairs (Locomotion)    [place] Level (Gait) contact guard; verbal cues; nonverbal cues (demo/gesture)  -     Assistive Device (Gait) walker, front-wheeled  -     Distance in Feet (Gait) 160', 100'  -LL     Deviations/Abnormal Patterns (Gait) [place] decreased; gait speed decreased; stride length decreased  -LL     Bilateral Gait Deviations forward flexed posture  -     Row Name 02/14/22 1519          Motor Skills    Therapeutic Exercise hip; knee; ankle  -[facility] Name 02/14/22 1519          Hip (Therapeutic Exercise)    Hip  (Therapeutic Exercise) strengthening exercise  -LL     Hip Strengthening (Therapeutic Exercise) bilateral; flexion; extension; aBduction; aDduction; marching while seated; marching while standing; sitting; standing; 1 lb free weight; resistance band; green  -LL     Row Name 02/14/22 1519          Knee (Therapeutic Exercise)    Knee (Therapeutic Exercise) strengthening exercise  -LL     Knee Strengthening (Therapeutic Exercise) bilateral; flexion; extension; marching while seated; marching while standing; LAQ (long arc quad); hamstring curls; sitting; standing; 1 lb free weight; resistance band; green  -     Row Name 02/14/22 1519          Ankle (Therapeutic Exercise)    Ankle (Therapeutic Exercise) strengthening exercise  -LL     Ankle Strengthening (Therapeutic Exercise) bilateral; plantarflexion; dorsiflexion; sitting; standing  -LL     Row Name 02/14/22 1519          IRF PT Goals    Bed Mobility Goal Selection (PT-IRF) bed mobility, PT goal 1  -LL     Transfer Goal Selection (PT-IRF) transfers, PT goal 1  -LL     Gait (Walking Locomotion) Goal Selection (PT-IRF) gait, PT goal 1  -LL     Row Name 02/14/22 1519          Bed Mobility Goal 1 (PT-IRF)    Activity/Assistive Device (Bed Mobility Goal 1, PT-IRF) sit to supine/supine to sit  -LL     [place] Level (Bed Mobility Goal 1, PT-IRF) supervision required  -LL     Time Frame (Bed Mobility Goal 1, PT-IRF) by discharge  -LL     Row Name 02/14/22 1519          Transfer Goal 1 (PT-IRF)    Activity/Assistive Device (Transfer Goal 1, PT-IRF) sit-to-stand/stand-to-sit; bed-to-chair/chair-to-bed  -LL     [place] Level (Transfer Goal 1, PT-IRF) supervision required  -LL     Time Frame (Transfer Goal 1, PT-IRF) by discharge  -LL     Row Name 02/14/22 1519          Gait/Walking Locomotion Goal 1 (PT-IRF)    Activity/Assistive Device (Gait/Walking Locomotion Goal 1, PT-IRF) walker, rolling  -LL     Gait/Walking Locomotion Distance Goal 1 (PT-IRF) 300'  -LL      [place] Level (Gait/Walking Locomotion Goal 1, PT-IRF) supervision required  -LL     Time Frame (Gait/Walking Locomotion Goal 1, PT-IRF) by discharge  -     Row Name 02/14/22 1519          Positioning and Restraints    Pre-Treatment Position --  WC w/ OT  -LL     Post Treatment Position wheelchair  -LL     In Wheelchair sitting; with other staff; legs elevated  -     Row Name 02/14/22 1519          Therapy Assessment/Plan (PT)    Patient's Goals For Discharge return home  -     Row Name 02/14/22 1519          Therapy Assessment/Plan (PT)    Rehab Potential/Prognosis (PT) adequate, monitor progress closely  -LL     Frequency of Treatment (PT) 5 times per week  -LL     Estimated Duration of Therapy (PT) 2 weeks  -LL     Problem List (PT) balance; mobility; strength; pain; postural control  -LL     Activity Limitations Related to Problem List (PT) unable to ambulate safely; unable to transfer safely  -     Row Name 02/14/22 1519          Therapy Plan Review/Discharge Plan (PT)    Anticipated Equipment Needs at Discharge (PT Eval) --  tbd  -LL     Expected Discharge Disposition (PT Eval) home with home health care; home with caregiver  -           User Key  (r) = Recorded By, (t) = Taken By, (c) = Cosigned By    Initials Name Provider Type    LL Tanya Waller PTA Physical Therapy Assistant              Wound 02/10/22 1557 midline abdomen Incision (Active)   Dressing Appearance open to air 02/14/22 1027   Closure Staples 02/14/22 1027   Base pink; red; clean 02/14/22 1027   Edges rolled/closed 02/14/22 1027   Drainage Amount none 02/14/22 1027   Dressing Care open to air 02/14/22 1027       Wound 02/10/22 1602 Left upper back Incision (Active)   Dressing Appearance open to air 02/14/22 1027   Closure Liquid skin adhesive 02/14/22 1027   Base clean 02/14/22 1027   Dressing Care open to air 02/14/22 1027     Physical Therapy Education                 Title: PT OT SLP Therapies (Done)     Topic: Physical  Therapy (Done)     Point: Mobility training (Done)     Learning Progress Summary           Patient Acceptance, E,D, VU,NR by  at 2/14/2022 1522      Show all documentation for this point (3)                 Point: Home exercise program (Done)     Learning Progress Summary           Patient Acceptance, E,D, VU,NR by  at 2/14/2022 1522      Show all documentation for this point (3)                 Point: Body mechanics (Done)     Learning Progress Summary           Patient Acceptance, E,D, VU,NR by  at 2/14/2022 1522      Show all documentation for this point (3)                 Point: Precautions (Done)     Learning Progress Summary           Patient Acceptance, E,D, VU,NR by  at 2/14/2022 1522      Show all documentation for this point (3)                             User Key     Initials Effective Dates Name Provider Type [facility] 05/02/16 -  Tanya Waller PTA Physical Therapy Assistant PT                PT Recommendation and Plan    Frequency of Treatment (PT): 5 times per week  Anticipated Equipment Needs at Discharge (PT Eval):  (tbd)                  Time Calculation:      PT Charges     Row Name 02/14/22 1523             Time Calculation    Start Time 0915  -LL      Stop Time 1045  -LL      Time Calculation (min) 90 min  -LL      PT Received On 02/14/22  -LL              Time Calculation- PT    Total Timed Code Minutes- PT 90 minute(s)  -LL            User Key  (r) = Recorded By, (t) = Taken By, (c) = Cosigned By    Initials Name Provider Type     Tanya Waller PTA Physical Therapy Assistant                Therapy Charges for Today     Code Description Service Date Service Provider Modifiers Qty    50567947627 HC GAIT TRAINING EA 15 MIN 2/14/2022 Tanya Waller PTA GP, CQ 2    90291893006 HC PT THERAPEUTIC ACT EA 15 MIN 2/14/2022 Tanya Waller PTA GP, CQ 1    22255775758 HC PT THER PROC EA 15 MIN 2/14/2022 Tanya Waller PTA GP, CQ 3                   Tanya. Sridhar  PTA  2/14/2022

## 2022-02-15 LAB
GLUCOSE BLDC GLUCOMTR-MCNC: 156 MG/DL (ref 70–130)
GLUCOSE BLDC GLUCOMTR-MCNC: 185 MG/DL (ref 70–130)
GLUCOSE BLDC GLUCOMTR-MCNC: 292 MG/DL (ref 70–130)

## 2022-02-15 PROCEDURE — 25010000002 ENOXAPARIN PER 10 MG: Performed by: FAMILY MEDICINE

## 2022-02-15 PROCEDURE — 97116 GAIT TRAINING THERAPY: CPT

## 2022-02-15 PROCEDURE — 63710000001 INSULIN ASPART PER 5 UNITS: Performed by: FAMILY MEDICINE

## 2022-02-15 PROCEDURE — 82962 GLUCOSE BLOOD TEST: CPT

## 2022-02-15 PROCEDURE — 94799 UNLISTED PULMONARY SVC/PX: CPT

## 2022-02-15 PROCEDURE — 97530 THERAPEUTIC ACTIVITIES: CPT

## 2022-02-15 PROCEDURE — 97110 THERAPEUTIC EXERCISES: CPT

## 2022-02-15 PROCEDURE — 97535 SELF CARE MNGMENT TRAINING: CPT

## 2022-02-15 RX ADMIN — INSULIN ASPART 4 UNITS: 100 INJECTION, SOLUTION INTRAVENOUS; SUBCUTANEOUS at 09:25

## 2022-02-15 RX ADMIN — INSULIN ASPART 2 UNITS: 100 INJECTION, SOLUTION INTRAVENOUS; SUBCUTANEOUS at 11:54

## 2022-02-15 RX ADMIN — DIPHENHYDRAMINE HYDROCHLORIDE AND LIDOCAINE HYDROCHLORIDE AND ALUMINUM HYDROXIDE AND MAGNESIUM HYDRO 10 ML: KIT at 15:44

## 2022-02-15 RX ADMIN — METOPROLOL TARTRATE 25 MG: 25 TABLET ORAL at 20:51

## 2022-02-15 RX ADMIN — BETAMETHASONE DIPROPIONATE: 0.5 CREAM TOPICAL at 20:49

## 2022-02-15 RX ADMIN — OXYCODONE 5 MG: 5 TABLET ORAL at 18:41

## 2022-02-15 RX ADMIN — INSULIN ASPART 2 UNITS: 100 INJECTION, SOLUTION INTRAVENOUS; SUBCUTANEOUS at 16:50

## 2022-02-15 RX ADMIN — DIPHENHYDRAMINE HYDROCHLORIDE AND LIDOCAINE HYDROCHLORIDE AND ALUMINUM HYDROXIDE AND MAGNESIUM HYDRO 10 ML: KIT at 20:49

## 2022-02-15 RX ADMIN — ENOXAPARIN SODIUM 40 MG: 40 INJECTION SUBCUTANEOUS at 17:03

## 2022-02-15 RX ADMIN — BETAMETHASONE DIPROPIONATE: 0.5 CREAM TOPICAL at 09:29

## 2022-02-15 RX ADMIN — METOPROLOL TARTRATE 25 MG: 25 TABLET ORAL at 09:24

## 2022-02-15 RX ADMIN — DIPHENHYDRAMINE HYDROCHLORIDE AND LIDOCAINE HYDROCHLORIDE AND ALUMINUM HYDROXIDE AND MAGNESIUM HYDRO 10 ML: KIT at 09:24

## 2022-02-15 RX ADMIN — PANTOPRAZOLE SODIUM 40 MG: 40 INJECTION, POWDER, FOR SOLUTION INTRAVENOUS at 05:57

## 2022-02-15 NOTE — PROGRESS NOTES
Occupational Therapy:    Physical Therapy: Individual: 90 minutes.    Speech Language Pathology:    Signed by: Jose Martin Hernández PTA

## 2022-02-15 NOTE — THERAPY TREATMENT NOTE
Inpatient Rehabilitation - Physical Therapy Treatment Note        [place]     Patient Name: Sandro Argueta  : 1945  MRN: [id number]    Today's Date: 2/15/2022                    Admit Date: 2/10/2022      Visit Dx:   No diagnosis found.    Patient Active Problem List   Diagnosis   • Left elbow pain   • Hypertension   • DM2 (diabetes mellitus, type 2) (HCC)   • Elevated prostate specific antigen (PSA)   • Epigastric pain   • Epigastric abdominal pain   • Elevated liver enzymes   • Weight loss, abnormal   • Acute hepatitis   • Anemia   • Obstructive jaundice   • Pneumothorax, left   • Cytokine release syndrome, grade 1   • Esophageal rupture       Past Medical History:   Diagnosis Date   • Acute UTI (urinary tract infection) 2021   • Arthritis    • Borderline diabetes    • Coronary artery disease    • Diabetes mellitus (HCC)    • Elevated prostate specific antigen (PSA) 2020   • GERD (gastroesophageal reflux disease)    • Hypertension    • Left elbow pain        Past Surgical History:   Procedure Laterality Date   • COLONOSCOPY     • ENDOSCOPY N/A 2021    Procedure: ESOPHAGOGASTRODUODENOSCOPY WITH BIOPSY dilitation;  Surgeon: Bela Mcdaniel MD;  Location: [facility];  Service: Gastroenterology;  Laterality: N/A;   • ERCP N/A 2021    Procedure: ENDOSCOPIC RETROGRADE CHOLANGIOPANCREATOGRAPHY WITH STENT PLACEMENT;  Surgeon: Matthew Moreira MD;  Location: [facility] ENDOSCOPY;  Service: Gastroenterology;  Laterality: N/A;  with sphincterotomy, and brushing of common bile duct, and placement of [id number] wall stent     • PACEMAKER IMPLANTATION         PT ASSESSMENT (last 12 hours)     IRF PT Evaluation and Treatment     Row Name 02/15/22 1427          PT Time and Intention    Document Type daily treatment  -RG     Mode of Treatment individual therapy; physical therapy  -RG     Patient/Family/Caregiver Comments/Observations Pt and nursing in agreement for skilled PT on this  date. Pt c/o fatigue and required several rest breaks.  -RG     Row Name 02/15/22 1427          General Information    Existing Precautions/Restrictions fall  ABD incision, G and J tubes, mart drain-all at left ABD  -RG     Row Name 02/15/22 1427          Cognition/Psychosocial    Affect/Mental Status (Cognitive) WFL  -RG     Orientation Status (Cognition) oriented x 3  -RG     Follows Commands (Cognition) WFL  -RG     Personal Safety Interventions gait belt; nonskid shoes/slippers when out of bed; fall prevention program maintained  -RG     Row Name 02/15/22 1427          Pain Scale: FACES Pre/Post-Treatment    Pain: FACES Scale, Pretreatment 4-->hurts little more  -RG     Posttreatment Pain Rating 4-->hurts little more  -RG     Row Name 02/15/22 1427          Transfers    Bed-Chair [place] (Transfers) minimum assist (75% patient effort)  -RG     Chair-Bed [place] (Transfers) minimum assist (75% patient effort)  -RG     Assistive Device (Bed-Chair Transfers) wheelchair  -RG     Sit-Stand [place] (Transfers) minimum assist (75% patient effort); verbal cues; nonverbal cues (demo/gesture); contact guard  -RG     Stand-Sit [place] (Transfers) minimum assist (75% patient effort); verbal cues; nonverbal cues (demo/gesture); contact guard  -RG     Row Name 02/15/22 1427          Chair-Bed Transfer    Assistive Device (Chair-Bed Transfers) wheelchair  -RG     Row Name 02/15/22 1427          Sit-Stand Transfer    Assistive Device (Sit-Stand Transfers) walker, front-wheeled  in // bars x 5 reps  -RG     Row Name 02/15/22 1427          Stand-Sit Transfer    Assistive Device (Stand-Sit Transfers) walker, front-wheeled  -     Row Name 02/15/22 1427          Gait/Stairs (Locomotion)    [place] Level (Gait) contact guard; verbal cues; nonverbal cues (demo/gesture)  -RG     Assistive Device (Gait) walker, front-wheeled  -RG     Distance in Feet (Gait) 80'  -RG     Deviations/Abnormal Patterns (Gait)  [place] decreased; gait speed decreased; stride length decreased  -RG     Bilateral Gait Deviations forward flexed posture  -RG     Comment (Gait/Stairs) Cues to correct posture.  -RG     Row Name 02/15/22 1427          Hip (Therapeutic Exercise)    Hip Strengthening (Therapeutic Exercise) bilateral; flexion; aBduction; aDduction; marching while seated; sitting; resistance band; green; 10 repetitions; 2 sets  -RG     Row Name 02/15/22 1427          Knee (Therapeutic Exercise)    Knee Strengthening (Therapeutic Exercise) bilateral; flexion; extension; marching while seated; LAQ (long arc quad); sitting; resistance band; green; 10 repetitions; 2 sets  -RG     Row Name 02/15/22 1427          Ankle (Therapeutic Exercise)    Ankle Strengthening (Therapeutic Exercise) bilateral; dorsiflexion; plantarflexion; sitting; 10 repetitions; 2 sets  -RG     Row Name 02/15/22 1427          IRF PT Goals    Bed Mobility Goal Selection (PT-IRF) bed mobility, PT goal 1  -RG     Transfer Goal Selection (PT-IRF) transfers, PT goal 1  -RG     Gait (Walking Locomotion) Goal Selection (PT-IRF) gait, PT goal 1  -RG     Row Name 02/15/22 1427          Bed Mobility Goal 1 (PT-IRF)    Activity/Assistive Device (Bed Mobility Goal 1, PT-IRF) sit to supine/supine to sit  -RG     [place] Level (Bed Mobility Goal 1, PT-IRF) supervision required  -RG     Time Frame (Bed Mobility Goal 1, PT-IRF) by discharge  -RG     Row Name 02/15/22 1427          Transfer Goal 1 (PT-IRF)    Activity/Assistive Device (Transfer Goal 1, PT-IRF) sit-to-stand/stand-to-sit; bed-to-chair/chair-to-bed  -RG     [place] Level (Transfer Goal 1, PT-IRF) supervision required  -RG     Time Frame (Transfer Goal 1, PT-IRF) by discharge  -RG     Row Name 02/15/22 1427          Gait/Walking Locomotion Goal 1 (PT-IRF)    Activity/Assistive Device (Gait/Walking Locomotion Goal 1, PT-IRF) walker, rolling  -RG     Gait/Walking Locomotion Distance Goal 1 (PT-IRF) 300'  -RG      [place] Level (Gait/Walking Locomotion Goal 1, PT-IRF) supervision required  -RG     Time Frame (Gait/Walking Locomotion Goal 1, PT-IRF) by discharge  -RG     Row Name 02/15/22 1427          Positioning and Restraints    Pre-Treatment Position in bed  -RG     Post Treatment Position bed  -RG     In Bed notified nsg; supine; call light within reach; encouraged to call for assist; legs elevated  -RG     Row Name 02/15/22 1427          Therapy Assessment/Plan (PT)    Patient's Goals For Discharge return home  -RG     Row Name 02/15/22 1427          Therapy Assessment/Plan (PT)    Rehab Potential/Prognosis (PT) adequate, monitor progress closely  -RG     Frequency of Treatment (PT) 5 times per week  -RG     Estimated Duration of Therapy (PT) 2 weeks  -RG     Problem List (PT) balance; mobility; strength; pain; postural control  -RG     Activity Limitations Related to Problem List (PT) unable to ambulate safely; unable to transfer safely  -RG     Row Name 02/15/22 1427          Therapy Plan Review/Discharge Plan (PT)    Anticipated Equipment Needs at Discharge (PT Eval) --  tbd  -RG     Expected Discharge Disposition (PT Eval) home with home health care; home with caregiver  -RG           User Key  (r) = Recorded By, (t) = Taken By, (c) = Cosigned By    Initials Name Provider Type    RG Jose Martin Hernández PTA Physical Therapy Assistant              Wound 02/10/22 1557 midline abdomen Incision (Active)   Dressing Appearance open to air 02/15/22 1255   Closure Staples 02/15/22 1255   Base pink; red; clean 02/15/22 1255   Edges rolled/closed 02/15/22 1255   Drainage Amount none 02/15/22 1255   Dressing Care open to air 02/15/22 1255       Wound 02/10/22 1602 Left upper back Incision (Active)   Dressing Appearance open to air 02/15/22 1255   Closure Liquid skin adhesive 02/15/22 1255   Base clean 02/15/22 1255   Drainage Amount none 02/14/22 2020   Dressing Care open to air 02/15/22 1255     Physical Therapy Education                  Title: PT OT SLP Therapies (Done)     Topic: Physical Therapy (Done)     Point: Mobility training (Done)     Learning Progress Summary           Patient Acceptance, E,D, VU,NR by  at 2/15/2022 1431      Show all documentation for this point (4)                 Point: Home exercise program (Done)     Learning Progress Summary           Patient Acceptance, E,D, VU,NR by RG at 2/15/2022 1431      Show all documentation for this point (4)                 Point: Body mechanics (Done)     Learning Progress Summary           Patient Acceptance, E,D, VU,NR by RG at 2/15/2022 1431      Show all documentation for this point (4)                 Point: Precautions (Done)     Learning Progress Summary           Patient Acceptance, E,D, VU,NR by RG at 2/15/2022 1431      Show all documentation for this point (4)                             User Key     Initials Effective Dates Name Provider Type Discipline     06/16/21 -  Jose Martin Hernández PTA Physical Therapy Assistant PT                PT Recommendation and Plan    Frequency of Treatment (PT): 5 times per week  Anticipated Equipment Needs at Discharge (PT Eval):  (tbd)                  Time Calculation:      PT Charges     Row Name 02/15/22 1432 02/15/22 1431          Time Calculation    Start Time 1245  -RG 1000  -RG     Stop Time 1330  -RG 1045  -RG     Time Calculation (min) 45 min  -RG 45 min  -RG     PT Received On 02/15/22  -RG 02/15/22  -RG            Time Calculation- PT    Total Timed Code Minutes- PT 45 minute(s)  -RG 45 minute(s)  -RG           User Key  (r) = Recorded By, (t) = Taken By, (c) = Cosigned By    Initials Name Provider Type     Jose Martin Hernández PTA Physical Therapy Assistant                Therapy Charges for Today     Code Description Service Date Service Provider Modifiers Qty    53710319220 HC GAIT TRAINING EA 15 MIN 2/15/2022 Jose Martin Hernández PTA GP, CQ 1    49966584106 HC PT THERAPEUTIC ACT EA 15 MIN 2/15/2022 Jose Martin Hernández PTA GP, CQ  2    [id number]  PT THER PROC EA 15 MIN 2/15/2022 Jose Martin Hernández, ZACKARY GP, CQ 3                   Jose Martin Hernández PTA  2/15/2022

## 2022-02-15 NOTE — SIGNIFICANT NOTE
02/15/22 1879   Plan   Plan Team conference held today.  Spoke to pt and son-in-law Rosita about plans for discharge on 2-25-22 and how he is doing in therapy.  Pt plans to return home with spouse at discharge.  Pt is agreeable to discharge date at this time.   Patient/Family in Agreement with Plan yes

## 2022-02-15 NOTE — PROGRESS NOTES
[facility]  PROGRESS NOTE     Patient Identification:  Name:  Sandro Argueta  Age:  76 y.o.  Sex:  male  :  1945  MRN:  [id number]  Visit Number:  [id number]  ROOM: [facility]     Primary Care Provider:  Misti Nguyễn MD    Length of stay in inpatient status:  5    Subjective     Chief Compliant: Esophageal rupture    History of Presenting Illness: Patient 76-year-old gentleman history of hypertension, coronary disease, diabetes mellitus and GERD.  Patient had recent esophageal perforation with leakage of GI contents into the thoracic cavity.  Patient did require multiple chest tubes and was treated with broad-spectrum antibiotics as well as micafungin.  Patient did have repair of the esophagus and eventual esophageal stent placement because of continued leakage.  Patient has J-tube in place and also has a Kamari tube in place at this time.     Participated with physical occupational therapy on 2022: Perform transfer activities with minimal assist to contact-guard with verbal/nonverbal cues; utilize front wheeled walker for sit to stand/stand to sit transfer; ambulated 160 feet and 100 feet with front wheeled walker and contact-guard assist with verbal/nonverbal cues; participated with functional endurance therapeutic exercise/ROM; gross motor coordination activities; required set up assist for grooming activities      Objective     Current Hospital Meds:betamethasone augmented + lubriderm, , Topical, Q12H  enoxaparin, 40 mg, Subcutaneous, Q24H  First Mouthwash (Magic Mouthwash), 10 mL, Swish & Spit, Q6H  insulin aspart, 0-7 Units, Subcutaneous, TID AC  metoprolol tartrate, 25 mg, Per J Tube, Q12H  pantoprazole, 40 mg, Intravenous, Q AM       ----------------------------------------------------------------------------------------------------------------------  Vital Signs:  Temp:  [98.1 °F (36.7 °C)] 98.1 °F (36.7 °C)  Heart Rate:  [95] 95  Resp:  [18] 18  BP: (139)/(70) 139/70  SpO2:   [93 %-95 %] 93 %  on   ;   Device (Oxygen Therapy): room air  Body mass index is 18.49 kg/m².    Wt Readings from Last 3 Encounters:   02/10/22 66.7 kg (147 lb)   01/22/22 75.9 kg (167 lb 4.8 oz)   12/29/21 79.4 kg (175 lb)     Intake & Output (last 3 days)       02/12 0701  02/13 0700 02/13 0701  02/14 0700 02/14 0701  02/15 0700 02/15 0701 02/16 0700    P.O. 175 75 65     Other 636 341 506     NG/ 724 940     IV Piggyback 100 600      Total Intake(mL/kg) 1771 (26.6) 1740 (26.1) 1511 (22.7)     Urine (mL/kg/hr) 0 (0) 375 (0.2) 250 (0.2)     Emesis/NG output 52 2 1     Drains 30 105 30     Stool 0 0      Total Output 82 482 281     Net +1689 +1258 +1230             Urine Unmeasured Occurrence 3 x 5 x 5 x     Stool Unmeasured Occurrence 1 x 1 x          No diet orders on file  ----------------------------------------------------------------------------------------------------------------------  Physical exam:  Constitutional:   No acute distress, comfortable and awake. Feels much better this morning.  HEENT: Normocephalic atraumatic  Neck: Supple   Cardiovascular: Regular rate and rhythm  Pulmonary/Chest: Clear to auscultation; Kamari tube in place  Abdominal: Positive bowel sounds soft; G-tube and J-tube in place.     Musculoskeletal: No arthropathy  Neurological: No focal deficits  Skin: No rash    ----------------------------------------------------------------------------------------------------------------------    Last echocardiogram:  Results for orders placed during the hospital encounter of 07/29/20    Adult Transthoracic Echo Complete W/ Cont if Necessary Per Protocol    Interpretation Summary  · Left ventricular wall thickness is consistent with mild concentric hypertrophy.  · Left ventricular systolic function is normal.  · Estimated EF appears to be in the range of 61 - 65%.  · Left ventricular diastolic dysfunction (grade I) consistent with impaired relaxation.  · Normal right ventricular cavity  size and systolic function noted. Electronic lead present in the ventricle.  · Normal cardiac chambers dimensions.  · Mild aortic valve regurgitation is present.  · Mild mitral valve regurgitation is present  · Mild tricuspid valve regurgitation is present. No evidence of pulmonary hypertension is present.  · There is no evidence of pericardial effusion.    ----------------------------------------------------------------------------------------------------------------------  Results from last 7 days   Lab Units 02/14/22 0119 02/13/22 0010 02/11/22 0109   WBC 10*3/mm3 10.86* 9.70 10.20   HEMOGLOBIN g/dL 9.9* 9.6* 10.4*   HEMATOCRIT % 31.3* 30.9* 33.1*   MCV fL 86.7 87.8 87.3   MCHC g/dL 31.6 31.1* 31.4*   PLATELETS 10*3/mm3 442 353 473*         Results from last 7 days   Lab Units 02/14/22 0119 02/13/22 0010 02/11/22 0109   SODIUM mmol/L 127* 123* 132*   POTASSIUM mmol/L 4.8 4.5 4.2   CHLORIDE mmol/L 94* 90* 96*   CO2 mmol/L 24.3 25.3 25.9   BUN mg/dL 9 11 16   CREATININE mg/dL 0.63* 0.75* 0.63*   EGFR IF NONAFRICN AM mL/min/1.73 124 102 124   CALCIUM mg/dL 7.8* 7.9* 7.6*   GLUCOSE mg/dL 281* 291* 176*   Estimated Creatinine Clearance: 75.3 mL/min (A) (by C-G formula based on SCr of 0.63 mg/dL (L)).  No results found for: AMMONIA              Glucose   Date/Time Value Ref Range Status   02/15/2022 1138 185 (H) 70 - 130 mg/dL Final     Comment:     Meter: [id number] : [id number] crystal art   02/15/2022 0638 292 (H) 70 - 130 mg/dL Final     Comment:     Meter: [id number] : [id number] [place] Crystal   02/14/2022 1636 163 (H) 70 - 130 mg/dL Final     Comment:     Meter: [id number] : [id number] VANE GROVES   02/14/2022 1151 203 (H) 70 - 130 mg/dL Final     Comment:     Meter: [id number] : [id number] VANE GROVES   02/14/2022 0640 215 (H) 70 - 130 mg/dL Final     Comment:     Meter: [id number] : [id number] Dionte Pierce   02/13/2022 1615 174 (H) 70 - 130 mg/dL Final     Comment:      Meter: [id number] : [id number] Jane Simon   02/13/2022 1058 177 (H) 70 - 130 mg/dL Final     Comment:     Meter: [id number] : [id number] aJne Simon   02/13/2022 0603 287 (H) 70 - 130 mg/dL Final     Comment:     Meter: [id number] : [id number] [facility]     Lab Results   Component Value Date    TSH 1.620 06/27/2021     No results found for: PREGTESTUR, PREGSERUM, HCG, HCGQUANT  Pain Management Panel    There is no flowsheet data to display.       Brief Urine Lab Results  (Last result in the past 365 days)      Color   Clarity   Blood   Leuk Est   Nitrite   Protein   CREAT   Urine HCG        01/21/22 2149 Yellow   Clear   Negative   Negative   Negative   Trace               No results found for: BLOODCX      No results found for: URINECX  No results found for: WOUNDCX  No results found for: STOOLCX        I have personally looked at the labs and they are summarized above.  ----------------------------------------------------------------------------------------------------------------------  Detailed radiology reports for the last 24 hours:    Imaging Results (Last 24 Hours)     ** No results found for the last 24 hours. **        Final impressions for the last 30 days of radiology reports:    CT Chest Without Contrast Diagnostic    Result Date: 1/22/2022  1.  Transmural perforation of the distal thoracic esophagus communicating with the left basilar pleural space. Orally administered water-soluble contrast fills the left basilar pleural space and is visible within the left side chest tube. 2.  The exact site for esophageal perforation is not demonstrated on this study. There is very little contrast within the esophagus, and only a faint amount of contrast along the left side of the distal thoracic esophagus. 3.  Distal paraesophageal pneumomediastinum. 4.  Small left hydropneumothorax. No right pneumothorax. 5.  Consolidation and volume loss involving basal segments left lower lobe. Right  posterior basilar atelectasis. Small right pleural effusion. Signer Name: Celestino Herrera MD  Signed: 1/22/2022 6:54 PM  Workstation [facility]: [facility]  Radiology [facility]    CT Chest With Contrast Diagnostic    Result Date: 1/21/2022  1. There is a small left pneumothorax but there is at least a moderate-sized left-sided pleural effusion and there is a small amount of pneumomediastinum with shift of mediastinal structures from left to right. A chest tube is being placed at time of this dictation as described below. 2. Probable right lung atelectasis. 3. No acute finding in the upper abdomen. Abdomen and pelvis CT dictated separately. NOTIFICATION: Critical Value/emergent results were called by telephone at the time of interpretation on 1/21/2022 8:55 PM to ASTER Boudreaux who verbally acknowledged these results. Dr. Villafuerte was in the process of placing a chest tube in the patient at the time of my call. Signer Name: Nick Vines MD  Signed: 1/21/2022 8:59 PM  Workstation Name: [facility]    CT Abdomen Pelvis With Contrast    Result Date: 1/21/2022  1. There is a left-sided effusion that is at least moderate in size. There is a small left pneumothorax and there is trace pneumomediastinum and subtle shift of mediastinal structures from left to right. Please see the separately dictated CT chest for further details. The emergency department is aware of these findings and a chest tube was in the process of being placed at the time of interpretation of the CT chest. 2. The stomach is distended with air fluid and debris and there are new postoperative changes of the stomach compared to the prior study. Correlate with any signs or symptoms of gastric outlet obstruction and the patient may benefit from NG tube placement. 3. No evidence of bowel obstruction or drainable fluid collection. 4. Normal appendix. 5. Diverticulosis. 6. Hepatic steatosis. 7. Tiny  subcentimeter enhancing lesion in the spleen, favored to be benign but technically indeterminate. 8. Bilateral renal cysts. 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 Signer Name: Nick Vines MD  Signed: 1/21/2022 10:16 PM  Workstation Name: [facility]  Radiology [facility]    FL Esophagram Complete Single Contrast    Result Date: 1/22/2022  1.  Chest x-ray series with water-soluble oral contrast shows evidence of likely distal esophageal rupture with oral contrast appearing in the left basilar pleural space. Please see chest CT to follow. 2.  Left lower lobe consolidation and atelectasis. Mild infiltrate and volume loss in the right lung base. Small right pleural effusion. 3.  Left-sided hydropneumothorax with chest tube in place. Signer Name: Celestino Herrera MD  Signed: 1/22/2022 6:46 PM  Workstation [facility]: [facility]  Radiology [facility]    XR Chest 1 View    Result Date: 1/22/2022  1. Left sided chest tube remains present along with a small left pneumothorax, stable to slightly larger when compared with the prior study but this may be related to technical factors. 2. Worsening appearance of the left lung base with increasing pleural fluid and consolidation. No right-sided pneumothorax. Signer Name: Nick Vines MD  Signed: 1/22/2022 5:28 PM  Workstation Name: [facility]  Radiology [facility]    XR Chest 1 View    Result Date: 1/21/2022  Interval placement of left chest tube. Persistent small left pneumothorax. Persistent airspace disease in the left chest. Signer Name: LIDIA GAY MD  Signed: 1/21/2022 9:32 PM  Workstation Name: [facility]  Radiology [facility]    XR Chest 1 View    Addendum Date: 1/21/2022    //////////// ADDENDUM #1 //////////// ADDENDUM: Favor there is a small subtle left apical pneumothorax. This is confirmed by CT. Spoke directly to Dr. Scott Villafuerte in the ER at 8:36 PM 1/21/2022 to relay this finding. Signer Name:  LIDIA GAY MD  Signed: 1/21/2022 8:38 PM  Workstation Name: [facility]  [facility] [place]////////////  ORIGINAL REPORT //////////// CR Chest 1 Vw INDICATION: Upper back pain COMPARISON:  6/27/2021 FINDINGS: Portable AP view(s) of the chest.  Normal cardiomediastinal contours. Dense consolidation in the left midlung especially the left base, most consistent with a dense lobar pneumonia. Small left pleural effusion.     Result Date: 1/21/2022  Dense consolidation in the left midlung especially the left base, most consistent with a dense lobar pneumonia. Small left pleural effusion. Signer Name: LIDIA GAY MD  Signed: 1/21/2022 8:10 PM  Workstation Name: [facility]  [facility]iology [facility] Venous Doppler Lower Extremity Bilateral (duplex)    Result Date: 1/22/2022  No deep venous thrombus seen in either lower extremity. Signer Name: Nick Vines MD  Signed: 1/22/2022 3:56 PM  Workstation [facility]: [facility]  [facility]    I have personally looked at the radiology images and read the final radiology report.    Assessment & Plan    Debility after recent esophageal rupture with Kamari drain in place and recent esophageal stent placement.  Surgeon recommends leaving Kamari tube in place till esophageal stent can be removed in 4 weeks.  Patient is currently completing course of IV micafungin.  Patient receiving nutrition via J-tube at this time.  Participated with physical occupational therapy on 2/14/2022: Perform transfer activities with minimal assist to contact-guard with verbal/nonverbal cues; utilize front wheeled walker for sit to stand/stand to sit transfer; ambulated 160 feet and 100 feet with front wheeled walker and contact-guard assist with verbal/nonverbal cues; participated with functional endurance therapeutic exercise/ROM; gross motor coordination activities; required set up assist for grooming activities    Dry mouth with thrush, MMW  ordered    Nutrition continue J-tube feeds    Hyponatremia--water restriction ordered, Na improved    Diabetes mellitus continue sliding scale coverage    Hypertension controlled    History of CAD stable    VTE Prophylaxis:   Mechanical Order History:     None      Pharmalogical Order History:      Ordered     Dose Route Frequency Stop    02/10/22 9656  enoxaparin (LOVENOX) syringe 40 mg         40 mg SC Every 24 Hours --              Sylvia Talley MD  02/15/22  12:04 EST

## 2022-02-15 NOTE — PLAN OF CARE
Problem: Rehabilitation (IRF) Plan of Care  Goal: Absence of New-Onset Illness or Injury  Intervention: Prevent Fall and Fall Injury  Recent Flowsheet Documentation  Taken 2/15/2022 0200 by Siddharth Tena RN  Safety Promotion/Fall Prevention: safety round/check completed  Taken 2/15/2022 0000 by Siddharth Tena RN  Safety Promotion/Fall Prevention: safety round/check completed  Taken 2/14/2022 2200 by Siddharth Tena RN  Safety Promotion/Fall Prevention: safety round/check completed  Taken 2/14/2022 2020 by Siddharth Tena RN  Safety Promotion/Fall Prevention: safety round/check completed     Problem: Rehabilitation (IRF) Plan of Care  Goal: Absence of New-Onset Illness or Injury  Intervention: Prevent VTE (Venous Thromboembolism)  Recent Flowsheet Documentation  Taken 2/14/2022 2020 by Siddharth Tena, RN  VTE Prevention/Management: (see mar.)   bleeding risk factor(s) identified   other (see comments)     Problem: Skin Injury Risk Increased  Goal: Skin Health and Integrity  Outcome: Ongoing, Progressing  Intervention: Optimize Skin Protection  Recent Flowsheet Documentation  Taken 2/14/2022 2020 by Siddharth Tena, PARAMJIT  Pressure Reduction Techniques:   frequent weight shift encouraged   pressure points protected  Pressure Reduction Devices: pressure-redistributing mattress utilized  Skin Protection:   adhesive use limited   incontinence pads utilized  Intervention: Promote and Optimize Oral Intake  Recent Flowsheet Documentation  Taken 2/14/2022 2020 by Siddharth Tena, RN  Oral Nutrition Promotion: physical activity promoted   Goal Outcome Evaluation:  Plan of Care Reviewed With: patient        Progress: improving

## 2022-02-15 NOTE — PROGRESS NOTES
Rehabilitation Nursing  Inpatient Rehabilitation Plan of Care Note    Plan of Care  Copy from POC    Pain    Pain Management (Active)  Current Status (2/10/2022 3:38:00 PM): Potential for pain  Weekly Goal: No pain this week  Discharge Goal: No pain    Safety    Potential for Injury (Active)  Current Status (2/10/2022 3:38:00 PM): At risk for injury  Weekly Goal: No injury this week  Discharge Goal: No injuries    Pain    Pain Management (Active)  Current Status (2/10/2022 3:38:00 PM): Potential for pain  Weekly Goal: No pain this week  Discharge Goal: No pain    Safety    Potential for Injury (Active)  Current Status (2/10/2022 3:38:00 PM): At risk for injury  Weekly Goal: No injury this week  Discharge Goal: No injuries    Signed by: Batool Vasquez Nurse

## 2022-02-15 NOTE — PROGRESS NOTES
Occupational Therapy: Individual: 90 minutes.    Physical Therapy:    Speech Language Pathology:    Signed by: Elena Pisano, Occupational Therapist

## 2022-02-15 NOTE — THERAPY TREATMENT NOTE
Inpatient Rehabilitation - Occupational Therapy Treatment Note     [place]     Patient Name: Sandro Argueta  : 1945  MRN: [id number]    Today's Date: 2/15/2022                 Admit Date: 2/10/2022       No diagnosis found.    Patient Active Problem List   Diagnosis   • Left elbow pain   • Hypertension   • DM2 (diabetes mellitus, type 2) (HCC)   • Elevated prostate specific antigen (PSA)   • Epigastric pain   • Epigastric abdominal pain   • Elevated liver enzymes   • Weight loss, abnormal   • Acute hepatitis   • Anemia   • Obstructive jaundice   • Pneumothorax, left   • Cytokine release syndrome, grade 1   • Esophageal rupture       Past Medical History:   Diagnosis Date   • Acute UTI (urinary tract infection) 2021   • Arthritis    • Borderline diabetes    • Coronary artery disease    • Diabetes mellitus (HCC)    • Elevated prostate specific antigen (PSA) 2020   • GERD (gastroesophageal reflux disease)    • Hypertension    • Left elbow pain        Past Surgical History:   Procedure Laterality Date   • COLONOSCOPY     • ENDOSCOPY N/A 2021    Procedure: ESOPHAGOGASTRODUODENOSCOPY WITH BIOPSY dilitation;  Surgeon: Bela Mcdaniel MD;  Location: [facility] OR;  Service: Gastroenterology;  Laterality: N/A;   • ERCP N/A 2021    Procedure: ENDOSCOPIC RETROGRADE CHOLANGIOPANCREATOGRAPHY WITH STENT PLACEMENT;  Surgeon: Matthew Moreira MD;  Location: [facility] ENDOSCOPY;  Service: Gastroenterology;  Laterality: N/A;  with sphincterotomy, and brushing of common bile duct, and placement of [id number] wall stent     • PACEMAKER IMPLANTATION               IRF OT ASSESSMENT FLOWSHEET (last 12 hours)     IRF OT Evaluation and Treatment     Row Name 02/15/22 1215          OT Time and Intention    Document Type daily treatment  -CJ     Mode of Treatment occupational therapy  -CJ     Symptoms Noted During/After Treatment none  -CJ     Row Name 02/15/22 1215          General Information     Patient/Family/Caregiver Comments/Observations agreeable to therapy  -     Existing Precautions/Restrictions fall  Kamari drain, G and J tubes, decreased skin integrity  -     Row Name 02/15/22 1215          Transfers    Bed-Chair [place] (Transfers) minimum assist (75% patient effort); verbal cues  -     Chair-Bed [place] (Transfers) minimum assist (75% patient effort); contact guard; verbal cues  -     Assistive Device (Bed-Chair Transfers) wheelchair  -     Row Name 02/15/22 1215          Chair-Bed Transfer    Assistive Device (Chair-Bed Transfers) wheelchair  -     Row Name 02/15/22 1215          Motor Skills    Motor Skills functional endurance  -     Motor Control/Coordination Interventions therapeutic exercise/ROM; gross motor coordination activities  BUE ther ex/act, AROM, GMC, bilat coord ex  -     Therapeutic Exercise --  dowel ex- 15 reps X 2, 0 lbs  -     Row Name 02/15/22 1215          Grooming    [place] Level (Grooming) set up  -     Row Name 02/15/22 1215          Toileting    [place] Level (Toileting) minimum assist (75% patient effort); verbal cues  -     Assistive Device Use (Toileting) urinal  -     Row Name 02/15/22 1215          Positioning and Restraints    Post Treatment Position bed  -     In Bed call light within reach; encouraged to call for assist; notified ns  -           User Key  (r) = Recorded By, (t) = Taken By, (c) = Cosigned By    Initials Name Effective Dates     Elena Pisano OT 06/16/21 -                  Occupational Therapy Education                 Title: PT OT SLP Therapies (Done)     Topic: Occupational Therapy (Done)     Point: ADL training (Done)     Description:   Instruct learner(s) on proper safety adaptation and remediation techniques during self care or transfers.   Instruct in proper use of assistive devices.              Learning Progress Summary           Patient Acceptance, E,D, VU,NR by  at 2/15/2022 1221       Show all documentation for this point (3)                 Point: Precautions (Done)     Description:   Instruct learner(s) on prescribed precautions during self-care and functional transfers.              Learning Progress Summary           Patient Acceptance, E,D, VU,NR by  at 2/15/2022 1221      Show all documentation for this point (3)                             User Key     Initials Effective Dates Name Provider Type Discipline     06/16/21 -  Elena Pisano OT Occupational Therapist OT                    OT Recommendation and Plan    Planned Therapy Interventions (OT): activity tolerance training, adaptive equipment training, BADL retraining, ROM/therapeutic exercise, strengthening exercise, transfer/mobility retraining, patient/caregiver education/training                    Time Calculation:      Time Calculation- OT     Row Name 02/15/22 1221             Time Calculation- OT    OT Start Time 0800  -      OT Stop Time 0930  -      OT Time Calculation (min) 90 min  -      Total Timed Code Minutes- OT 90 minute(s)  -            User Key  (r) = Recorded By, (t) = Taken By, (c) = Cosigned By    Initials Name Provider Type     Elena Pisano OT Occupational Therapist              Therapy Charges for Today     Code Description Service Date Service Provider Modifiers Qty    57106333042 HC OT SELF CARE/MGMT/TRAIN EA 15 MIN 2/14/2022 Elena Pisano OT GO 1    75038489382 HC OT THER PROC EA 15 MIN 2/14/2022 Elena Pisano OT GO 3    71609613128 HC OT THERAPEUTIC ACT EA 15 MIN 2/14/2022 Elena Pisano OT GO 2    64458738912 HC OT SELF CARE/MGMT/TRAIN EA 15 MIN 2/15/2022 Elena Pisano OT GO 2    64307309961 HC OT THER PROC EA 15 MIN 2/15/2022 Elena Pisano OT GO 2    69804118245 HC OT THERAPEUTIC ACT EA 15 MIN 2/15/2022 Elena Pisano, OT GO 2                   Elena Pisano OT  2/15/2022

## 2022-02-16 ENCOUNTER — APPOINTMENT (OUTPATIENT)
Dept: GENERAL RADIOLOGY | Facility: HOSPITAL | Age: 77
End: 2022-02-16

## 2022-02-16 ENCOUNTER — APPOINTMENT (OUTPATIENT)
Dept: CT IMAGING | Facility: HOSPITAL | Age: 77
End: 2022-02-16

## 2022-02-16 LAB
GLUCOSE BLDC GLUCOMTR-MCNC: 142 MG/DL (ref 70–130)
GLUCOSE BLDC GLUCOMTR-MCNC: 178 MG/DL (ref 70–130)
GLUCOSE BLDC GLUCOMTR-MCNC: 288 MG/DL (ref 70–130)

## 2022-02-16 PROCEDURE — 25010000002 ENOXAPARIN PER 10 MG: Performed by: FAMILY MEDICINE

## 2022-02-16 PROCEDURE — 97116 GAIT TRAINING THERAPY: CPT

## 2022-02-16 PROCEDURE — 71046 X-RAY EXAM CHEST 2 VIEWS: CPT | Performed by: RADIOLOGY

## 2022-02-16 PROCEDURE — 63710000001 INSULIN ASPART PER 5 UNITS: Performed by: FAMILY MEDICINE

## 2022-02-16 PROCEDURE — 97110 THERAPEUTIC EXERCISES: CPT

## 2022-02-16 PROCEDURE — 97530 THERAPEUTIC ACTIVITIES: CPT

## 2022-02-16 PROCEDURE — 71046 X-RAY EXAM CHEST 2 VIEWS: CPT

## 2022-02-16 PROCEDURE — 71250 CT THORAX DX C-: CPT

## 2022-02-16 PROCEDURE — 71250 CT THORAX DX C-: CPT | Performed by: RADIOLOGY

## 2022-02-16 PROCEDURE — 94799 UNLISTED PULMONARY SVC/PX: CPT

## 2022-02-16 PROCEDURE — 82962 GLUCOSE BLOOD TEST: CPT

## 2022-02-16 PROCEDURE — 97535 SELF CARE MNGMENT TRAINING: CPT

## 2022-02-16 RX ADMIN — DIPHENHYDRAMINE HYDROCHLORIDE AND LIDOCAINE HYDROCHLORIDE AND ALUMINUM HYDROXIDE AND MAGNESIUM HYDRO 10 ML: KIT at 21:06

## 2022-02-16 RX ADMIN — DIPHENHYDRAMINE HYDROCHLORIDE AND LIDOCAINE HYDROCHLORIDE AND ALUMINUM HYDROXIDE AND MAGNESIUM HYDRO 10 ML: KIT at 15:27

## 2022-02-16 RX ADMIN — INSULIN ASPART 4 UNITS: 100 INJECTION, SOLUTION INTRAVENOUS; SUBCUTANEOUS at 08:11

## 2022-02-16 RX ADMIN — BETAMETHASONE DIPROPIONATE: 0.5 CREAM TOPICAL at 08:12

## 2022-02-16 RX ADMIN — PANTOPRAZOLE SODIUM 40 MG: 40 INJECTION, POWDER, FOR SOLUTION INTRAVENOUS at 05:25

## 2022-02-16 RX ADMIN — METOPROLOL TARTRATE 25 MG: 25 TABLET ORAL at 08:12

## 2022-02-16 RX ADMIN — BETAMETHASONE DIPROPIONATE: 0.5 CREAM TOPICAL at 21:10

## 2022-02-16 RX ADMIN — METOPROLOL TARTRATE 25 MG: 25 TABLET ORAL at 21:06

## 2022-02-16 RX ADMIN — OXYCODONE 5 MG: 5 TABLET ORAL at 05:33

## 2022-02-16 RX ADMIN — INSULIN ASPART 2 UNITS: 100 INJECTION, SOLUTION INTRAVENOUS; SUBCUTANEOUS at 17:07

## 2022-02-16 RX ADMIN — ENOXAPARIN SODIUM 40 MG: 40 INJECTION SUBCUTANEOUS at 18:27

## 2022-02-16 RX ADMIN — OXYCODONE 5 MG: 5 TABLET ORAL at 21:06

## 2022-02-16 NOTE — PROGRESS NOTES
[facility]  PROGRESS NOTE     Patient Identification:  Name:  Sandro Argueta  Age:  76 y.o.  Sex:  male  :  1945  MRN:  [id number]  Visit Number:  [id number]  ROOM: [facility]     Primary Care Provider:  Misti Nguyễn MD    Length of stay in inpatient status:  6    Subjective     Chief Compliant: Esophageal rupture    History of Presenting Illness: Patient 76-year-old gentleman history of hypertension, coronary disease, diabetes mellitus and GERD.  Patient had recent esophageal perforation with leakage of GI contents into the thoracic cavity.  Patient did require multiple chest tubes and was treated with broad-spectrum antibiotics as well as micafungin.  Patient did have repair of the esophagus and eventual esophageal stent placement because of continued leakage.  Patient has J-tube in place and also has a Kamari tube in place at this time.     Participated with physical and occupational therapy on 2/15/2022: Performs transfer activities with minimal assist and verbal/nonverbal cues; utilized wheelchair/rolled walker for chair to bed, sit to stand/stand to sit transfer; ambulated 80 feet with front wheeled walker and contact-guard assist with verbal/nonverbal cues; required set up assist for grooming activity; minimal assist with verbal cues for toileting activities      Objective     Current Hospital Meds:betamethasone augmented + lubriderm, , Topical, Q12H  enoxaparin, 40 mg, Subcutaneous, Q24H  First Mouthwash (Magic Mouthwash), 10 mL, Swish & Spit, Q6H  insulin aspart, 0-7 Units, Subcutaneous, TID AC  metoprolol tartrate, 25 mg, Per J Tube, Q12H  pantoprazole, 40 mg, Intravenous, Q AM       ----------------------------------------------------------------------------------------------------------------------  Vital Signs:  Temp:  [98.3 °F (36.8 °C)-98.7 °F (37.1 °C)] 98.7 °F (37.1 °C)  Heart Rate:  [78-92] 78  Resp:  [18-20] 20  BP: (130-132)/(63-66) 130/66  SpO2:  [93 %-95 %] 94 %  on   ;    Device (Oxygen Therapy): room air  Body mass index is 18.49 kg/m².    Wt Readings from Last 3 Encounters:   02/10/22 66.7 kg (147 lb)   01/22/22 75.9 kg (167 lb 4.8 oz)   12/29/21 79.4 kg (175 lb)     Intake & Output (last 3 days)       02/13 0701  02/14 0700 02/14 0701  02/15 0700 02/15 0701 02/16 0700 02/16 0701 02/17 0700    P.O. 75 65 120 240    Other 341 506 575     NG/ 328 1056     IV Piggyback 600       Total Intake(mL/kg) 1740 (26.1) 1511 (22.7) 1698 (25.5) 240 (3.6)    Urine (mL/kg/hr) 375 (0.2) 250 (0.2) 400 (0.2) 400 (0.9)    Emesis/NG output 2 1 30     Drains 105 30 30     Stool 0  0     Total Output 482 281 460 400    Net +1258 +1230 +1238 -160            Urine Unmeasured Occurrence 5 x 5 x 6 x 1 x    Stool Unmeasured Occurrence 1 x  2 x         No diet orders on file  ----------------------------------------------------------------------------------------------------------------------  Physical exam:  Constitutional:   No acute distress, slightly depressed but denies depression  HEENT: Normocephalic atraumatic  Neck: Supple   Cardiovascular: Regular rate and rhythm  Pulmonary/Chest: Clear to auscultation; Kamari tube in place  Abdominal: Positive bowel sounds soft; G-tube and J-tube in place.     Musculoskeletal: No arthropathy  Neurological: No focal deficits  Skin: No rash    ----------------------------------------------------------------------------------------------------------------------    Last echocardiogram:  Results for orders placed during the hospital encounter of 07/29/20    Adult Transthoracic Echo Complete W/ Cont if Necessary Per Protocol    Interpretation Summary  · Left ventricular wall thickness is consistent with mild concentric hypertrophy.  · Left ventricular systolic function is normal.  · Estimated EF appears to be in the range of 61 - 65%.  · Left ventricular diastolic dysfunction (grade I) consistent with impaired relaxation.  · Normal right ventricular cavity size  and systolic function noted. Electronic lead present in the ventricle.  · Normal cardiac chambers dimensions.  · Mild aortic valve regurgitation is present.  · Mild mitral valve regurgitation is present  · Mild tricuspid valve regurgitation is present. No evidence of pulmonary hypertension is present.  · There is no evidence of pericardial effusion.    ----------------------------------------------------------------------------------------------------------------------  Results from last 7 days   Lab Units 02/14/22 0119 02/13/22 0010 02/11/22 0109   WBC 10*3/mm3 10.86* 9.70 10.20   HEMOGLOBIN g/dL 9.9* 9.6* 10.4*   HEMATOCRIT % 31.3* 30.9* 33.1*   MCV fL 86.7 87.8 87.3   MCHC g/dL 31.6 31.1* 31.4*   PLATELETS 10*3/mm3 442 353 473*         Results from last 7 days   Lab Units 02/14/22 0119 02/13/22 0010 02/11/22 0109   SODIUM mmol/L 127* 123* 132*   POTASSIUM mmol/L 4.8 4.5 4.2   CHLORIDE mmol/L 94* 90* 96*   CO2 mmol/L 24.3 25.3 25.9   BUN mg/dL 9 11 16   CREATININE mg/dL 0.63* 0.75* 0.63*   EGFR IF NONAFRICN AM mL/min/1.73 124 102 124   CALCIUM mg/dL 7.8* 7.9* 7.6*   GLUCOSE mg/dL 281* 291* 176*   Estimated Creatinine Clearance: 75.3 mL/min (A) (by C-G formula based on SCr of 0.63 mg/dL (L)).  No results found for: AMMONIA              Glucose   Date/Time Value Ref Range Status   02/16/2022 1111 142 (H) 70 - 130 mg/dL Final     Comment:     Meter: [id number] : [id number] Jane Aurora   02/16/2022 0550 288 (H) 70 - 130 mg/dL Final     Comment:     Meter: [id number] : [id number] [facility]   02/15/2022 1603 156 (H) 70 - 130 mg/dL Final     Comment:     Meter: [id number] : [id number] crystal art   02/15/2022 1138 185 (H) 70 - 130 mg/dL Final     Comment:     Meter: [id number] : [id number] crystal art   02/15/2022 0638 292 (H) 70 - 130 mg/dL Final     Comment:     Meter: [id number] : [id number] Caty Crystal   02/14/2022 1636 163 (H) 70 - 130 mg/dL Final     Comment:     Meter:  [id number] : [id number] VANE GROVES   02/14/2022 1151 203 (H) 70 - 130 mg/dL Final     Comment:     Meter: [id number] : [id number] VANE GROVES   02/14/2022 0640 215 (H) 70 - 130 mg/dL Final     Comment:     Meter: [id number] : [id number] Dionte Pierce     Lab Results   Component Value Date    TSH 1.620 06/27/2021     No results found for: PREGTESTUR, PREGSERUM, HCG, HCGQUANT  Pain Management Panel    There is no flowsheet data to display.       Brief Urine Lab Results  (Last result in the past 365 days)      Color   Clarity   Blood   Leuk Est   Nitrite   Protein   CREAT   Urine HCG        01/21/22 2149 Yellow   Clear   Negative   Negative   Negative   Trace               No results found for: BLOODCX      No results found for: URINECX  No results found for: WOUNDCX  No results found for: STOOLCX        I have personally looked at the labs and they are summarized above.  ----------------------------------------------------------------------------------------------------------------------  Detailed radiology reports for the last 24 hours:    Imaging Results (Last 24 Hours)     Procedure Component Value Units Date/Time    CT Chest Without Contrast Diagnostic [[id number]] Collected: 02/16/22 1247     Updated: 02/16/22 1303    Addenda:        Compared with previous study this represents a fairly moderate increase  in size of the empyema.     This report was finalized on 2/16/2022 1:01 PM by Dr. Jarek Flores MD.     Signed: 02/16/22 1301 by Jarek Flores MD    Narrative:      EXAM:    CT Chest Without Intravenous Contrast     EXAM DATE:    2/16/2022 12:31 PM     CLINICAL HISTORY:    worsening     TECHNIQUE:    Axial computed tomography images of the chest without intravenous  contrast.  Sagittal and coronal reformatted images were created and  reviewed.  This CT exam was performed using one or more of the following  dose reduction techniques:  automated exposure control, adjustment of  the mA and/or  kV according to patient size, and/or use of iterative  reconstruction technique.     COMPARISON:    01/22/2022     FINDINGS:    LUNGS: Minimal patchy left lower lobe atelectasis.    PLEURAL SPACE:  Bilobed loculated effusion of the left lower pleural  space measuring about 10 cm with internal air, as well as the left upper  lobe pleural space measuring about 4.4 cm and in total length about 16  cm.  Small right pleural effusion.    HEART:  Unremarkable.  No cardiomegaly.  No significant pericardial  effusion.  No significant coronary artery calcifications.    MEDIASTINUM:  Esophageal stent.    BONES/JOINTS:  Unremarkable.  No acute fracture.  No dislocation.    SOFT TISSUES:  Unremarkable.    VASCULATURE:  Unremarkable.  No thoracic aortic aneurysm.    LYMPH NODES:  Unremarkable.  No enlarged lymph nodes.    TUBES, LINES AND DEVICES:  Indwelling chest tube noted.       Impression:      1.  Bilobed loculated effusion of the left lower pleural space measuring  about 10 cm with internal air, as well as the left upper lobe pleural  space measuring about 4.4 cm and in total length about 16 cm.  2.  Small right pleural effusion.     This report was finalized on 2/16/2022 12:49 PM by Dr. Jarek Flores MD.       XR Chest PA & Lateral [[id number]] Collected: 02/16/22 1053     Updated: 02/16/22 1056    Narrative:      EXAM:    XR Chest, 2 Views     CLINICAL HISTORY:    Please compare to CXR from 1/22/22, empyema     TECHNIQUE:    Frontal and lateral views of the chest.     COMPARISON:    02/22/2022     FINDINGS:    LUNGS: Left lower lobe airspace opacity.    PLEURAL SPACE:  Loculated pleural collection likely measuring 3.6 and  4.9 cm of the left hemithorax. Some locules of air noted within the  collection concerning for empyema.  No pneumothorax.    HEART:  Unremarkable.  No cardiomegaly.    MEDIASTINUM:  Esophageal stent noted.    BONES/JOINTS:  Unremarkable.    TUBES, LINES AND DEVICES:  Right central line with tip in SVC.    Cardiac defibrillator noted.  Probable left surgical drain near the left  lung base.       Impression:        Loculated pleural collection likely measuring 3.6 and 4.9 cm of the  left hemithorax. Some locules of air noted within the collection  concerning for empyema.     This report was finalized on 2/16/2022 10:54 AM by Dr. Jarek Flores MD.           Final impressions for the last 30 days of radiology reports:    CT Chest Without Contrast Diagnostic    Addendum Date: 2/16/2022    Compared with previous study this represents a fairly moderate increase in size of the empyema.  This report was finalized on 2/16/2022 1:01 PM by Dr. Jarek Flores MD.      Result Date: 2/16/2022  1.  Bilobed loculated effusion of the left lower pleural space measuring about 10 cm with internal air, as well as the left upper lobe pleural space measuring about 4.4 cm and in total length about 16 cm. 2.  Small right pleural effusion.  This report was finalized on 2/16/2022 12:49 PM by Dr. Jarek Flores MD.      CT Chest Without Contrast Diagnostic    Result Date: 1/22/2022  1.  Transmural perforation of the distal thoracic esophagus communicating with the left basilar pleural space. Orally administered water-soluble contrast fills the left basilar pleural space and is visible within the left side chest tube. 2.  The exact site for esophageal perforation is not demonstrated on this study. There is very little contrast within the esophagus, and only a faint amount of contrast along the left side of the distal thoracic esophagus. 3.  Distal paraesophageal pneumomediastinum. 4.  Small left hydropneumothorax. No right pneumothorax. 5.  Consolidation and volume loss involving basal segments left lower lobe. Right posterior basilar atelectasis. Small right pleural effusion. Signer Name: Celestino Herrera MD  Signed: 1/22/2022 6:54 PM  Workstation Name: [facility] [email or web address]  [facility]    CT Chest With Contrast  Diagnostic    Result Date: 1/21/2022  1. There is a small left pneumothorax but there is at least a moderate-sized left-sided pleural effusion and there is a small amount of pneumomediastinum with shift of mediastinal structures from left to right. A chest tube is being placed at time of this dictation as described below. 2. Probable right lung atelectasis. 3. No acute finding in the upper abdomen. Abdomen and pelvis CT dictated separately. NOTIFICATION: Critical Value/emergent results were called by telephone at the time of interpretation on 1/21/2022 8:55 PM to ASTER Boudreaux who verbally acknowledged these results. Dr. Villafuerte was in the process of placing a chest tube in the patient at the time of my call. Signer Name: Nick Vines MD  Signed: 1/21/2022 8:59 PM  Workstation Name: [facility]  [facility]    CT Abdomen Pelvis With Contrast    Result Date: 1/21/2022  1. There is a left-sided effusion that is at least moderate in size. There is a small left pneumothorax and there is trace pneumomediastinum and subtle shift of mediastinal structures from left to right. Please see the separately dictated CT chest for further details. The emergency department is aware of these findings and a chest tube was in the process of being placed at the time of interpretation of the CT chest. 2. The stomach is distended with air fluid and debris and there are new postoperative changes of the stomach compared to the prior study. Correlate with any signs or symptoms of gastric outlet obstruction and the patient may benefit from NG tube placement. 3. No evidence of bowel obstruction or drainable fluid collection. 4. Normal appendix. 5. Diverticulosis. 6. Hepatic steatosis. 7. Tiny subcentimeter enhancing lesion in the spleen, favored to be benign but technically indeterminate. 8. Bilateral renal cysts. 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 Signer Name: Nick Vines MD  Signed: 1/21/2022  10:16 PM  Workstation Name: [facility]  [facility]    FL Esophagram Complete Single Contrast    Result Date: 1/22/2022  1.  Chest x-ray series with water-soluble oral contrast shows evidence of likely distal esophageal rupture with oral contrast appearing in the left basilar pleural space. Please see chest CT to follow. 2.  Left lower lobe consolidation and atelectasis. Mild infiltrate and volume loss in the right lung base. Small right pleural effusion. 3.  Left-sided hydropneumothorax with chest tube in place. Signer Name: Celestino Herrera MD  Signed: 1/22/2022 6:46 PM  Workstation [facility]: [facility]  [facility]    XR Chest 1 View    Result Date: 1/22/2022  1. Left sided chest tube remains present along with a small left pneumothorax, stable to slightly larger when compared with the prior study but this may be related to technical factors. 2. Worsening appearance of the left lung base with increasing pleural fluid and consolidation. No right-sided pneumothorax. Signer Name: Nick Vines MD  Signed: 1/22/2022 5:28 PM  Workstation Name: [facility]    XR Chest 1 View    Result Date: 1/21/2022  Interval placement of left chest tube. Persistent small left pneumothorax. Persistent airspace disease in the left chest. Signer Name: LIDIA GAY MD  Signed: 1/21/2022 9:32 PM  Workstation Name: [facility]    XR Chest 1 View    Addendum Date: 1/21/2022    //////////// ADDENDUM #1 //////////// ADDENDUM: Favor there is a small subtle left apical pneumothorax. This is confirmed by CT. Spoke directly to Dr. Scott Villafuerte in the ER at 8:36 PM 1/21/2022 to relay this finding. Signer Name: LIDIA GAY MD  Signed: 1/21/2022 8:38 PM  Workstation Name: [facility]  [facility]////////////  ORIGINAL REPORT //////////// CR Chest 1 Vw INDICATION: Upper back pain COMPARISON:  6/27/2021  FINDINGS: Portable AP view(s) of the chest.  Normal cardiomediastinal contours. Dense consolidation in the left midlung especially the left base, most consistent with a dense lobar pneumonia. Small left pleural effusion.     Result Date: 1/21/2022  Dense consolidation in the left midlung especially the left base, most consistent with a dense lobar pneumonia. Small left pleural effusion. Signer Name: LIDIA GAY MD  Signed: 1/21/2022 8:10 PM  Workstation Name: [facility]  [facility]    US Venous Doppler Lower Extremity Bilateral (duplex)    Result Date: 1/22/2022  No deep venous thrombus seen in either lower extremity. Signer Name: Nick Vines MD  Signed: 1/22/2022 3:56 PM  Workstation Name: [facility]  [facility]    XR Chest PA & Lateral    Result Date: 2/16/2022    Loculated pleural collection likely measuring 3.6 and 4.9 cm of the left hemithorax. Some locules of air noted within the collection concerning for empyema.  This report was finalized on 2/16/2022 10:54 AM by Dr. Jarek Flores MD.      I have personally looked at the radiology images and read the final radiology report.    Assessment & Plan    Debility after recent esophageal rupture with Kamari drain in place and recent esophageal stent placement.  Surgeon recommends leaving Kamari tube in place till esophageal stent can be removed in 4 weeks.  Patient is currently completing course of IV micafungin.  Patient receiving nutrition via J-tube at this time.    CXR and CT chest repeated and discussed with pulmonary and radiologist    Participated with physical and occupational therapy on 2/15/2022: Performs transfer activities with minimal assist and verbal/nonverbal cues; utilized wheelchair/rolled walker for chair to bed, sit to stand/stand to sit transfer; ambulated 80 feet with front wheeled walker and contact-guard assist with verbal/nonverbal cues; required set up assist for grooming activity;  minimal assist with verbal cues for toileting activities    Dry mouth with thrush, MMW ordered    Nutrition continue J-tube feeds    Hyponatremia--water restriction ordered, Na improved    Diabetes mellitus continue sliding scale coverage    Hypertension controlled    History of CAD stable    VTE Prophylaxis:   Mechanical Order History:     None      Pharmalogical Order History:      Ordered     Dose Route Frequency Stop    02/10/22 1558  enoxaparin (LOVENOX) syringe 40 mg         40 mg SC Every 24 Hours --              Sylvia Talley MD  02/16/22  13:46 EST

## 2022-02-16 NOTE — PROGRESS NOTES
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  Spouse,  children and son-in-law are supportive and will assist pt with needs.  Medical Necessity Expected Level Rationale: good  Intensity and Duration: an average of 3 hours/5 days per week  Medical Supervision and 24 Hour Rehab Nursing: x  Physical Therapy: x  PT Intensity/Duration: PT 1.5 hours per day/5 days per week  Occupational Therapy: x  OT Intensity/Duration: OT 1.5 hours per day/5 days per week  Social Work: x  Therapeutic Recreation: x  Updated (if changes indicated)    Anticipated Discharge Date/Estimated Length of Stay:   2-25-22      Discharge Plan of Care:    Based on the patient's medical and functional status, their prognosis and  expected level of functional improvement is: good      Interdisciplinary Problem/Goals/Status  Mobility    [PT] Bed/Chair/Wheelchair(Active)  Current Status(02/11/2022): min A  Weekly Goal(02/18/2022): Sup  Discharge Goal: Sup    [PT] Walk(Active)  Current Status(02/11/2022): amb 80' RW CGA  Weekly Goal(02/18/2022): amb 300' RW  Sup  Discharge Goal: amb 300' RW Sup        Pain    [RN] Pain Management(Active)  Current Status(02/10/2022): Potential for pain  Weekly Goal(02/25/2022): No pain this week  Discharge Goal: No pain        Safety    [RN] Potential for Injury(Active)  Current Status(02/10/2022): At risk for injury  Weekly Goal(02/25/2022): No injury this week  Discharge Goal: No injuries        Self Care    [OT] Dressing (Lower)(Active)  Current Status(02/11/2022): Mod/MaxA  Weekly Goal(02/22/2022): ModA  Discharge Goal: Jonny    Comments: Pt plans to return home with spouse at discharge.    Signed by: JOSIE Schwartz    Physician CoSigned By: Sylvia Talley 02/16/2022 07:05:30

## 2022-02-16 NOTE — THERAPY TREATMENT NOTE
Inpatient Rehabilitation - Occupational Therapy Treatment Note    [facility]     Patient Name: Sandro Argueta  : 1945  MRN: [id number]    Today's Date: 2022                 Admit Date: 2/10/2022       No diagnosis found.    Patient Active Problem List   Diagnosis   • Left elbow pain   • Hypertension   • DM2 (diabetes mellitus, type 2) (HCC)   • Elevated prostate specific antigen (PSA)   • Epigastric pain   • Epigastric abdominal pain   • Elevated liver enzymes   • Weight loss, abnormal   • Acute hepatitis   • Anemia   • Obstructive jaundice   • Pneumothorax, left   • Cytokine release syndrome, grade 1   • Esophageal rupture       Past Medical History:   Diagnosis Date   • Acute UTI (urinary tract infection) 2021   • Arthritis    • Borderline diabetes    • Coronary artery disease    • Diabetes mellitus (HCC)    • Elevated prostate specific antigen (PSA) 2020   • GERD (gastroesophageal reflux disease)    • Hypertension    • Left elbow pain        Past Surgical History:   Procedure Laterality Date   • COLONOSCOPY     • ENDOSCOPY N/A 2021    Procedure: ESOPHAGOGASTRODUODENOSCOPY WITH BIOPSY dilitation;  Surgeon: Bela Mcdaniel MD;  Location: [facility] OR;  Service: Gastroenterology;  Laterality: N/A;   • ERCP N/A 2021    Procedure: ENDOSCOPIC RETROGRADE CHOLANGIOPANCREATOGRAPHY WITH STENT PLACEMENT;  Surgeon: Matthew Moreira MD;  Location: [facility] ENDOSCOPY;  Service: Gastroenterology;  Laterality: N/A;  with sphincterotomy, and brushing of common bile duct, and placement of [id number] wall stent     • PACEMAKER IMPLANTATION               IRF OT ASSESSMENT FLOWSHEET (last 12 hours)     IRF OT Evaluation and Treatment     Row Name 22 0910          OT Time and Intention    Document Type daily treatment  -CJ     Mode of Treatment occupational therapy  -CJ     Symptoms Noted During/After Treatment fatigue  -CJ     Row Name 22 0910          General Information     Patient/Family/Caregiver Comments/Observations agreeable to therapy  -     Existing Precautions/Restrictions fall  Kamari drain, G and J tubes, decreased skin integrity  -     Row Name 02/16/22 0910          Transfers    Bed-Chair [place] (Transfers) minimum assist (75% patient effort); verbal cues  -     Assistive Device (Bed-Chair Transfers) wheelchair  -     Row Name 02/16/22 0910          Motor Skills    Motor Skills functional endurance  -     Motor Control/Coordination Interventions therapeutic exercise/ROM; gross motor coordination activities  BUE ther ex/ act, AROM, bilat coord ex, GMC  -     Therapeutic Exercise --  dowel wrist rolls X 2  -     Row Name 02/16/22 0910          Grooming    [place] Level (Grooming) set up  -     Row Name 02/16/22 0910          Toileting    [place] Level (Toileting) minimum assist (75% patient effort); verbal cues  -     Assistive Device Use (Toileting) urinal  -     Row Name 02/16/22 0910          Positioning and Restraints    Post Treatment Position bed  -     In Bed call light within reach; encouraged to call for assist; notified nsg  pm tx;  seen at bedside in pm  -     In Wheelchair with PT  am tx  -           User Key  (r) = Recorded By, (t) = Taken By, (c) = Cosigned By    Initials Name Effective Dates     Elena Pisano OT 06/16/21 -                  Occupational Therapy Education                 Title: PT OT SLP Therapies (Done)     Topic: Occupational Therapy (Done)     Point: ADL training (Done)     Description:   Instruct learner(s) on proper safety adaptation and remediation techniques during self care or transfers.   Instruct in proper use of assistive devices.              Learning Progress Summary           Patient Acceptance, E,D, VU,NR by  at 2/16/2022 0914      Show all documentation for this point (4)                 Point: Precautions (Done)     Description:   Instruct learner(s) on prescribed precautions  during self-care and functional transfers.              Learning Progress Summary           Patient Acceptance, E,D, VU,NR by  at 2/16/2022 0914      Show all documentation for this point (4)                             User Key     Initials Effective Dates Name Provider Type Discipline     06/16/21 -  Elena Pisano OT Occupational Therapist OT                    OT Recommendation and Plan    Planned Therapy Interventions (OT): activity tolerance training, adaptive equipment training, BADL retraining, ROM/therapeutic exercise, strengthening exercise, transfer/mobility retraining, patient/caregiver education/training                    Time Calculation:      Time Calculation- OT     Row Name 02/16/22 1449 02/16/22 1447          Time Calculation- OT    OT Start Time 1315  - 0800  -     OT Stop Time 1330  - 0915  -     OT Time Calculation (min) 15 min  - 75 min  -     Total Timed Code Minutes- OT 15 minute(s)  - 75 minute(s)  -           User Key  (r) = Recorded By, (t) = Taken By, (c) = Cosigned By    Initials Name Provider Type     Elena Pisano OT Occupational Therapist              Therapy Charges for Today     Code Description Service Date Service Provider Modifiers Qty    46324212942 HC OT SELF CARE/MGMT/TRAIN EA 15 MIN 2/15/2022 Elena Pisano, OT GO 2    35321141814 HC OT THER PROC EA 15 MIN 2/15/2022 Elena Pisano OT GO 2    00567238931 HC OT THERAPEUTIC ACT EA 15 MIN 2/15/2022 Elena Pisano OT GO 2    06067215141 HC OT SELF CARE/MGMT/TRAIN EA 15 MIN 2/16/2022 Elena Pisano OT GO 2    39468759958 HC OT THER PROC EA 15 MIN 2/16/2022 Elena Pisano OT GO 2    69739310832 HC OT THERAPEUTIC ACT EA 15 MIN 2/16/2022 Elena Pisano, OT GO 2                   Elena Pisano OT  2/16/2022

## 2022-02-16 NOTE — SIGNIFICANT NOTE
02/16/22 1611   Plan   Plan Spoke to spouse [phone] about how pt is doing in therapy and plans for discharge on 2-25-22.  Explained SS will assist with home health referral if ordered, tube feeding set-up and DME prior to discharge.  Pt will return home with spouse providing assistance.  Will follow.   Patient/Family in Agreement with Plan yes

## 2022-02-16 NOTE — PROGRESS NOTES
Rehabilitation Nursing  Inpatient Rehabilitation Plan of Care Note    Plan of Care  Pain    Pain Management (Active)  Current Status (2/10/2022 3:38:00 PM): Potential for pain  Weekly Goal: No pain this week  Discharge Goal: No pain    Safety    Potential for Injury (Active)  Current Status (2/10/2022 3:38:00 PM): At risk for injury  Weekly Goal: No injury this week  Discharge Goal: No injuries    Signed by: Charli Tan RN

## 2022-02-16 NOTE — THERAPY TREATMENT NOTE
Inpatient Rehabilitation - Physical Therapy Treatment Note        [facility]     Patient Name: Sandro Argueta  : 1945  MRN: [id number]    Today's Date: 2022                    Admit Date: 2/10/2022      Visit Dx:   No diagnosis found.    Patient Active Problem List   Diagnosis   • Left elbow pain   • Hypertension   • DM2 (diabetes mellitus, type 2) (HCC)   • Elevated prostate specific antigen (PSA)   • Epigastric pain   • Epigastric abdominal pain   • Elevated liver enzymes   • Weight loss, abnormal   • Acute hepatitis   • Anemia   • Obstructive jaundice   • Pneumothorax, left   • Cytokine release syndrome, grade 1   • Esophageal rupture       Past Medical History:   Diagnosis Date   • Acute UTI (urinary tract infection) 2021   • Arthritis    • Borderline diabetes    • Coronary artery disease    • Diabetes mellitus (HCC)    • Elevated prostate specific antigen (PSA) 2020   • GERD (gastroesophageal reflux disease)    • Hypertension    • Left elbow pain        Past Surgical History:   Procedure Laterality Date   • COLONOSCOPY     • ENDOSCOPY N/A 2021    Procedure: ESOPHAGOGASTRODUODENOSCOPY WITH BIOPSY dilitation;  Surgeon: Bela Mcdaniel MD;  Location: [facility];  Service: Gastroenterology;  Laterality: N/A;   • ERCP N/A 2021    Procedure: ENDOSCOPIC RETROGRADE CHOLANGIOPANCREATOGRAPHY WITH STENT PLACEMENT;  Surgeon: Matthew Moreira MD;  Location: [facility] ENDOSCOPY;  Service: Gastroenterology;  Laterality: N/A;  with sphincterotomy, and brushing of common bile duct, and placement of [id number] wall stent     • PACEMAKER IMPLANTATION         PT ASSESSMENT (last 12 hours)     IRF PT Evaluation and Treatment     Row Name 22 1452          PT Time and Intention    Document Type daily treatment  -RG     Mode of Treatment individual therapy; physical therapy  -RG     Patient/Family/Caregiver Comments/Observations Pt and nursing in agreement for skilled PT on this  date. Pt fatigued by second session.  -RG     Row Name 02/16/22 1452          General Information    Existing Precautions/Restrictions fall  ABD incision, G and J tubes, mart drain-all at left ABD  -RG     Row Name 02/16/22 1452          Cognition/Psychosocial    Affect/Mental Status (Cognitive) WFL  -RG     Orientation Status (Cognition) oriented x 3  -RG     Follows Commands (Cognition) WFL  -RG     Personal Safety Interventions gait belt; nonskid shoes/slippers when out of bed; fall prevention program maintained  -RG     Row Name 02/16/22 1452          Pain Scale: FACES Pre/Post-Treatment    Pain: FACES Scale, Pretreatment 4-->hurts little more  -RG     Posttreatment Pain Rating 4-->hurts little more  -RG     Row Name 02/16/22 1452          Transfers    Bed-Chair [place] (Transfers) minimum assist (75% patient effort)  -RG     Chair-Bed [place] (Transfers) minimum assist (75% patient effort)  -RG     Assistive Device (Bed-Chair Transfers) wheelchair  -RG     Sit-Stand [place] (Transfers) minimum assist (75% patient effort); verbal cues; nonverbal cues (demo/gesture); contact guard  -RG     Stand-Sit [place] (Transfers) minimum assist (75% patient effort); verbal cues; nonverbal cues (demo/gesture); contact guard  -RG     Row Name 02/16/22 1452          Chair-Bed Transfer    Assistive Device (Chair-Bed Transfers) wheelchair  -RG     Row Name 02/16/22 1452          Sit-Stand Transfer    Assistive Device (Sit-Stand Transfers) walker, front-wheeled  in // bars x 5 reps  -RG     Row Name 02/16/22 1452          Stand-Sit Transfer    Assistive Device (Stand-Sit Transfers) walker, front-wheeled  -     Row Name 02/16/22 1452          Stand Pivot/Stand Step Transfer    Stand Pivot/Stand Step [place] (Transfers) minimum assist (75% patient effort)  -     Assistive Device (Stand Pivot Stand Step Transfer) wheelchair  -     Row Name 02/16/22 1452          Gait/Stairs (Locomotion)     [place] Level (Gait) contact guard; verbal cues; nonverbal cues (demo/gesture)  -RG     Assistive Device (Gait) walker, front-wheeled  -RG     Distance in Feet (Gait) 160, 160  -RG     Deviations/Abnormal Patterns (Gait) [place] decreased; gait speed decreased; stride length decreased  -RG     Bilateral Gait Deviations forward flexed posture  -RG     Comment (Gait/Stairs) rest breaks as needed  -RG     Row Name 02/16/22 1452          Hip (Therapeutic Exercise)    Hip Strengthening (Therapeutic Exercise) bilateral; flexion; aBduction; aDduction; external rotation; internal rotation; heel slides; marching while seated; sitting; supine; resistance band; green; 10 repetitions; 2 sets  -RG     Row Name 02/16/22 1452          Knee (Therapeutic Exercise)    Knee Strengthening (Therapeutic Exercise) bilateral; flexion; extension; heel slides; marching while seated; SAQ (short arc quad); LAQ (long arc quad); hamstring curls; sitting; supine; resistance band; green; 10 repetitions; 2 sets  -RG     Row Name 02/16/22 1452          Ankle (Therapeutic Exercise)    Ankle Strengthening (Therapeutic Exercise) bilateral; dorsiflexion; plantarflexion; sitting; supine; 10 repetitions; 2 sets  -RG     Row Name 02/16/22 1452          IRF PT Goals    Bed Mobility Goal Selection (PT-IRF) bed mobility, PT goal 1  -RG     Transfer Goal Selection (PT-IRF) transfers, PT goal 1  -RG     Gait (Walking Locomotion) Goal Selection (PT-IRF) gait, PT goal 1  -RG     Row Name 02/16/22 1452          Bed Mobility Goal 1 (PT-IRF)    Activity/Assistive Device (Bed Mobility Goal 1, PT-IRF) sit to supine/supine to sit  -RG     [place] Level (Bed Mobility Goal 1, PT-IRF) supervision required  -RG     Time Frame (Bed Mobility Goal 1, PT-IRF) by discharge  -RG     Row Name 02/16/22 1452          Transfer Goal 1 (PT-IRF)    Activity/Assistive Device (Transfer Goal 1, PT-IRF) sit-to-stand/stand-to-sit; bed-to-chair/chair-to-bed  -RG     [place]  Level (Transfer Goal 1, PT-IRF) supervision required  -RG     Time Frame (Transfer Goal 1, PT-IRF) by discharge  -RG     Row Name 02/16/22 1452          Gait/Walking Locomotion Goal 1 (PT-IRF)    Activity/Assistive Device (Gait/Walking Locomotion Goal 1, PT-IRF) walker, rolling  -RG     Gait/Walking Locomotion Distance Goal 1 (PT-IRF) 300'  -RG     [place] Level (Gait/Walking Locomotion Goal 1, PT-IRF) supervision required  -RG     Time Frame (Gait/Walking Locomotion Goal 1, PT-IRF) by discharge  -RG     Row Name 02/16/22 1452          Positioning and Restraints    Pre-Treatment Position in bed  -RG     Post Treatment Position bed  -RG     In Bed notified nsg; supine; call light within reach; encouraged to call for assist; legs elevated  -RG     Row Name 02/16/22 1452          Therapy Assessment/Plan (PT)    Patient's Goals For Discharge return home  -RG     Row Name 02/16/22 1452          Therapy Assessment/Plan (PT)    Rehab Potential/Prognosis (PT) adequate, monitor progress closely  -RG     Frequency of Treatment (PT) 5 times per week  -RG     Estimated Duration of Therapy (PT) 2 weeks  -RG     Problem List (PT) balance; mobility; strength; pain; postural control  -RG     Activity Limitations Related to Problem List (PT) unable to ambulate safely; unable to transfer safely  -RG     Row Name 02/16/22 1452          Therapy Plan Review/Discharge Plan (PT)    Anticipated Equipment Needs at Discharge (PT Eval) --  tbd  -RG     Expected Discharge Disposition (PT Eval) home with home health care; home with caregiver  -RG           User Key  (r) = Recorded By, (t) = Taken By, (c) = Cosigned By    Initials Name Provider Type    RG Jose Martin Hernández PTA Physical Therapy Assistant              Wound 02/10/22 155 midline abdomen Incision (Active)   Dressing Appearance open to air 02/16/22 0710   Closure Staples 02/15/22 2000   Base pink; red; clean 02/15/22 2000   Edges rolled/closed 02/15/22 2000   Drainage Amount none  02/15/22 2000   Dressing Care open to air 02/16/22 0710       Wound 02/10/22 1602 Left upper back Incision (Active)   Dressing Appearance open to air 02/16/22 0710   Closure Liquid skin adhesive 02/15/22 2000   Base clean 02/15/22 2000   Drainage Amount none 02/15/22 2000   Dressing Care open to air 02/16/22 0710     Physical Therapy Education                 Title: PT OT SLP Therapies (Done)     Topic: Physical Therapy (Done)     Point: Mobility training (Done)     Learning Progress Summary           Patient Acceptance, E,D, VU,NR by  at 2/16/2022 1501      Show all documentation for this point (5)                 Point: Home exercise program (Done)     Learning Progress Summary           Patient Acceptance, E,D, VU,NR by  at 2/16/2022 1501      Show all documentation for this point (5)                 Point: Body mechanics (Done)     Learning Progress Summary           Patient Acceptance, E,D, VU,NR by  at 2/16/2022 1501      Show all documentation for this point (5)                 Point: Precautions (Done)     Learning Progress Summary           Patient Acceptance, E,D, VU,NR by  at 2/16/2022 1501      Show all documentation for this point (5)                             User Key     Initials Effective Dates Name Provider Type Discipline     06/16/21 -  Jose Martin Hernández, PTA Physical Therapy Assistant PT                PT Recommendation and Plan    Frequency of Treatment (PT): 5 times per week  Anticipated Equipment Needs at Discharge (PT Eval):  (tbd)                  Time Calculation:      PT Charges     Row Name 02/16/22 1502 02/16/22 1501          Time Calculation    Start Time 1355  - 0915  -     Stop Time 1440  -RG 1000  -RG     Time Calculation (min) 45 min  -RG 45 min  -RG     PT Received On 02/16/22  -RG 02/16/22  -RG            Time Calculation- PT    Total Timed Code Minutes- PT 45 minute(s)  -RG 45 minute(s)  -RG           User Key  (r) = Recorded By, (t) = Taken By, (c) = Cosigned By     Initials Name Provider Type    Jose Martin Jordan PTA Physical Therapy Assistant                Therapy Charges for Today     Code Description Service Date Service Provider Modifiers Qty    39283122542 HC GAIT TRAINING EA 15 MIN 2/15/2022 Jose Martin Hernández, ZACKARY GP, CQ 1    90819077760 HC PT THERAPEUTIC ACT EA 15 MIN 2/15/2022 Jose Martin Hernández PTA GP, CQ 2    34383482221 HC PT THER PROC EA 15 MIN 2/15/2022 Jose Martin Hernández PTA GP, CQ 3    42812120896 HC GAIT TRAINING EA 15 MIN 2/16/2022 Jose Martin Hernández PTA GP, CQ 1    54531228148 HC PT THERAPEUTIC ACT EA 15 MIN 2/16/2022 Jose Martin Hernández PTA GP, CQ 2    62248714594 HC PT THER PROC EA 15 MIN 2/16/2022 Jose Martin Hernández PTA GP, CQ 3                   Jose Martin Hernández PTA  2/16/2022

## 2022-02-17 LAB
GLUCOSE BLDC GLUCOMTR-MCNC: 167 MG/DL (ref 70–130)
GLUCOSE BLDC GLUCOMTR-MCNC: 172 MG/DL (ref 70–130)
GLUCOSE BLDC GLUCOMTR-MCNC: 207 MG/DL (ref 70–130)

## 2022-02-17 PROCEDURE — 63710000001 INSULIN ASPART PER 5 UNITS: Performed by: FAMILY MEDICINE

## 2022-02-17 PROCEDURE — 97116 GAIT TRAINING THERAPY: CPT

## 2022-02-17 PROCEDURE — 97530 THERAPEUTIC ACTIVITIES: CPT

## 2022-02-17 PROCEDURE — 94799 UNLISTED PULMONARY SVC/PX: CPT

## 2022-02-17 PROCEDURE — 25010000002 ALTEPLASE 2 MG RECONSTITUTED SOLUTION: Performed by: INTERNAL MEDICINE

## 2022-02-17 PROCEDURE — 25010000002 ENOXAPARIN PER 10 MG: Performed by: FAMILY MEDICINE

## 2022-02-17 PROCEDURE — 82962 GLUCOSE BLOOD TEST: CPT

## 2022-02-17 PROCEDURE — 97110 THERAPEUTIC EXERCISES: CPT

## 2022-02-17 PROCEDURE — 97535 SELF CARE MNGMENT TRAINING: CPT

## 2022-02-17 RX ADMIN — PANTOPRAZOLE SODIUM 40 MG: 40 INJECTION, POWDER, FOR SOLUTION INTRAVENOUS at 05:56

## 2022-02-17 RX ADMIN — ALTEPLASE 10 MG: 2.2 INJECTION, POWDER, LYOPHILIZED, FOR SOLUTION INTRAVENOUS at 19:02

## 2022-02-17 RX ADMIN — OXYCODONE 5 MG: 5 TABLET ORAL at 21:11

## 2022-02-17 RX ADMIN — INSULIN ASPART 2 UNITS: 100 INJECTION, SOLUTION INTRAVENOUS; SUBCUTANEOUS at 11:28

## 2022-02-17 RX ADMIN — METOPROLOL TARTRATE 25 MG: 25 TABLET ORAL at 21:11

## 2022-02-17 RX ADMIN — INSULIN ASPART 3 UNITS: 100 INJECTION, SOLUTION INTRAVENOUS; SUBCUTANEOUS at 09:06

## 2022-02-17 RX ADMIN — METOPROLOL TARTRATE 25 MG: 25 TABLET ORAL at 09:05

## 2022-02-17 RX ADMIN — ENOXAPARIN SODIUM 40 MG: 40 INJECTION SUBCUTANEOUS at 19:02

## 2022-02-17 RX ADMIN — INSULIN ASPART 2 UNITS: 100 INJECTION, SOLUTION INTRAVENOUS; SUBCUTANEOUS at 17:08

## 2022-02-17 RX ADMIN — BETAMETHASONE DIPROPIONATE: 0.5 CREAM TOPICAL at 09:06

## 2022-02-17 RX ADMIN — OXYCODONE 5 MG: 5 TABLET ORAL at 09:06

## 2022-02-17 RX ADMIN — BETAMETHASONE DIPROPIONATE: 0.5 CREAM TOPICAL at 21:11

## 2022-02-17 RX ADMIN — DIPHENHYDRAMINE HYDROCHLORIDE AND LIDOCAINE HYDROCHLORIDE AND ALUMINUM HYDROXIDE AND MAGNESIUM HYDRO 10 ML: KIT at 09:06

## 2022-02-17 NOTE — CONSULTS
Pulmonary Consultation     Patient Name: Sandro Argueta  Age/Sex: 76 y.o. male  : 1945  MRN: [id number]    Date of Admission: 2/10/2022  Date of Encounter Visit: 22  Encounter Provider: Delmar Womack MD  Referring Provider: No Known Provider  Place of Service: [facility]  Patient Care Team:  Misti Nguyễn MD as PCP - General (Geriatric Medicine)      Subjective:     Consulted for: Loculated pleural effusion    Chief Complaint: Patient had a complicated esophageal perforation and is currently in rehab    History of Present Illness:  Sandro Argueta is a 76 y.o. male with history of prior Whipple procedure for suspected pancreatic cancer which was eventually ruled out with some postoperative complication including esophageal perforation followed by stenting of the esophagus.  He had a recent empyema with fungal infection that was treated with chest tube placement and currently he has a chronic draining.  I was called by infectious disease to evaluate because follow-up CT scan was showing worsening effusion with air bubbles and loculated effusion on the left side that was concerning, for further recommendation and management.  The patient is complaining of some minimal left-sided chest discomfort that felt similar to the time when he had the empyema before the drainage.  He does have a small chest tube and that should be connected to a suction grenade however it was noted that the sterile system has been breached and the tube was open to the air and the suction container was disconnected.  According the patient this has happened frequently even before he came into this facility and that is concerning for possibility of seeding infection into the  pleural cavity where the chest tube is placed.  Patient himself is not septic at this point but the CAT scan shows definite progression with air bubble that can be either from the empyema getting worse or it can be air that was aspirated through the  breached system  Patient is working with physical therapy but he feels tired and exhausted, he has been afebrile however.  Currently he is on no antibiotics.  T-max was 97.9, his white count has been normal to borderline elevated at 10.86 on 2/14/2022 his kidney function is normal except for hyponatremia  His H&P was reviewed and it has detailed description of the decortication procedure he had for the empyema and his prior surgical procedure, this note was reviewed.  His surgical procedure so far includes decortication, EGD, he had esophageal stenting, he had G-tube and J-tube, and at one point he had a TPN nutritional support.    Pulmonary Functions Testing Results:    No results found for: FEV1, FVC, RWM3JQD, TLC, DLCO    Review of Systems:   Review of Systems   General: No fever no chills.  Some generalized weakness  HEENT: Negative  Heart: Negative  Lungs:  Dyspnea on exertion, minimal chest discomfort on the left side  Abdomen: Patient does have G-tube and J-tube in place at this time along with again Kamari drain  : Negative  Musculoskeletal: Negative  Neuro: Negative  Skin: Negative  Past Medical History:  Past Medical History:   Diagnosis Date   • Acute UTI (urinary tract infection) 2/20/2021   • Arthritis    • Borderline diabetes    • Coronary artery disease    • Diabetes mellitus (HCC)    • Elevated prostate specific antigen (PSA) 8/17/2020   • GERD (gastroesophageal reflux disease)    • Hypertension    • Left elbow pain        Past Surgical History:   Procedure Laterality Date   • COLONOSCOPY     • ENDOSCOPY N/A 2/9/2021    Procedure: ESOPHAGOGASTRODUODENOSCOPY WITH BIOPSY dilitation;  Surgeon: Bela Mcdaniel MD;  Location: [facility] OR;  Service: Gastroenterology;  Laterality: N/A;   • ERCP N/A 2/21/2021    Procedure: ENDOSCOPIC RETROGRADE CHOLANGIOPANCREATOGRAPHY WITH STENT PLACEMENT;  Surgeon: Matthew Moreira MD;  Location: [facility] ENDOSCOPY;  Service: Gastroenterology;   Laterality: N/A;  with sphincterotomy, and brushing of common bile duct, and placement of [id number] wall stent     • PACEMAKER IMPLANTATION         Home Medications:   Medications Prior to Admission   Medication Sig Dispense Refill Last Dose   • amLODIPine (NORVASC) 5 MG tablet Take 5 mg by mouth Daily.   Unknown at Unknown time   • Insulin Glargine, 1 Unit Dial, (TOUJEO) 300 UNIT/ML solution pen-injector injection Inject 15 Units under the skin into the appropriate area as directed Daily.   Unknown at Unknown time   • omeprazole (priLOSEC) 40 MG capsule Take 40 mg by mouth Daily.   Unknown at Unknown time   • Pancrelipase, Lip-Prot-Amyl, (CREON) 53816-152133 units capsule delayed-release particles capsule Take 72,000 units of lipase by mouth 3 (Three) Times a Day With Meals. Take 2 capsules with meals and 1 capsule with snacks . Max of 8 caps per days   Unknown at Unknown time   • Pancrelipase, Lip-Prot-Amyl, (CREON) 94568-196535 units capsule delayed-release particles capsule Take 36,000 units of lipase by mouth 2 (Two) Times a Day. Take 2 capsules with meals and 1 capsule with snacks . Max of 8 caps per days   Unknown at Unknown time       Inpatient Medications:  Scheduled Meds:betamethasone augmented + lubriderm, , Topical, Q12H  enoxaparin, 40 mg, Subcutaneous, Q24H  First Mouthwash (Magic Mouthwash), 10 mL, Swish & Spit, Q6H  insulin aspart, 0-7 Units, Subcutaneous, TID AC  metoprolol tartrate, 25 mg, Per J Tube, Q12H  pantoprazole, 40 mg, Intravenous, Q AM      Continuous Infusions:   PRN Meds:.•  acetaminophen  •  dextrose  •  dextrose  •  glucagon (human recombinant)  •  ipratropium-albuterol  •  oxyCODONE    Allergies:  Allergies   Allergen Reactions   • Other Anaphylaxis and Swelling     Patient had a reaction to the COVID vaccine. Pt states his tongue and lips started swelling       Past Social History:  Social History     Socioeconomic History   • Marital status:    Tobacco Use   • Smoking  status: Never Smoker   • Smokeless tobacco: Never Used   Vaping Use   • Vaping Use: Never used   Substance and Sexual Activity   • Alcohol use: No   • Drug use: No   • Sexual activity: Defer       Past Family History:  Family History   Problem Relation Age of Onset   • Diabetes Mother    • Diabetes Sister    • Diabetes Brother            Objective:   Temp:  [97.9 °F (36.6 °C)] 97.9 °F (36.6 °C)  Heart Rate:  [91-98] 91  Resp:  [18] 18  BP: (127)/(67) 127/67  SpO2:  [93 %-95 %] 95 %  on    Device (Oxygen Therapy): room air     Intake/Output Summary (Last 24 hours) at 2/17/2022 0900  Last data filed at 2/17/2022 0534  Gross per 24 hour   Intake 1385 ml   Output 1550 ml   Net -165 ml     Body mass index is 18.49 kg/m².      02/10/22  1534   Weight: 66.7 kg (147 lb)     Weight change:     Physical Exam:   Physical Exam   General:   Patient does not look toxic, he is awake and oriented, he is in no apparent distress but he is a frail thin gentleman, pleasant and oriented                   Head:    Normocephalic, atraumatic. External ears and nose are normal   Eyes:          Conjunctivae and sclerae normal, no icterus, PERRLA, no  discharge   Throat:   No oral lesions, no thrush, oral mucosa moist.  Poor gum health   Neck:   Supple, trachea midline. No JVD, no cervical or supraclavicular lymphadenopathy    Lungs:     Normal chest on inspection, he has diminished breath sounds, Iromin any wheezes or crackles, no use of any accessory muscles.      Heart:    Regular rhythm and normal rate.  No murmurs, gallops, or rubs noted.   Abdomen:     Soft, nontender, nondistended, positive bowel sounds. No hepatospleenomegaly.  He has a metallic staples from his abdominal incision, he has a a G-tube and a J-tube.   Extremities:   No clubbing, cyanosis, or edema.     Pulses:   Pulses palpable and equal bilaterally.    Skin:   No bleeding or rash. No bumps, good turgor pressure    Neuro:   Nonfocal.  Moves all extremities well. Strength  5/5 and symmetrical, no sensory deficit    Psychiatric:   Normal mood and affect.     Lab Review:   Results from last 7 days   Lab Units 02/14/22 0119 02/13/22 0010 02/11/22 0109   SODIUM mmol/L 127* 123* 132*   POTASSIUM mmol/L 4.8 4.5 4.2   CHLORIDE mmol/L 94* 90* 96*   CO2 mmol/L 24.3 25.3 25.9   BUN mg/dL 9 11 16   CREATININE mg/dL 0.63* 0.75* 0.63*   GLUCOSE mg/dL 281* 291* 176*   CALCIUM mg/dL 7.8* 7.9* 7.6*         Results from last 7 days   Lab Units 02/14/22 0119 02/13/22 0010 02/13/22 0010 02/11/22 0109 02/11/22 0109   WBC 10*3/mm3 10.86*  --  9.70  --  10.20   HEMOGLOBIN g/dL 9.9*  --  9.6*  --  10.4*   HEMATOCRIT % 31.3*  --  30.9*  --  33.1*   PLATELETS 10*3/mm3 442  --  353  --  473*   MCV fL 86.7   < > 87.8   < > 87.3   MCH pg 27.4   < > 27.3   < > 27.4   MCHC g/dL 31.6   < > 31.1*   < > 31.4*   RDW % 13.8   < > 14.0   < > 14.2   RDW-SD fl 43.7   < > 44.9   < > 45.3   MPV fL 9.5   < > 10.9   < > 9.7   NEUTROPHIL % % 69.6   < > 64.2  --   --    LYMPHOCYTE % % 14.4*   < > 17.1*  --   --    MONOCYTES % % 10.0   < > 11.5  --   --    EOSINOPHIL % % 4.9   < > 5.9  --   --    BASOPHIL % % 0.5   < > 0.5  --   --    IMM GRAN % % 0.6*   < > 0.8*  --   --    NEUTROS ABS 10*3/mm3 7.57*   < > 6.22  --  6.32   LYMPHS ABS 10*3/mm3 1.56   < > 1.66  --   --    MONOS ABS 10*3/mm3 1.09*   < > 1.12*  --   --    EOS ABS 10*3/mm3 0.53*   < > 0.57*  --  0.41*   BASOS ABS 10*3/mm3 0.05   < > 0.05  --  0.10   IMMATURE GRANS (ABS) 10*3/mm3 0.06*   < > 0.08*  --   --    NRBC /100 WBC 0.0   < > 0.0  --   --     < > = values in this interval not displayed.                   Invalid input(s): LDLCALC                                              Imaging:  Imaging Results (Most Recent)     Procedure Component Value Units Date/Time    CT Chest Without Contrast Diagnostic [[id number]] Collected: 02/16/22 1247     Updated: 02/16/22 1303    Addenda:        Compared with previous study this represents a fairly moderate increase   in size of the empyema.     This report was finalized on 2/16/2022 1:01 PM by Dr. Jarek Flores MD.     Signed: 02/16/22 1301 by Jarek Flores MD    Narrative:      EXAM:    CT Chest Without Intravenous Contrast     EXAM DATE:    2/16/2022 12:31 PM     CLINICAL HISTORY:    worsening     TECHNIQUE:    Axial computed tomography images of the chest without intravenous  contrast.  Sagittal and coronal reformatted images were created and  reviewed.  This CT exam was performed using one or more of the following  dose reduction techniques:  automated exposure control, adjustment of  the mA and/or kV according to patient size, and/or use of iterative  reconstruction technique.     COMPARISON:    01/22/2022     FINDINGS:    LUNGS: Minimal patchy left lower lobe atelectasis.    PLEURAL SPACE:  Bilobed loculated effusion of the left lower pleural  space measuring about 10 cm with internal air, as well as the left upper  lobe pleural space measuring about 4.4 cm and in total length about 16  cm.  Small right pleural effusion.    HEART:  Unremarkable.  No cardiomegaly.  No significant pericardial  effusion.  No significant coronary artery calcifications.    MEDIASTINUM:  Esophageal stent.    BONES/JOINTS:  Unremarkable.  No acute fracture.  No dislocation.    SOFT TISSUES:  Unremarkable.    VASCULATURE:  Unremarkable.  No thoracic aortic aneurysm.    LYMPH NODES:  Unremarkable.  No enlarged lymph nodes.    TUBES, LINES AND DEVICES:  Indwelling chest tube noted.       Impression:      1.  Bilobed loculated effusion of the left lower pleural space measuring  about 10 cm with internal air, as well as the left upper lobe pleural  space measuring about 4.4 cm and in total length about 16 cm.  2.  Small right pleural effusion.     This report was finalized on 2/16/2022 12:49 PM by Dr. Jarek Flores MD.       XR Chest PA & Lateral [[id number]] Collected: 02/16/22 1053     Updated: 02/16/22 1056    Narrative:      EXAM:    XR Chest, 2  Views     CLINICAL HISTORY:    Please compare to CXR from 1/22/22, empyema     TECHNIQUE:    Frontal and lateral views of the chest.     COMPARISON:    02/22/2022     FINDINGS:    LUNGS: Left lower lobe airspace opacity.    PLEURAL SPACE:  Loculated pleural collection likely measuring 3.6 and  4.9 cm of the left hemithorax. Some locules of air noted within the  collection concerning for empyema.  No pneumothorax.    HEART:  Unremarkable.  No cardiomegaly.    MEDIASTINUM:  Esophageal stent noted.    BONES/JOINTS:  Unremarkable.    TUBES, LINES AND DEVICES:  Right central line with tip in SVC.   Cardiac defibrillator noted.  Probable left surgical drain near the left  lung base.       Impression:        Loculated pleural collection likely measuring 3.6 and 4.9 cm of the  left hemithorax. Some locules of air noted within the collection  concerning for empyema.     This report was finalized on 2/16/2022 10:54 AM by Dr. Jarek Flores MD.             I personally viewed and interpreted the patient's imaging studies.    Assessment:     1. Complicated pleural effusion with loculated effusion on the left side and simple effusion on the right  2. Patient is nontoxic with borderline white blood cell count  3. Esophageal rupture  4. Immobilization syndrome      Plan:     Patient is not septic however this is not uncommon with empyema and the concern is that the existing chest tube has been open to the outside and this has been happening frequently which increase the risk of seeding infection into the pleural cavity.  Discussed with Dr. Talley, we will try to instill TPA into the pleural cavity through the existing chest tube, will discuss with the staff to make whenever necessary measures to make sure that the chest tube is not getting disconnected from the suction grenade and reduce the risk for further contamination of the pleural space.  We will repeat the TPA on 3 consecutive days and repeat the imaging studies with further  recommendation accordingly  In case of any fever or signs or symptoms of sepsis we might need to drain the right pleural effusion as well.  We will continue to follow      Thank you for allowing me to participate in the care of Sandro Argueta. Feel free to contact me directly with any further questions or concerns.    Delmar Womack MD  [facility]   02/17/22  09:00 EST    Dictated utilizing Dragon dictation

## 2022-02-17 NOTE — THERAPY TREATMENT NOTE
Inpatient Rehabilitation - Physical Therapy Treatment Note        [place]     Patient Name: Sandro Argueta  : 1945  MRN: [id number]    Today's Date: 2022                    Admit Date: 2/10/2022      Visit Dx:   No diagnosis found.    Patient Active Problem List   Diagnosis   • Left elbow pain   • Hypertension   • DM2 (diabetes mellitus, type 2) (HCC)   • Elevated prostate specific antigen (PSA)   • Epigastric pain   • Epigastric abdominal pain   • Elevated liver enzymes   • Weight loss, abnormal   • Acute hepatitis   • Anemia   • Obstructive jaundice   • Pneumothorax, left   • Cytokine release syndrome, grade 1   • Esophageal rupture       Past Medical History:   Diagnosis Date   • Acute UTI (urinary tract infection) 2021   • Arthritis    • Borderline diabetes    • Coronary artery disease    • Diabetes mellitus (HCC)    • Elevated prostate specific antigen (PSA) 2020   • GERD (gastroesophageal reflux disease)    • Hypertension    • Left elbow pain        Past Surgical History:   Procedure Laterality Date   • COLONOSCOPY     • ENDOSCOPY N/A 2021    Procedure: ESOPHAGOGASTRODUODENOSCOPY WITH BIOPSY dilitation;  Surgeon: Bela Mcdaniel MD;  Location: [facility];  Service: Gastroenterology;  Laterality: N/A;   • ERCP N/A 2021    Procedure: ENDOSCOPIC RETROGRADE CHOLANGIOPANCREATOGRAPHY WITH STENT PLACEMENT;  Surgeon: Matthew Moreira MD;  Location: [facility] ENDOSCOPY;  Service: Gastroenterology;  Laterality: N/A;  with sphincterotomy, and brushing of common bile duct, and placement of [id number] wall stent     • PACEMAKER IMPLANTATION         PT ASSESSMENT (last 12 hours)     IR[facility] PT Evaluation and Treatment     Row Name 22 1538          PT Time and Intention    Document Type daily treatment  -AG     Mode of Treatment physical therapy; individual therapy  -AG     Patient/Family/Caregiver Comments/Observations pt. declined AM treatment session d/t feeling  unwell/ medical issue. PARAMJIT Dash aware.  In PM, patient reports feeling somewhat better, agreeable to gait training.  -     Row Name 02/17/22 1538          General Information    Patient Profile Reviewed yes  -AG     Existing Precautions/Restrictions fall  -AG     Limitations/Impairments other (see comments)  medical complexity  -[facility] Name 02/17/22 1538          Coping Strategies    Trust Relationship/Rapport care explained; choices provided; thoughts/feelings acknowledged  -[facility] Name 02/17/22 1538          Cognition/Psychosocial    Affect/Mental Status (Cognitive) WFL  -AG     Orientation Status (Cognition) oriented x 4  -AG     Follows Commands (Cognition) WFL  -AG     Personal Safety Interventions fall prevention program maintained; gait belt; nonskid shoes/slippers when out of bed; supervised activity  -[facility] Name 02/17/22 1538          Pain Assessment    Additional Documentation --  patient denies incisional or abdominal pain; he does report intermittent L shoulder pain, unrelated to current medical issues.  -[facility] Name 02/17/22 1538          Pain Scale: FACES Pre/Post-Treatment    Pain: FACES Scale, Pretreatment 0-->no hurt  -AG     Posttreatment Pain Rating 0-->no hurt  -[facility] Name 02/17/22 1538          Mobility    Extremity Weight-bearing Status right lower extremity; left lower extremity  -AG     Left Lower Extremity (Weight-bearing Status) full weight-bearing (FWB)  -AG     Right Lower Extremity (Weight-bearing Status) full weight-bearing (FWB)  -[facility] Name 02/17/22 1538          Bed Mobility    Supine-Sit [place] (Bed Mobility) verbal cues; nonverbal cues (demo/gesture); minimum assist (75% patient effort)  -AG     Bed Mobility, Safety Issues decreased use of legs for bridging/pushing; decreased use of arms for pushing/pulling  -     Assistive Device (Bed Mobility) bed rails; other (see comments)  manual assistance from therapist  -[facility] Name 02/17/22 1538           Transfer Assessment/Treatment    Transfers sit-stand transfer; stand-sit transfer; bed-chair transfer; stand pivot/stand step transfer  -AG     Row Name 02/17/22 1538          Transfers    Bed-Chair [place] (Transfers) standby assist; verbal cues  -AG     Assistive Device (Bed-Chair Transfers) wheelchair  -AG     Sit-Stand [place] (Transfers) standby assist; verbal cues  -AG     Stand-Sit [place] (Transfers) standby assist; verbal cues  -AG     Row Name 02/17/22 1538          Sit-Stand Transfer    Assistive Device (Sit-Stand Transfers) walker, front-wheeled  -AG     Row Name 02/17/22 1538          Stand-Sit Transfer    Assistive Device (Stand-Sit Transfers) walker, front-wheeled  -AG     Row Name 02/17/22 1538          Gait/Stairs (Locomotion)    Gait/Stairs Locomotion gait/ambulation independence; gait/ambulation assistive device; distance ambulated; gait pattern; gait deviations; maintains weight-bearing status  -AG     [place] Level (Gait) standby assist; verbal cues; contact guard  -AG     Assistive Device (Gait) walker, front-wheeled  -AG     Distance in Feet (Gait) 160  -AG     Pattern (Gait) step-through  -AG     Deviations/Abnormal Patterns (Gait) gait speed decreased  -AG     Bilateral Gait Deviations forward flexed posture  -AG     Row Name 02/17/22 1538          Safety Issues, Functional Mobility    Impairments Affecting Function (Mobility) endurance/activity tolerance; strength; balance  -AG     Row Name 02/17/22 1538          Balance    Balance Assessment sitting static balance; sitting dynamic balance; sit to stand dynamic balance; standing static balance; standing dynamic balance  -AG     Static Sitting Balance WFL; unsupported; sitting, edge of bed  -AG     Static Standing Balance WFL; supported; standing  -AG     Dynamic Standing Balance WFL; supported; standing  -AG     Row Name 02/17/22 1538          Positioning and Restraints    Pre-Treatment Position in bed  -AG      Post Treatment Position wheelchair  -AG     In Wheelchair sitting; call light within reach; encouraged to call for assist  -AG     Row Name 02/17/22 1538          Daily Progress Summary (PT)    Functional Goal Overall Progress (PT) progressing toward functional goals as expected  -AG     Daily Progress Summary (PT) progressing toward more independent functional mobility  -AG     Impairments Still Limiting Function (PT) strength decreased; impaired balance; impaired functional activity tolerance  -AG     Recommendations (PT) continue PT, progressing as tolerated.  -AG     Row Name 02/17/22 1538          Therapy Plan Review/Discharge Plan (PT)    Therapy Plan Review (PT) evaluation/treatment results reviewed; care plan/treatment goals reviewed; risks/benefits reviewed; current/potential barriers reviewed; participants voiced agreement with care plan; participants included; patient  -AG     Expected Discharge Disposition (PT Eval) home with caregiver; home with home health care  -AG           User Key  (r) = Recorded By, (t) = Taken By, (c) = Cosigned By    Initials Name Provider Type    AG Cece Perez, PT Physical Therapist              Wound 02/10/22 1557 midline abdomen Incision (Active)   Dressing Appearance open to air 02/17/22 0730   Closure Staples 02/16/22 1911   Base pink; red; clean 02/16/22 1911   Drainage Amount none 02/16/22 1911   Dressing Care open to air 02/17/22 0730       Wound 02/10/22 1602 Left upper back Incision (Active)   Dressing Appearance open to air 02/17/22 0730   Closure Liquid skin adhesive 02/16/22 1911   Base clean; dry 02/16/22 1911   Drainage Amount none 02/16/22 1911   Dressing Care open to air 02/17/22 0730     Physical Therapy Education                 Title: PT OT SLP Therapies (Done)     Topic: Physical Therapy (Done)     Point: Mobility training (Done)     Learning Progress Summary           Patient Acceptance, E,D, VU,NR by  at 2/17/2022 1537      Show all documentation for  this point (7)                 Point: Home exercise program (Done)     Learning Progress Summary           Patient Acceptance, E,D, VU,NR by  at 2/17/2022 1537      Show all documentation for this point (7)                 Point: Body mechanics (Done)     Learning Progress Summary           Patient Acceptance, E,D, VU,NR by  at 2/17/2022 1537      Show all documentation for this point (7)                 Point: Precautions (Done)     Learning Progress Summary           Patient Acceptance, E,D, VU,NR by  at 2/17/2022 1537      Show all documentation for this point (7)                             User Key     Initials Effective Dates Name Provider Type Discipline     06/16/21 -  Cece Perez, PT Physical Therapist PT                PT Recommendation and Plan          Daily Progress Summary (PT)  Functional Goal Overall Progress (PT): progressing toward functional goals as expected  Daily Progress Summary (PT): progressing toward more independent functional mobility  Impairments Still Limiting Function (PT): strength decreased, impaired balance, impaired functional activity tolerance  Recommendations (PT): continue PT, progressing as tolerated.               Time Calculation:      PT Charges     Row Name 02/17/22 1537             Time Calculation    Start Time 1245  -      Stop Time 1310  -      Time Calculation (min) 25 min  -      PT Received On 02/17/22  -      PT - Next Appointment 02/18/22  -            User Key  (r) = Recorded By, (t) = Taken By, (c) = Cosigned By    Initials Name Provider Type     Cece Perez, PT Physical Therapist                Therapy Charges for Today     Code Description Service Date Service Provider Modifiers Qty    33142615085 HC GAIT TRAINING EA 15 MIN 2/17/2022 Cece Perez, PT GP 1    64678166028 HC PT THERAPEUTIC ACT EA 15 MIN 2/17/2022 Cece Perez, PT GP 1                   eCce Perez PT  2/17/2022

## 2022-02-17 NOTE — PROGRESS NOTES
Occupational Therapy:    Physical Therapy: Individual: 25 minutes.    Speech Language Pathology:    Signed by: Cece Perez, Physical Therapist

## 2022-02-17 NOTE — THERAPY TREATMENT NOTE
Inpatient Rehabilitation - Occupational Therapy Treatment Note     [place]     Patient Name: Sandro Argueta  : 1945  MRN: [id number]    Today's Date: 2022                 Admit Date: 2/10/2022       No diagnosis found.    Patient Active Problem List   Diagnosis   • Left elbow pain   • Hypertension   • DM2 (diabetes mellitus, type 2) (HCC)   • Elevated prostate specific antigen (PSA)   • Epigastric pain   • Epigastric abdominal pain   • Elevated liver enzymes   • Weight loss, abnormal   • Acute hepatitis   • Anemia   • Obstructive jaundice   • Pneumothorax, left   • Cytokine release syndrome, grade 1   • Esophageal rupture       Past Medical History:   Diagnosis Date   • Acute UTI (urinary tract infection) 2021   • Arthritis    • Borderline diabetes    • Coronary artery disease    • Diabetes mellitus (HCC)    • Elevated prostate specific antigen (PSA) 2020   • GERD (gastroesophageal reflux disease)    • Hypertension    • Left elbow pain        Past Surgical History:   Procedure Laterality Date   • COLONOSCOPY     • ENDOSCOPY N/A 2021    Procedure: ESOPHAGOGASTRODUODENOSCOPY WITH BIOPSY dilitation;  Surgeon: Bela Mcdaniel MD;  Location: [facility] OR;  Service: Gastroenterology;  Laterality: N/A;   • ERCP N/A 2021    Procedure: ENDOSCOPIC RETROGRADE CHOLANGIOPANCREATOGRAPHY WITH STENT PLACEMENT;  Surgeon: Matthew Moreira MD;  Location: [facility] ENDOSCOPY;  Service: Gastroenterology;  Laterality: N/A;  with sphincterotomy, and brushing of common bile duct, and placement of [id number] wall stent     • PACEMAKER IMPLANTATION               IRF OT ASSESSMENT FLOWSHEET (last 12 hours)     IRF OT Evaluation and Treatment     Row Name 22 1357          OT Time and Intention    Document Type daily treatment  -CJ     Mode of Treatment occupational therapy  -CJ     Symptoms Noted During/After Treatment --  c/o back pain/ not feeling well;  RN notified  -CJ     Comment,  Evaluation/Treatment Not Performed decreased tx d/t pain / not feeling well;  RN notified  -     Row Name 02/17/22 1357          General Information    Patient/Family/Caregiver Comments/Observations agreeable to therapy in am  -     Existing Precautions/Restrictions fall  Kamari drain, G and J tubes, decreased skin integrity  -     Row Name 02/17/22 1357          Transfers    Bed-Chair [place] (Transfers) minimum assist (75% patient effort); verbal cues  -     Chair-Bed [place] (Transfers) minimum assist (75% patient effort); contact guard; verbal cues  -     Assistive Device (Bed-Chair Transfers) wheelchair  -     Row Name 02/17/22 1357          Chair-Bed Transfer    Assistive Device (Chair-Bed Transfers) wheelchair  -     Row Name 02/17/22 [facility]          Motor Skills    Motor Skills functional endurance  -     Motor Control/Coordination Interventions therapeutic exercise/ROM  BUE ther ex/act, AROM, bilat coord ex, hand exerciser  -     Row Name 02/17/22 135          Lower Body Dressing    [place] Level (Lower Body Dressing) moderate assist (50% patient effort); verbal cues  -     Row Name 02/17/22 135          Grooming    [place] Level (Grooming) set up  -     Row Name 02/17/22 135          Positioning and Restraints    Post Treatment Position bed  -     In Bed call light within reach; encouraged to call for assist; notified [facility]           User Key  (r) = Recorded By, (t) = Taken By, (c) = Cosigned By    Initials Name Effective Dates     Elena Pisano, OT 06/16/21 -                  Occupational Therapy Education                 Title: PT OT SLP Therapies (Done)     Topic: Occupational Therapy (Done)     Point: ADL training (Done)     Description:   Instruct learner(s) on proper safety adaptation and remediation techniques during self care or transfers.   Instruct in proper use of assistive devices.              Learning Progress Summary           Patient  Acceptance, E,D, VU,NR by  at 2/17/2022 1407      Show all documentation for this point (6)                 Point: Precautions (Done)     Description:   Instruct learner(s) on prescribed precautions during self-care and functional transfers.              Learning Progress Summary           Patient Acceptance, E,D, VU,NR by  at 2/17/2022 1407      Show all documentation for this point (6)                             User Key     Initials Effective Dates Name Provider Type [facility] 06/16/21 -  Elena Pisano OT Occupational Therapist OT                    OT Recommendation and Plan    Planned Therapy Interventions (OT): activity tolerance training, adaptive equipment training, BADL retraining, ROM/therapeutic exercise, strengthening exercise, transfer/mobility retraining, patient/caregiver education/training                    Time Calculation:      Time Calculation- OT     Row Name 02/17/22 1407             Time Calculation- OT    OT Start Time 0805  -      OT Stop Time 0915  -      OT Time Calculation (min) 70 min  -      Total Timed Code Minutes- OT 70 minute(s)  -            User Key  (r) = Recorded By, (t) = Taken By, (c) = Cosigned By    Initials Name Provider Type     Elena Pisano OT Occupational Therapist              Therapy Charges for Today     Code Description Service Date Service Provider Modifiers Qty    20469450540 HC OT SELF CARE/MGMT/TRAIN EA 15 MIN 2/16/2022 Elena Pisano OT GO 2    69469125809 HC OT THER PROC EA 15 MIN 2/16/2022 Elena Pisano OT GO 2    89156339333 HC OT THERAPEUTIC ACT EA 15 MIN 2/16/2022 Elena Pisano OT GO 2    15341437130 HC OT SELF CARE/MGMT/TRAIN EA 15 MIN 2/17/2022 Elena Pisano OT GO 1    98700546421 HC OT THER PROC EA 15 MIN 2/17/2022 Elena Pisano OT GO 2    16294282768 HC OT THERAPEUTIC ACT EA 15 MIN 2/17/2022 Elena Pisano OT GO 2                   Elena Pisano OT  2/17/2022

## 2022-02-17 NOTE — PROGRESS NOTES
Occupational Therapy: Individual: 70 minutes.    Physical Therapy:    Speech Language Pathology:    Signed by: Elena Pisano, Occupational Therapist

## 2022-02-17 NOTE — PROGRESS NOTES
[facility]  PROGRESS NOTE     Patient Identification:  Name:  Sandro Argueta  Age:  76 y.o.  Sex:  male  :  1945  MRN:  [id number]  Visit Number:  [id number]  ROOM: [facility]     Primary Care Provider:  Misti Nguyễn MD    Length of stay in inpatient status:  7    Subjective     Chief Compliant: Esophageal rupture    History of Presenting Illness: Patient 76-year-old gentleman history of hypertension, coronary disease, diabetes mellitus and GERD.  Patient had recent esophageal perforation with leakage of GI contents into the thoracic cavity.  Patient did require multiple chest tubes and was treated with broad-spectrum antibiotics as well as micafungin.  Patient did have repair of the esophagus and eventual esophageal stent placement because of continued leakage.  Patient has J-tube in place and also has a Kamari tube in place at this time.     Participated with physical and occupational therapy on 2022: Performs transfer activities with minimal assist and verbal/nonverbal cues; utilized wheelchair/front wheeled walker for chair to bed, sit to stand/stand to sit transfer; ambulated 160 feet x 2 with front wheel walker and contact-guard assist with verbal/nonverbal cues; required set up assist for grooming activity; minimal assist with verbal cues for toileting activities      Objective     Current Hospital Meds:alteplase, 10 mg, Intrapleural, Once  betamethasone augmented + lubriderm, , Topical, Q12H  enoxaparin, 40 mg, Subcutaneous, Q24H  First Mouthwash (Magic Mouthwash), 10 mL, Swish & Spit, Q6H  insulin aspart, 0-7 Units, Subcutaneous, TID AC  metoprolol tartrate, 25 mg, Per J Tube, Q12H  pantoprazole, 40 mg, Intravenous, Q AM       ----------------------------------------------------------------------------------------------------------------------  Vital Signs:  Temp:  [97.9 °F (36.6 °C)-98.3 °F (36.8 °C)] 98.3 °F (36.8 °C)  Heart Rate:  [88-98] 91  Resp:  [18-20] 18  BP:  (127-132)/(67-68) 132/68  SpO2:  [93 %-95 %] 95 %  on   ;   Device (Oxygen Therapy): room air  Body mass index is 18.49 kg/m².    Wt Readings from Last 3 Encounters:   02/10/22 66.7 kg (147 lb)   01/22/22 75.9 kg (167 lb 4.8 oz)   12/29/21 79.4 kg (175 lb)     Intake & Output (last 3 days)       02/14 0701  02/15 0700 02/15 0701  02/16 0700 02/16 0701 02/17 0700 02/17 0701 02/18 0700    P.O. 65 120 735 240    Other 506 575 200     NG/ 1003 450     IV Piggyback        Total Intake(mL/kg) 1511 (22.7) 1698 (25.5) 1385 (20.8) 240 (3.6)    Urine (mL/kg/hr) 250 (0.2) 400 (0.2) 1525 (1) 300 (0.9)    Emesis/NG output 1 30      Drains 30 30 25     Stool  0      Total Output  300    Net +1230 +1238 -165 -60            Urine Unmeasured Occurrence 5 x 6 x 3 x 1 x    Stool Unmeasured Occurrence  2 x          No diet orders on file  ----------------------------------------------------------------------------------------------------------------------  Physical exam:  Constitutional:   No acute distress, slightly depressed but denies depression, denies any soa  HEENT: Normocephalic atraumatic  Neck: Supple   Cardiovascular: Regular rate and rhythm  Pulmonary/Chest: Clear to auscultation; Kamari tube in place  Abdominal: Positive bowel sounds soft; G-tube and J-tube in place.     Musculoskeletal: No arthropathy  Neurological: No focal deficits  Skin: No rash    ----------------------------------------------------------------------------------------------------------------------    Last echocardiogram:  Results for orders placed during the hospital encounter of 07/29/20    Adult Transthoracic Echo Complete W/ Cont if Necessary Per Protocol    Interpretation Summary  · Left ventricular wall thickness is consistent with mild concentric hypertrophy.  · Left ventricular systolic function is normal.  · Estimated EF appears to be in the range of 61 - 65%.  · Left ventricular diastolic dysfunction (grade I) consistent  with impaired relaxation.  · Normal right ventricular cavity size and systolic function noted. Electronic lead present in the ventricle.  · Normal cardiac chambers dimensions.  · Mild aortic valve regurgitation is present.  · Mild mitral valve regurgitation is present  · Mild tricuspid valve regurgitation is present. No evidence of pulmonary hypertension is present.  · There is no evidence of pericardial effusion.    ----------------------------------------------------------------------------------------------------------------------  Results from last 7 days   Lab Units 02/14/22 0119 02/13/22 0010 02/11/22 0109   WBC 10*3/mm3 10.86* 9.70 10.20   HEMOGLOBIN g/dL 9.9* 9.6* 10.4*   HEMATOCRIT % 31.3* 30.9* 33.1*   MCV fL 86.7 87.8 87.3   MCHC g/dL 31.6 31.1* 31.4*   PLATELETS 10*3/mm3 442 353 473*         Results from last 7 days   Lab Units 02/14/22 0119 02/13/22 0010 02/11/22 0109   SODIUM mmol/L 127* 123* 132*   POTASSIUM mmol/L 4.8 4.5 4.2   CHLORIDE mmol/L 94* 90* 96*   CO2 mmol/L 24.3 25.3 25.9   BUN mg/dL 9 11 16   CREATININE mg/dL 0.63* 0.75* 0.63*   EGFR IF NONAFRICN AM mL/min/1.73 124 102 124   CALCIUM mg/dL 7.8* 7.9* 7.6*   GLUCOSE mg/dL 281* 291* 176*   Estimated Creatinine Clearance: 75.3 mL/min (A) (by C-G formula based on SCr of 0.63 mg/dL (L)).  No results found for: AMMONIA              Glucose   Date/Time Value Ref Range Status   02/17/2022 1114 167 (H) 70 - 130 mg/dL Final     Comment:     Meter: [id number] : [id number] yue walker   02/17/2022 0603 207 (H) 70 - 130 mg/dL Final     Comment:     Meter: [id number] : [id number] Caty Crystal   02/16/2022 1648 178 (H) 70 - 130 mg/dL Final     Comment:     Meter: [id number] : [id number] annalisa edmonds   02/16/2022 1111 142 (H) 70 - 130 mg/dL Final     Comment:     Meter: [id number] : [id number] Jane Simon   02/16/2022 0550 288 (H) 70 - 130 mg/dL Final     Comment:     Meter: [id number] : [id number] Vashti Simon    02/15/2022 1603 156 (H) 70 - 130 mg/dL Final     Comment:     Meter: [id number] : [id number] crystal art   02/15/2022 1138 185 (H) 70 - 130 mg/dL Final     Comment:     Meter: [id number] : [id number] crystal art   02/15/2022 0638 292 (H) 70 - 130 mg/dL Final     Comment:     Meter: [id number] : [id number] [place] Crystal     Lab Results   Component Value Date    TSH 1.620 06/27/2021     No results found for: PREGTESTUR, PREGSERUM, HCG, HCGQUANT  Pain Management Panel    There is no flowsheet data to display.       Brief Urine Lab Results  (Last result in the past 365 days)      Color   Clarity   Blood   Leuk Est   Nitrite   Protein   CREAT   Urine HCG        01/21/22 2149 Yellow   Clear   Negative   Negative   Negative   Trace               No results found for: BLOODCX      No results found for: URINECX  No results found for: WOUNDCX  No results found for: STOOLCX        I have personally looked at the labs and they are summarized above.  ----------------------------------------------------------------------------------------------------------------------  Detailed radiology reports for the last 24 hours:    Imaging Results (Last 24 Hours)     Procedure Component Value Units Date/Time    CT Chest Without Contrast Diagnostic [[id number]] Collected: 02/16/22 1247     Updated: 02/16/22 1303    Addenda:        Compared with previous study this represents a fairly moderate increase  in size of the empyema.     This report was finalized on 2/16/2022 1:01 PM by Dr. Jarek Flores MD.     Signed: 02/16/22 1301 by Jarek Flores MD    Narrative:      EXAM:    CT Chest Without Intravenous Contrast     EXAM DATE:    2/16/2022 12:31 PM     CLINICAL HISTORY:    worsening     TECHNIQUE:    Axial computed tomography images of the chest without intravenous  contrast.  Sagittal and coronal reformatted images were created and  reviewed.  This CT exam was performed using one or more of the following  dose reduction  techniques:  automated exposure control, adjustment of  the mA and/or kV according to patient size, and/or use of iterative  reconstruction technique.     COMPARISON:    01/22/2022     FINDINGS:    LUNGS: Minimal patchy left lower lobe atelectasis.    PLEURAL SPACE:  Bilobed loculated effusion of the left lower pleural  space measuring about 10 cm with internal air, as well as the left upper  lobe pleural space measuring about 4.4 cm and in total length about 16  cm.  Small right pleural effusion.    HEART:  Unremarkable.  No cardiomegaly.  No significant pericardial  effusion.  No significant coronary artery calcifications.    MEDIASTINUM:  Esophageal stent.    BONES/JOINTS:  Unremarkable.  No acute fracture.  No dislocation.    SOFT TISSUES:  Unremarkable.    VASCULATURE:  Unremarkable.  No thoracic aortic aneurysm.    LYMPH NODES:  Unremarkable.  No enlarged lymph nodes.    TUBES, LINES AND DEVICES:  Indwelling chest tube noted.       Impression:      1.  Bilobed loculated effusion of the left lower pleural space measuring  about 10 cm with internal air, as well as the left upper lobe pleural  space measuring about 4.4 cm and in total length about 16 cm.  2.  Small right pleural effusion.     This report was finalized on 2/16/2022 12:49 PM by Dr. Jarek Flores MD.           Final impressions for the last 30 days of radiology reports:    CT Chest Without Contrast Diagnostic    Addendum Date: 2/16/2022    Compared with previous study this represents a fairly moderate increase in size of the empyema.  This report was finalized on 2/16/2022 1:01 PM by Dr. Jarek Flores MD.      Result Date: 2/16/2022  1.  Bilobed loculated effusion of the left lower pleural space measuring about 10 cm with internal air, as well as the left upper lobe pleural space measuring about 4.4 cm and in total length about 16 cm. 2.  Small right pleural effusion.  This report was finalized on 2/16/2022 12:49 PM by Dr. Jarek Flores MD.      CT  Chest Without Contrast Diagnostic    Result Date: 1/22/2022  1.  Transmural perforation of the distal thoracic esophagus communicating with the left basilar pleural space. Orally administered water-soluble contrast fills the left basilar pleural space and is visible within the left side chest tube. 2.  The exact site for esophageal perforation is not demonstrated on this study. There is very little contrast within the esophagus, and only a faint amount of contrast along the left side of the distal thoracic esophagus. 3.  Distal paraesophageal pneumomediastinum. 4.  Small left hydropneumothorax. No right pneumothorax. 5.  Consolidation and volume loss involving basal segments left lower lobe. Right posterior basilar atelectasis. Small right pleural effusion. Signer Name: Celestino Herrera MD  Signed: 1/22/2022 6:54 PM  Workstation Name: [facility]  Radiology [facility]    CT Chest With Contrast Diagnostic    Result Date: 1/21/2022  1. There is a small left pneumothorax but there is at least a moderate-sized left-sided pleural effusion and there is a small amount of pneumomediastinum with shift of mediastinal structures from left to right. A chest tube is being placed at time of this dictation as described below. 2. Probable right lung atelectasis. 3. No acute finding in the upper abdomen. Abdomen and pelvis CT dictated separately. NOTIFICATION: Critical Value/emergent results were called by telephone at the time of interpretation on 1/21/2022 8:55 PM to ASTER Boudreaux who verbally acknowledged these results. Dr. Villafuerte was in the process of placing a chest tube in the patient at the time of my call. Signer Name: Nick Vines MD  Signed: 1/21/2022 8:59 PM  Workstation Name: [employer]  Radiology [facility]    CT Abdomen Pelvis With Contrast    Result Date: 1/21/2022  1. There is a left-sided effusion that is at least moderate in size. There is a small left pneumothorax and  there is trace pneumomediastinum and subtle shift of mediastinal structures from left to right. Please see the separately dictated CT chest for further details. The emergency department is aware of these findings and a chest tube was in the process of being placed at the time of interpretation of the CT chest. 2. The stomach is distended with air fluid and debris and there are new postoperative changes of the stomach compared to the prior study. Correlate with any signs or symptoms of gastric outlet obstruction and the patient may benefit from NG tube placement. 3. No evidence of bowel obstruction or drainable fluid collection. 4. Normal appendix. 5. Diverticulosis. 6. Hepatic steatosis. 7. Tiny subcentimeter enhancing lesion in the spleen, favored to be benign but technically indeterminate. 8. Bilateral renal cysts. 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 Signer Name: Nick Vines MD  Signed: 1/21/2022 10:16 PM  Workstation Name: [place]  [facility]    FL Esophagram Complete Single Contrast    Result Date: 1/22/2022  1.  Chest x-ray series with water-soluble oral contrast shows evidence of likely distal esophageal rupture with oral contrast appearing in the left basilar pleural space. Please see chest CT to follow. 2.  Left lower lobe consolidation and atelectasis. Mild infiltrate and volume loss in the right lung base. Small right pleural effusion. 3.  Left-sided hydropneumothorax with chest tube in place. Signer Name: Celestino Herrera MD  Signed: 1/22/2022 6:46 PM  Workstation Name: [facility]  Radiology [facility]    XR Chest 1 View    Result Date: 1/22/2022  1. Left sided chest tube remains present along with a small left pneumothorax, stable to slightly larger when compared with the prior study but this may be related to technical factors. 2. Worsening appearance of the left lung base with increasing pleural fluid and consolidation. No right-sided  pneumothorax. Signer Name: Nick Vines MD  Signed: 1/22/2022 5:28 PM  Workstation Name: [facility]  Radiology [facility]    XR Chest 1 View    Result Date: 1/21/2022  Interval placement of left chest tube. Persistent small left pneumothorax. Persistent airspace disease in the left chest. Signer Name: LIDIA GAY MD  Signed: 1/21/2022 9:32 PM  Workstation Name: [facility]  Radiology [facility]    XR Chest 1 View    Addendum Date: 1/21/2022    //////////// ADDENDUM #1 //////////// ADDENDUM: Favor there is a small subtle left apical pneumothorax. This is confirmed by CT. Spoke directly to Dr. Scott Villafuerte in the ER at 8:36 PM 1/21/2022 to relay this finding. Signer Name: LIDIA GAY MD  Signed: 1/21/2022 8:38 PM  Workstation Name: [facility]  Radiology [facility]////////////  ORIGINAL REPORT //////////// CR Chest 1 Vw INDICATION: Upper back pain COMPARISON:  6/27/2021 FINDINGS: Portable AP view(s) of the chest.  Normal cardiomediastinal contours. Dense consolidation in the left midlung especially the left base, most consistent with a dense lobar pneumonia. Small left pleural effusion.     Result Date: 1/21/2022  Dense consolidation in the left midlung especially the left base, most consistent with a dense lobar pneumonia. Small left pleural effusion. Signer Name: LIDIA GAY MD  Signed: 1/21/2022 8:10 PM  Workstation Name: [facility]  Radiology [facility]    US Venous Doppler Lower Extremity Bilateral (duplex)    Result Date: 1/22/2022  No deep venous thrombus seen in either lower extremity. Signer Name: Nick Vines MD  Signed: 1/22/2022 3:56 PM  Workstation Name: [facility]  Radiology [facility]    XR Chest PA & Lateral    Result Date: 2/16/2022    Loculated pleural collection likely measuring 3.6 and 4.9 cm of the left hemithorax. Some locules of air noted within the collection concerning for empyema.  This report was finalized on  2/16/2022 10:54 AM by Dr. Jarek Flores MD.      I have personally looked at the radiology images and read the final radiology report.    Assessment & Plan    Debility after recent esophageal rupture with Kamari drain in place and recent esophageal stent placement.  Surgeon recommends leaving Kamari tube in place till esophageal stent can be removed in 4 weeks.  Patient is currently completing course of IV micafungin.  Patient receiving nutrition via J-tube at this time.    CXR and CT chest repeated and discussed with pulmonary and radiologist. Dr. Womack is mostly concern about the vacuum getting disconnected allowing for air entry. He is going to apply TPA x 3 days.    Participated with physical and occupational therapy on 2/16/2022: Performs transfer activities with minimal assist and verbal/nonverbal cues; utilized wheelchair/front wheeled walker for chair to bed, sit to stand/stand to sit transfer; ambulated 160 feet x 2 with front wheel walker and contact-guard assist with verbal/nonverbal cues; required set up assist for grooming activity; minimal assist with verbal cues for toileting activities    Dry mouth with thrush, MMW ordered    Nutrition continue J-tube feeds    Hyponatremia--water restriction ordered, Na improved    Diabetes mellitus continue sliding scale coverage    Hypertension controlled    History of CAD stable      VTE Prophylaxis:   Mechanical Order History:     None      Pharmalogical Order History:      Ordered     Dose Route Frequency Stop    02/10/22 3216  enoxaparin (LOVENOX) syringe 40 mg         40 mg SC Every 24 Hours --              Sylvia Talley MD  02/17/22  14:05 EST

## 2022-02-18 ENCOUNTER — APPOINTMENT (OUTPATIENT)
Dept: GENERAL RADIOLOGY | Facility: HOSPITAL | Age: 77
End: 2022-02-18

## 2022-02-18 LAB
GLUCOSE BLDC GLUCOMTR-MCNC: 243 MG/DL (ref 70–130)
GLUCOSE BLDC GLUCOMTR-MCNC: 247 MG/DL (ref 70–130)
GLUCOSE BLDC GLUCOMTR-MCNC: 275 MG/DL (ref 70–130)
GLUCOSE BLDC GLUCOMTR-MCNC: 311 MG/DL (ref 70–130)

## 2022-02-18 PROCEDURE — 71045 X-RAY EXAM CHEST 1 VIEW: CPT | Performed by: RADIOLOGY

## 2022-02-18 PROCEDURE — 97116 GAIT TRAINING THERAPY: CPT

## 2022-02-18 PROCEDURE — 97535 SELF CARE MNGMENT TRAINING: CPT

## 2022-02-18 PROCEDURE — 97530 THERAPEUTIC ACTIVITIES: CPT

## 2022-02-18 PROCEDURE — 71045 X-RAY EXAM CHEST 1 VIEW: CPT

## 2022-02-18 PROCEDURE — 63710000001 INSULIN ASPART PER 5 UNITS: Performed by: FAMILY MEDICINE

## 2022-02-18 PROCEDURE — 97110 THERAPEUTIC EXERCISES: CPT

## 2022-02-18 PROCEDURE — 82962 GLUCOSE BLOOD TEST: CPT

## 2022-02-18 PROCEDURE — 25010000002 ALTEPLASE 2 MG RECONSTITUTED SOLUTION: Performed by: INTERNAL MEDICINE

## 2022-02-18 PROCEDURE — 25010000002 ENOXAPARIN PER 10 MG: Performed by: FAMILY MEDICINE

## 2022-02-18 RX ADMIN — ENOXAPARIN SODIUM 40 MG: 40 INJECTION SUBCUTANEOUS at 18:41

## 2022-02-18 RX ADMIN — PANTOPRAZOLE SODIUM 40 MG: 40 INJECTION, POWDER, FOR SOLUTION INTRAVENOUS at 05:14

## 2022-02-18 RX ADMIN — INSULIN ASPART 4 UNITS: 100 INJECTION, SOLUTION INTRAVENOUS; SUBCUTANEOUS at 08:50

## 2022-02-18 RX ADMIN — OXYCODONE 5 MG: 5 TABLET ORAL at 21:24

## 2022-02-18 RX ADMIN — INSULIN ASPART 3 UNITS: 100 INJECTION, SOLUTION INTRAVENOUS; SUBCUTANEOUS at 17:04

## 2022-02-18 RX ADMIN — BETAMETHASONE DIPROPIONATE: 0.5 CREAM TOPICAL at 08:50

## 2022-02-18 RX ADMIN — METOPROLOL TARTRATE 25 MG: 25 TABLET ORAL at 08:51

## 2022-02-18 RX ADMIN — METOPROLOL TARTRATE 25 MG: 25 TABLET ORAL at 21:24

## 2022-02-18 RX ADMIN — BETAMETHASONE DIPROPIONATE: 0.5 CREAM TOPICAL at 21:24

## 2022-02-18 RX ADMIN — ALTEPLASE 10 MG: 2.2 INJECTION, POWDER, LYOPHILIZED, FOR SOLUTION INTRAVENOUS at 14:56

## 2022-02-18 RX ADMIN — INSULIN ASPART 3 UNITS: 100 INJECTION, SOLUTION INTRAVENOUS; SUBCUTANEOUS at 12:14

## 2022-02-18 NOTE — NURSING NOTE
Dr Womack here to instill Alteplase into pt Left chest tube,Sterile tech used,pt tolerated well..Will turn pt side to side for 2 hours every 15 mins. Then reconnect to bulb drain to pull out drainage and Alteplase.

## 2022-02-18 NOTE — PROGRESS NOTES
[facility]  PROGRESS NOTE     Patient Identification:  Name:  Sandro Argueta  Age:  76 y.o.  Sex:  male  :  1945  MRN:  [id number]  Visit Number:  [id number]  ROOM: [facility]     Primary Care Provider:  Misti Nguyễn MD    Length of stay in inpatient status:  8    Subjective     Chief Compliant: Esophageal rupture    History of Presenting Illness: Patient 76-year-old gentleman history of hypertension, coronary disease, diabetes mellitus and GERD.  Patient had recent esophageal perforation with leakage of GI contents into the thoracic cavity.  Patient did require multiple chest tubes and was treated with broad-spectrum antibiotics as well as micafungin.  Patient did have repair of the esophagus and eventual esophageal stent placement because of continued leakage.  Patient has J-tube in place and also has a Kamari tube in place at this time.     Participated in physical occupational therapy on 2022: Performs bed mobility with minimal assist and verbal/nonverbal cues become from transfer activities and standby assist with verbal cues; utilizes front wheel walker for sit to stand/stand to sit transfer; ambulated 160 feet with front wheel walker standby assist to contact-guard with verbal cues; requires moderate assist verbal cues for lower body dressing; set up assist for grooming activities      Objective     Current Hospital Meds:alteplase, 10 mg, Intrapleural, Once  betamethasone augmented + lubriderm, , Topical, Q12H  enoxaparin, 40 mg, Subcutaneous, Q24H  First Mouthwash (Magic Mouthwash), 10 mL, Swish & Spit, Q6H  insulin aspart, 0-7 Units, Subcutaneous, TID AC  metoprolol tartrate, 25 mg, Per J Tube, Q12H  pantoprazole, 40 mg, Intravenous, Q AM       ----------------------------------------------------------------------------------------------------------------------  Vital Signs:  Temp:  [98.3 °F (36.8 °C)-98.7 °F (37.1 °C)] 98.7 °F (37.1 °C)  Heart Rate:  [88-89] 88  Resp:  [18-20]  20  BP: (104-152)/(64-79) 104/64  SpO2:  [91 %] 91 %  on   ;   Device (Oxygen Therapy): room air  Body mass index is 18.49 kg/m².    Wt Readings from Last 3 Encounters:   02/10/22 66.7 kg (147 lb)   01/22/22 75.9 kg (167 lb 4.8 oz)   12/29/21 79.4 kg (175 lb)     Intake & Output (last 3 days)       02/15 0701 02/16 0700 02/16 0701 02/17 0700 02/17 0701 02/18 0700 02/18 0701 02/19 0700    P.O. 120 735 600 120    Other 575 200 350     NG/GT 1003 450 765     Total Intake(mL/kg) 1698 (25.5) 1385 (20.8) 1715 (25.7) 120 (1.8)    Urine (mL/kg/hr) 400 (0.2) 1525 (1) 1225 (0.8) 300 (0.7)    Emesis/NG output 30  15     Drains 30 25 125     Stool 0       Total Output 460 1550 1365 300    Net +1238 -165 +350 -180            Urine Unmeasured Occurrence 6 x 3 x 7 x     Stool Unmeasured Occurrence 2 x           No diet orders on file  ----------------------------------------------------------------------------------------------------------------------  Physical exam:  Constitutional:   No acute distress, feels better today, working with OT sp RANDOLPH  HEENT: Normocephalic atraumatic  Neck: Supple   Cardiovascular: Regular rate and rhythm  Pulmonary/Chest: Clear to auscultation; Kamari tube in place  Abdominal: Positive bowel sounds soft; G-tube and J-tube in place.     Musculoskeletal: No arthropathy  Neurological: No focal deficits  Skin: No rash    ----------------------------------------------------------------------------------------------------------------------    Last echocardiogram:  Results for orders placed during the hospital encounter of 07/29/20    Adult Transthoracic Echo Complete W/ Cont if Necessary Per Protocol    Interpretation Summary  · Left ventricular wall thickness is consistent with mild concentric hypertrophy.  · Left ventricular systolic function is normal.  · Estimated EF appears to be in the range of 61 - 65%.  · Left ventricular diastolic dysfunction (grade I) consistent with impaired relaxation.  ·  Normal right ventricular cavity size and systolic function noted. Electronic lead present in the ventricle.  · Normal cardiac chambers dimensions.  · Mild aortic valve regurgitation is present.  · Mild mitral valve regurgitation is present  · Mild tricuspid valve regurgitation is present. No evidence of pulmonary hypertension is present.  · There is no evidence of pericardial effusion.    ----------------------------------------------------------------------------------------------------------------------  Results from last 7 days   Lab Units 02/14/22  0119 02/13/22  0010   WBC 10*3/mm3 10.86* 9.70   HEMOGLOBIN g/dL 9.9* 9.6*   HEMATOCRIT % 31.3* 30.9*   MCV fL 86.7 87.8   MCHC g/dL 31.6 31.1*   PLATELETS 10*3/mm3 442 353         Results from last 7 days   Lab Units 02/14/22  0119 02/13/22  0010   SODIUM mmol/L 127* 123*   POTASSIUM mmol/L 4.8 4.5   CHLORIDE mmol/L 94* 90*   CO2 mmol/L 24.3 25.3   BUN mg/dL 9 11   CREATININE mg/dL 0.63* 0.75*   EGFR IF NONAFRICN AM mL/min/1.73 124 102   CALCIUM mg/dL 7.8* 7.9*   GLUCOSE mg/dL 281* 291*   Estimated Creatinine Clearance: 75.3 mL/min (A) (by C-G formula based on SCr of 0.63 mg/dL (L)).  No results found for: AMMONIA              Glucose   Date/Time Value Ref Range Status   02/18/2022 1132 243 (H) 70 - 130 mg/dL Final     Comment:     Meter: [id number] : [id number] Jane Simon   02/18/2022 0607 275 (H) 70 - 130 mg/dL Final     Comment:     Meter: [id number] : [id number] Tim Lopez   02/17/2022 1614 172 (H) 70 - 130 mg/dL Final     Comment:     Meter: [id number] : [id number] annalisa edmonds   02/17/2022 1114 167 (H) 70 - 130 mg/dL Final     Comment:     Meter: [id number] : [id number] yue gaspar   02/17/2022 0603 207 (H) 70 - 130 mg/dL Final     Comment:     Meter: [id number] : [id number] Caty Crystal   02/16/2022 1648 178 (H) 70 - 130 mg/dL Final     Comment:     Meter: [id number] : [id number] annalisa edmonds   02/16/2022 1111 142 (H) 70 -  130 mg/dL Final     Comment:     Meter: [id number] : [id number] Jane Simon   02/16/2022 0550 288 (H) 70 - 130 mg/dL Final     Comment:     Meter: [id number] : [id number] Vashti Simon     Lab Results   Component Value Date    TSH 1.620 06/27/2021     No results found for: PREGTESTUR, PREGSERUM, HCG, HCGQUANT  Pain Management Panel    There is no flowsheet data to display.       Brief Urine Lab Results  (Last result in the past 365 days)      Color   Clarity   Blood   Leuk Est   Nitrite   Protein   CREAT   Urine HCG        01/21/22 2149 Yellow   Clear   Negative   Negative   Negative   Trace               No results found for: BLOODCX      No results found for: URINECX  No results found for: WOUNDCX  No results found for: STOOLCX        I have personally looked at the labs and they are summarized above.  ----------------------------------------------------------------------------------------------------------------------  Detailed radiology reports for the last 24 hours:    Imaging Results (Last 24 Hours)     Procedure Component Value Units Date/Time    XR Chest 1 View [[id number]] Collected: 02/18/22 0928     Updated: 02/18/22 0931    Narrative:      EXAM:    XR Chest, 1 View     EXAM DATE:    2/18/2022 9:14 AM     CLINICAL HISTORY:    effusion     TECHNIQUE:    Frontal view of the chest.     COMPARISON:    02/16/2022     FINDINGS:    LUNGS:  Grossly stable appearance of left basilar airspace opacity.    PLEURAL SPACE:  Persistent and stable appearance of left pleural  collections.  Probably small right pleural effusion.  No pneumothorax.    HEART:  Unremarkable.  No cardiomegaly.    MEDIASTINUM:  Esophageal stent again noted.    BONES/JOINTS:  Unremarkable.    VASCULATURE:  Right peripheral line with tip in SVC.    TUBES, LINES AND DEVICES:  Left cardiac defibrillator.       Impression:      1.  Persistent and stable appearance of left pleural collections.  2.  Probably small right pleural effusion.  3.   Grossly stable appearance of left basilar airspace opacity.     This report was finalized on 2/18/2022 9:29 AM by Dr. Jarek Flores MD.           Final impressions for the last 30 days of radiology reports:    CT Chest Without Contrast Diagnostic    Addendum Date: 2/16/2022    Compared with previous study this represents a fairly moderate increase in size of the empyema.  This report was finalized on 2/16/2022 1:01 PM by Dr. Jarek Flores MD.      Result Date: 2/16/2022  1.  Bilobed loculated effusion of the left lower pleural space measuring about 10 cm with internal air, as well as the left upper lobe pleural space measuring about 4.4 cm and in total length about 16 cm. 2.  Small right pleural effusion.  This report was finalized on 2/16/2022 12:49 PM by Dr. Jarek Flores MD.      CT Chest Without Contrast Diagnostic    Result Date: 1/22/2022  1.  Transmural perforation of the distal thoracic esophagus communicating with the left basilar pleural space. Orally administered water-soluble contrast fills the left basilar pleural space and is visible within the left side chest tube. 2.  The exact site for esophageal perforation is not demonstrated on this study. There is very little contrast within the esophagus, and only a faint amount of contrast along the left side of the distal thoracic esophagus. 3.  Distal paraesophageal pneumomediastinum. 4.  Small left hydropneumothorax. No right pneumothorax. 5.  Consolidation and volume loss involving basal segments left lower lobe. Right posterior basilar atelectasis. Small right pleural effusion. Signer Name: Celestino Herrera MD  Signed: 1/22/2022 6:54 PM  Workstation Name: [email or web address]  [facility]    CT Chest With Contrast Diagnostic    Result Date: 1/21/2022  1. There is a small left pneumothorax but there is at least a moderate-sized left-sided pleural effusion and there is a small amount of pneumomediastinum with shift of mediastinal  structures from left to right. A chest tube is being placed at time of this dictation as described below. 2. Probable right lung atelectasis. 3. No acute finding in the upper abdomen. Abdomen and pelvis CT dictated separately. NOTIFICATION: Critical Value/emergent results were called by telephone at the time of interpretation on 1/21/2022 8:55 PM to ASTER Boudreaux who verbally acknowledged these results. Dr. Villafuerte was in the process of placing a chest tube in the patient at the time of my call. Signer Name: Nick Vines MD  Signed: 1/21/2022 8:59 PM  Workstation Name: [facility]  [facility]    CT Abdomen Pelvis With Contrast    Result Date: 1/21/2022  1. There is a left-sided effusion that is at least moderate in size. There is a small left pneumothorax and there is trace pneumomediastinum and subtle shift of mediastinal structures from left to right. Please see the separately dictated CT chest for further details. The emergency department is aware of these findings and a chest tube was in the process of being placed at the time of interpretation of the CT chest. 2. The stomach is distended with air fluid and debris and there are new postoperative changes of the stomach compared to the prior study. Correlate with any signs or symptoms of gastric outlet obstruction and the patient may benefit from NG tube placement. 3. No evidence of bowel obstruction or drainable fluid collection. 4. Normal appendix. 5. Diverticulosis. 6. Hepatic steatosis. 7. Tiny subcentimeter enhancing lesion in the spleen, favored to be benign but technically indeterminate. 8. Bilateral renal cysts. 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 Signer Name: Nick Vines MD  Signed: 1/21/2022 10:16 PM  Workstation Name: [employer]  [facility]    FL Esophagram Complete Single Contrast    Result Date: 1/22/2022  1.  Chest x-ray series with water-soluble oral contrast shows evidence of  likely distal esophageal rupture with oral contrast appearing in the left basilar pleural space. Please see chest CT to follow. 2.  Left lower lobe consolidation and atelectasis. Mild infiltrate and volume loss in the right lung base. Small right pleural effusion. 3.  Left-sided hydropneumothorax with chest tube in place. Signer Name: Celestino Herrera MD  Signed: 1/22/2022 6:46 PM  Workstation Name: [facility]  Radiology [facility]    XR Chest 1 View    Result Date: 2/18/2022  1.  Persistent and stable appearance of left pleural collections. 2.  Probably small right pleural effusion. 3.  Grossly stable appearance of left basilar airspace opacity.  This report was finalized on 2/18/2022 9:29 AM by Dr. Jarek Flores MD.      XR Chest 1 View    Result Date: 1/22/2022  1. Left sided chest tube remains present along with a small left pneumothorax, stable to slightly larger when compared with the prior study but this may be related to technical factors. 2. Worsening appearance of the left lung base with increasing pleural fluid and consolidation. No right-sided pneumothorax. Signer Name: Nick Vines MD  Signed: 1/22/2022 5:28 PM  Workstation Name: [facility]    XR Chest 1 View    Result Date: 1/21/2022  Interval placement of left chest tube. Persistent small left pneumothorax. Persistent airspace disease in the left chest. Signer Name: LIDIA GAY MD  Signed: 1/21/2022 9:32 PM  Workstation Name: [facility]    XR Chest 1 View    Addendum Date: 1/21/2022    //////////// ADDENDUM #1 //////////// ADDENDUM: Favor there is a small subtle left apical pneumothorax. This is confirmed by CT. Spoke directly to Dr. Scott Villafuerte in the ER at 8:36 PM 1/21/2022 to relay this finding. Signer Name: LIDIA GAY MD  Signed: 1/21/2022 8:38 PM  Workstation Name: [facility]  Radiology [facility]////////////  ORIGINAL REPORT  //////////// CR Chest 1 Vw INDICATION: Upper back pain COMPARISON:  6/27/2021 FINDINGS: Portable AP view(s) of the chest.  Normal cardiomediastinal contours. Dense consolidation in the left midlung especially the left base, most consistent with a dense lobar pneumonia. Small left pleural effusion.     Result Date: 1/21/2022  Dense consolidation in the left midlung especially the left base, most consistent with a dense lobar pneumonia. Small left pleural effusion. Signer Name: LIDIA GAY MD  Signed: 1/21/2022 8:10 PM  Workstation Name: [facility]  [facility] Venous Doppler Lower Extremity Bilateral (duplex)    Result Date: 1/22/2022  No deep venous thrombus seen in either lower extremity. Signer Name: Nick Vines MD  Signed: 1/22/2022 3:56 PM  Workstation Name: [facility]  [facility]    XR Chest PA & Lateral    Result Date: 2/16/2022    Loculated pleural collection likely measuring 3.6 and 4.9 cm of the left hemithorax. Some locules of air noted within the collection concerning for empyema.  This report was finalized on 2/16/2022 10:54 AM by Dr. Jarek Flores MD.      I have personally looked at the radiology images and read the final radiology report.    Assessment & Plan    Debility after recent esophageal rupture with Kamari drain in place and recent esophageal stent placement.  Surgeon recommends leaving Kamari tube in place till esophageal stent can be removed in 4 weeks.  Patient is currently completing course of IV micafungin.  Patient receiving nutrition via J-tube at this time.    CXR and CT chest repeated and discussed with pulmonary and radiologist. Dr. Womack is mostly concern about the vacuum getting disconnected allowing for air entry. He is going to apply TPA x 3 days.  CXR stable with increased drainage in drain tube post TPA    Participated in physical occupational therapy on 2/17/2022: Performs bed mobility with minimal assist and  verbal/nonverbal cues become from transfer activities and standby assist with verbal cues; utilizes front wheel walker for sit to stand/stand to sit transfer; ambulated 160 feet with front wheel walker standby assist to contact-guard with verbal cues; requires moderate assist verbal cues for lower body dressing; set up assist for grooming activities    Dry mouth with thrush, MMW ordered    Nutrition continue J-tube feeds    Hyponatremia--labs in am    Diabetes mellitus continue sliding scale coverage    Hypertension controlled    History of CAD stable      VTE Prophylaxis:   Mechanical Order History:     None      Pharmalogical Order History:      Ordered     Dose Route Frequency Stop    02/10/22 1558  enoxaparin (LOVENOX) syringe 40 mg         40 mg SC Every 24 Hours --              Sylvia Talley MD  02/18/22  13:42 EST

## 2022-02-18 NOTE — PROGRESS NOTES
Rehabilitation Nursing  Inpatient Rehabilitation Plan of Care Note    Plan of Care  Pain    Pain Management (Active)  Current Status (2/10/2022 3:38:00 PM): Potential for pain  Weekly Goal: No pain this week  Discharge Goal: No pain    Safety    Potential for Injury (Active)  Current Status (2/10/2022 3:38:00 PM): At risk for injury  Weekly Goal: No injury this week  Discharge Goal: No injuriesC    Signed by: Ekta Mc Nurse

## 2022-02-18 NOTE — THERAPY TREATMENT NOTE
Inpatient Rehabilitation - Occupational Therapy Treatment Note     [place]     Patient Name: Sandro Argueta  : 1945  MRN: [id number]    Today's Date: 2022                 Admit Date: 2/10/2022       No diagnosis found.    Patient Active Problem List   Diagnosis   • Left elbow pain   • Hypertension   • DM2 (diabetes mellitus, type 2) (HCC)   • Elevated prostate specific antigen (PSA)   • Epigastric pain   • Epigastric abdominal pain   • Elevated liver enzymes   • Weight loss, abnormal   • Acute hepatitis   • Anemia   • Obstructive jaundice   • Pneumothorax, left   • Cytokine release syndrome, grade 1   • Esophageal rupture       Past Medical History:   Diagnosis Date   • Acute UTI (urinary tract infection) 2021   • Arthritis    • Borderline diabetes    • Coronary artery disease    • Diabetes mellitus (HCC)    • Elevated prostate specific antigen (PSA) 2020   • GERD (gastroesophageal reflux disease)    • Hypertension    • Left elbow pain        Past Surgical History:   Procedure Laterality Date   • COLONOSCOPY     • ENDOSCOPY N/A 2021    Procedure: ESOPHAGOGASTRODUODENOSCOPY WITH BIOPSY dilitation;  Surgeon: Bela Mcdaniel MD;  Location: [facility] OR;  Service: Gastroenterology;  Laterality: N/A;   • ERCP N/A 2021    Procedure: ENDOSCOPIC RETROGRADE CHOLANGIOPANCREATOGRAPHY WITH STENT PLACEMENT;  Surgeon: Matthew Moreira MD;  Location: [facility] ENDOSCOPY;  Service: Gastroenterology;  Laterality: N/A;  with sphincterotomy, and brushing of common bile duct, and placement of [id number] wall stent     • PACEMAKER IMPLANTATION               IRF OT ASSESSMENT FLOWSHEET (last 12 hours)     IRF OT Evaluation and Treatment     Row Name 22 1526          OT Time and Intention    Document Type daily treatment  -LM     Mode of Treatment occupational therapy  -LM     Patient Effort good  -LM     Comment, Evaluation/Treatment Not Performed decreased tx in am, patient not  feeling good, pulmonologist procedure performed bedside.  -LM     Row Name 02/18/22 1526          General Information    Patient/Family/Caregiver Comments/Observations Patient seen this date for light bue therex/ta, fxl mobility, fxl activity tolerance, adl retraining.  Patient requires cga for fxl transfer.  Patient unable to tolerate fxl activities without frequent rest breaks.  Patient did perform light strengthening activities bue.  Unable to tolerate full tx session due to fatigue.  -LM     Existing Precautions/Restrictions fall; other (see comments)  chest tube, gtube, jtube, NPO, only comfort clear liquids  -LM     Limitations/Impairments hearing  -LM     Row Name 02/18/22 1526          Cognition/Psychosocial    Orientation Status (Cognition) oriented x 4; oriented x 3  -LM     Follows Commands (Cognition) WFL  -LM     Row Name 02/18/22 1526          Transfers    Stand-Sit Orange (Transfers) contact guard  -LM     Orange Level (Toilet Transfer) contact guard; minimum assist (75% patient effort)  -LM     Row Name 02/18/22 1526          Positioning and Restraints    In Bed notified nsg; call light within reach; encouraged to call for assist  -LM           User Key  (r) = Recorded By, (t) = Taken By, (c) = Cosigned By    Initials Name Effective Dates    LM Rosita Veloz, OT 06/16/21 -                  Occupational Therapy Education                 Title: PT OT SLP Therapies (Done)     Topic: Occupational Therapy (Done)     Point: ADL training (Done)     Description:   Instruct learner(s) on proper safety adaptation and remediation techniques during self care or transfers.   Instruct in proper use of assistive devices.              Learning Progress Summary           Patient Acceptance, E,TB, VU by DG at 2/18/2022 0017      Show all documentation for this point (7)                 Point: Precautions (Done)     Description:   Instruct learner(s) on prescribed precautions during self-care and  functional transfers.              Learning Progress Summary           Patient Acceptance, E,TB, VU by  at 2/18/2022 0017      Show all documentation for this point (7)                             User Key     Initials Effective Dates Name Provider Type Discipline     06/16/21 -  Jessica Dumont, RN Registered Nurse Nurse                    OT Recommendation and Plan                         Time Calculation:      Time Calculation- OT     Row Name 02/18/22 1536 02/18/22 1535          Time Calculation- OT    OT Start Time 1200  -LM 1045  -LM     OT Stop Time 1230  -LM 1100  -LM     OT Time Calculation (min) 30 min  -LM 15 min  -LM     Total Timed Code Minutes- OT 30 minute(s)  -LM 15 minute(s)  -LM           User Key  (r) = Recorded By, (t) = Taken By, (c) = Cosigned By    Initials Name Provider Type     Rosita Veloz, OT Occupational Therapist              Therapy Charges for Today     Code Description Service Date Service Provider Modifiers Qty    28391933977 HC OT SELF CARE/MGMT/TRAIN EA 15 MIN 2/18/2022 Rosita Veloz OT GO 2    26358139110 HC OT THERAPEUTIC ACT EA 15 MIN 2/18/2022 Rosita Veloz OT GO 1                   Rosita Veloz OT  2/18/2022

## 2022-02-18 NOTE — PROGRESS NOTES
After discussion with the ID team and with the pharmacy, the patient had a 25 cc syringe with 10 mg of TPA available at the bedside.  Under sterile technique, the chest tube was disconnected from the vacuum device and the TPA was instilled through the tube and the tube was clamped afterwards.  Instruction provided to the patient and to the staff to do change position every 15 minutes for the next 2 hours to make sure that the TPA is distributed throughout the whole loculated effusion area and instruction were given to unclamp it and reconnect to the Vacutainer at 2 hours.  We will attempt the same thing tomorrow and will try to see if it can be arranged to be done on Saturday by any local medical provider.  If not we will do it again on Monday and repeat imaging, will follow up on the output from that chest tube with the use of the TPA

## 2022-02-18 NOTE — THERAPY TREATMENT NOTE
Inpatient Rehabilitation - Physical Therapy Treatment Note        Rocky     Patient Name: Sandro Argueta  : 1945  MRN: [id number]    Today's Date: 2022                    Admit Date: 2/10/2022      Visit Dx:   No diagnosis found.    Patient Active Problem List   Diagnosis   • Left elbow pain   • Hypertension   • DM2 (diabetes mellitus, type 2) (HCC)   • Elevated prostate specific antigen (PSA)   • Epigastric pain   • Epigastric abdominal pain   • Elevated liver enzymes   • Weight loss, abnormal   • Acute hepatitis   • Anemia   • Obstructive jaundice   • Pneumothorax, left   • Cytokine release syndrome, grade 1   • Esophageal rupture       Past Medical History:   Diagnosis Date   • Acute UTI (urinary tract infection) 2021   • Arthritis    • Borderline diabetes    • Coronary artery disease    • Diabetes mellitus (HCC)    • Elevated prostate specific antigen (PSA) 2020   • GERD (gastroesophageal reflux disease)    • Hypertension    • Left elbow pain        Past Surgical History:   Procedure Laterality Date   • COLONOSCOPY     • ENDOSCOPY N/A 2021    Procedure: ESOPHAGOGASTRODUODENOSCOPY WITH BIOPSY dilitation;  Surgeon: Bela Mcdaniel MD;  Location: [facility];  Service: Gastroenterology;  Laterality: N/A;   • ERCP N/A 2021    Procedure: ENDOSCOPIC RETROGRADE CHOLANGIOPANCREATOGRAPHY WITH STENT PLACEMENT;  Surgeon: Matthew Moreira MD;  Location: [facility] ENDOSCOPY;  Service: Gastroenterology;  Laterality: N/A;  with sphincterotomy, and brushing of common bile duct, and placement of [id number] wall stent     • PACEMAKER IMPLANTATION         PT ASSESSMENT (last 12 hours)     IRF PT Evaluation and Treatment     Row Name 22 1452 22 1441       PT Time and Intention    Document Type daily treatment  -RG progress note  -LL    Mode of Treatment physical therapy; individual therapy  -RG physical therapy; individual therapy  -LL    Patient/Family/Caregiver  Comments/Observations Pt and nursing in agreement for skilled PT on this date.  -RG Patient agreeable to PT session this date.  He has made progress with therapy, but continues to be very weak and would benefit from ongoing skilled therapy to further improve strength, endurance and safety with functional mobility.  -LL    Row Name 02/18/22 1452 02/18/22 1441       General Information    Patient Profile Reviewed yes  -RG yes  -LL    Existing Precautions/Restrictions fall  -RG fall  -LL    Limitations/Impairments other (see comments)  medical complexity  -RG other (see comments)  medical complexity  -LL    Row Name 02/18/22 1452 02/18/22 1441       Cognition/Psychosocial    Affect/Mental Status (Cognitive) WFL  -RG WFL  -LL    Orientation Status (Cognition) oriented x 4  -RG oriented x 4  -LL    Follows Commands (Cognition) WFL  -RG WFL  -LL    Personal Safety Interventions fall prevention program maintained; gait belt; nonskid shoes/slippers when out of bed  -RG fall prevention program maintained; gait belt; nonskid shoes/slippers when out of bed; supervised activity  -LL    Cognitive Function (Cognitive) WFL  -RG WFL  -LL    Row Name 02/18/22 1452 02/18/22 1441       Pain Scale: FACES Pre/Post-Treatment    Pain: FACES Scale, Pretreatment 0-->no hurt  -RG 0-->no hurt  -LL    Posttreatment Pain Rating 0-->no hurt  -RG 0-->no hurt  -LL    Row Name 02/18/22 1452 02/18/22 1441       Mobility    Extremity Weight-bearing Status right lower extremity; left lower extremity  -RG right lower extremity; left lower extremity  -LL    Left Lower Extremity (Weight-bearing Status) full weight-bearing (FWB)  -RG full weight-bearing (FWB)  -LL    Right Lower Extremity (Weight-bearing Status) full weight-bearing (FWB)  -RG full weight-bearing (FWB)  -LL    Row Name 02/18/22 1452 02/18/22 1441       Bed Mobility    Supine-Sit [place] (Bed Mobility) verbal cues; nonverbal cues (demo/gesture); minimum assist (75% patient effort)  -RG  verbal cues; nonverbal cues (demo/gesture); minimum assist (75% patient effort)  -LL    Bed Mobility, Safety Issues decreased use of legs for bridging/pushing; decreased use of arms for pushing/pulling  -RG decreased use of legs for bridging/pushing; decreased use of arms for pushing/pulling  -LL    Assistive Device (Bed Mobility) bed rails; other (see comments)  manual assistance from therapist  -RG bed rails; other (see comments)  manual assistance from therapist  -    Row Name 02/18/22 1452 02/18/22 1441       Transfer Assessment/Treatment    Transfers sit-stand transfer; stand-sit transfer; bed-chair transfer; stand pivot/stand step transfer  -RG sit-stand transfer; stand-sit transfer; bed-chair transfer; stand pivot/stand step transfer  -    Row Name 02/18/22 1452 02/18/22 1441       Transfers    Bed-Chair [place] (Transfers) verbal cues; minimum assist (75% patient effort); contact guard  -RG verbal cues; minimum assist (75% patient effort); contact guard  -LL    Chair-Bed [place] (Transfers) minimum assist (75% patient effort)  -RG --    Assistive Device (Bed-Chair Transfers) wheelchair  -RG wheelchair  -LL    Sit-Stand [place] (Transfers) standby assist; verbal cues; nonverbal cues (demo/gesture)  -RG --    Stand-Sit [place] (Transfers) standby assist; verbal cues; nonverbal cues (demo/gesture)  -RG --    Row Name 02/18/22 1452          Chair-Bed Transfer    Assistive Device (Chair-Bed Transfers) wheelchair  -RG     Row Name 02/18/22 1452          Sit-Stand Transfer    Assistive Device (Sit-Stand Transfers) walker, front-wheeled  -RG     Row Name 02/18/22 1452          Stand Pivot/Stand Step Transfer    Stand Pivot/Stand Step [place] (Transfers) minimum assist (75% patient effort)  -RG     Assistive Device (Stand Pivot Stand Step Transfer) wheelchair  -RG     Row Name 02/18/22 1452          Gait/Stairs (Locomotion)    [place] Level (Gait) contact guard  -RG     Assistive  Device (Gait) walker, front-wheeled  -RG     Distance in Feet (Gait) 160  -RG     Pattern (Gait) step-through  -RG     Deviations/Abnormal Patterns (Gait) gait speed decreased  -RG     Bilateral Gait Deviations forward flexed posture  -RG     Comment (Gait/Stairs) Pt c/o fatigue  -RG     Row Name 02/18/22 1452 02/18/22 1441       Safety Issues, Functional Mobility    Impairments Affecting Function (Mobility) endurance/activity tolerance; strength; balance  -RG endurance/activity tolerance; strength; balance  -    Row Name 02/18/22 1441          Motor Skills    Therapeutic Exercise --  GS  -     Row Name 02/18/22 1452 02/18/22 1441       Hip (Therapeutic Exercise)    Hip Strengthening (Therapeutic Exercise) bilateral; flexion; aBduction; aDduction; marching while seated; sitting; resistance band; green; 10 repetitions; 2 sets  -RG bilateral; flexion; extension; aBduction; aDduction; marching while seated; sitting; resistance band; green  -    Row Name 02/18/22 1452 02/18/22 1441       Knee (Therapeutic Exercise)    Knee Strengthening (Therapeutic Exercise) bilateral; flexion; extension; marching while seated; LAQ (long arc quad); sitting; resistance band; green; 10 repetitions; 2 sets  -RG bilateral; flexion; extension; marching while seated; LAQ (long arc quad); hamstring curls; sitting; resistance band; green  -LL    Row Name 02/18/22 1452 02/18/22 1441       Ankle (Therapeutic Exercise)    Ankle Strengthening (Therapeutic Exercise) bilateral; dorsiflexion; plantarflexion; sitting; 10 repetitions; 2 sets  -RG bilateral; dorsiflexion; plantarflexion; sitting  -    Row Name 02/18/22 1452 02/18/22 1441       Bed Mobility Goal 1 (PT-IRF)    Progress/Outcomes (Bed Mobility Goal 1, PT-IRF) goal ongoing  -RG goal ongoing  -LL    Row Name 02/18/22 1452 02/18/22 1441       Transfer Goal 1 (PT-IRF)    Progress/Outcomes (Transfer Goal 1, PT-IRF) goal ongoing  -RG goal ongoing  -LL    Row Name 02/18/22 1452 02/18/22  1441       Gait/Walking Locomotion Goal 1 (PT-IRF)    Progress/Outcomes (Gait/Walking Locomotion Goal 1, PT-IRF) goal ongoing  -RG goal ongoing  -LL    Row Name 02/18/22 1452 02/18/22 1441       Positioning and Restraints    Pre-Treatment Position in bed  -RG in bed  -LL    Post Treatment Position bed  -RG wheelchair  -LL    In Bed notified nsg; supine; call light within reach; encouraged to call for assist; legs elevated  -RG --    In Wheelchair -- sitting; call light within reach; encouraged to call for assist  With tray table in front of him  -LL    Row Name 02/18/22 1452 02/18/22 1441       Daily Progress Summary (PT)    Impairments Still Limiting Function (PT) strength decreased; impaired balance; impaired functional activity tolerance  -RG strength decreased; impaired balance; impaired functional activity tolerance  -LL    Row Name 02/18/22 1452 02/18/22 1441       Therapy Plan Review/Discharge Plan (PT)    Expected Discharge Disposition (PT Eval) home with caregiver; home with home health care  -RG home with caregiver; home with home health care  -LL          User Key  (r) = Recorded By, (t) = Taken By, (c) = Cosigned By    Initials Name Provider Type    LL Tanya Waller PTA Physical Therapy Assistant    RG Jose Martin Hernández PTA Physical Therapy Assistant              Wound 02/10/22 1557 midline abdomen Incision (Active)   Dressing Appearance open to air 02/18/22 0725   Closure Staples 02/17/22 1901   Base pink; red; clean 02/17/22 1901   Drainage Amount none 02/17/22 1901   Dressing Care open to air 02/18/22 0725       Wound 02/10/22 1602 Left upper back Incision (Active)   Dressing Appearance open to air 02/18/22 0725   Closure Liquid skin adhesive 02/17/22 1901   Base clean; dry 02/17/22 1901   Drainage Amount none 02/17/22 1901   Dressing Care open to air 02/18/22 0725     Physical Therapy Education                 Title: PT OT SLP Therapies (Done)     Topic: Physical Therapy (Done)     Point: Mobility  training (Done)     Learning Progress Summary           Patient Acceptance, E,D, VU,NR by  at 2/18/2022 1456      Show all documentation for this point (10)                 Point: Home exercise program (Done)     Learning Progress Summary           Patient Acceptance, E,D, VU,NR by  at 2/18/2022 1456      Show all documentation for this point (10)                 Point: Body mechanics (Done)     Learning Progress Summary           Patient Acceptance, E,D, VU,NR by  at 2/18/2022 1456      Show all documentation for this point (10)                 Point: Precautions (Done)     Learning Progress Summary           Patient Acceptance, E,D, VU,NR by  at 2/18/2022 1456      Show all documentation for this point (10)                             User Key     Initials Effective Dates Name Provider Type Discipline     06/16/21 -  Jose Martin Hernández PTA Physical Therapy Assistant PT                PT Recommendation and Plan    Frequency of Treatment (PT): 5 times per week  Anticipated Equipment Needs at Discharge (PT Eval):  (tbd)  Daily Progress Summary (PT)  Impairments Still Limiting Function (PT): strength decreased, impaired balance, impaired functional activity tolerance               Time Calculation:      PT Charges     Row Name 02/18/22 1456 02/18/22 1446          Time Calculation    Start Time 0745  - 1245  -LL     Stop Time 0830  - 1330  -LL     Time Calculation (min) 45 min  -RG 45 min  -LL     PT Received On 02/18/22  - 02/18/22  -LL     PT Goal Re-Cert Due Date -- 02/25/22  -LL            Time Calculation- PT    Total Timed Code Minutes- PT 45 minute(s)  -RG 45 minute(s)  -LL           User Key  (r) = Recorded By, (t) = Taken By, (c) = Cosigned By    Initials Name Provider Type    LL Tanya Waller PTA Physical Therapy Assistant    Jose Martin Jordan PTA Physical Therapy Assistant                Therapy Charges for Today     Code Description Service Date Service Provider Modifiers Qty    29540024092  HC GAIT TRAINING EA 15 MIN 2/18/2022 Jose Martin Hernández, PTA GP, CQ 1    74453155881 HC PT THERAPEUTIC ACT EA 15 MIN 2/18/2022 Jose Martin Hernández, PTA GP, CQ 1    72362209052 HC PT THER PROC EA 15 MIN 2/18/2022 Jose Martin Hernández, ZACKARY GP, CQ 1                   Jose Martin Hernández, ZACKARY  2/18/2022

## 2022-02-18 NOTE — PROGRESS NOTES
Inpatient Rehabilitation Functional Measures Assessment    Functional Measures  MAGAN Eating:  MAGAN Grooming:  MAGAN Bathing:  MAGAN Upper Body Dressing:  MAGAN Lower Body Dressing:  MAGAN Toileting:    MAGAN Bladder Management  Level of Assistance:  Frequency/Number of Accidents this Shift:    MAGAN Bowel Management  Level of Assistance:  Frequency/Number of Accidents this Shift:    MAGAN Bed/Chair/Wheelchair Transfer:  MAGAN Toilet Transfer:  MAGAN Tub/Shower Transfer:    Previously Documented Mode of Locomotion at Discharge:  [facility] Expected Mode of Locomotion at Discharge:  [facility] Walk/Wheelchair:  [facility] Stairs:    [facility] Comprehension:  [facility] Expression:  [facility] Social Interaction:  [facility] Problem Solving:  MAGAN Memory:    Therapy Mode Minutes  Occupational Therapy: Individual: 45 minutes.  Physical Therapy:  Speech Language Pathology:    Discharge Functional Goals:    Signed by: Rosita Veloz, Occupational Therapist

## 2022-02-18 NOTE — THERAPY PROGRESS REPORT/RE-CERT
Inpatient Rehabilitation - Physical Therapy Treatment Note        Rocky     Patient Name: Sandro Argueta  : 1945  MRN: [id number]    Today's Date: 2022                    Admit Date: 2/10/2022      Visit Dx:   No diagnosis found.    Patient Active Problem List   Diagnosis   • Left elbow pain   • Hypertension   • DM2 (diabetes mellitus, type 2) (HCC)   • Elevated prostate specific antigen (PSA)   • Epigastric pain   • Epigastric abdominal pain   • Elevated liver enzymes   • Weight loss, abnormal   • Acute hepatitis   • Anemia   • Obstructive jaundice   • Pneumothorax, left   • Cytokine release syndrome, grade 1   • Esophageal rupture       Past Medical History:   Diagnosis Date   • Acute UTI (urinary tract infection) 2021   • Arthritis    • Borderline diabetes    • Coronary artery disease    • Diabetes mellitus (HCC)    • Elevated prostate specific antigen (PSA) 2020   • GERD (gastroesophageal reflux disease)    • Hypertension    • Left elbow pain        Past Surgical History:   Procedure Laterality Date   • COLONOSCOPY     • ENDOSCOPY N/A 2021    Procedure: ESOPHAGOGASTRODUODENOSCOPY WITH BIOPSY dilitation;  Surgeon: Bela Mcdaniel MD;  Location: [facility];  Service: Gastroenterology;  Laterality: N/A;   • ERCP N/A 2021    Procedure: ENDOSCOPIC RETROGRADE CHOLANGIOPANCREATOGRAPHY WITH STENT PLACEMENT;  Surgeon: Matthew Moreira MD;  Location: [facility] ENDOSCOPY;  Service: Gastroenterology;  Laterality: N/A;  with sphincterotomy, and brushing of common bile duct, and placement of [id number] wall stent     • PACEMAKER IMPLANTATION         PT ASSESSMENT (last 12 hours)     IRF PT Evaluation and Treatment     Row Name 22 1441          PT Time and Intention    Document Type progress note  -LL     Mode of Treatment physical therapy; individual therapy  -LL     Patient/Family/Caregiver Comments/Observations Patient agreeable to PT session this date.  He has  made progress with therapy, but continues to be very weak and would benefit from ongoing skilled therapy to further improve strength, endurance and safety with functional mobility.  -     Row Name 02/18/22 1441          General Information    Patient Profile Reviewed yes  -LL     Existing Precautions/Restrictions fall  -LL     Limitations/Impairments other (see comments)  medical complexity  -[facility] Name 02/18/22 1441          Cognition/Psychosocial    Affect/Mental Status (Cognitive) WFL  -LL     Orientation Status (Cognition) oriented x 4  -LL     Follows Commands (Cognition) WFL  -LL     Personal Safety Interventions fall prevention program maintained; gait belt; nonskid shoes/slippers when out of bed; supervised activity  -LL     Cognitive Function (Cognitive) WFL  -LL     [facility] Name 02/18/22 1441          Pain Scale: FACES Pre/Post-Treatment    Pain: FACES Scale, Pretreatment 0-->no hurt  -LL     Posttreatment Pain Rating 0-->no hurt  -LL     [facility] Name 02/18/22 1441          Mobility    Extremity Weight-bearing Status right lower extremity; left lower extremity  -LL     Left Lower Extremity (Weight-bearing Status) full weight-bearing (FWB)  -     Right Lower Extremity (Weight-bearing Status) full weight-bearing (FWB)  -[facility] Name 02/18/22 1441          Bed Mobility    Supine-Sit [place] (Bed Mobility) verbal cues; nonverbal cues (demo/gesture); minimum assist (75% patient effort)  -     Bed Mobility, Safety Issues decreased use of legs for bridging/pushing; decreased use of arms for pushing/pulling  -     Assistive Device (Bed Mobility) bed rails; other (see comments)  manual assistance from therapist  -[facility] Name 02/18/22 1441          Transfer Assessment/Treatment    Transfers sit-stand transfer; stand-sit transfer; bed-chair transfer; stand pivot/stand step transfer  -[facility] Name 02/18/22 1441          Transfers    Bed-Chair [place] (Transfers) verbal cues; minimum assist  (75% patient effort); contact guard  -LL     Assistive Device (Bed-Chair Transfers) wheelchair  -     Row Name 02/18/22 1441          Safety Issues, Functional Mobility    Impairments Affecting Function (Mobility) endurance/activity tolerance; strength; balance  -     Row Name 02/18/22 1441          Motor Skills    Therapeutic Exercise --  GS  -     Row Name 02/18/22 1441          Hip (Therapeutic Exercise)    Hip Strengthening (Therapeutic Exercise) bilateral; flexion; extension; aBduction; aDduction; marching while seated; sitting; resistance band; green  [facility]     Row Name 02/18/22 1441          Knee (Therapeutic Exercise)    Knee Strengthening (Therapeutic Exercise) bilateral; flexion; extension; marching while seated; LAQ (long arc quad); hamstring curls; sitting; resistance band; green  -LL     Row Name 02/18/22 1441          Ankle (Therapeutic Exercise)    Ankle Strengthening (Therapeutic Exercise) bilateral; dorsiflexion; plantarflexion; sitting  -     Row Name 02/18/22 1441          Bed Mobility Goal 1 (PT-IRF)    Progress/Outcomes (Bed Mobility Goal 1, PT-IRF) goal ongoing  [facility] Name 02/18/22 1441          Transfer Goal 1 (PT-IRF)    Progress/Outcomes (Transfer Goal 1, PT-IRF) goal ongoing  -LL     Row Name 02/18/22 1441          Gait/Walking Locomotion Goal 1 (PT-IRF)    Progress/Outcomes (Gait/Walking Locomotion Goal 1, PT-IRF) goal ongoing  -LL     Row Name 02/18/22 1441          Positioning and Restraints    Pre-Treatment Position in bed  -LL     Post Treatment Position wheelchair  -LL     In Wheelchair sitting; call light within reach; encouraged to call for assist  With tray table in front of him  -[facility] Name 02/18/22 1441          Daily Progress Summary (PT)    Impairments Still Limiting Function (PT) strength decreased; impaired balance; impaired functional activity tolerance  -     Row Name 02/18/22 1441          Therapy Plan Review/Discharge Plan (PT)    Expected Discharge  Disposition (PT Eval) home with caregiver; home with home health care  [facility]           User Key  (r) = Recorded By, (t) = Taken By, (c) = Cosigned By    Initials Name Provider Type     Tanya Waller PTA Physical Therapy Assistant              Wound 02/10/22 1557 midline abdomen Incision (Active)   Dressing Appearance open to air 02/18/22 0725   Closure Staples 02/17/22 1901   Base pink; red; clean 02/17/22 1901   Drainage Amount none 02/17/22 1901   Dressing Care open to air 02/18/22 0725       Wound 02/10/22 1602 Left upper back Incision (Active)   Dressing Appearance open to air 02/18/22 0725   Closure Liquid skin adhesive 02/17/22 1901   Base clean; dry 02/17/22 1901   Drainage Amount none 02/17/22 1901   Dressing Care open to air 02/18/22 0725     Physical Therapy Education                 Title: PT OT SLP Therapies (Done)     Topic: Physical Therapy (Done)     Point: Mobility training (Done)     Learning Progress Summary           Patient Acceptance, E,D, VU,NR by  at 2/18/2022 1446      Show all documentation for this point (9)                 Point: Home exercise program (Done)     Learning Progress Summary           Patient Acceptance, E,D, VU,NR by  at 2/18/2022 1446      Show all documentation for this point (9)                 Point: Body mechanics (Done)     Learning Progress Summary           Patient Acceptance, E,D, VU,NR by  at 2/18/2022 1446      Show all documentation for this point (9)                 Point: Precautions (Done)     Learning Progress Summary           Patient Acceptance, E,D, VU,NR by  at 2/18/2022 1446      Show all documentation for this point (9)                             User Key     Initials Effective Dates Name Provider Type Discipline     05/02/16 -  Tanya Waller PTA Physical Therapy Assistant PT                PT Recommendation and Plan    Frequency of Treatment (PT): 5 times per week  Anticipated Equipment Needs at Discharge (PT Eval):  (tbd)  Daily  Progress Summary (PT)  Impairments Still Limiting Function (PT): strength decreased, impaired balance, impaired functional activity tolerance               Time Calculation:      PT Charges     Row Name 02/18/22 1446             Time Calculation    Start Time 1245  -LL      Stop Time 1330  -LL      Time Calculation (min) 45 min  -LL      PT Received On 02/18/22  -LL      PT Goal Re-Cert Due Date 02/25/22  -LL              Time Calculation- PT    Total Timed Code Minutes- PT 45 minute(s)  -LL            User Key  (r) = Recorded By, (t) = Taken By, (c) = Cosigned By    Initials Name Provider Type    LL Tanya Waller PTA Physical Therapy Assistant                Therapy Charges for Today     Code Description Service Date Service Provider Modifiers Qty    65959428114 HC PT THER PROC EA 15 MIN 2/18/2022 Tanya Waller PTA GP, CQ 1    02044726028 HC PT THERAPEUTIC ACT EA 15 MIN 2/18/2022 Tanya Waller PTA GP, CQ 2                   David Waller PTA  2/18/2022

## 2022-02-18 NOTE — PROGRESS NOTES
Inpatient Rehabilitation Functional Measures Assessment    Functional Measures  MAGAN Eating:  MAGAN Grooming:  MAGAN Bathing:  MAGAN Upper Body Dressing:  MAGAN Lower Body Dressing:  MAGAN Toileting:    MAGAN Bladder Management  Level of Assistance:  Frequency/Number of Accidents this Shift:    MAGAN Bowel Management  Level of Assistance:  Frequency/Number of Accidents this Shift:    MAGAN Bed/Chair/Wheelchair Transfer:  MAGAN Toilet Transfer:  MAGAN Tub/Shower Transfer:    Previously Documented Mode of Locomotion at Discharge:  [facility] Expected Mode of Locomotion at Discharge:  [facility] Walk/Wheelchair:  [facility] Stairs:    [facility] Comprehension:  [facility] Expression:  [facility] Social Interaction:  [facility] Problem Solving:  MAGAN Memory:    Therapy Mode Minutes  Occupational Therapy: Individual: 45 minutes.  Physical Therapy:  Speech Language Pathology:    Discharge Functional Goals:    Signed by: Rosita Veloz, Occupational Therapist

## 2022-02-18 NOTE — PROGRESS NOTES
PROGRESS NOTE  Patient Name: Sandro Argueta  Age/Sex: 76 y.o. male  : 1945  MRN: [id number]    Date of Admission: 2/10/2022  Date of Encounter Visit: 22   LOS: 8 days   Patient Care Team:  Misti Nguyễn MD as PCP - General (Geriatric Medicine)    Chief Complaint: Loculated effusion     Hospital course:patient had empyema status post decortication and chest tube placement on the left side with currently chronic chest tube With recent worsening with increase in the fluid collection and evidence of air bubbles suggestive of recurrent empyema.  It was noted that the chest tube has been disconnected from the collection bottle and there might be a chance of air entering from the outside into the chest cavity.  There was concern because it was a bridge of the sterile environment and measures were taken trying to prevent that from happening again.  tPA was administered through the chest tube on the evening of 2022 with increase in the drainage with the plan to treat for 3 consecutive days of possible and repeat the CAT scan sometime next week as long as the patient remains hemodynamically stable with no clinical sign of sepsis.    According the nursing staff patient had an extra 75 cc out since TPA was initiated    REVIEW OF SYSTEMS:   CONSTITUTIONAL: no fever or chills  CARDIOVASCULAR: Left-sided chest pain  RESPIRATORY: No shortness of breath, cough or sputum.   GASTROINTESTINAL: No anorexia, nausea, vomiting or diarrhea. No abdominal pain or blood.   HEMATOLOGIC: No bleeding or bruising.     Ventilator/Non-Invasive Ventilation Settings (From admission, onward)            None            Vital Signs  Temp:  [98.3 °F (36.8 °C)-98.7 °F (37.1 °C)] 98.7 °F (37.1 °C)  Heart Rate:  [88-89] 88  Resp:  [18-20] 20  BP: (104-152)/(64-79) 104/64  SpO2:  [91 %] 91 %  on    Device (Oxygen Therapy): room air    Intake/Output Summary (Last 24 hours) at 2022 2008  Last data filed at 2022 0934  Gross  "per 24 hour   Intake 1595 ml   Output 1065 ml   Net 530 ml     Flowsheet Rows      First Filed Value   Admission Height 189.9 cm (74.76\") Documented at 02/10/2022 1534   Admission Weight 66.7 kg (147 lb) Documented at 02/10/2022 1534        Body mass index is 18.49 kg/m².      02/10/22  1534   Weight: 66.7 kg (147 lb)       Physical Exam:   General:   Patient does not look toxic, he is awake and oriented, he is in no apparent distress but he is a frail thin gentleman, pleasant and oriented                   Head:    Normocephalic, atraumatic. External ears and nose are normal   Eyes:          Conjunctivae and sclerae normal, no icterus, PERRLA, no  discharge   Throat:   No oral lesions, no thrush, oral mucosa moist.  Poor gum health   Neck:   Supple, trachea midline. No JVD, no cervical or supraclavicular lymphadenopathy    Lungs:     Normal chest on inspection, he has diminished breath sounds, Iromin any wheezes or crackles, no use of any accessory muscles.    Chest tube on the left side with yellow/light green output    Heart:    Regular rhythm and normal rate.  No murmurs, gallops, or rubs noted.   Abdomen:     Soft, nontender, nondistended, positive bowel sounds. No hepatospleenomegaly.  He has a metallic staples from his abdominal incision, he has a a G-tube and a J-tube.   Extremities:   No clubbing, cyanosis, or edema.     Pulses:   Pulses palpable and equal bilaterally.    Skin:   No bleeding or rash. No bumps, good turgor pressure    Neuro:   Nonfocal.  Moves all extremities well. Strength 5/5 and symmetrical, no sensory deficit    Psychiatric:   Normal mood and affect.         Results Review:        Results from last 7 days   Lab Units 02/14/22  0119 02/13/22  0010   SODIUM mmol/L 127* 123*   POTASSIUM mmol/L 4.8 4.5   CHLORIDE mmol/L 94* 90*   CO2 mmol/L 24.3 25.3   BUN mg/dL 9 11   CREATININE mg/dL 0.63* 0.75*   CALCIUM mg/dL 7.8* 7.9*   ANION GAP mmol/L 8.7 7.7                 Results from last 7 days "   Lab Units 02/14/22  0119 02/13/22  0010   WBC 10*3/mm3 10.86* 9.70   HEMOGLOBIN g/dL 9.9* 9.6*   HEMATOCRIT % 31.3* 30.9*   PLATELETS 10*3/mm3 442 353   MCV fL 86.7 87.8   NEUTROPHIL % % 69.6 64.2   LYMPHOCYTE % % 14.4* 17.1*   MONOCYTES % % 10.0 11.5   EOSINOPHIL % % 4.9 5.9   BASOPHIL % % 0.5 0.5   IMM GRAN % % 0.6* 0.8*                   Invalid input(s): LDLCALC          Glucose   Date/Time Value Ref Range Status   02/18/2022 1132 243 (H) 70 - 130 mg/dL Final     Comment:     Meter: [id number] : [id number] Jane Simon   02/18/2022 0607 275 (H) 70 - 130 mg/dL Final     Comment:     Meter: [id number] : [id number] Tim Lopez   02/17/2022 1614 172 (H) 70 - 130 mg/dL Final     Comment:     Meter: [id number] : [id number] annalisa edmonds   02/17/2022 1114 167 (H) 70 - 130 mg/dL Final     Comment:     Meter: [id number] : [id number] yue walker   02/17/2022 0603 207 (H) 70 - 130 mg/dL Final     Comment:     Meter: [id number] : [id number] [place]   02/16/2022 1648 178 (H) 70 - 130 mg/dL Final     Comment:     Meter: [id number] : [id number] annalisa edmonds   02/16/2022 1111 142 (H) 70 - 130 mg/dL Final     Comment:     Meter: [id number] : [id number] Jane Simon   02/16/2022 0550 288 (H) 70 - 130 mg/dL Final     Comment:     Meter: [id number] : [id number] Vashti Simon                               Imaging:   Imaging Results (All)     Procedure Component Value Units Date/Time     I reviewed the patient's new clinical results.  I personally viewed and interpreted the patient's imaging results:        Medication Review:   alteplase, 10 mg, Intrapleural, Once  betamethasone augmented + lubriderm, , Topical, Q12H  enoxaparin, 40 mg, Subcutaneous, Q24H  First Mouthwash (Magic Mouthwash), 10 mL, Swish & Spit, Q6H  insulin aspart, 0-7 Units, Subcutaneous, TID AC  metoprolol tartrate, 25 mg, Per J Tube, Q12H  pantoprazole, 40 mg, Intravenous, Q AM             ASSESSMENT:    1. Complicated pleural effusion with loculated effusion on the left side and simple effusion on the right  2. Patient is nontoxic with borderline white blood cell count  3. Esophageal rupture  4. Immobilization syndrome      PLAN:  Output drainage from the chest tube is better with the TPA, we will continue to administer for total of 3 days  If drainage is limited, may consider placing a different CT-guided chest tube more anterolaterally which is where the current fluid collection is mainly located  For the time being continue with current measures and repeat the CAT scan next week and consider further recommendations accordingly    Addendum:  25 cc containing 10 mg of TPA were instilled through the left-sided chest tube with similar instruction provided to the patient to return and change position every 15 minutes for the next 2 hours before that tube can be unclamped again.  Apparently there will not be any other provider who would be able/willing or trained to do the same over the weekend, will try to do again on Monday      Disposition: Per primary team    Delmar Womack MD  02/18/22  14:17 EST           Dictated utilizing Dragon dictation

## 2022-02-19 LAB
ALBUMIN SERPL-MCNC: 2.45 G/DL (ref 3.5–5.2)
ALBUMIN/GLOB SERPL: 0.5 G/DL
ALP SERPL-CCNC: 110 U/L (ref 39–117)
ALT SERPL W P-5'-P-CCNC: 70 U/L (ref 1–41)
ANION GAP SERPL CALCULATED.3IONS-SCNC: 9.9 MMOL/L (ref 5–15)
AST SERPL-CCNC: 49 U/L (ref 1–40)
BASOPHILS # BLD AUTO: 0.08 10*3/MM3 (ref 0–0.2)
BASOPHILS NFR BLD AUTO: 0.7 % (ref 0–1.5)
BILIRUB SERPL-MCNC: 0.3 MG/DL (ref 0–1.2)
BUN SERPL-MCNC: 15 MG/DL (ref 8–23)
BUN/CREAT SERPL: 22.4 (ref 7–25)
CALCIUM SPEC-SCNC: 8.2 MG/DL (ref 8.6–10.5)
CHLORIDE SERPL-SCNC: 93 MMOL/L (ref 98–107)
CO2 SERPL-SCNC: 24.1 MMOL/L (ref 22–29)
CREAT SERPL-MCNC: 0.67 MG/DL (ref 0.76–1.27)
DEPRECATED RDW RBC AUTO: 42.5 FL (ref 37–54)
EOSINOPHIL # BLD AUTO: 0.23 10*3/MM3 (ref 0–0.4)
EOSINOPHIL NFR BLD AUTO: 1.9 % (ref 0.3–6.2)
ERYTHROCYTE [DISTWIDTH] IN BLOOD BY AUTOMATED COUNT: 13.5 % (ref 12.3–15.4)
GFR SERPL CREATININE-BSD FRML MDRD: 116 ML/MIN/1.73
GLOBULIN UR ELPH-MCNC: 4.7 GM/DL
GLUCOSE BLDC GLUCOMTR-MCNC: 194 MG/DL (ref 70–130)
GLUCOSE BLDC GLUCOMTR-MCNC: 201 MG/DL (ref 70–130)
GLUCOSE BLDC GLUCOMTR-MCNC: 291 MG/DL (ref 70–130)
GLUCOSE BLDC GLUCOMTR-MCNC: 301 MG/DL (ref 70–130)
GLUCOSE SERPL-MCNC: 320 MG/DL (ref 65–99)
HCT VFR BLD AUTO: 32.9 % (ref 37.5–51)
HGB BLD-MCNC: 10.2 G/DL (ref 13–17.7)
IMM GRANULOCYTES # BLD AUTO: 0.05 10*3/MM3 (ref 0–0.05)
IMM GRANULOCYTES NFR BLD AUTO: 0.4 % (ref 0–0.5)
LYMPHOCYTES # BLD AUTO: 1.55 10*3/MM3 (ref 0.7–3.1)
LYMPHOCYTES NFR BLD AUTO: 12.9 % (ref 19.6–45.3)
MCH RBC QN AUTO: 27 PG (ref 26.6–33)
MCHC RBC AUTO-ENTMCNC: 31 G/DL (ref 31.5–35.7)
MCV RBC AUTO: 87 FL (ref 79–97)
MONOCYTES # BLD AUTO: 1.51 10*3/MM3 (ref 0.1–0.9)
MONOCYTES NFR BLD AUTO: 12.6 % (ref 5–12)
NEUTROPHILS NFR BLD AUTO: 71.5 % (ref 42.7–76)
NEUTROPHILS NFR BLD AUTO: 8.61 10*3/MM3 (ref 1.7–7)
NRBC BLD AUTO-RTO: 0 /100 WBC (ref 0–0.2)
PLATELET # BLD AUTO: 403 10*3/MM3 (ref 140–450)
PMV BLD AUTO: 9.8 FL (ref 6–12)
POTASSIUM SERPL-SCNC: 5.1 MMOL/L (ref 3.5–5.2)
PROT SERPL-MCNC: 7.1 G/DL (ref 6–8.5)
RBC # BLD AUTO: 3.78 10*6/MM3 (ref 4.14–5.8)
SODIUM SERPL-SCNC: 127 MMOL/L (ref 136–145)
WBC NRBC COR # BLD: 12.03 10*3/MM3 (ref 3.4–10.8)

## 2022-02-19 PROCEDURE — 63710000001 INSULIN ASPART PER 5 UNITS: Performed by: FAMILY MEDICINE

## 2022-02-19 PROCEDURE — 25010000002 ENOXAPARIN PER 10 MG: Performed by: FAMILY MEDICINE

## 2022-02-19 PROCEDURE — 85025 COMPLETE CBC W/AUTO DIFF WBC: CPT | Performed by: INTERNAL MEDICINE

## 2022-02-19 PROCEDURE — 80053 COMPREHEN METABOLIC PANEL: CPT | Performed by: INTERNAL MEDICINE

## 2022-02-19 PROCEDURE — 82962 GLUCOSE BLOOD TEST: CPT

## 2022-02-19 RX ADMIN — INSULIN ASPART 3 UNITS: 100 INJECTION, SOLUTION INTRAVENOUS; SUBCUTANEOUS at 11:40

## 2022-02-19 RX ADMIN — BETAMETHASONE DIPROPIONATE: 0.5 CREAM TOPICAL at 09:00

## 2022-02-19 RX ADMIN — SODIUM CHLORIDE 1000 ML: 9 INJECTION, SOLUTION INTRAVENOUS at 11:41

## 2022-02-19 RX ADMIN — OXYCODONE 5 MG: 5 TABLET ORAL at 21:22

## 2022-02-19 RX ADMIN — METOPROLOL TARTRATE 25 MG: 25 TABLET ORAL at 09:01

## 2022-02-19 RX ADMIN — ENOXAPARIN SODIUM 40 MG: 40 INJECTION SUBCUTANEOUS at 17:04

## 2022-02-19 RX ADMIN — PANTOPRAZOLE SODIUM 40 MG: 40 INJECTION, POWDER, FOR SOLUTION INTRAVENOUS at 05:56

## 2022-02-19 RX ADMIN — BETAMETHASONE DIPROPIONATE: 0.5 CREAM TOPICAL at 21:23

## 2022-02-19 RX ADMIN — INSULIN ASPART 2 UNITS: 100 INJECTION, SOLUTION INTRAVENOUS; SUBCUTANEOUS at 17:03

## 2022-02-19 RX ADMIN — INSULIN ASPART 4 UNITS: 100 INJECTION, SOLUTION INTRAVENOUS; SUBCUTANEOUS at 08:33

## 2022-02-19 RX ADMIN — METOPROLOL TARTRATE 25 MG: 25 TABLET ORAL at 21:22

## 2022-02-19 NOTE — PLAN OF CARE
Goal Outcome Evaluation:               Pt is progressing medically and physically with all therapies, continue with current plan of education

## 2022-02-19 NOTE — PROGRESS NOTES
[facility]  PROGRESS NOTE     Patient Identification:  Name:  Sandro Argueta  Age:  76 y.o.  Sex:  male  :  1945  MRN:  [id number]  Visit Number:  [id number]  ROOM: [facility]     Primary Care Provider:  Misti Nguyễn MD    Length of stay in inpatient status:  9    Subjective     Chief Compliant: Esophageal rupture    History of Presenting Illness: Patient 76-year-old gentleman history of hypertension, coronary disease, diabetes mellitus and GERD.  Patient had recent esophageal perforation with leakage of GI contents into the thoracic cavity.  Patient did require multiple chest tubes and was treated with broad-spectrum antibiotics as well as micafungin.  Patient did have repair of the esophagus and eventual esophageal stent placement because of continued leakage.  Patient has J-tube in place and also has a Kamair tube in place at this time.     Participated in physical occupational therapy: Performs bed mobility with minimal assist and verbal/nonverbal cues become from transfer activities and standby assist with verbal cues; utilizes front wheel walker for sit to stand/stand to sit transfer; ambulated 160 feet with front wheel walker standby assist to contact-guard with verbal cues; requires moderate assist verbal cues for lower body dressing; set up assist for grooming activities      Objective     Current Hospital Meds:betamethasone augmented + lubriderm, , Topical, Q12H  enoxaparin, 40 mg, Subcutaneous, Q24H  First Mouthwash (Magic Mouthwash), 10 mL, Swish & Spit, Q6H  insulin aspart, 0-7 Units, Subcutaneous, TID AC  metoprolol tartrate, 25 mg, Per J Tube, Q12H  pantoprazole, 40 mg, Intravenous, Q AM       ----------------------------------------------------------------------------------------------------------------------  Vital Signs:  Temp:  [97.7 °F (36.5 °C)-98 °F (36.7 °C)] 98 °F (36.7 °C)  Heart Rate:  [] 92  Resp:  [18-20] 20  BP: (136-140)/(70-71) 136/70  SpO2:  [95 %] 95 %   on   ;   Device (Oxygen Therapy): room air  Body mass index is 18.49 kg/m².    Wt Readings from Last 3 Encounters:   02/10/22 66.7 kg (147 lb)   01/22/22 75.9 kg (167 lb 4.8 oz)   12/29/21 79.4 kg (175 lb)     Intake & Output (last 3 days)       02/16 0701  02/17 0700 02/17 0701 02/18 0700 02/18 0701 02/19 0700 02/19 0701 02/20 0700    P.O. 735 600 410     Other 200 350 528     NG/ 415 6997     Total Intake(mL/kg) 1385 (20.8) 1715 (25.7) 1960 (29.4)     Urine (mL/kg/hr) 1525 (1) 1225 (0.8) 1250 (0.8)     Emesis/NG output  15      Drains 25 125 125     Stool        Total Output 1550 1365 1375     Net -165 +350 +585             Urine Unmeasured Occurrence 3 x 7 x          No diet orders on file  ----------------------------------------------------------------------------------------------------------------------  Physical exam:  Constitutional:   No acute distress, tired sp TPA x2, looks dehydrated with dry mucous membranes  HEENT: Normocephalic atraumatic  Neck: Supple   Cardiovascular: Regular rate and rhythm  Pulmonary/Chest: Clear to auscultation; Kamari tube in place  Abdominal: Positive bowel sounds soft; G-tube and J-tube in place.     Musculoskeletal: No arthropathy  Neurological: No focal deficits  Skin: No rash    ----------------------------------------------------------------------------------------------------------------------    Last echocardiogram:  Results for orders placed during the hospital encounter of 07/29/20    Adult Transthoracic Echo Complete W/ Cont if Necessary Per Protocol    Interpretation Summary  · Left ventricular wall thickness is consistent with mild concentric hypertrophy.  · Left ventricular systolic function is normal.  · Estimated EF appears to be in the range of 61 - 65%.  · Left ventricular diastolic dysfunction (grade I) consistent with impaired relaxation.  · Normal right ventricular cavity size and systolic function noted. Electronic lead present in the ventricle.  ·  Normal cardiac chambers dimensions.  · Mild aortic valve regurgitation is present.  · Mild mitral valve regurgitation is present  · Mild tricuspid valve regurgitation is present. No evidence of pulmonary hypertension is present.  · There is no evidence of pericardial effusion.    ----------------------------------------------------------------------------------------------------------------------  Results from last 7 days   Lab Units 02/19/22  0110 02/14/22  0119 02/13/22  0010   WBC 10*3/mm3 12.03* 10.86* 9.70   HEMOGLOBIN g/dL 10.2* 9.9* 9.6*   HEMATOCRIT % 32.9* 31.3* 30.9*   MCV fL 87.0 86.7 87.8   MCHC g/dL 31.0* 31.6 31.1*   PLATELETS 10*3/mm3 403 442 353         Results from last 7 days   Lab Units 02/19/22  0110 02/14/22  0119 02/13/22  0010   SODIUM mmol/L 127* 127* 123*   POTASSIUM mmol/L 5.1 4.8 4.5   CHLORIDE mmol/L 93* 94* 90*   CO2 mmol/L 24.1 24.3 25.3   BUN mg/dL 15 9 11   CREATININE mg/dL 0.67* 0.63* 0.75*   EGFR IF NONAFRICN AM mL/min/1.73 116 124 102   CALCIUM mg/dL 8.2* 7.8* 7.9*   GLUCOSE mg/dL 320* 281* 291*   ALBUMIN g/dL 2.45*  --   --    BILIRUBIN mg/dL 0.3  --   --    ALK PHOS U/L 110  --   --    AST (SGOT) U/L 49*  --   --    ALT (SGPT) U/L 70*  --   --    Estimated Creatinine Clearance: 75.3 mL/min (A) (by C-G formula based on SCr of 0.67 mg/dL (L)).  No results found for: AMMONIA              Glucose   Date/Time Value Ref Range Status   02/19/2022 0622 291 (H) 70 - 130 mg/dL Final     Comment:     Meter: [id number] : [id number] Mariann Glover   02/18/2022 2022 311 (H) 70 - 130 mg/dL Final     Comment:     Meter: [id number] : [id number] Kai Virginia C   02/18/2022 1649 247 (H) 70 - 130 mg/dL Final     Comment:     Meter: [id number] : [id number] annalisa edmonds   02/18/2022 1132 243 (H) 70 - 130 mg/dL Final     Comment:     Meter: [id number] : [id number] Jane Simon   02/18/2022 0607 275 (H) 70 - 130 mg/dL Final     Comment:     Meter: [id number] : [id number] Tim  Jessica   02/17/2022 1614 172 (H) 70 - 130 mg/dL Final     Comment:     Meter: [id number] : [id number] annalisa edmonds   02/17/2022 1114 167 (H) 70 - 130 mg/dL Final     Comment:     Meter: [id number] : [id number] yue gaspar   02/17/2022 0603 207 (H) 70 - 130 mg/dL Final     Comment:     Meter: [id number] : [id number] [place] Crystal     Lab Results   Component Value Date    TSH 1.620 06/27/2021     No results found for: PREGTESTUR, PREGSERUM, HCG, HCGQUANT  Pain Management Panel    There is no flowsheet data to display.       Brief Urine Lab Results  (Last result in the past 365 days)      Color   Clarity   Blood   Leuk Est   Nitrite   Protein   CREAT   Urine HCG        01/21/22 2149 Yellow   Clear   Negative   Negative   Negative   Trace               No results found for: BLOODCX      No results found for: URINECX  No results found for: WOUNDCX  No results found for: STOOLCX        I have personally looked at the labs and they are summarized above.  ----------------------------------------------------------------------------------------------------------------------  Detailed radiology reports for the last 24 hours:    Imaging Results (Last 24 Hours)     ** No results found for the last 24 hours. **        Final impressions for the last 30 days of radiology reports:    CT Chest Without Contrast Diagnostic    Addendum Date: 2/16/2022    Compared with previous study this represents a fairly moderate increase in size of the empyema.  This report was finalized on 2/16/2022 1:01 PM by Dr. Jarek Flores MD.      Result Date: 2/16/2022  1.  Bilobed loculated effusion of the left lower pleural space measuring about 10 cm with internal air, as well as the left upper lobe pleural space measuring about 4.4 cm and in total length about 16 cm. 2.  Small right pleural effusion.  This report was finalized on 2/16/2022 12:49 PM by Dr. Jarek Flores MD.      CT Chest Without Contrast Diagnostic    Result Date:  1/22/2022  1.  Transmural perforation of the distal thoracic esophagus communicating with the left basilar pleural space. Orally administered water-soluble contrast fills the left basilar pleural space and is visible within the left side chest tube. 2.  The exact site for esophageal perforation is not demonstrated on this study. There is very little contrast within the esophagus, and only a faint amount of contrast along the left side of the distal thoracic esophagus. 3.  Distal paraesophageal pneumomediastinum. 4.  Small left hydropneumothorax. No right pneumothorax. 5.  Consolidation and volume loss involving basal segments left lower lobe. Right posterior basilar atelectasis. Small right pleural effusion. Signer Name: Celestino Herrera MD  Signed: 1/22/2022 6:54 PM  Workstation Name: [facility]  Radiology [facility]    CT Chest With Contrast Diagnostic    Result Date: 1/21/2022  1. There is a small left pneumothorax but there is at least a moderate-sized left-sided pleural effusion and there is a small amount of pneumomediastinum with shift of mediastinal structures from left to right. A chest tube is being placed at time of this dictation as described below. 2. Probable right lung atelectasis. 3. No acute finding in the upper abdomen. Abdomen and pelvis CT dictated separately. NOTIFICATION: Critical Value/emergent results were called by telephone at the time of interpretation on 1/21/2022 8:55 PM to ASTER Boudreaux who verbally acknowledged these results. Dr. Villafuerte was in the process of placing a chest tube in the patient at the time of my call. Signer Name: Nick Vinse MD  Signed: 1/21/2022 8:59 PM  Workstation [facility]: [facility]    CT Abdomen Pelvis With Contrast    Result Date: 1/21/2022  1. There is a left-sided effusion that is at least moderate in size. There is a small left pneumothorax and there is trace pneumomediastinum and subtle shift  of mediastinal structures from left to right. Please see the separately dictated CT chest for further details. The emergency department is aware of these findings and a chest tube was in the process of being placed at the time of interpretation of the CT chest. 2. The stomach is distended with air fluid and debris and there are new postoperative changes of the stomach compared to the prior study. Correlate with any signs or symptoms of gastric outlet obstruction and the patient may benefit from NG tube placement. 3. No evidence of bowel obstruction or drainable fluid collection. 4. Normal appendix. 5. Diverticulosis. 6. Hepatic steatosis. 7. Tiny subcentimeter enhancing lesion in the spleen, favored to be benign but technically indeterminate. 8. Bilateral renal cysts. 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 Signer Name: Nick Vines MD  Signed: 1/21/2022 10:16 PM  Workstation Name: [email or web address]  [facility]    FL Esophagram Complete Single Contrast    Result Date: 1/22/2022  1.  Chest x-ray series with water-soluble oral contrast shows evidence of likely distal esophageal rupture with oral contrast appearing in the left basilar pleural space. Please see chest CT to follow. 2.  Left lower lobe consolidation and atelectasis. Mild infiltrate and volume loss in the right lung base. Small right pleural effusion. 3.  Left-sided hydropneumothorax with chest tube in place. Signer Name: Celestino Herrera MD  Signed: 1/22/2022 6:46 PM  Workstation Name: [facility]  Radiology [facility]    XR Chest 1 View    Result Date: 2/18/2022  1.  Persistent and stable appearance of left pleural collections. 2.  Probably small right pleural effusion. 3.  Grossly stable appearance of left basilar airspace opacity.  This report was finalized on 2/18/2022 9:29 AM by Dr. Jarek Flores MD.      XR Chest 1 View    Result Date: 1/22/2022  1. Left sided chest tube remains present along with a small  left pneumothorax, stable to slightly larger when compared with the prior study but this may be related to technical factors. 2. Worsening appearance of the left lung base with increasing pleural fluid and consolidation. No right-sided pneumothorax. Signer Name: Nick Vines MD  Signed: 1/22/2022 5:28 PM  Workstation Name: [facility]  [facility]    XR Chest 1 View    Result Date: 1/21/2022  Interval placement of left chest tube. Persistent small left pneumothorax. Persistent airspace disease in the left chest. Signer Name: LIDIA GAY MD  Signed: 1/21/2022 9:32 PM  Workstation Name: [facility]  Radiology [facility]    XR Chest 1 View    Addendum Date: 1/21/2022    //////////// ADDENDUM #1 //////////// ADDENDUM: Favor there is a small subtle left apical pneumothorax. This is confirmed by CT. Spoke directly to Dr. Scott Villafuerte in the ER at 8:36 PM 1/21/2022 to relay this finding. Signer Name: LIDIA GAY MD  Signed: 1/21/2022 8:38 PM  Workstation Name: [facility]  [facility]////////////  ORIGINAL REPORT //////////// CR Chest 1 Vw INDICATION: Upper back pain COMPARISON:  6/27/2021 FINDINGS: Portable AP view(s) of the chest.  Normal cardiomediastinal contours. Dense consolidation in the left midlung especially the left base, most consistent with a dense lobar pneumonia. Small left pleural effusion.     Result Date: 1/21/2022  Dense consolidation in the left midlung especially the left base, most consistent with a dense lobar pneumonia. Small left pleural effusion. Signer Name: LIDIA GAY MD  Signed: 1/21/2022 8:10 PM  Workstation Name: [facility]  Radiology [facility]    US Venous Doppler Lower Extremity Bilateral (duplex)    Result Date: 1/22/2022  No deep venous thrombus seen in either lower extremity. Signer Name: Nick Vines MD  Signed: 1/22/2022 3:56 PM  Workstation Name: [facility]  [facility]   [place]    XR Chest PA & Lateral    Result Date: 2/16/2022    Loculated pleural collection likely measuring 3.6 and 4.9 cm of the left hemithorax. Some locules of air noted within the collection concerning for empyema.  This report was finalized on 2/16/2022 10:54 AM by Dr. Jarek Flores MD.      I have personally looked at the radiology images and read the final radiology report.    Assessment & Plan    Debility after recent esophageal rupture with Kamari drain in place and recent esophageal stent placement.  Surgeon recommends leaving Kamari tube in place till esophageal stent can be removed in 4 weeks.  Patient is currently completing course of IV micafungin.  Patient receiving nutrition via J-tube at this time.    Unfortunately patient has decreased oral intake and fluid intake and seems to be slightly dehydrated this morning with persistent hyponatremia.  Normal saline x1 L was ordered and recheck labs in a.m.    CXR and CT chest repeated and discussed with pulmonary and radiologist. Dr. Womack is mostly concern about the vacuum getting disconnected allowing for air entry. He is s/p TPA x 2 days.  CXR stable with increased drainage in drain tube post TPA 125cc.    Participated in physical occupational therapy: Performs bed mobility with minimal assist and verbal/nonverbal cues become from transfer activities and standby assist with verbal cues; utilizes front wheel walker for sit to stand/stand to sit transfer; ambulated 160 feet with front wheel walker standby assist to contact-guard with verbal cues; requires moderate assist verbal cues for lower body dressing; set up assist for grooming activities    Dry mouth with thrush, MMW ordered    Nutrition continue J-tube feeds    Hyponatremia--labs in am    Diabetes mellitus continue sliding scale coverage    Hypertension controlled    History of CAD stable      VTE Prophylaxis:   Mechanical Order History:     None      Pharmalogical Order History:      Ordered      Dose Route Frequency Stop    02/10/22 1558  enoxaparin (LOVENOX) syringe 40 mg         40 mg SC Every 24 Hours --              Sylvia Talley MD  02/19/22  13:42 EST

## 2022-02-20 LAB
ANION GAP SERPL CALCULATED.3IONS-SCNC: 9.4 MMOL/L (ref 5–15)
BUN SERPL-MCNC: 13 MG/DL (ref 8–23)
BUN/CREAT SERPL: 18.8 (ref 7–25)
CALCIUM SPEC-SCNC: 8.3 MG/DL (ref 8.6–10.5)
CHLORIDE SERPL-SCNC: 94 MMOL/L (ref 98–107)
CO2 SERPL-SCNC: 25.6 MMOL/L (ref 22–29)
CREAT SERPL-MCNC: 0.69 MG/DL (ref 0.76–1.27)
GFR SERPL CREATININE-BSD FRML MDRD: 112 ML/MIN/1.73
GLUCOSE BLDC GLUCOMTR-MCNC: 158 MG/DL (ref 70–130)
GLUCOSE BLDC GLUCOMTR-MCNC: 184 MG/DL (ref 70–130)
GLUCOSE BLDC GLUCOMTR-MCNC: 301 MG/DL (ref 70–130)
GLUCOSE SERPL-MCNC: 318 MG/DL (ref 65–99)
POTASSIUM SERPL-SCNC: 5.3 MMOL/L (ref 3.5–5.2)
SODIUM SERPL-SCNC: 129 MMOL/L (ref 136–145)

## 2022-02-20 PROCEDURE — 25010000002 ENOXAPARIN PER 10 MG: Performed by: FAMILY MEDICINE

## 2022-02-20 PROCEDURE — 80048 BASIC METABOLIC PNL TOTAL CA: CPT | Performed by: INTERNAL MEDICINE

## 2022-02-20 PROCEDURE — 63710000001 INSULIN ASPART PER 5 UNITS: Performed by: FAMILY MEDICINE

## 2022-02-20 PROCEDURE — 82962 GLUCOSE BLOOD TEST: CPT

## 2022-02-20 RX ADMIN — INSULIN ASPART 5 UNITS: 100 INJECTION, SOLUTION INTRAVENOUS; SUBCUTANEOUS at 08:19

## 2022-02-20 RX ADMIN — BETAMETHASONE DIPROPIONATE: 0.5 CREAM TOPICAL at 22:33

## 2022-02-20 RX ADMIN — PANTOPRAZOLE SODIUM 40 MG: 40 INJECTION, POWDER, FOR SOLUTION INTRAVENOUS at 05:42

## 2022-02-20 RX ADMIN — BETAMETHASONE DIPROPIONATE 1 APPLICATION: 0.5 CREAM TOPICAL at 08:19

## 2022-02-20 RX ADMIN — SODIUM CHLORIDE 1000 ML: 9 INJECTION, SOLUTION INTRAVENOUS at 12:35

## 2022-02-20 RX ADMIN — METOPROLOL TARTRATE 25 MG: 25 TABLET ORAL at 22:28

## 2022-02-20 RX ADMIN — METOPROLOL TARTRATE 25 MG: 25 TABLET ORAL at 08:18

## 2022-02-20 RX ADMIN — INSULIN ASPART 2 UNITS: 100 INJECTION, SOLUTION INTRAVENOUS; SUBCUTANEOUS at 17:25

## 2022-02-20 RX ADMIN — ENOXAPARIN SODIUM 40 MG: 40 INJECTION SUBCUTANEOUS at 17:25

## 2022-02-20 RX ADMIN — DIPHENHYDRAMINE HYDROCHLORIDE AND LIDOCAINE HYDROCHLORIDE AND ALUMINUM HYDROXIDE AND MAGNESIUM HYDRO 10 ML: KIT at 22:28

## 2022-02-20 RX ADMIN — INSULIN ASPART 2 UNITS: 100 INJECTION, SOLUTION INTRAVENOUS; SUBCUTANEOUS at 12:36

## 2022-02-20 RX ADMIN — SODIUM ZIRCONIUM CYCLOSILICATE 10 G: 10 POWDER, FOR SUSPENSION ORAL at 12:34

## 2022-02-20 RX ADMIN — OXYCODONE 5 MG: 5 TABLET ORAL at 22:38

## 2022-02-21 ENCOUNTER — APPOINTMENT (OUTPATIENT)
Dept: GENERAL RADIOLOGY | Facility: HOSPITAL | Age: 77
End: 2022-02-21

## 2022-02-21 LAB
ANION GAP SERPL CALCULATED.3IONS-SCNC: 10.6 MMOL/L (ref 5–15)
BASOPHILS # BLD AUTO: 0.07 10*3/MM3 (ref 0–0.2)
BASOPHILS NFR BLD AUTO: 0.7 % (ref 0–1.5)
BUN SERPL-MCNC: 12 MG/DL (ref 8–23)
BUN/CREAT SERPL: 19.4 (ref 7–25)
CALCIUM SPEC-SCNC: 8.1 MG/DL (ref 8.6–10.5)
CHLORIDE SERPL-SCNC: 93 MMOL/L (ref 98–107)
CO2 SERPL-SCNC: 24.4 MMOL/L (ref 22–29)
CREAT SERPL-MCNC: 0.62 MG/DL (ref 0.76–1.27)
DEPRECATED RDW RBC AUTO: 43.7 FL (ref 37–54)
EOSINOPHIL # BLD AUTO: 0.38 10*3/MM3 (ref 0–0.4)
EOSINOPHIL NFR BLD AUTO: 3.7 % (ref 0.3–6.2)
ERYTHROCYTE [DISTWIDTH] IN BLOOD BY AUTOMATED COUNT: 13.3 % (ref 12.3–15.4)
GFR SERPL CREATININE-BSD FRML MDRD: 126 ML/MIN/1.73
GLUCOSE BLDC GLUCOMTR-MCNC: 188 MG/DL (ref 70–130)
GLUCOSE BLDC GLUCOMTR-MCNC: 306 MG/DL (ref 70–130)
GLUCOSE BLDC GLUCOMTR-MCNC: 321 MG/DL (ref 70–130)
GLUCOSE SERPL-MCNC: 312 MG/DL (ref 65–99)
HCT VFR BLD AUTO: 33.1 % (ref 37.5–51)
HGB BLD-MCNC: 10 G/DL (ref 13–17.7)
IMM GRANULOCYTES # BLD AUTO: 0.04 10*3/MM3 (ref 0–0.05)
IMM GRANULOCYTES NFR BLD AUTO: 0.4 % (ref 0–0.5)
LYMPHOCYTES # BLD AUTO: 1.67 10*3/MM3 (ref 0.7–3.1)
LYMPHOCYTES NFR BLD AUTO: 16.4 % (ref 19.6–45.3)
MCH RBC QN AUTO: 26.7 PG (ref 26.6–33)
MCHC RBC AUTO-ENTMCNC: 30.2 G/DL (ref 31.5–35.7)
MCV RBC AUTO: 88.5 FL (ref 79–97)
MONOCYTES # BLD AUTO: 1.15 10*3/MM3 (ref 0.1–0.9)
MONOCYTES NFR BLD AUTO: 11.3 % (ref 5–12)
NEUTROPHILS NFR BLD AUTO: 6.89 10*3/MM3 (ref 1.7–7)
NEUTROPHILS NFR BLD AUTO: 67.5 % (ref 42.7–76)
NRBC BLD AUTO-RTO: 0 /100 WBC (ref 0–0.2)
PLATELET # BLD AUTO: 391 10*3/MM3 (ref 140–450)
PMV BLD AUTO: 10.4 FL (ref 6–12)
POTASSIUM SERPL-SCNC: 4.6 MMOL/L (ref 3.5–5.2)
RBC # BLD AUTO: 3.74 10*6/MM3 (ref 4.14–5.8)
SODIUM SERPL-SCNC: 128 MMOL/L (ref 136–145)
WBC NRBC COR # BLD: 10.2 10*3/MM3 (ref 3.4–10.8)

## 2022-02-21 PROCEDURE — 80048 BASIC METABOLIC PNL TOTAL CA: CPT | Performed by: INTERNAL MEDICINE

## 2022-02-21 PROCEDURE — 71046 X-RAY EXAM CHEST 2 VIEWS: CPT | Performed by: RADIOLOGY

## 2022-02-21 PROCEDURE — 97110 THERAPEUTIC EXERCISES: CPT

## 2022-02-21 PROCEDURE — 63710000001 INSULIN ASPART PER 5 UNITS: Performed by: FAMILY MEDICINE

## 2022-02-21 PROCEDURE — 85025 COMPLETE CBC W/AUTO DIFF WBC: CPT | Performed by: FAMILY MEDICINE

## 2022-02-21 PROCEDURE — 97530 THERAPEUTIC ACTIVITIES: CPT

## 2022-02-21 PROCEDURE — 71046 X-RAY EXAM CHEST 2 VIEWS: CPT

## 2022-02-21 PROCEDURE — 99231 SBSQ HOSP IP/OBS SF/LOW 25: CPT | Performed by: FAMILY MEDICINE

## 2022-02-21 PROCEDURE — 82962 GLUCOSE BLOOD TEST: CPT

## 2022-02-21 PROCEDURE — 25010000002 ALTEPLASE 2 MG RECONSTITUTED SOLUTION: Performed by: INTERNAL MEDICINE

## 2022-02-21 PROCEDURE — 25010000002 ENOXAPARIN PER 10 MG: Performed by: FAMILY MEDICINE

## 2022-02-21 PROCEDURE — 97116 GAIT TRAINING THERAPY: CPT

## 2022-02-21 PROCEDURE — 97535 SELF CARE MNGMENT TRAINING: CPT

## 2022-02-21 RX ADMIN — BETAMETHASONE DIPROPIONATE 1 APPLICATION: 0.5 CREAM TOPICAL at 20:05

## 2022-02-21 RX ADMIN — PANTOPRAZOLE SODIUM 40 MG: 40 INJECTION, POWDER, FOR SOLUTION INTRAVENOUS at 05:45

## 2022-02-21 RX ADMIN — ALTEPLASE 10 MG: 2.2 INJECTION, POWDER, LYOPHILIZED, FOR SOLUTION INTRAVENOUS at 18:04

## 2022-02-21 RX ADMIN — METOPROLOL TARTRATE 25 MG: 25 TABLET ORAL at 20:05

## 2022-02-21 RX ADMIN — ENOXAPARIN SODIUM 40 MG: 40 INJECTION SUBCUTANEOUS at 17:39

## 2022-02-21 RX ADMIN — OXYCODONE 5 MG: 5 TABLET ORAL at 20:04

## 2022-02-21 RX ADMIN — INSULIN ASPART 5 UNITS: 100 INJECTION, SOLUTION INTRAVENOUS; SUBCUTANEOUS at 08:43

## 2022-02-21 RX ADMIN — INSULIN ASPART 2 UNITS: 100 INJECTION, SOLUTION INTRAVENOUS; SUBCUTANEOUS at 12:41

## 2022-02-21 RX ADMIN — METOPROLOL TARTRATE 25 MG: 25 TABLET ORAL at 08:42

## 2022-02-21 RX ADMIN — BETAMETHASONE DIPROPIONATE 1 APPLICATION: 0.5 CREAM TOPICAL at 08:48

## 2022-02-21 RX ADMIN — INSULIN ASPART 5 UNITS: 100 INJECTION, SOLUTION INTRAVENOUS; SUBCUTANEOUS at 17:25

## 2022-02-21 NOTE — PROGRESS NOTES
Occupational Therapy:    Physical Therapy: Individual: 90 minutes.    Speech Language Pathology:    Signed by: Reina Pat PTA

## 2022-02-21 NOTE — PROGRESS NOTES
[facility]  PROGRESS NOTE     Patient Identification:  Name:  Sandro Argueta  Age:  76 y.o.  Sex:  male  :  1945  MRN:  [id number]  Visit Number:  [id number]  ROOM: [facility]     Primary Care Provider:  Misti Nguyễn MD    Length of stay in inpatient status:  11    Subjective     Chief Compliant:  No chief complaint on file.      History of Presenting Illness: 76-year-old gentleman with history of hypertension, coronary disease, diabetes mellitus and GERD.  Patient is status post esophageal perforation with leakage of GI contents into the thoracic cavity.  Patient did require multiple chest tubes demonstrated broad-spectrum antibiotics as well as micafungin.  Patient does have Kamari tube in place and J-tube in place.  Patient states she just feels tired but has had no fever no chills no other difficulties.  Patient states that he is thinks he is doing reasonably well with therapy.    Objective     Current Hospital Meds:betamethasone augmented + lubriderm, , Topical, Q12H  enoxaparin, 40 mg, Subcutaneous, Q24H  insulin aspart, 0-7 Units, Subcutaneous, TID AC  metoprolol tartrate, 25 mg, Per J Tube, Q12H  pantoprazole, 40 mg, Intravenous, Q AM       ----------------------------------------------------------------------------------------------------------------------  Vital Signs:  Temp:  [97.3 °F (36.3 °C)-98.3 °F (36.8 °C)] 97.3 °F (36.3 °C)  Heart Rate:  [82-87] 87  Resp:  [18] 18  BP: (114-145)/(64-72) 114/64  SpO2:  [92 %-96 %] 92 %  on   ;   Device (Oxygen Therapy): room air  Body mass index is 18.49 kg/m².    Wt Readings from Last 3 Encounters:   02/10/22 66.7 kg (147 lb)   22 75.9 kg (167 lb 4.8 oz)   21 79.4 kg (175 lb)     Intake & Output (last 3 days)        07 07 07 07 07 07 07 0700    P.O. 410 245 250 240    Other 528 476      NG/GT 1022 943      IV Piggyback   1000     Total Intake(mL/kg) 1960 (29.4)  1664 (24.9) 1250 (18.7) 240 (3.6)    Urine (mL/kg/hr) 1250 (0.8) 0 (0) 1175 (0.7)     Emesis/NG output   2     Drains 125 120 125     Stool   0     Total Output 4024 223 1546     Net +585 +1544 -52 +240            Urine Unmeasured Occurrence  7 x 1 x     Stool Unmeasured Occurrence   1 x         No diet orders on file  ----------------------------------------------------------------------------------------------------------------------  Physical exam:  Constitutional:   No acute distress  HEENT: Normocephalic atraumatic  Neck: Supple   Cardiovascular: Regular rate and rhythm  Pulmonary/Chest: Clear to auscultation, Kamari tube in place  Abdominal: Positive bowel sounds soft; J-tube in place.   Musculoskeletal: No arthropathy  Neurological: No focal deficits  Skin: No rash  Peripheral vascular:  Genitourinary:  ----------------------------------------------------------------------------------------------------------------------    Last echocardiogram:  Results for orders placed during the hospital encounter of 07/29/20    Adult Transthoracic Echo Complete W/ Cont if Necessary Per Protocol    Interpretation Summary  · Left ventricular wall thickness is consistent with mild concentric hypertrophy.  · Left ventricular systolic function is normal.  · Estimated EF appears to be in the range of 61 - 65%.  · Left ventricular diastolic dysfunction (grade I) consistent with impaired relaxation.  · Normal right ventricular cavity size and systolic function noted. Electronic lead present in the ventricle.  · Normal cardiac chambers dimensions.  · Mild aortic valve regurgitation is present.  · Mild mitral valve regurgitation is present  · Mild tricuspid valve regurgitation is present. No evidence of pulmonary hypertension is present.  · There is no evidence of pericardial effusion.    ----------------------------------------------------------------------------------------------------------------------  Results from last 7 days    Lab Units 02/21/22  0012 02/19/22  0110   WBC 10*3/mm3 10.20 12.03*   HEMOGLOBIN g/dL 10.0* 10.2*   HEMATOCRIT % 33.1* 32.9*   MCV fL 88.5 87.0   MCHC g/dL 30.2* 31.0*   PLATELETS 10*3/mm3 391 403         Results from last 7 days   Lab Units 02/21/22  0012 02/20/22  0001 02/19/22  0110   SODIUM mmol/L 128* 129* 127*   POTASSIUM mmol/L 4.6 5.3* 5.1   CHLORIDE mmol/L 93* 94* 93*   CO2 mmol/L 24.4 25.6 24.1   BUN mg/dL 12 13 15   CREATININE mg/dL 0.62* 0.69* 0.67*   EGFR IF NONAFRICN AM mL/min/1.73 126 112 116   CALCIUM mg/dL 8.1* 8.3* 8.2*   GLUCOSE mg/dL 312* 318* 320*   ALBUMIN g/dL  --   --  2.45*   BILIRUBIN mg/dL  --   --  0.3   ALK PHOS U/L  --   --  110   AST (SGOT) U/L  --   --  49*   ALT (SGPT) U/L  --   --  70*   Estimated Creatinine Clearance: 75.3 mL/min (A) (by C-G formula based on SCr of 0.62 mg/dL (L)).  No results found for: AMMONIA              Glucose   Date/Time Value Ref Range Status   02/21/2022 0550 321 (H) 70 - 130 mg/dL Final     Comment:     Meter: [id number] : [id number] LORIE JOSE   02/20/2022 1624 158 (H) 70 - 130 mg/dL Final     Comment:     Meter: [id number] : [id number] alisno lopez   02/20/2022 1104 184 (H) 70 - 130 mg/dL Final     Comment:     Meter: [id number] : [id number] Jack JEFFERSON   02/20/2022 0604 301 (H) 70 - 130 mg/dL Final     Comment:     Meter: [id number] : [id number] Mariann Glover   02/19/2022 2057 301 (H) 70 - 130 mg/dL Final     Comment:     Meter: [id number] : [id number] Kai Hope C   02/19/2022 1619 194 (H) 70 - 130 mg/dL Final     Comment:     Meter: [id number] : [id number] monhollin jessica   02/19/2022 1051 201 (H) 70 - 130 mg/dL Final     Comment:     Meter: [id number] : [id number] monhollin jessica   02/19/2022 0622 291 (H) 70 - 130 mg/dL Final     Comment:     Meter: [id number] : [id number] Mariann Glover     Lab Results   Component Value Date    TSH 1.620 06/27/2021     No results found for: PREGTESTUR, PREGSERUM, HCG,  HCGQUANT  Pain Management Panel    There is no flowsheet data to display.       Brief Urine Lab Results  (Last result in the past 365 days)      Color   Clarity   Blood   Leuk Est   Nitrite   Protein   CREAT   Urine HCG        01/21/22 2149 Yellow   Clear   Negative   Negative   Negative   Trace               No results found for: BLOODCX      No results found for: URINECX  No results found for: WOUNDCX  No results found for: STOOLCX        I have personally looked at the labs and they are summarized above.  ----------------------------------------------------------------------------------------------------------------------  Detailed radiology reports for the last 24 hours:    Imaging Results (Last 24 Hours)     Procedure Component Value Units Date/Time    XR Chest PA & Lateral [[id number]] Collected: 02/21/22 0817     Updated: 02/21/22 0820    Narrative:      EXAMINATION: XR CHEST PA AND LATERAL-      CLINICAL INDICATION: pleural effusion        COMPARISON: 02/18/2022      TECHNIQUE: XR CHEST PA AND LATERAL-      FINDINGS:   LUNGS: Small left pleural effusion      HEART AND MEDIASTINUM: Heart and mediastinal contours are unremarkable        SKELETON: Bony and soft tissue structures are unremarkable.             Impression:      Small left pleural effusion, similar to the prior study  Trace right pleural effusion also similar to the previous exam     This report was finalized on 2/21/2022 8:18 AM by Dr. Antoine Guevara MD.           Final impressions for the last 30 days of radiology reports:    CT Chest Without Contrast Diagnostic    Addendum Date: 2/16/2022    Compared with previous study this represents a fairly moderate increase in size of the empyema.  This report was finalized on 2/16/2022 1:01 PM by Dr. Jarek Flores MD.      Result Date: 2/16/2022  1.  Bilobed loculated effusion of the left lower pleural space measuring about 10 cm with internal air, as well as the left upper lobe pleural space measuring about  4.4 cm and in total length about 16 cm. 2.  Small right pleural effusion.  This report was finalized on 2/16/2022 12:49 PM by Dr. Jarek Flores MD.      CT Chest Without Contrast Diagnostic    Result Date: 1/22/2022  1.  Transmural perforation of the distal thoracic esophagus communicating with the left basilar pleural space. Orally administered water-soluble contrast fills the left basilar pleural space and is visible within the left side chest tube. 2.  The exact site for esophageal perforation is not demonstrated on this study. There is very little contrast within the esophagus, and only a faint amount of contrast along the left side of the distal thoracic esophagus. 3.  Distal paraesophageal pneumomediastinum. 4.  Small left hydropneumothorax. No right pneumothorax. 5.  Consolidation and volume loss involving basal segments left lower lobe. Right posterior basilar atelectasis. Small right pleural effusion. Signer Name: Celestino Herrera MD  Signed: 1/22/2022 6:54 PM  Workstation Name: [facility]  Radiology [facility]    CT Chest With Contrast Diagnostic    Result Date: 1/21/2022  1. There is a small left pneumothorax but there is at least a moderate-sized left-sided pleural effusion and there is a small amount of pneumomediastinum with shift of mediastinal structures from left to right. A chest tube is being placed at time of this dictation as described below. 2. Probable right lung atelectasis. 3. No acute finding in the upper abdomen. Abdomen and pelvis CT dictated separately. NOTIFICATION: Critical Value/emergent results were called by telephone at the time of interpretation on 1/21/2022 8:55 PM to ASTER Boudreaux who verbally acknowledged these results. Dr. Villafuerte was in the process of placing a chest tube in the patient at the time of my call. Signer Name: Nick Vines MD  Signed: 1/21/2022 8:59 PM  Workstation Name: [facility]  Radiology [facility]    CT Abdomen  Pelvis With Contrast    Result Date: 1/21/2022  1. There is a left-sided effusion that is at least moderate in size. There is a small left pneumothorax and there is trace pneumomediastinum and subtle shift of mediastinal structures from left to right. Please see the separately dictated CT chest for further details. The emergency department is aware of these findings and a chest tube was in the process of being placed at the time of interpretation of the CT chest. 2. The stomach is distended with air fluid and debris and there are new postoperative changes of the stomach compared to the prior study. Correlate with any signs or symptoms of gastric outlet obstruction and the patient may benefit from NG tube placement. 3. No evidence of bowel obstruction or drainable fluid collection. 4. Normal appendix. 5. Diverticulosis. 6. Hepatic steatosis. 7. Tiny subcentimeter enhancing lesion in the spleen, favored to be benign but technically indeterminate. 8. Bilateral renal cysts. 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 Signer Name: Nick Vines MD  Signed: 1/21/2022 10:16 PM  Workstation Name: [facility]  [facility]    FL Esophagram Complete Single Contrast    Result Date: 1/22/2022  1.  Chest x-ray series with water-soluble oral contrast shows evidence of likely distal esophageal rupture with oral contrast appearing in the left basilar pleural space. Please see chest CT to follow. 2.  Left lower lobe consolidation and atelectasis. Mild infiltrate and volume loss in the right lung base. Small right pleural effusion. 3.  Left-sided hydropneumothorax with chest tube in place. Signer Name: Celestino Herrera MD  Signed: 1/22/2022 6:46 PM  Workstation Name: [facility]  Radiology [facility]    XR Chest 1 View    Result Date: 2/18/2022  1.  Persistent and stable appearance of left pleural collections. 2.  Probably small right pleural effusion. 3.  Grossly stable appearance of left  basilar airspace opacity.  This report was finalized on 2/18/2022 9:29 AM by Dr. Jarek Flores MD.      XR Chest 1 View    Result Date: 1/22/2022  1. Left sided chest tube remains present along with a small left pneumothorax, stable to slightly larger when compared with the prior study but this may be related to technical factors. 2. Worsening appearance of the left lung base with increasing pleural fluid and consolidation. No right-sided pneumothorax. Signer Name: Nick Vines MD  Signed: 1/22/2022 5:28 PM  Workstation Name: [facility]  [facility]    XR Chest 1 View    Result Date: 1/21/2022  Interval placement of left chest tube. Persistent small left pneumothorax. Persistent airspace disease in the left chest. Signer Name: LIDIA GAY MD  Signed: 1/21/2022 9:32 PM  Workstation Name: [facility]  Radiology [facility]    XR Chest 1 View    Addendum Date: 1/21/2022    //////////// ADDENDUM #1 //////////// ADDENDUM: Favor there is a small subtle left apical pneumothorax. This is confirmed by CT. Spoke directly to Dr. Scott Villafuerte in the ER at 8:36 PM 1/21/2022 to relay this finding. Signer Name: LIDIA GAY MD  Signed: 1/21/2022 8:38 PM  Workstation Name: [facility]  Radiology [facility]////////////  ORIGINAL REPORT //////////// CR Chest 1 Vw INDICATION: Upper back pain COMPARISON:  6/27/2021 FINDINGS: Portable AP view(s) of the chest.  Normal cardiomediastinal contours. Dense consolidation in the left midlung especially the left base, most consistent with a dense lobar pneumonia. Small left pleural effusion.     Result Date: 1/21/2022  Dense consolidation in the left midlung especially the left base, most consistent with a dense lobar pneumonia. Small left pleural effusion. Signer Name: LIDIA GAY MD  Signed: 1/21/2022 8:10 PM  Workstation Name: [facility]  Radiology [facility]    US Venous Doppler Lower Extremity Bilateral  (duplex)    Result Date: 1/22/2022  No deep venous thrombus seen in either lower extremity. Signer Name: Nick Vines MD  Signed: 1/22/2022 3:56 PM  Workstation Name: [facility]  [facility] [place]    XR Chest PA & Lateral    Result Date: 2/21/2022  Small left pleural effusion, similar to the prior study Trace right pleural effusion also similar to the previous exam  This report was finalized on 2/21/2022 8:18 AM by Dr. Antoine Guevara MD.      XR Chest PA & Lateral    Result Date: 2/16/2022    Loculated pleural collection likely measuring 3.6 and 4.9 cm of the left hemithorax. Some locules of air noted within the collection concerning for empyema.  This report was finalized on 2/16/2022 10:54 AM by Dr. Jarek Flores MD.      I have personally looked at the radiology images and read the final radiology report.    Assessment & Plan    Debility after recent esophageal rupture with Kamari drain in place and recent esophageal stent placement.  I will leave Kamari tube in place until esophageal stent removed in proximately 2 to 3 weeks.  Patient's left pleural effusion is stable at this time.  I did do repeat chest x-ray this morning which showed no acute changes.  Patient being followed by pulmonology as well.  Patient requiring contact-guard for transfers.  Patient requiring minimum assistance for bed mobility; minimum assistance for transfers; ambulated 160 feet with front wheel walker and contact-guard last week.    Nutrition continue J-tube feeds    Hyponatremia consider fluid restriction.  Stable    diabetes mellitus sliding scale coverage    Hypertension controlled    History of CAD--stable    VTE Prophylaxis:   Mechanical Order History:     None      Pharmalogical Order History:      Ordered     Dose Route Frequency Stop    02/10/22 1558  enoxaparin (LOVENOX) syringe 40 mg         40 mg SC Every 24 Hours --                    Elder Alexis MD  [place] Hospitalist  02/21/22  11:08  EST

## 2022-02-21 NOTE — THERAPY TREATMENT NOTE
Inpatient Rehabilitation - Physical Therapy Treatment Note        Rocky     Patient Name: Sandro Argueta  : 1945  MRN: [id number]    Today's Date: 2022                    Admit Date: 2/10/2022      Visit Dx:   No diagnosis found.    Patient Active Problem List   Diagnosis   • Left elbow pain   • Hypertension   • DM2 (diabetes mellitus, type 2) (HCC)   • Elevated prostate specific antigen (PSA)   • Epigastric pain   • Epigastric abdominal pain   • Elevated liver enzymes   • Weight loss, abnormal   • Acute hepatitis   • Anemia   • Obstructive jaundice   • Pneumothorax, left   • Cytokine release syndrome, grade 1   • Esophageal rupture       Past Medical History:   Diagnosis Date   • Acute UTI (urinary tract infection) 2021   • Arthritis    • Borderline diabetes    • Coronary artery disease    • Diabetes mellitus (HCC)    • Elevated prostate specific antigen (PSA) 2020   • GERD (gastroesophageal reflux disease)    • Hypertension    • Left elbow pain        Past Surgical History:   Procedure Laterality Date   • COLONOSCOPY     • ENDOSCOPY N/A 2021    Procedure: ESOPHAGOGASTRODUODENOSCOPY WITH BIOPSY dilitation;  Surgeon: Bela Mcdaniel MD;  Location[facility];  Service: Gastroenterology;  Laterality: N/A;   • ERCP N/A 2021    Procedure: ENDOSCOPIC RETROGRADE CHOLANGIOPANCREATOGRAPHY WITH STENT PLACEMENT;  Surgeon: Matthew Moreira MD;  Location: [facility] ENDOSCOPY;  Service: Gastroenterology;  Laterality: N/A;  with sphincterotomy, and brushing of common bile duct, and placement of [id number] wall stent     • PACEMAKER IMPLANTATION         PT ASSESSMENT (last 12 hours)     IRF PT Evaluation and Treatment     Row Name 22 1447 22 1420       PT Time and Intention    Document Type daily treatment  -RG daily treatment  -HR    Mode of Treatment physical therapy; individual therapy  -RG physical therapy; individual therapy  -HR    Patient/Family/Caregiver  Comments/Observations Pt and nursing in agreement for skilled PT on this date.  -RG Pt and nursing in agreemnt for PT  -HR    Row Name 02/21/22 1447 02/21/22 1420       General Information    Patient Profile Reviewed yes  -RG yes  -HR    Existing Precautions/Restrictions fall  -RG fall  -HR    Limitations/Impairments other (see comments)  medical complexity  -RG other (see comments)  medical complexity  -HR    Row Name 02/21/22 1447 02/21/22 1420       Cognition/Psychosocial    Affect/Mental Status (Cognitive) WFL  -RG WFL  -HR    Orientation Status (Cognition) oriented x 4  -RG oriented x 4  -HR    Follows Commands (Cognition) WFL  -RG WFL  -HR    Personal Safety Interventions gait belt; nonskid shoes/slippers when out of bed; fall prevention program maintained  -RG fall prevention program maintained; gait belt; nonskid shoes/slippers when out of bed; supervised activity  -HR    Cognitive Function (Cognitive) WFL  -RG WFL  -HR    Row Name 02/21/22 1447 02/21/22 1420       Pain Scale: FACES Pre/Post-Treatment    Pain: FACES Scale, Pretreatment 0-->no hurt  -RG 0-->no hurt  -HR    Posttreatment Pain Rating 0-->no hurt  -RG 0-->no hurt  -HR    Row Name 02/21/22 1447 02/21/22 1420       Mobility    Extremity Weight-bearing Status right lower extremity; left lower extremity  -RG right lower extremity; left lower extremity  -HR    Left Lower Extremity (Weight-bearing Status) full weight-bearing (FWB)  -RG full weight-bearing (FWB)  -HR    Right Lower Extremity (Weight-bearing Status) full weight-bearing (FWB)  -RG full weight-bearing (FWB)  -HR    Row Name 02/21/22 1447 02/21/22 1420       Bed Mobility    Supine-Sit [place] (Bed Mobility) verbal cues; nonverbal cues (demo/gesture); minimum assist (75% patient effort); contact guard  -RG verbal cues; nonverbal cues (demo/gesture); minimum assist (75% patient effort); contact guard  -HR    Bed Mobility, Safety Issues decreased use of legs for bridging/pushing;  decreased use of arms for pushing/pulling  -RG decreased use of legs for bridging/pushing; decreased use of arms for pushing/pulling  -HR    Assistive Device (Bed Mobility) bed rails; other (see comments)  manual assistance from therapist  -RG bed rails; other (see comments)  manual assistance from therapist  -HR    Row Name 02/21/22 1447 02/21/22 1420       Transfer Assessment/Treatment    Transfers sit-stand transfer; stand-sit transfer; bed-chair transfer; stand pivot/stand step transfer  -RG sit-stand transfer; stand-sit transfer; bed-chair transfer; stand pivot/stand step transfer  -HR    Row Name 02/21/22 1447 02/21/22 1420       Transfers    Bed-Chair Pandora (Transfers) verbal cues; contact guard  -RG verbal cues; contact guard  -HR    Chair-Bed Pandora (Transfers) contact guard  -RG contact guard  -HR    Assistive Device (Bed-Chair Transfers) wheelchair  -RG wheelchair  -HR    Sit-Stand Pandora (Transfers) standby assist; verbal cues; nonverbal cues (demo/gesture); contact guard  -RG standby assist; verbal cues; nonverbal cues (demo/gesture); contact guard  -HR    Stand-Sit Pand[place] (Transfers) standby assist; verbal cues; nonverbal cues (demo/gesture)  -RG standby assist; verbal cues; nonverbal cues (demo/gesture)  -HR    Row Name 02/21/22 1447 02/21/22 1420       Chair-Bed Transfer    Assistive Device (Chair-Bed Transfers) wheelchair  -RG wheelchair  -HR    Row Name 02/21/22 1447 02/21/22 1420       Sit-Stand Transfer    Assistive Device (Sit-Stand Transfers) walker, front-wheeled  -RG walker, front-wheeled  -HR    Row Name 02/21/22 1447 02/21/22 1420       Stand Pivot/Stand Step Transfer    Stand Pivot/Stand Step [place] (Transfers) contact guard; verbal cues; nonverbal cues (demo/gesture)  -RG contact guard; verbal cues; nonverbal cues (demo/gesture)  -HR    Assistive Device (Stand Pivot Stand Step Transfer) wheelchair  -RG wheelchair  -HR    Row Name 02/21/22 1447 02/21/22 1420        Gait/Stairs (Locomotion)    Alda Level (Gait) contact guard  -RG contact guard  -HR    Assistive Device (Gait) walker, front-wheeled  -RG walker, front-wheeled  -HR    Distance in Feet (Gait) 160  -RG --    Pattern (Gait) step-through  -RG step-through  -HR    Deviations/Abnormal Patterns (Gait) gait speed decreased  -RG gait speed decreased  -HR    Bilateral Gait Deviations forward flexed posture  -RG forward flexed posture  -HR    Comment (Gait/Stairs) C/o fatigue after ambulation.  -RG --    Row Name 02/21/22 1447 02/21/22 1420       Safety Issues, Functional Mobility    Impairments Affecting Function (Mobility) endurance/activity tolerance; strength; balance  -RG endurance/activity tolerance; strength; balance  -HR    Row Name 02/21/22 1420          Balance    Comment, Balance // Bars: marching, hip abd, HS curls, Hip ext, bean bag toss.  -HR     Row Name 02/21/22 1420          Motor Skills    Therapeutic Exercise other (see comments)  Supine: quad sets, hip abd, heel-slides  -HR     Row Name 02/21/22 1447 02/21/22 1420       Hip (Therapeutic Exercise)    Hip Strengthening (Therapeutic Exercise) bilateral; flexion; aBduction; aDduction; marching while seated; sitting; resistance band; green; 10 repetitions; 2 sets  -RG bilateral; flexion; extension; aBduction; aDduction; heel slides; marching while standing; standing; supine  -HR    Row Name 02/21/22 1447 02/21/22 1420       Knee (Therapeutic Exercise)    Knee Strengthening (Therapeutic Exercise) bilateral; flexion; extension; marching while seated; LAQ (long arc quad); sitting; resistance band; green; 10 repetitions; 2 sets  -RG bilateral; flexion; extension; heel slides; marching while standing; hamstring curls; standing; supine  -HR    Row Name 02/21/22 1447 02/21/22 1420       Ankle (Therapeutic Exercise)    Ankle Strengthening (Therapeutic Exercise) bilateral; dorsiflexion; plantarflexion; sitting; 10 repetitions; 2 sets  -RG bilateral;  dorsiflexion; plantarflexion  -HR    Row Name 02/21/22 1447 02/21/22 1420       Bed Mobility Goal 1 (PT-IRF)    Progress/Outcomes (Bed Mobility Goal 1, PT-IRF) goal ongoing  -RG goal ongoing  -HR    Row Name 02/21/22 1447 02/21/22 1420       Transfer Goal 1 (PT-IRF)    Progress/Outcomes (Transfer Goal 1, PT-IRF) goal ongoing  -RG goal ongoing  -HR    Row Name 02/21/22 1447 02/21/22 1420       Gait/Walking Locomotion Goal 1 (PT-IRF)    Progress/Outcomes (Gait/Walking Locomotion Goal 1, PT-IRF) goal ongoing  -RG goal ongoing  -HR    Row Name 02/21/22 1447 02/21/22 1420       Positioning and Restraints    Pre-Treatment Position in bed  -RG in bed  -HR    Post Treatment Position bed  -RG bed  -HR    In Bed notified nsg; supine; call light within reach; encouraged to call for assist  -RG supine; call light within reach; encouraged to call for assist; side rails up x2  -HR    Row Name 02/21/22 1447 02/21/22 1420       Daily Progress Summary (PT)    Daily Progress Summary (PT) -- Pt does well with RX and gives good effort with all activities. Pt completed standing exercises in // bars on this date, but does become fatiqued very quickly. Pt request to go back to room and lay down after 30 mins of RX, but does agree to complete supine exercise for the remainder of Rx. No adverse effects noted.  -HR    Impairments Still Limiting Function (PT) strength decreased; impaired balance; impaired functional activity tolerance  -RG strength decreased; impaired balance; impaired functional activity tolerance  -HR    Row Name 02/21/22 1447 02/21/22 1420       Therapy Plan Review/Discharge Plan (PT)    Expected Discharge Disposition (PT Eval) home with caregiver; home with home health care  -RG home with caregiver; home with home health care  -HR          User Key  (r) = Recorded By, (t) = Taken By, (c) = Cosigned By    Initials Name Provider Type    RG Jose Martin Hernández PTA Physical Therapy Assistant    HR Reina Pat PTA Physical  Therapy Assistant              Wound 02/10/22 1557 midline abdomen Incision (Active)   Dressing Appearance open to air 02/21/22 1101   Closure Staples 02/21/22 1101   Base red; pink 02/21/22 1101   Edges rolled/closed 02/20/22 2000   Drainage Amount none 02/21/22 1101   Dressing Care open to air 02/21/22 1101       Wound 02/10/22 1602 Left upper back Incision (Active)   Dressing Appearance open to air 02/21/22 1101   Closure Liquid skin adhesive 02/21/22 0000   Base dry; clean 02/21/22 1101   Periwound intact; dry 02/21/22 0000   Periwound Temperature warm 02/21/22 1101   Periwound Skin Turgor firm 02/21/22 0000   Edges rolled/closed 02/21/22 0000   Drainage Amount none 02/21/22 1101   Dressing Care open to air 02/21/22 1101     Physical Therapy Education                 Title: PT OT SLP Therapies (Done)     Topic: Physical Therapy (Done)     Point: Mobility training (Done)     Learning Progress Summary           Patient Acceptance, E,D, VU,NR by  at 2/21/2022 1450      Show all documentation for this point (13)                 Point: Home exercise program (Done)     Learning Progress Summary           Patient Acceptance, E,D, VU,NR by  at 2/21/2022 1450      Show all documentation for this point (13)                 Point: Body mechanics (Done)     Learning Progress Summary           Patient Acceptance, E,D, VU,NR by  at 2/21/2022 1450      Show all documentation for this point (13)                 Point: Precautions (Done)     Learning Progress Summary           Patient Acceptance, E,D, VU,NR by  at 2/21/2022 1450      Show all documentation for this point (13)                             User Key     Initials Effective Dates Name Provider Type Discipline     06/16/21 -  Jose Martin Hernández PTA Physical Therapy Assistant PT                PT Recommendation and Plan    Frequency of Treatment (PT): 5 times per week  Anticipated Equipment Needs at Discharge (PT Eval):  (tbd)  Daily Progress Summary  (PT)  Impairments Still Limiting Function (PT): strength decreased, impaired balance, impaired functional activity tolerance               Time Calculation:      PT Charges     Row Name 02/21/22 1451 02/21/22 1428          Time Calculation    Start Time 1045  -RG 1330  -HR     Stop Time 1130  - 1415  -HR     Time Calculation (min) 45 min  -RG 45 min  -HR     PT Received On 02/21/22  -RG 02/21/22  -HR            Time Calculation- PT    Total Timed Code Minutes- PT 45 minute(s)  -RG 45 minute(s)  -HR           User Key  (r) = Recorded By, (t) = Taken By, (c) = Cosigned By    Initials Name Provider Type    Jose Martin Jordan PTA Physical Therapy Assistant    HR Reina Pat PTA Physical Therapy Assistant                Therapy Charges for Today     Code Description Service Date Service Provider Modifiers Qty    38530111061 HC GAIT TRAINING EA 15 MIN 2/21/2022 Jose Martin Hernández PTA GP, CQ 1    55701623463 HC PT THERAPEUTIC ACT EA 15 MIN 2/21/2022 Jose Martin Hernández PTA GP, CQ 1    04358984085 HC PT THER PROC EA 15 MIN 2/21/2022 Jose Martin Hernández PTA GP, CQ 1                   Jose Martin Hernández PTA  2/21/2022

## 2022-02-21 NOTE — THERAPY TREATMENT NOTE
Inpatient Rehabilitation - Occupational Therapy Progress Note and Treatment Note     Rocky     Patient Name: Sandro Argueta  : 1945  MRN: [id number]    Today's Date: 2022                 Admit Date: 2/10/2022       No diagnosis found.    Patient Active Problem List   Diagnosis   • Left elbow pain   • Hypertension   • DM2 (diabetes mellitus, type 2) (HCC)   • Elevated prostate specific antigen (PSA)   • Epigastric pain   • Epigastric abdominal pain   • Elevated liver enzymes   • Weight loss, abnormal   • Acute hepatitis   • Anemia   • Obstructive jaundice   • Pneumothorax, left   • Cytokine release syndrome, grade 1   • Esophageal rupture       Past Medical History:   Diagnosis Date   • Acute UTI (urinary tract infection) 2021   • Arthritis    • Borderline diabetes    • Coronary artery disease    • Diabetes mellitus (HCC)    • Elevated prostate specific antigen (PSA) 2020   • GERD (gastroesophageal reflux disease)    • Hypertension    • Left elbow pain        Past Surgical History:   Procedure Laterality Date   • COLONOSCOPY     • ENDOSCOPY N/A 2021    Procedure: ESOPHAGOGASTRODUODENOSCOPY WITH BIOPSY dilitation;  Surgeon: Bela Mcdaniel MD;  Location: [facility];  Service: Gastroenterology;  Laterality: N/A;   • ERCP N/A 2021    Procedure: ENDOSCOPIC RETROGRADE CHOLANGIOPANCREATOGRAPHY WITH STENT PLACEMENT;  Surgeon: Matthew Moreira MD;  Location: [facility] ENDOSCOPY;  Service: Gastroenterology;  Laterality: N/A;  with sphincterotomy, and brushing of common bile duct, and placement of [id number] wall stent     • PACEMAKER IMPLANTATION               IRF OT ASSESSMENT FLOWSHEET (last 12 hours)     IRF OT Evaluation and Treatment     Row Name 22 1452          OT Time and Intention    Document Type progress note; daily treatment  -CJ     Mode of Treatment occupational therapy  -CJ     Row Name 22 1453          General Information     Patient/Family/Caregiver Comments/Observations agreeable to therapy  -     Existing Precautions/Restrictions fall  Kamari drain, G and J tubes, decreased skin integrity  -     Row Name 02/21/22 1452          Transfers    Chair-Bed Bertie (Transfers) contact guard; verbal cues  -     Bertie Level (Toilet Transfer) contact guard; verbal cues  -     Assistive Device (Toilet Transfer) grab bars/safety frame; raised toilet seat  -     Row Name 02/21/22 1452          Chair-Bed Transfer    Assistive Device (Chair-Bed Transfers) wheelchair  -     Row Name 02/21/22 1452          Motor Skills    Motor Skills functional endurance  -     Motor Control/Coordination Interventions therapeutic exercise/ROM; gross motor coordination activities  BUE ther ex/act, AROM, GMC, bilat coord ex  -     Therapeutic Exercise --  dowel ex- 15 reps X 2, 0 lbs  -     Row Name 02/21/22 1452          Bathing    Comment (Bathing) Min/ModA  -     Row Name 02/21/22 1452          Upper Body Dressing    Comment (Upper Body Dressing) Set up/ supervision  -     Row Name 02/21/22 1452          Lower Body Dressing    Comment (Lower Body Dressing) ModA  -     Row Name 02/21/22 1452          Grooming    Comment (Grooming) Set up  -     Row Name 02/21/22 1452          Toileting    Comment (Toileting) Jonny  -     Row Name 02/21/22 1452          Self-Feeding    Comment (Self-Feeding) Dependent - tube feeding  -     Row Name 02/21/22 1508 02/21/22 1452       LB Dressing Goal 1 (OT-IRF)    Bertie (LB Dressing Goal 1, OT-IRF) minimum assist (75% or more patient effort)  - moderate assist (50-74% patient effort)  -    Time Frame (LB Dressing Goal 1, OT-IRF) short-term goal (STG)  - --    Progress/Outcomes (LB Dressing Goal 1, OT-IRF) -- goal met  -    Row Name 02/21/22 1452          Toileting Goal 1 (OT-IRF)    Bertie Level (Toileting Goal 1, OT-IRF) moderate assist (50-74% patient effort)  -      Progress/Outcomes (Toileting Goal 1, OT-IRF) goal met  -     Row Name 02/21/22 1452          Positioning and Restraints    Post Treatment Position bed  -     In Bed call light within reach; encouraged to call for assist; notified nsg  both sessions;  seen at bedside in pm  -           User Key  (r) = Recorded By, (t) = Taken By, (c) = Cosigned By    Initials Name Effective Dates     Elena Pisano OT 06/16/21 -                  Occupational Therapy Education                 Title: PT OT SLP Therapies (Done)     Topic: Occupational Therapy (Done)     Point: ADL training (Done)     Description:   Instruct learner(s) on proper safety adaptation and remediation techniques during self care or transfers.   Instruct in proper use of assistive devices.              Learning Progress Summary           Patient Acceptance, E,D, VU,NR by  at 2/21/2022 1503      Show all documentation for this point (9)                 Point: Precautions (Done)     Description:   Instruct learner(s) on prescribed precautions during self-care and functional transfers.              Learning Progress Summary           Patient Acceptance, E,D, VU,NR by  at 2/21/2022 1503      Show all documentation for this point (9)                             User Key     Initials Effective Dates Name Provider Type Discipline     06/16/21 -  Elena Pisano, OT Occupational Therapist OT                    OT Recommendation and Plan    Planned Therapy Interventions (OT): activity tolerance training, adaptive equipment training, BADL retraining, ROM/therapeutic exercise, strengthening exercise, transfer/mobility retraining, patient/caregiver education/training                    Time Calculation:      Time Calculation- OT     Row Name 02/21/22 1505 02/21/22 1504          Time Calculation- OT    OT Start Time 1235  - 0810  -     OT Stop Time 1255  - 0920  -     OT Time Calculation (min) 20 min  - 70 min  -     Total Timed Code Minutes-  OT 20 minute(s)  -CJ 70 minute(s)  -MELECIO           User Key  (r) = Recorded By, (t) = Taken By, (c) = Cosigned By    Initials Name Provider Type    Elena Segura OT Occupational Therapist              Therapy Charges for Today     Code Description Service Date Service Provider Modifiers Qty    36373489915  OT SELF CARE/MGMT/TRAIN EA 15 MIN 2/21/2022 Elena Pisano OT GO 2    [id number]  OT THER PROC EA 15 MIN 2/21/2022 Elena Pisano OT GO 2    [id number]  OT THERAPEUTIC ACT EA 15 MIN 2/21/2022 Elena Pisano OT GO 2                   Elena Pisano OT  2/21/2022

## 2022-02-21 NOTE — THERAPY TREATMENT NOTE
Inpatient Rehabilitation - Physical Therapy Treatment Note       [facility]     Patient Name: Sandro Argueta  : 1945  MRN: [id number]    Today's Date: 2022                    Admit Date: 2/10/2022      Visit Dx:   No diagnosis found.    Patient Active Problem List   Diagnosis   • Left elbow pain   • Hypertension   • DM2 (diabetes mellitus, type 2) (HCC)   • Elevated prostate specific antigen (PSA)   • Epigastric pain   • Epigastric abdominal pain   • Elevated liver enzymes   • Weight loss, abnormal   • Acute hepatitis   • Anemia   • Obstructive jaundice   • Pneumothorax, left   • Cytokine release syndrome, grade 1   • Esophageal rupture       Past Medical History:   Diagnosis Date   • Acute UTI (urinary tract infection) 2021   • Arthritis    • Borderline diabetes    • Coronary artery disease    • Diabetes mellitus (HCC)    • Elevated prostate specific antigen (PSA) 2020   • GERD (gastroesophageal reflux disease)    • Hypertension    • Left elbow pain        Past Surgical History:   Procedure Laterality Date   • COLONOSCOPY     • ENDOSCOPY N/A 2021    Procedure: ESOPHAGOGASTRODUODENOSCOPY WITH BIOPSY dilitation;  Surgeon: Bela Mcdaniel MD;  Location: [facility];  Service: Gastroenterology;  Laterality: N/A;   • ERCP N/A 2021    Procedure: ENDOSCOPIC RETROGRADE CHOLANGIOPANCREATOGRAPHY WITH STENT PLACEMENT;  Surgeon: Matthew Moreira MD;  Location: [facility] ENDOSCOPY;  Service: Gastroenterology;  Laterality: N/A;  with sphincterotomy, and brushing of common bile duct, and placement of [id number] wall stent     • PACEMAKER IMPLANTATION         PT ASSESSMENT (last 12 hours)     IRF PT Evaluation and Treatment     Row Name 22 1420          PT Time and Intention    Document Type daily treatment  -HR     Mode of Treatment physical therapy; individual therapy  -HR     Patient/Family/Caregiver Comments/Observations Pt and nursing in agreemnt for PT  -HR     Row Name  02/21/22 1420          General Information    Patient Profile Reviewed yes  -HR     Existing Precautions/Restrictions fall  -HR     Limitations/Impairments other (see comments)  medical complexity  -HR     Row Name 02/21/22 1420          Cognition/Psychosocial    Affect/Mental Status (Cognitive) WFL  -HR     Orientation Status (Cognition) oriented x 4  -HR     Follows Commands (Cognition) WFL  -HR     Personal Safety Interventions fall prevention program maintained; gait belt; nonskid shoes/slippers when out of bed; supervised activity  -HR     Cognitive Function (Cognitive) WFL  -HR     Row Name 02/21/22 1420          Pain Scale: FACES Pre/Post-Treatment    Pain: FACES Scale, Pretreatment 0-->no hurt  -HR     Posttreatment Pain Rating 0-->no hurt  -HR     Row Name 02/21/22 1420          Mobility    Extremity Weight-bearing Status right lower extremity; left lower extremity  -HR     Left Lower Extremity (Weight-bearing Status) full weight-bearing (FWB)  -HR     Right Lower Extremity (Weight-bearing Status) full weight-bearing (FWB)  -HR     Row Name 02/21/22 1420          Bed Mobility    Supine-Sit [place] (Bed Mobility) verbal cues; nonverbal cues (demo/gesture); minimum assist (75% patient effort); contact guard  -HR     Bed Mobility, Safety Issues decreased use of legs for bridging/pushing; decreased use of arms for pushing/pulling  -HR     Assistive Device (Bed Mobility) bed rails; other (see comments)  manual assistance from therapist  -HR     Row Name 02/21/22 1420          Transfer Assessment/Treatment    Transfers sit-stand transfer; stand-sit transfer; bed-chair transfer; stand pivot/stand step transfer  -HR     Row Name 02/21/22 1420          Transfers    Bed-Chair [place] (Transfers) verbal cues; contact guard  -HR     Chair-Bed [place] (Transfers) contact guard  -HR     Assistive Device (Bed-Chair Transfers) wheelchair  -HR     Sit-Stand [place] (Transfers) standby assist; verbal  cues; nonverbal cues (demo/gesture); contact guard  -HR     Stand-Sit [place] (Transfers) standby assist; verbal cues; nonverbal cues (demo/gesture)  -HR     Row Name 02/21/22 1420          Chair-Bed Transfer    Assistive Device (Chair-Bed Transfers) wheelchair  -HR     Row Name 02/21/22 1420          Sit-Stand Transfer    Assistive Device (Sit-Stand Transfers) walker, front-wheeled  -HR     Row Name 02/21/22 1420          Stand Pivot/Stand Step Transfer    Stand Pivot/Stand Step [place] (Transfers) contact guard; verbal cues; nonverbal cues (demo/gesture)  -HR     Assistive Device (Stand Pivot Stand Step Transfer) wheelchair  -HR     Row Name 02/21/22 1420          Gait/Stairs (Locomotion)    [place] Level (Gait) contact guard  -HR     Assistive Device (Gait) walker, front-wheeled  -HR     Pattern (Gait) step-through  -HR     Deviations/Abnormal Patterns (Gait) gait speed decreased  -HR     Bilateral Gait Deviations forward flexed posture  -HR     Row Name 02/21/22 1420          Safety Issues, Functional Mobility    Impairments Affecting Function (Mobility) endurance/activity tolerance; strength; balance  -HR     Row Name 02/21/22 1420          Balance    Comment, Balance // Bars: marching, hip abd, HS curls, Hip ext, bean bag toss.  -HR     Row Name 02/21/22 1420          Motor Skills    Therapeutic Exercise other (see comments)  Supine: quad sets, hip abd, heel-slides  -HR     Row Name 02/21/22 1420          Hip (Therapeutic Exercise)    Hip Strengthening (Therapeutic Exercise) bilateral; flexion; extension; aBduction; aDduction; heel slides; marching while standing; standing; supine  -HR     Row Name 02/21/22 1420          Knee (Therapeutic Exercise)    Knee Strengthening (Therapeutic Exercise) bilateral; flexion; extension; heel slides; marching while standing; hamstring curls; standing; supine  -HR     Row Name 02/21/22 1420          Ankle (Therapeutic Exercise)    Ankle Strengthening  (Therapeutic Exercise) bilateral; dorsiflexion; plantarflexion  -HR     Row Name 02/21/22 1420          Bed Mobility Goal 1 (PT-IRF)    Progress/Outcomes (Bed Mobility Goal 1, PT-IRF) goal ongoing  -HR     Row Name 02/21/22 1420          Transfer Goal 1 (PT-IRF)    Progress/Outcomes (Transfer Goal 1, PT-IRF) goal ongoing  -HR     Row Name 02/21/22 1420          Gait/Walking Locomotion Goal 1 (PT-IRF)    Progress/Outcomes (Gait/Walking Locomotion Goal 1, PT-IRF) goal ongoing  -HR     Row Name 02/21/22 1420          Positioning and Restraints    Pre-Treatment Position in bed  -HR     Post Treatment Position bed  -HR     In Bed supine; call light within reach; encouraged to call for assist; side rails up x2  -HR     Row Name 02/21/22 1420          Daily Progress Summary (PT)    Daily Progress Summary (PT) Pt does well with RX and gives good effort with all activities. Pt completed standing exercises in // bars on this date, but does become fatiqued very quickly. Pt request to go back to room and lay down after 30 mins of RX, but does agree to complete supine exercise for the remainder of Rx. No adverse effects noted.  -HR     Impairments Still Limiting Function (PT) strength decreased; impaired balance; impaired functional activity tolerance  -HR     Row Name 02/21/22 1420          Therapy Plan Review/Discharge Plan (PT)    Expected Discharge Disposition (PT Eval) home with caregiver; home with home health care  -HR           User Key  (r) = Recorded By, (t) = Taken By, (c) = Cosigned By    Initials Name Provider Type    HR Reina Pat, ZACKARY Physical Therapy Assistant              Wound 02/10/22 1557 midline abdomen Incision (Active)   Dressing Appearance open to air 02/21/22 1101   Closure Staples 02/21/22 1101   Base red; pink 02/21/22 1101   Edges rolled/closed 02/20/22 2000   Drainage Amount none 02/21/22 1101   Dressing Care open to air 02/21/22 1101       Wound 02/10/22 1602 Left upper back Incision (Active)    Dressing Appearance open to air 02/21/22 1101   Closure Liquid skin adhesive 02/21/22 0000   Base dry; clean 02/21/22 1101   Periwound intact; dry 02/21/22 0000   Periwound Temperature warm 02/21/22 1101   Periwound Skin Turgor firm 02/21/22 0000   Edges rolled/closed 02/21/22 0000   Drainage Amount none 02/21/22 1101   Dressing Care open to air 02/21/22 1101     Physical Therapy Education                 Title: PT OT SLP Therapies (Done)     Topic: Physical Therapy (Done)     Point: Mobility training (Done)     Learning Progress Summary           Patient Acceptance, E, VU,DU by HR at 2/21/2022 1427      Show all documentation for this point (12)                 Point: Home exercise program (Done)     Learning Progress Summary           Patient Acceptance, E, VU,DU by HR at 2/21/2022 1427      Show all documentation for this point (12)                 Point: Body mechanics (Done)     Learning Progress Summary           Patient Acceptance, E, VU,DU by HR at 2/21/2022 1427      Show all documentation for this point (12)                 Point: Precautions (Done)     Learning Progress Summary           Patient Acceptance, E, VU,DU by HR at 2/21/2022 1427      Show all documentation for this point (12)                             User Key     Initials Effective Dates Name Provider Type Discipline    HR 01/14/22 -  Reina Pat PTA Physical Therapy Assistant PT                PT Recommendation and Plan          Daily Progress Summary (PT)  Daily Progress Summary (PT): Pt does well with RX and gives good effort with all activities. Pt completed standing exercises in // bars on this date, but does become fatiqued very quickly. Pt request to go back to room and lay down after 30 mins of RX, but does agree to complete supine exercise for the remainder of Rx. No adverse effects noted.  Impairments Still Limiting Function (PT): strength decreased, impaired balance, impaired functional activity tolerance               Time  Calculation:      PT Charges     Row Name 02/21/22 1428             Time Calculation    Start Time 1330  -HR      Stop Time 1415  -HR      Time Calculation (min) 45 min  -HR      PT Received On 02/21/22  -HR              Time Calculation- PT    Total Timed Code Minutes- PT 45 minute(s)  -HR            User Key  (r) = Recorded By, (t) = Taken By, (c) = Cosigned By    Initials Name Provider Type    HR Reina Pat PTA Physical Therapy Assistant                Therapy Charges for Today     Code Description Service Date Service Provider Modifiers Qty    92818538705 HC PT THER PROC EA 15 MIN 2/21/2022 Reina Pat PTA GP, CQ 2    48346985257 HC PT THERAPEUTIC ACT EA 15 MIN 2/21/2022 Reina Pat PTA GP, CQ 1                   Reina Pat PTA  2/21/2022

## 2022-02-21 NOTE — PROGRESS NOTES
Inpatient Rehabilitation Functional Measures Assessment and Plan of Care    Plan of Care  Self Care    [OT] Dressing (Lower)(Active)  Current Status(02/21/2022): ModA  Weekly Goal(03/01/2022): Jonny  Discharge Goal: Jonny    Performed Intervention(s)  ADL re-trng, AE trng, ther ex/act, education    Functional Measures  MAGAN Eating:  MAGAN Grooming:  MAGAN Bathing:  MAGAN Upper Body Dressing:  MAGAN Lower Body Dressing:  MAGAN Toileting:    MAGAN Bladder Management  Level of Assistance:  Frequency/Number of Accidents this Shift:    MAGAN Bowel Management  Level of Assistance:  Frequency/Number of Accidents this Shift:    MAGAN Bed/Chair/Wheelchair Transfer:  MAGAN Toilet Transfer:  MAGAN Tub/Shower Transfer:    Previously Documented Mode of Locomotion at Discharge:  [facility] Expected Mode of Locomotion at Discharge:  [facility] Walk/Wheelchair:  [facility] Stairs:    [facility] Comprehension:  [facility] Expression:  [facility] Social Interaction:  [facility] Problem Solving:  MAGAN Memory:    Therapy Mode Minutes  Occupational Therapy: Individual: 90 minutes.  Physical Therapy:  Speech Language Pathology:    Discharge Functional Goals:    Signed by: Elena Pisano Occupational Therapist

## 2022-02-22 ENCOUNTER — APPOINTMENT (OUTPATIENT)
Dept: CT IMAGING | Facility: HOSPITAL | Age: 77
End: 2022-02-22

## 2022-02-22 LAB
ANION GAP SERPL CALCULATED.3IONS-SCNC: 9.5 MMOL/L (ref 5–15)
BASOPHILS # BLD AUTO: 0.07 10*3/MM3 (ref 0–0.2)
BASOPHILS NFR BLD AUTO: 0.6 % (ref 0–1.5)
BILIRUB UR QL STRIP: NEGATIVE
BUN SERPL-MCNC: 13 MG/DL (ref 8–23)
BUN/CREAT SERPL: 18.8 (ref 7–25)
CALCIUM SPEC-SCNC: 8.6 MG/DL (ref 8.6–10.5)
CHLORIDE SERPL-SCNC: 89 MMOL/L (ref 98–107)
CLARITY UR: CLEAR
CO2 SERPL-SCNC: 25.5 MMOL/L (ref 22–29)
COLOR UR: YELLOW
CREAT SERPL-MCNC: 0.69 MG/DL (ref 0.76–1.27)
CRP SERPL-MCNC: 4.17 MG/DL (ref 0–0.5)
DEPRECATED RDW RBC AUTO: 41.6 FL (ref 37–54)
EOSINOPHIL # BLD AUTO: 0.25 10*3/MM3 (ref 0–0.4)
EOSINOPHIL NFR BLD AUTO: 2.3 % (ref 0.3–6.2)
ERYTHROCYTE [DISTWIDTH] IN BLOOD BY AUTOMATED COUNT: 13.2 % (ref 12.3–15.4)
GFR SERPL CREATININE-BSD FRML MDRD: 112 ML/MIN/1.73
GLUCOSE BLDC GLUCOMTR-MCNC: 282 MG/DL (ref 70–130)
GLUCOSE BLDC GLUCOMTR-MCNC: 324 MG/DL (ref 70–130)
GLUCOSE BLDC GLUCOMTR-MCNC: 351 MG/DL (ref 70–130)
GLUCOSE SERPL-MCNC: 333 MG/DL (ref 65–99)
GLUCOSE UR STRIP-MCNC: ABNORMAL MG/DL
HCT VFR BLD AUTO: 34.8 % (ref 37.5–51)
HGB BLD-MCNC: 10.6 G/DL (ref 13–17.7)
HGB UR QL STRIP.AUTO: NEGATIVE
IMM GRANULOCYTES # BLD AUTO: 0.04 10*3/MM3 (ref 0–0.05)
IMM GRANULOCYTES NFR BLD AUTO: 0.4 % (ref 0–0.5)
KETONES UR QL STRIP: NEGATIVE
LEUKOCYTE ESTERASE UR QL STRIP.AUTO: NEGATIVE
LYMPHOCYTES # BLD AUTO: 1.69 10*3/MM3 (ref 0.7–3.1)
LYMPHOCYTES NFR BLD AUTO: 15.6 % (ref 19.6–45.3)
MCH RBC QN AUTO: 26.5 PG (ref 26.6–33)
MCHC RBC AUTO-ENTMCNC: 30.5 G/DL (ref 31.5–35.7)
MCV RBC AUTO: 87 FL (ref 79–97)
MONOCYTES # BLD AUTO: 1.02 10*3/MM3 (ref 0.1–0.9)
MONOCYTES NFR BLD AUTO: 9.4 % (ref 5–12)
NEUTROPHILS NFR BLD AUTO: 7.77 10*3/MM3 (ref 1.7–7)
NEUTROPHILS NFR BLD AUTO: 71.7 % (ref 42.7–76)
NITRITE UR QL STRIP: NEGATIVE
NRBC BLD AUTO-RTO: 0 /100 WBC (ref 0–0.2)
PH UR STRIP.AUTO: 7 [PH] (ref 5–8)
PLATELET # BLD AUTO: 376 10*3/MM3 (ref 140–450)
PMV BLD AUTO: 9.3 FL (ref 6–12)
POTASSIUM SERPL-SCNC: 4.8 MMOL/L (ref 3.5–5.2)
PROT UR QL STRIP: ABNORMAL
RBC # BLD AUTO: 4 10*6/MM3 (ref 4.14–5.8)
SODIUM SERPL-SCNC: 124 MMOL/L (ref 136–145)
SODIUM SERPL-SCNC: 124 MMOL/L (ref 136–145)
SODIUM UR-SCNC: 56 MMOL/L
SP GR UR STRIP: >1.03 (ref 1–1.03)
UROBILINOGEN UR QL STRIP: ABNORMAL
WBC NRBC COR # BLD: 10.84 10*3/MM3 (ref 3.4–10.8)

## 2022-02-22 PROCEDURE — 97110 THERAPEUTIC EXERCISES: CPT

## 2022-02-22 PROCEDURE — 25010000002 ENOXAPARIN PER 10 MG: Performed by: FAMILY MEDICINE

## 2022-02-22 PROCEDURE — 84295 ASSAY OF SERUM SODIUM: CPT | Performed by: INTERNAL MEDICINE

## 2022-02-22 PROCEDURE — 82962 GLUCOSE BLOOD TEST: CPT

## 2022-02-22 PROCEDURE — 71250 CT THORAX DX C-: CPT

## 2022-02-22 PROCEDURE — 97530 THERAPEUTIC ACTIVITIES: CPT

## 2022-02-22 PROCEDURE — 81003 URINALYSIS AUTO W/O SCOPE: CPT | Performed by: FAMILY MEDICINE

## 2022-02-22 PROCEDURE — 85025 COMPLETE CBC W/AUTO DIFF WBC: CPT | Performed by: FAMILY MEDICINE

## 2022-02-22 PROCEDURE — 63710000001 INSULIN ASPART PER 5 UNITS: Performed by: FAMILY MEDICINE

## 2022-02-22 PROCEDURE — 80048 BASIC METABOLIC PNL TOTAL CA: CPT | Performed by: FAMILY MEDICINE

## 2022-02-22 PROCEDURE — 97116 GAIT TRAINING THERAPY: CPT

## 2022-02-22 PROCEDURE — 84300 ASSAY OF URINE SODIUM: CPT | Performed by: FAMILY MEDICINE

## 2022-02-22 PROCEDURE — 86140 C-REACTIVE PROTEIN: CPT | Performed by: FAMILY MEDICINE

## 2022-02-22 PROCEDURE — 71250 CT THORAX DX C-: CPT | Performed by: RADIOLOGY

## 2022-02-22 PROCEDURE — 97535 SELF CARE MNGMENT TRAINING: CPT

## 2022-02-22 PROCEDURE — 99231 SBSQ HOSP IP/OBS SF/LOW 25: CPT | Performed by: FAMILY MEDICINE

## 2022-02-22 RX ORDER — DEXTROSE MONOHYDRATE 25 G/50ML
25 INJECTION, SOLUTION INTRAVENOUS
Status: DISCONTINUED | OUTPATIENT
Start: 2022-02-22 | End: 2022-02-26 | Stop reason: HOSPADM

## 2022-02-22 RX ORDER — ECHINACEA PURPUREA EXTRACT 125 MG
2 TABLET ORAL AS NEEDED
Status: DISCONTINUED | OUTPATIENT
Start: 2022-02-22 | End: 2022-02-26 | Stop reason: HOSPADM

## 2022-02-22 RX ORDER — NICOTINE POLACRILEX 4 MG
15 LOZENGE BUCCAL
Status: DISCONTINUED | OUTPATIENT
Start: 2022-02-22 | End: 2022-02-26 | Stop reason: HOSPADM

## 2022-02-22 RX ADMIN — ENOXAPARIN SODIUM 40 MG: 40 INJECTION SUBCUTANEOUS at 17:12

## 2022-02-22 RX ADMIN — PANTOPRAZOLE SODIUM 40 MG: 40 INJECTION, POWDER, FOR SOLUTION INTRAVENOUS at 05:12

## 2022-02-22 RX ADMIN — INSULIN ASPART 6 UNITS: 100 INJECTION, SOLUTION INTRAVENOUS; SUBCUTANEOUS at 12:29

## 2022-02-22 RX ADMIN — BETAMETHASONE DIPROPIONATE 1 APPLICATION: 0.5 CREAM TOPICAL at 21:03

## 2022-02-22 RX ADMIN — BETAMETHASONE DIPROPIONATE 1 APPLICATION: 0.5 CREAM TOPICAL at 09:13

## 2022-02-22 RX ADMIN — INSULIN ASPART 8 UNITS: 100 INJECTION, SOLUTION INTRAVENOUS; SUBCUTANEOUS at 17:08

## 2022-02-22 RX ADMIN — METOPROLOL TARTRATE 25 MG: 25 TABLET ORAL at 09:03

## 2022-02-22 RX ADMIN — METOPROLOL TARTRATE 25 MG: 25 TABLET ORAL at 21:04

## 2022-02-22 RX ADMIN — INSULIN ASPART 5 UNITS: 100 INJECTION, SOLUTION INTRAVENOUS; SUBCUTANEOUS at 09:03

## 2022-02-22 NOTE — NURSING NOTE
Educate pt,wife,daughter and son in law on gtube drainage, mart drain emptying,connecting tube feeding to j tube. Cleansing around tube sites and signs and symptoms of possible infection. Family verbalized understanding at this time. I will repeat education on Wednesday with family to help with understanding.

## 2022-02-22 NOTE — PROGRESS NOTES
[facility]  PROGRESS NOTE     Patient Identification:  Name:  Sandro Argueta  Age:  76 y.o.  Sex:  male  :  1945  MRN:  [id number]  Visit Number:  [id number]  ROOM: [facility]     Primary Care Provider:  Misti Nguyễn MD    Length of stay in inpatient status:  12    Subjective     Chief Compliant:  No chief complaint on file.      History of Presenting Illness: 76-year-old gentleman history of hip hypertension, coronary disease, diabetes mellitus and GERD.  Patient is status post esophageal perforation with leakage of GI contents into the thoracic cavity.  Patient did require multiple chest tubes and had been given a course of broad-spectrum antibiotics as well as micafungin.  Patient does have Kamari tube in place J-tube in place.  Patient did have episode of confusion last evening.  No new complaints otherwise.  Patient also has had some weight loss and we have increased tube feeds.    Objective     Current Hospital Meds:betamethasone augmented + lubriderm, , Topical, Q12H  enoxaparin, 40 mg, Subcutaneous, Q24H  insulin aspart, 0-7 Units, Subcutaneous, TID AC  metoprolol tartrate, 25 mg, Per J Tube, Q12H  pantoprazole, 40 mg, Intravenous, Q AM       ----------------------------------------------------------------------------------------------------------------------  Vital Signs:  Temp:  [98 °F (36.7 °C)] 98 °F (36.7 °C)  Heart Rate:  [] 104  Resp:  [20] 20  BP: (116-136)/(63-71) 116/63  SpO2:  [94 %] 94 %  on   ;   Device (Oxygen Therapy): room air  Body mass index is 17.82 kg/m².    Wt Readings from Last 3 Encounters:   22 64.3 kg (141 lb 11.2 oz)   22 75.9 kg (167 lb 4.8 oz)   21 79.4 kg (175 lb)     Intake & Output (last 3 days)        07    P.O. 245 250 770 360    Other 476  606     NG/ 1190 887     IV Piggyback  1000      Total Intake(mL/kg) 1664 (24.9) 2440 (36.6)  2263 (35.2) 360 (5.6)    Urine (mL/kg/hr) 0 (0) 1175 (0.7) 975 (0.6)     Emesis/NG output  2 90     Drains 120 125 100     Stool  0      Total Output 120 1302 1165     Net +1544 +1138 +1098 +360            Urine Unmeasured Occurrence 7 x 1 x 3 x 1 x    Stool Unmeasured Occurrence  1 x          No diet orders on file  ----------------------------------------------------------------------------------------------------------------------  Physical exam:  Constitutional:   Elderly gentleman no distress  HEENT: Normocephalic atraumatic  Neck:    Supple  Cardiovascular: Regular rate and rhythm  Pulmonary/Chest: Clear to auscultation; Kamari tube in place.  Should state patient does have decreased breath sounds in the bases  Abdominal:  .  Positive bowel sounds soft; J-tube in place  Musculoskeletal: No arthropathy  Neurological: No focal deficits at this time and is alert and oriented x3 today  Skin: No rash  Peripheral vascular:  Genitourinary:  ----------------------------------------------------------------------------------------------------------------------    Last echocardiogram:  Results for orders placed during the hospital encounter of 07/29/20    Adult Transthoracic Echo Complete W/ Cont if Necessary Per Protocol    Interpretation Summary  · Left ventricular wall thickness is consistent with mild concentric hypertrophy.  · Left ventricular systolic function is normal.  · Estimated EF appears to be in the range of 61 - 65%.  · Left ventricular diastolic dysfunction (grade I) consistent with impaired relaxation.  · Normal right ventricular cavity size and systolic function noted. Electronic lead present in the ventricle.  · Normal cardiac chambers dimensions.  · Mild aortic valve regurgitation is present.  · Mild mitral valve regurgitation is present  · Mild tricuspid valve regurgitation is present. No evidence of pulmonary hypertension is present.  · There is no evidence of pericardial  effusion.    ----------------------------------------------------------------------------------------------------------------------  Results from last 7 days   Lab Units 02/22/22  1033 02/21/22  0012 02/19/22  0110   CRP mg/dL 4.17*  --   --    WBC 10*3/mm3 10.84* 10.20 12.03*   HEMOGLOBIN g/dL 10.6* 10.0* 10.2*   HEMATOCRIT % 34.8* 33.1* 32.9*   MCV fL 87.0 88.5 87.0   MCHC g/dL 30.5* 30.2* 31.0*   PLATELETS 10*3/mm3 376 391 403         Results from last 7 days   Lab Units 02/22/22  1033 02/21/22  0012 02/20/22  0001 02/19/22  0110 02/19/22  0110   SODIUM mmol/L 124* 128* 129*   < > 127*   POTASSIUM mmol/L 4.8 4.6 5.3*   < > 5.1   CHLORIDE mmol/L 89* 93* 94*   < > 93*   CO2 mmol/L 25.5 24.4 25.6   < > 24.1   BUN mg/dL 13 12 13   < > 15   CREATININE mg/dL 0.69* 0.62* 0.69*   < > 0.67*   EGFR IF NONAFRICN AM mL/min/1.73 112 126 112   < > 116   CALCIUM mg/dL 8.6 8.1* 8.3*   < > 8.2*   GLUCOSE mg/dL 333* 312* 318*   < > 320*   ALBUMIN g/dL  --   --   --   --  2.45*   BILIRUBIN mg/dL  --   --   --   --  0.3   ALK PHOS U/L  --   --   --   --  110   AST (SGOT) U/L  --   --   --   --  49*   ALT (SGPT) U/L  --   --   --   --  70*    < > = values in this interval not displayed.   Estimated Creatinine Clearance: 72.6 mL/min (A) (by C-G formula based on SCr of 0.69 mg/dL (L)).  No results found for: AMMONIA              Glucose   Date/Time Value Ref Range Status   02/22/2022 1131 351 (H) 70 - 130 mg/dL Final     Comment:     Meter: [id number] : [id number] Dominick Galarza   02/22/2022 0611 324 (H) 70 - 130 mg/dL Final     Comment:     Meter: [id number] : [id number] Kai WARREN   02/21/2022 1608 306 (H) 70 - 130 mg/dL Final     Comment:     Meter: [id number] : [id number] Dominick Galarza   02/21/2022 1139 188 (H) 70 - 130 mg/dL Final     Comment:     Meter: [id number] : [id number] Dominick Galarza   02/21/2022 0550 321 (H) 70 - 130 mg/dL Final     Comment:     Meter: [id number] : [id number] LORIE JOSE   02/20/2022  1624 158 (H) 70 - 130 mg/dL Final     Comment:     Meter: [id number] : [id number] alison lopez   02/20/2022 1104 184 (H) 70 - 130 mg/dL Final     Comment:     Meter: [id number] : [id number] Jack JEFFERSON   02/20/2022 0604 301 (H) 70 - 130 mg/dL Final     Comment:     Meter: [id number] : [id number] Mariann Glover     Lab Results   Component Value Date    TSH 1.620 06/27/2021     No results found for: PREGTESTUR, PREGSERUM, HCG, HCGQUANT  Pain Management Panel    There is no flowsheet data to display.       Brief Urine Lab Results  (Last result in the past 365 days)      Color   Clarity   Blood   Leuk Est   Nitrite   Protein   CREAT   Urine HCG        02/22/22 1026 Yellow   Clear   Negative   Negative   Negative   Trace               No results found for: BLOODCX  Results from last 7 days   Lab Units 02/22/22  1026   NITRITE UA  Negative     No results found for: URINECX  No results found for: WOUNDCX  No results found for: STOOLCX  Results from last 7 days   Lab Units 02/22/22  1033   CRP mg/dL 4.17*       I have personally looked at the labs and they are summarized above.  ----------------------------------------------------------------------------------------------------------------------  Detailed radiology reports for the last 24 hours:    Imaging Results (Last 24 Hours)     ** No results found for the last 24 hours. **        Final impressions for the last 30 days of radiology reports:    CT Chest Without Contrast Diagnostic    Addendum Date: 2/16/2022    Compared with previous study this represents a fairly moderate increase in size of the empyema.  This report was finalized on 2/16/2022 1:01 PM by Dr. Jarek Flores MD.      Result Date: 2/16/2022  1.  Bilobed loculated effusion of the left lower pleural space measuring about 10 cm with internal air, as well as the left upper lobe pleural space measuring about 4.4 cm and in total length about 16 cm. 2.  Small right pleural effusion.  This report  was finalized on 2/16/2022 12:49 PM by Dr. Jarek Flores MD.      CT Chest Without Contrast Diagnostic    Result Date: 1/22/2022  1.  Transmural perforation of the distal thoracic esophagus communicating with the left basilar pleural space. Orally administered water-soluble contrast fills the left basilar pleural space and is visible within the left side chest tube. 2.  The exact site for esophageal perforation is not demonstrated on this study. There is very little contrast within the esophagus, and only a faint amount of contrast along the left side of the distal thoracic esophagus. 3.  Distal paraesophageal pneumomediastinum. 4.  Small left hydropneumothorax. No right pneumothorax. 5.  Consolidation and volume loss involving basal segments left lower lobe. Right posterior basilar atelectasis. Small right pleural effusion. Signer Name: Celestino Herrera MD  Signed: 1/22/2022 6:54 PM  Workstation Name: [facility]  Radiology [facility]    FL Esophagram Complete Single Contrast    Result Date: 1/22/2022  1.  Chest x-ray series with water-soluble oral contrast shows evidence of likely distal esophageal rupture with oral contrast appearing in the left basilar pleural space. Please see chest CT to follow. 2.  Left lower lobe consolidation and atelectasis. Mild infiltrate and volume loss in the right lung base. Small right pleural effusion. 3.  Left-sided hydropneumothorax with chest tube in place. Signer Name: Celestino Herrera MD  Signed: 1/22/2022 6:46 PM  Workstation Name: [facility]  Radiology [facility]    XR Chest 1 View    Result Date: 2/18/2022  1.  Persistent and stable appearance of left pleural collections. 2.  Probably small right pleural effusion. 3.  Grossly stable appearance of left basilar airspace opacity.  This report was finalized on 2/18/2022 9:29 AM by Dr. Jarek Flores MD.      XR Chest 1 View    Result Date: 1/22/2022  1. Left sided chest tube remains present  along with a small left pneumothorax, stable to slightly larger when compared with the prior study but this may be related to technical factors. 2. Worsening appearance of the left lung base with increasing pleural fluid and consolidation. No right-sided pneumothorax. Signer Name: Nick Vines MD  Signed: 1/22/2022 5:28 PM  Workstation Name: [facility]  [facility]    US Venous Doppler Lower Extremity Bilateral (duplex)    Result Date: 1/22/2022  No deep venous thrombus seen in either lower extremity. Signer Name: Nick Vines MD  Signed: 1/22/2022 3:56 PM  Workstation Name: [facility]  [facility]    XR Chest PA & Lateral    Result Date: 2/21/2022  Small left pleural effusion, similar to the prior study Trace right pleural effusion also similar to the previous exam  This report was finalized on 2/21/2022 8:18 AM by Dr. Antoine Guevara MD.      XR Chest PA & Lateral    Result Date: 2/16/2022    Loculated pleural collection likely measuring 3.6 and 4.9 cm of the left hemithorax. Some locules of air noted within the collection concerning for empyema.  This report was finalized on 2/16/2022 10:54 AM by Dr. Jarek Flores MD.      I have personally looked at the radiology images and read the final radiology report.    Assessment & Plan    Debility after recent esophageal rupture with Kamari drain in place and recent esophageal stent placement.  Patient's left pleural effusion has been stable.  We will repeat CT scan of the chest for reevaluation of complicated loculated effusion.  Appreciate pulmonology assistance.  Patient requiring minimum to moderate assistance for up with bathing; moderate assistance for lower body dressing; set up for grooming; minimum assistance for toileting, set up for upper body dressing.  Requiring minimum assist for bed mobility; ambulated 160 feet with front wheel walker and contact-guard yesterday.    Hyponatremia--patient is currently only  only on 960 cc of free water per day.  I will check urine sodium level.  Obtain nephrology consultation.      Diabetes mellitus continue sliding scale coverage    Hypertension controlled    History of CAD    Nutrition--of adjusted upwards calories given an NG tube feeds.    VTE Prophylaxis:   Mechanical Order History:     None      Pharmalogical Order History:      Ordered     Dose Route Frequency Stop    02/10/22 0359  enoxaparin (LOVENOX) syringe 40 mg         40 mg SC Every 24 Hours --                    Elder Alexis MD  [facility]  02/22/22  11:47 EST

## 2022-02-22 NOTE — PROGRESS NOTES
PROGRESS NOTE  Patient Name: Sandro Argueta  Age/Sex: 76 y.o. male  : 1945  MRN: [id number]    Date of Admission: 2/10/2022  Date of Encounter Visit: 22   LOS: 12 days   Patient Care Team:  Misti Nguyễn MD as PCP - General (Geriatric Medicine)    Chief Complaint: Loculated effusion     Hospital course:patient had empyema status post decortication and chest tube placement on the left side with currently chronic chest tube With recent worsening with increase in the fluid collection and evidence of air bubbles suggestive of recurrent empyema.  It was noted that the chest tube has been disconnected from the collection bottle and there might be a chance of air entering from the outside into the chest cavity.  There was concern because it was a bridge of the sterile environment and measures were taken trying to prevent that from happening again.  tPA was administered through the chest tube on the evening of 2022 and repeated again on 2022 and patient could not get that done over the weekend due to the logistics issues with the providers.  Patient is due for 1 more TPA infusion today in the pleural cavity.  Overall he is doing better, denies any pleurisy, no fever or chills.    According the nursing staff patient had an extra 75 cc out since TPA was initiated    REVIEW OF SYSTEMS:   CONSTITUTIONAL: no fever or chills  CARDIOVASCULAR: Improvement in the left-sided chest pain  RESPIRATORY: No shortness of breath, cough or sputum.   GASTROINTESTINAL: No anorexia, nausea, vomiting or diarrhea. No abdominal pain or blood.   HEMATOLOGIC: No bleeding or bruising.     Ventilator/Non-Invasive Ventilation Settings (From admission, onward)            None            Vital Signs  Temp:  [98 °F (36.7 °C)] 98 °F (36.7 °C)  Heart Rate:  [] 104  Resp:  [20] 20  BP: (116-136)/(63-71) 116/63  SpO2:  [94 %] 94 %  on    Device (Oxygen Therapy): room air    Intake/Output Summary (Last 24 hours) at  "2/22/2022 0919  Last data filed at 2/22/2022 0555  Gross per 24 hour   Intake 2023 ml   Output 1165 ml   Net 858 ml     Flowsheet Rows      First Filed Value   Admission Height 189.9 cm (74.76\") Documented at 02/10/2022 1534   Admission Weight 66.7 kg (147 lb) Documented at 02/10/2022 1534        Body mass index is 17.82 kg/m².      02/10/22  1534 02/21/22  1400 02/22/22  0500   Weight: 66.7 kg (147 lb) 61.6 kg (135 lb 12.8 oz) 64.3 kg (141 lb 11.2 oz)       Physical Exam:   General:   Patient does not look toxic, he is awake and oriented, he is in no apparent distress but he is a frail thin gentleman, pleasant and oriented                   Head:    Normocephalic, atraumatic. External ears and nose are normal   Eyes:          Conjunctivae and sclerae normal, no icterus, PERRLA, no  discharge   Throat:   No oral lesions, no thrush, oral mucosa moist.  Poor gum health   Neck:   Supple, trachea midline. No JVD, no cervical or supraclavicular lymphadenopathy    Lungs:     Normal chest on inspection, he has diminished breath sounds, Iromin any wheezes or crackles, no use of any accessory muscles.    Chest tube on the left side with yellow/light green output    Heart:    Regular rhythm and normal rate.  No murmurs, gallops, or rubs noted.   Abdomen:     Soft, nontender, nondistended, positive bowel sounds. No hepatospleenomegaly.  He has a metallic staples from his abdominal incision, he has a a G-tube and a J-tube.   Extremities:   No clubbing, cyanosis, or edema.     Pulses:   Pulses palpable and equal bilaterally.    Skin:   No bleeding or rash. No bumps, good turgor pressure    Neuro:   Nonfocal.  Moves all extremities well. Strength 5/5 and symmetrical, no sensory deficit    Psychiatric:   Normal mood and affect.         Results Review:        Results from last 7 days   Lab Units 02/21/22  0012 02/20/22  0001 02/19/22  0110   SODIUM mmol/L 128* 129* 127*   POTASSIUM mmol/L 4.6 5.3* 5.1   CHLORIDE mmol/L 93* 94* 93* "   CO2 mmol/L 24.4 25.6 24.1   BUN mg/dL 12 13 15   CREATININE mg/dL 0.62* 0.69* 0.67*   CALCIUM mg/dL 8.1* 8.3* 8.2*   AST (SGOT) U/L  --   --  49*   ALT (SGPT) U/L  --   --  70*   ANION GAP mmol/L 10.6 9.4 9.9   ALBUMIN g/dL  --   --  2.45*                 Results from last 7 days   Lab Units 02/21/22  0012 02/19/22  0110   WBC 10*3/mm3 10.20 12.03*   HEMOGLOBIN g/dL 10.0* 10.2*   HEMATOCRIT % 33.1* 32.9*   PLATELETS 10*3/mm3 391 403   MCV fL 88.5 87.0   NEUTROPHIL % % 67.5 71.5   LYMPHOCYTE % % 16.4* 12.9*   MONOCYTES % % 11.3 12.6*   EOSINOPHIL % % 3.7 1.9   BASOPHIL % % 0.7 0.7   IMM GRAN % % 0.4 0.4                   Invalid input(s): LDLCALC          Glucose   Date/Time Value Ref Range Status   02/22/2022 0611 324 (H) 70 - 130 mg/dL Final     Comment:     Meter: [id number] : [id number] Kai WARREN   02/21/2022 1608 306 (H) 70 - 130 mg/dL Final     Comment:     Meter: [id number] : [id number] Dominick Michell   02/21/2022 1139 188 (H) 70 - 130 mg/dL Final     Comment:     Meter: [id number] : [id number] Dominick Michell   02/21/2022 0550 321 (H) 70 - 130 mg/dL Final     Comment:     Meter: [id number] : [id number] LORIE JOSE   02/20/2022 1624 158 (H) 70 - 130 mg/dL Final     Comment:     Meter: [id number] : [id number] alison lopez   02/20/2022 1104 184 (H) 70 - 130 mg/dL Final     Comment:     Meter: [id number] : [id number] Jack JEFFERSON   02/20/2022 0604 301 (H) 70 - 130 mg/dL Final     Comment:     Meter: [id number] : [id number] Mariann Glover   02/19/2022 2057 301 (H) 70 - 130 mg/dL Final     Comment:     Meter: [id number] : [id number] Kai WARREN                               Imaging:   Imaging Results (All)     Procedure Component Value Units Date/Time     I reviewed the patient's new clinical results.  I personally viewed and interpreted the patient's imaging results:        Medication Review:   betamethasone augmented + lubriderm, , Topical, Q12H  enoxaparin, 40 mg,  Subcutaneous, Q24H  insulin aspart, 0-7 Units, Subcutaneous, TID AC  metoprolol tartrate, 25 mg, Per J Tube, Q12H  pantoprazole, 40 mg, Intravenous, Q AM             ASSESSMENT:   1. Complicated pleural effusion with loculated effusion on the left side and simple effusion on the right  2. Patient is nontoxic with borderline white blood cell count  3. Esophageal rupture  4. Immobilization syndrome      PLAN:  Output drainage from the chest tube is better with the TPA, we will continue to administer for total of 3 days, his last dose is due today,  TPA infusion was 10 mg was done today with no problem, similar instruction were provided to change position every 15 minutes for the next 2 hours and unclamp to drainage.  We will need to have a follow-up CAT scan but the chest x-ray on 218 already showed improvement.    Addendum:  25 cc containing 10 mg of TPA were instilled through the left-sided chest tube with similar instruction provided to the patient to return and change position every 15 minutes for the next 2 hours before that tube can be unclamped again.        Disposition: Per primary team    Delmar Womack MD  02/22/22  09:19 EST           Dictated utilizing Dragon dictation

## 2022-02-22 NOTE — THERAPY TREATMENT NOTE
Inpatient Rehabilitation - Physical Therapy Treatment Note       [facility]     Patient Name: Sandro Argueta  : 1945  MRN: [id number]    Today's Date: 2022                    Admit Date: 2/10/2022      Visit Dx:   No diagnosis found.    Patient Active Problem List   Diagnosis   • Left elbow pain   • Hypertension   • DM2 (diabetes mellitus, type 2) (HCC)   • Elevated prostate specific antigen (PSA)   • Epigastric pain   • Epigastric abdominal pain   • Elevated liver enzymes   • Weight loss, abnormal   • Acute hepatitis   • Anemia   • Obstructive jaundice   • Pneumothorax, left   • Cytokine release syndrome, grade 1   • Esophageal rupture       Past Medical History:   Diagnosis Date   • Acute UTI (urinary tract infection) 2021   • Arthritis    • Borderline diabetes    • Coronary artery disease    • Diabetes mellitus (HCC)    • Elevated prostate specific antigen (PSA) 2020   • GERD (gastroesophageal reflux disease)    • Hypertension    • Left elbow pain        Past Surgical History:   Procedure Laterality Date   • COLONOSCOPY     • ENDOSCOPY N/A 2021    Procedure: ESOPHAGOGASTRODUODENOSCOPY WITH BIOPSY dilitation;  Surgeon: Bela Mcdaniel MD;  Location: [facility];  Service: Gastroenterology;  Laterality: N/A;   • ERCP N/A 2021    Procedure: ENDOSCOPIC RETROGRADE CHOLANGIOPANCREATOGRAPHY WITH STENT PLACEMENT;  Surgeon: Matthew Moreira MD;  Location: [facility] ENDOSCOPY;  Service: Gastroenterology;  Laterality: N/A;  with sphincterotomy, and brushing of common bile duct, and placement of [id number] wall stent     • PACEMAKER IMPLANTATION         PT ASSESSMENT (last 12 hours)     IRF PT Evaluation and Treatment     Row Name 22 1420          PT Time and Intention    Document Type daily treatment  -HR     Mode of Treatment physical therapy; individual therapy  -HR     Patient/Family/Caregiver Comments/Observations Pt and nursing in agreement for PT  -HR     Row Name  02/22/22 1420          General Information    Patient Profile Reviewed yes  -HR     Existing Precautions/Restrictions fall  -HR     Limitations/Impairments other (see comments)  medical complexity  -HR     Row Name 02/22/22 1420          Cognition/Psychosocial    Affect/Mental Status (Cognitive) WFL  -HR     Orientation Status (Cognition) oriented x 4  -HR     Follows Commands (Cognition) WFL  -HR     Personal Safety Interventions fall prevention program maintained; gait belt; nonskid shoes/slippers when out of bed; supervised activity  -HR     Cognitive Function (Cognitive) WFL  -HR     Row Name 02/22/22 1420          Pain Scale: FACES Pre/Post-Treatment    Pain: FACES Scale, Pretreatment 0-->no hurt  -HR     Posttreatment Pain Rating 0-->no hurt  -HR     Row Name 02/22/22 1420          Mobility    Extremity Weight-bearing Status right lower extremity; left lower extremity  -HR     Left Lower Extremity (Weight-bearing Status) full weight-bearing (FWB)  -HR     Right Lower Extremity (Weight-bearing Status) full weight-bearing (FWB)  -HR     Row Name 02/22/22 1420          Bed Mobility    Supine-Sit [place] (Bed Mobility) verbal cues; nonverbal cues (demo/gesture); minimum assist (75% patient effort); contact guard  -HR     Supine-Sit-Supine [place] (Bed Mobility) contact guard; standby assist  -HR     Bed Mobility, Safety Issues decreased use of legs for bridging/pushing; decreased use of arms for pushing/pulling  -HR     Assistive Device (Bed Mobility) bed rails; other (see comments)  manual assistance from therapist  -HR     Row Name 02/22/22 1420          Transfer Assessment/Treatment    Transfers sit-stand transfer; stand-sit transfer; bed-chair transfer; stand pivot/stand step transfer  -HR     Row Name 02/22/22 1420          Transfers    Bed-Chair [place] (Transfers) verbal cues; contact guard  -HR     Chair-Bed [place] (Transfers) contact guard  -HR     Assistive Device (Bed-Chair  Transfers) wheelchair  -HR     Sit-Stand [place] (Transfers) standby assist; verbal cues; nonverbal cues (demo/gesture); contact guard  -HR     Stand-Sit [place] (Transfers) standby assist; verbal cues; nonverbal cues (demo/gesture)  -HR     Row Name 02/22/22 1420          Chair-Bed Transfer    Assistive Device (Chair-Bed Transfers) wheelchair  -HR     Row Name 02/22/22 1420          Sit-Stand Transfer    Assistive Device (Sit-Stand Transfers) walker, front-wheeled  -HR     Row Name 02/22/22 1420          Stand Pivot/Stand Step Transfer    Stand Pivot/Stand Step [place] (Transfers) contact guard; verbal cues; nonverbal cues (demo/gesture)  -HR     Assistive Device (Stand Pivot Stand Step Transfer) wheelchair  -HR     Row Name 02/22/22 1420          Gait/Stairs (Locomotion)    [place] Level (Gait) contact guard  -HR     Assistive Device (Gait) walker, front-wheeled  -HR     Pattern (Gait) step-through  -HR     Deviations/Abnormal Patterns (Gait) gait speed decreased  -HR     Bilateral Gait Deviations forward flexed posture  -HR     Row Name 02/22/22 1420          Safety Issues, Functional Mobility    Impairments Affecting Function (Mobility) endurance/activity tolerance; strength; balance  -HR     Row Name 02/22/22 1420          Balance    Comment, Balance // Bars: marching, hip abd, hip ext, HS curls, squats.  -HR     Row Name 02/22/22 1420          Hip (Therapeutic Exercise)    Hip Strengthening (Therapeutic Exercise) bilateral; flexion; extension; aBduction; aDduction; marching while standing; standing  -HR     Row Name 02/22/22 1420          Knee (Therapeutic Exercise)    Knee Strengthening (Therapeutic Exercise) bilateral; extension; flexion; marching while standing; hamstring curls; standing  -HR     Row Name 02/22/22 1420          Ankle (Therapeutic Exercise)    Ankle Strengthening (Therapeutic Exercise) bilateral; dorsiflexion; plantarflexion  -HR     Row Name 02/22/22 1420          Bed  Mobility Goal 1 (PT-IRF)    Progress/Outcomes (Bed Mobility Goal 1, PT-IRF) goal ongoing  -HR     Row Name 02/22/22 1420          Transfer Goal 1 (PT-IRF)    Progress/Outcomes (Transfer Goal 1, PT-IRF) goal ongoing  -HR     Row Name 02/22/22 1420          Gait/Walking Locomotion Goal 1 (PT-IRF)    Progress/Outcomes (Gait/Walking Locomotion Goal 1, PT-IRF) goal ongoing  -HR     Row Name 02/22/22 1420          Positioning and Restraints    Pre-Treatment Position in bed  -HR     Post Treatment Position bed  -HR     In Bed call light within reach; encouraged to call for assist; exit alarm on; side rails up x2; fowlers  -HR     Row Name 02/22/22 1420          Daily Progress Summary (PT)    Daily Progress Summary (PT) Pt continues to fatique quickly, but did complete entire RX in // bars on this date. Pt gives good effort , no adverse effects noted  -HR     Impairments Still Limiting Function (PT) strength decreased; impaired balance; impaired functional activity tolerance  -HR     Row Name 02/22/22 1420          Therapy Plan Review/Discharge Plan (PT)    Expected Discharge Disposition (PT Eval) home with caregiver; home with home health care  -HR           User Key  (r) = Recorded By, (t) = Taken By, (c) = Cosigned By    Initials Name Provider Type    HR Reina Pat, ZACKARY Physical Therapy Assistant              Wound 02/10/22 1557 midline abdomen Incision (Active)   Dressing Appearance open to air 02/21/22 1901   Closure Staples 02/22/22 1352   Base red; pink 02/22/22 1352   Drainage Amount none 02/22/22 1352   Care, Wound other (see comments) 02/21/22 1901   Dressing Care open to air 02/21/22 1901       Wound 02/10/22 1602 Left upper back Incision (Active)   Dressing Appearance open to air 02/21/22 1901   Closure Liquid skin adhesive 02/21/22 1901   Base dry; clean 02/22/22 1352   Periwound Temperature warm 02/22/22 1352   Drainage Amount none 02/22/22 1352   Care, Wound other (see comments) 02/21/22 1901   Dressing  Care open to air 02/21/22 1901     Physical Therapy Education                 Title: PT OT SLP Therapies (Done)     Topic: Physical Therapy (Done)     Point: Mobility training (Done)     Learning Progress Summary           Patient Acceptance, E, VU,DU by HR at 2/22/2022 1426      Show all documentation for this point (14)                 Point: Home exercise program (Done)     Learning Progress Summary           Patient Acceptance, E, VU,DU by HR at 2/22/2022 1426      Show all documentation for this point (14)                 Point: Body mechanics (Done)     Learning Progress Summary           Patient Acceptance, E, VU,DU by HR at 2/22/2022 1426      Show all documentation for this point (14)                 Point: Precautions (Done)     Learning Progress Summary           Patient Acceptance, E, VU,DU by HR at 2/22/2022 1426      Show all documentation for this point (14)                             User Key     Initials Effective Dates Name Provider Type Discipline    HR 01/14/22 -  Reina Pat PTA Physical Therapy Assistant PT                PT Recommendation and Plan          Daily Progress Summary (PT)  Daily Progress Summary (PT): Pt continues to fatique quickly, but did complete entire RX in // bars on this date. Pt gives good effort , no adverse effects noted  Impairments Still Limiting Function (PT): strength decreased, impaired balance, impaired functional activity tolerance               Time Calculation:      PT Charges     Row Name 02/22/22 1426             Time Calculation    Start Time 1330  -HR      Stop Time 1415  -HR      Time Calculation (min) 45 min  -HR      PT Received On 02/22/22  -HR              Time Calculation- PT    Total Timed Code Minutes- PT 45 minute(s)  -HR            User Key  (r) = Recorded By, (t) = Taken By, (c) = Cosigned By    Initials Name Provider Type    HR Reina Pat PTA Physical Therapy Assistant                Therapy Charges for Today     Code Description Service  Date Service Provider Modifiers Qty    66348979648 HC PT THER PROC EA 15 MIN 2/21/2022 Reina Pat, ZACKARY GP, CQ 2    74923498775 HC PT THERAPEUTIC ACT EA 15 MIN 2/21/2022 Reina Pat PTA GP, CQ 1    17711573717 HC PT THERAPEUTIC ACT EA 15 MIN 2/22/2022 Reina Pat PTA GP, CQ 2    33198181770 HC PT THER PROC EA 15 MIN 2/22/2022 Reina Pat PTA GP, CQ 1                   Reina Pat PTA  2/22/2022

## 2022-02-22 NOTE — PROGRESS NOTES
"  PROGRESS NOTE  Patient Name: Sandro Argueta  Age/Sex: 76 y.o. male  : 1945  MRN: [id number]    Date of Admission: 2/10/2022  Date of Encounter Visit: 22   LOS: 12 days   Patient Care Team:  Misti Nguyễn MD as PCP - General (Geriatric Medicine)    Chief Complaint: Loculated effusion     Hospital course:patient had empyema status post decortication and chest tube placement on the left side with currently chronic chest tube With recent worsening with increase in the fluid collection and evidence of air bubbles suggestive of recurrent empyema.  He had 3 doses of TPA given through the chest tube on Thursday/Friday/Monday and had some increase in the amount of drainage.  Patient had CT scan done today that showed some improvement, but apparently the fluid collection on the upper part of the left chest does not seem to be well communicating with the bottom part of the chest where the chest tube is.  Patient himself is afebrile, no more pleurisy    REVIEW OF SYSTEMS:   CONSTITUTIONAL: no fever or chills  CARDIOVASCULAR:  nearly resolved left-sided chest pain  RESPIRATORY: No shortness of breath, cough or sputum.   GASTROINTESTINAL: No anorexia, nausea, vomiting or diarrhea. No abdominal pain or blood.   HEMATOLOGIC: No bleeding or bruising.     Ventilator/Non-Invasive Ventilation Settings (From admission, onward)            None            Vital Signs  Temp:  [97.4 °F (36.3 °C)-98 °F (36.7 °C)] 97.4 °F (36.3 °C)  Heart Rate:  [] 104  Resp:  [18-20] 18  BP: (116-136)/(63-71) 116/63  SpO2:  [94 %] 94 %  on    Device (Oxygen Therapy): room air    Intake/Output Summary (Last 24 hours) at 2022 1414  Last data filed at 2022 0903  Gross per 24 hour   Intake 2143 ml   Output 865 ml   Net 1278 ml     Flowsheet Rows      First Filed Value   Admission Height 189.9 cm (74.76\") Documented at 02/10/2022 1534   Admission Weight 66.7 kg (147 lb) Documented at 02/10/2022 1534        Body mass index is " 17.82 kg/m².      02/10/22  1534 02/21/22  1400 02/22/22  0500   Weight: 66.7 kg (147 lb) 61.6 kg (135 lb 12.8 oz) 64.3 kg (141 lb 11.2 oz)       Physical Exam:   General:   Patient does not look toxic, he is awake and oriented, he is in no apparent distress but he is a frail thin gentleman, pleasant and oriented                   Head:    Normocephalic, atraumatic. External ears and nose are normal   Eyes:          Conjunctivae and sclerae normal, no icterus, PERRLA, no  discharge   Throat:   No oral lesions, no thrush, oral mucosa moist.  Poor gum health   Neck:   Supple, trachea midline. No JVD, no cervical or supraclavicular lymphadenopathy    Lungs:     Normal chest on inspection, he has diminished breath sounds, Iromin any wheezes or crackles, no use of any accessory muscles.    Chest tube on the left side with yellow/light green output    Heart:    Regular rhythm and normal rate.  No murmurs, gallops, or rubs noted.   Abdomen:     Soft, nontender, nondistended, positive bowel sounds. No hepatospleenomegaly.  He has a metallic staples from his abdominal incision, he has a a G-tube and a J-tube.   Extremities:   No clubbing, cyanosis, or edema.     Pulses:   Pulses palpable and equal bilaterally.    Skin:   No bleeding or rash. No bumps, good turgor pressure    Neuro:   Nonfocal.  Moves all extremities well. Strength 5/5 and symmetrical, no sensory deficit    Psychiatric:   Normal mood and affect.         Results Review:    Results From Last 14 Days   Lab Units 02/22/22  1033   CRP mg/dL 4.17*     Results from last 7 days   Lab Units 02/22/22  1033 02/21/22  0012 02/20/22  0001 02/19/22  0110   SODIUM mmol/L 124* 128* 129* 127*   POTASSIUM mmol/L 4.8 4.6 5.3* 5.1   CHLORIDE mmol/L 89* 93* 94* 93*   CO2 mmol/L 25.5 24.4 25.6 24.1   BUN mg/dL 13 12 13 15   CREATININE mg/dL 0.69* 0.62* 0.69* 0.67*   CALCIUM mg/dL 8.6 8.1* 8.3* 8.2*   AST (SGOT) U/L  --   --   --  49*   ALT (SGPT) U/L  --   --   --  70*   ANION GAP  mmol/L 9.5 10.6 9.4 9.9   ALBUMIN g/dL  --   --   --  2.45*                 Results from last 7 days   Lab Units 02/22/22  1033 02/21/22  0012 02/19/22  0110   WBC 10*3/mm3 10.84* 10.20 12.03*   HEMOGLOBIN g/dL 10.6* 10.0* 10.2*   HEMATOCRIT % 34.8* 33.1* 32.9*   PLATELETS 10*3/mm3 376 391 403   MCV fL 87.0 88.5 87.0   NEUTROPHIL % % 71.7 67.5 71.5   LYMPHOCYTE % % 15.6* 16.4* 12.9*   MONOCYTES % % 9.4 11.3 12.6*   EOSINOPHIL % % 2.3 3.7 1.9   BASOPHIL % % 0.6 0.7 0.7   IMM GRAN % % 0.4 0.4 0.4                   Invalid input(s): LDLCALC          Glucose   Date/Time Value Ref Range Status   02/22/2022 1131 351 (H) 70 - 130 mg/dL Final     Comment:     Meter: [id number] : [id number] Tan Michell   02/22/2022 0611 324 (H) 70 - 130 mg/dL Final     Comment:     Meter: [id number] : [id number] Kai WARREN   02/21/2022 1608 306 (H) 70 - 130 mg/dL Final     Comment:     Meter: [id number] : [id number] Tan Michell   02/21/2022 1139 188 (H) 70 - 130 mg/dL Final     Comment:     Meter: [id number] : [id number] Tan Michell   02/21/2022 0550 321 (H) 70 - 130 mg/dL Final     Comment:     Meter: [id number] : [id number] LORIE JOSE   02/20/2022 1624 158 (H) 70 - 130 mg/dL Final     Comment:     Meter: [id number] : [id number] alison lopez   02/20/2022 1104 184 (H) 70 - 130 mg/dL Final     Comment:     Meter: [id number] : [id number] Jack JEFFERSON   02/20/2022 0604 301 (H) 70 - 130 mg/dL Final     Comment:     Meter: [id number] : [id number] Mariann Glover             Results from last 7 days   Lab Units 02/22/22  1026   NITRITE UA  Negative                   Imaging:   Imaging Results (All)     Procedure Component Value Units Date/Time           I reviewed the patient's new clinical results.  I personally viewed and interpreted the patient's imaging results:  I do not believe given the position of the current chest tube, whether it would be able to drain that loculated segment of fluid in the  upper part of the chest.  No more air bubbles and the appearance is more benign           Medication Review:   betamethasone augmented + lubriderm, , Topical, Q12H  enoxaparin, 40 mg, Subcutaneous, Q24H  insulin aspart, 0-14 Units, Subcutaneous, TID AC  metoprolol tartrate, 25 mg, Per J Tube, Q12H  pantoprazole, 40 mg, Intravenous, Q AM  sodium chloride, 500 mL, Intravenous, Once             ASSESSMENT:   1. Complicated pleural effusion with loculated effusion on the left side and simple effusion on the right  2. Patient is nontoxic with borderline white blood cell count  3. Esophageal rupture  4. Immobilization syndrome      PLAN:  Output drainage from the chest tube is better with the TPA, for total of 3 days, his last dose is given on 2/21/2022,  The follow-up CT scan showed normal air bubbles, the appearance is more reassuring but the current position of the chest tube is unlikely to drain that fluid collection in the left upper chest.  As long as the patient is nontoxic and no sign of sepsis, we can continue to follow-up with x-rays in a week, in case of any clinical concern about early symptoms or sign of sepsis, that pocket of fluid need to be drained with CT or ultrasound-guided procedure  The current chest tube did not drain the effusion and the bottom part of the left chest and need to be addressed by thoracic surgery and probably removed soon.    Recommendations:  · Continue physical therapy and no specific treatment  · Make sure that the chest tube does not get any air or contamination  · Address with his thoracic surgeon if the chest tube can be removed  · We will follow-up again next week    Addendum:  Discussed with family, patient has an appointment at [facility] next week with thoracic surgery to address the issue of the chest tube and they can address whether the residual benign looking effusion need to be drained or monitored.  No additional recommendation at this point, will follow as  needed    Disposition: Per primary team    Delmar Womack MD  02/22/22  14:14 EST           Dictated utilizing Dragon dictation

## 2022-02-22 NOTE — THERAPY TREATMENT NOTE
Inpatient Rehabilitation - Physical Therapy Treatment Note        [place]     Patient Name: Sandro Argueta  : 1945  MRN: [id number]    Today's Date: 2022                    Admit Date: 2/10/2022      Visit Dx:   No diagnosis found.    Patient Active Problem List   Diagnosis   • Left elbow pain   • Hypertension   • DM2 (diabetes mellitus, type 2) (HCC)   • Elevated prostate specific antigen (PSA)   • Epigastric pain   • Epigastric abdominal pain   • Elevated liver enzymes   • Weight loss, abnormal   • Acute hepatitis   • Anemia   • Obstructive jaundice   • Pneumothorax, left   • Cytokine release syndrome, grade 1   • Esophageal rupture       Past Medical History:   Diagnosis Date   • Acute UTI (urinary tract infection) 2021   • Arthritis    • Borderline diabetes    • Coronary artery disease    • Diabetes mellitus (HCC)    • Elevated prostate specific antigen (PSA) 2020   • GERD (gastroesophageal reflux disease)    • Hypertension    • Left elbow pain        Past Surgical History:   Procedure Laterality Date   • COLONOSCOPY     • ENDOSCOPY N/A 2021    Procedure: ESOPHAGOGASTRODUODENOSCOPY WITH BIOPSY dilitation;  Surgeon: Bela Mcdaniel MD;  Location: [facility];  Service: Gastroenterology;  Laterality: N/A;   • ERCP N/A 2021    Procedure: ENDOSCOPIC RETROGRADE CHOLANGIOPANCREATOGRAPHY WITH STENT PLACEMENT;  Surgeon: Matthew Moreira MD;  Location: [facility] ENDOSCOPY;  Service: Gastroenterology;  Laterality: N/A;  with sphincterotomy, and brushing of common bile duct, and placement of [id number] wall stent     • PACEMAKER IMPLANTATION         PT ASSESSMENT (last 12 hours)     IRF PT Evaluation and Treatment     Row Name 22 1447 22 1420       PT Time and Intention    Document Type daily treatment  -RG daily treatment  -HR    Mode of Treatment physical therapy; individual therapy  -RG physical therapy; individual therapy  -HR    Patient/Family/Caregiver  Comments/Observations Pt and nursing in agreement for skilled PT on this date.  -RG Pt and nursing in agreement for PT  -HR    Row Name 02/22/22 1447 02/22/22 1420       General Information    Patient Profile Reviewed yes  -RG yes  -HR    Existing Precautions/Restrictions fall  -RG fall  -HR    Limitations/Impairments other (see comments)  medical complexity  -RG other (see comments)  medical complexity  -HR    Row Name 02/22/22 1447 02/22/22 1420       Cognition/Psychosocial    Affect/Mental Status (Cognitive) WFL  -RG WFL  -HR    Orientation Status (Cognition) oriented x 4  -RG oriented x 4  -HR    Follows Commands (Cognition) WFL  -RG WFL  -HR    Personal Safety Interventions fall prevention program maintained; gait belt; nonskid shoes/slippers when out of bed  -RG fall prevention program maintained; gait belt; nonskid shoes/slippers when out of bed; supervised activity  -HR    Cognitive Function (Cognitive) WFL  -RG WFL  -HR    Row Name 02/22/22 1447 02/22/22 1420       Pain Scale: FACES Pre/Post-Treatment    Pain: FACES Scale, Pretreatment 0-->no hurt  -RG 0-->no hurt  -HR    Posttreatment Pain Rating 0-->no hurt  -RG 0-->no hurt  -HR    Row Name 02/22/22 1447 02/22/22 1420       Mobility    Extremity Weight-bearing Status right lower extremity; left lower extremity  -RG right lower extremity; left lower extremity  -HR    Left Lower Extremity (Weight-bearing Status) full weight-bearing (FWB)  -RG full weight-bearing (FWB)  -HR    Right Lower Extremity (Weight-bearing Status) full weight-bearing (FWB)  -RG full weight-bearing (FWB)  -HR    Row Name 02/22/22 1447 02/22/22 1420       Bed Mobility    Supine-Sit [place] (Bed Mobility) verbal cues; nonverbal cues (demo/gesture); minimum assist (75% patient effort); contact guard  -RG verbal cues; nonverbal cues (demo/gesture); minimum assist (75% patient effort); contact guard  -HR    Supine-Sit-Supine [place] (Bed Mobility) contact guard; standby assist   -RG contact guard; standby assist  -HR    Bed Mobility, Safety Issues decreased use of legs for bridging/pushing; decreased use of arms for pushing/pulling  -RG decreased use of legs for bridging/pushing; decreased use of arms for pushing/pulling  -HR    Assistive Device (Bed Mobility) bed rails; other (see comments)  manual assistance from therapist  -RG bed rails; other (see comments)  manual assistance from therapist  -HR    Row Name 02/22/22 1447 02/22/22 1420       Transfer Assessment/Treatment    Transfers sit-stand transfer; stand-sit transfer; bed-chair transfer; stand pivot/stand step transfer  -RG sit-stand transfer; stand-sit transfer; bed-chair transfer; stand pivot/stand step transfer  -HR    Row Name 02/22/22 1447 02/22/22 1420       Transfers    Bed-Chair [place] (Transfers) verbal cues; contact guard  -RG verbal cues; contact guard  -HR    Chair-Bed [place] (Transfers) contact guard  -RG contact guard  -HR    Assistive Device (Bed-Chair Transfers) wheelchair  -RG wheelchair  -HR    Sit-Stand [place] (Transfers) standby assist; verbal cues; nonverbal cues (demo/gesture); contact guard  -RG standby assist; verbal cues; nonverbal cues (demo/gesture); contact guard  -HR    Stand-Sit [place] (Transfers) standby assist; verbal cues; nonverbal cues (demo/gesture)  -RG standby assist; verbal cues; nonverbal cues (demo/gesture)  -HR    Row Name 02/22/22 1447 02/22/22 1420       Chair-Bed Transfer    Assistive Device (Chair-Bed Transfers) wheelchair  -RG wheelchair  -HR    Row Name 02/22/22 1447 02/22/22 1420       Sit-Stand Transfer    Assistive Device (Sit-Stand Transfers) walker, front-wheeled  -RG walker, front-wheeled  -HR    Row Name 02/22/22 1447 02/22/22 1420       Stand Pivot/Stand Step Transfer    Stand Pivot/Stand Step [place] (Transfers) contact guard; verbal cues; nonverbal cues (demo/gesture)  -RG contact guard; verbal cues; nonverbal cues (demo/gesture)  -HR    Assistive  Device (Stand Pivot Stand Step Transfer) wheelchair  -RG wheelchair  -HR    Row Name 02/22/22 1447 02/22/22 1420       Gait/Stairs (Locomotion)    [place] (Gait) contact guard  -RG contact guard  -HR    Assistive Device (Gait) walker, front-wheeled  -RG walker, front-wheeled  -HR    Distance in Feet (Gait) 180  -RG --    Pattern (Gait) step-through  -RG step-through  -HR    Deviations/Abnormal Patterns (Gait) gait speed decreased  -RG gait speed decreased  -HR    Bilateral Gait Deviations forward flexed posture  -RG forward flexed posture  -HR    Comment (Gait/Stairs) Pt fatigued after session and asked to lay back down.  -RG --    Row Name 02/22/22 1447 02/22/22 1420       Safety Issues, Functional Mobility    Impairments Affecting Function (Mobility) endurance/activity tolerance; strength; balance  -RG endurance/activity tolerance; strength; balance  -HR    Row Name 02/22/22 1420          Balance    Comment, Balance // Bars: marching, hip abd, hip ext, HS curls, squats.  -HR     Row Name 02/22/22 1447 02/22/22 1420       Hip (Therapeutic Exercise)    Hip Strengthening (Therapeutic Exercise) bilateral; flexion; aBduction; aDduction; marching while seated; sitting; resistance band; green; 10 repetitions; 2 sets  -RG bilateral; flexion; extension; aBduction; aDduction; marching while standing; standing  -HR    Row Name 02/22/22 1447 02/22/22 1420       Knee (Therapeutic Exercise)    Knee Strengthening (Therapeutic Exercise) bilateral; flexion; extension; marching while seated; LAQ (long arc quad); sitting; resistance band; green; 10 repetitions; 2 sets  -RG bilateral; extension; flexion; marching while standing; hamstring curls; standing  -HR    Row Name 02/22/22 1447 02/22/22 1420       Ankle (Therapeutic Exercise)    Ankle Strengthening (Therapeutic Exercise) bilateral; dorsiflexion; plantarflexion; sitting; 10 repetitions; 2 sets  -RG bilateral; dorsiflexion; plantarflexion  -    Row Name 02/22/22  1447 02/22/22 1420       Bed Mobility Goal 1 (PT-IRF)    Progress/Outcomes (Bed Mobility Goal 1, PT-IRF) goal ongoing  -RG goal ongoing  -HR    Row Name 02/22/22 1447 02/22/22 1420       Transfer Goal 1 (PT-IRF)    Progress/Outcomes (Transfer Goal 1, PT-IRF) goal ongoing  -RG goal ongoing  -HR    Row Name 02/22/22 1447 02/22/22 1420       Gait/Walking Locomotion Goal 1 (PT-IRF)    Progress/Outcomes (Gait/Walking Locomotion Goal 1, PT-IRF) goal ongoing  -RG goal ongoing  -HR    Row Name 02/22/22 1447 02/22/22 1420       Positioning and Restraints    Pre-Treatment Position in bed  -RG in bed  -HR    Post Treatment Position bed  -RG bed  -HR    In Bed notified nsg; supine; call light within reach; encouraged to call for assist; exit alarm on; legs elevated  -RG call light within reach; encouraged to call for assist; exit alarm on; side rails up x2; fowlers  -HR    Row Name 02/22/22 1447 02/22/22 1420       Daily Progress Summary (PT)    Daily Progress Summary (PT) -- Pt continues to fatique quickly, but did complete entire RX in // bars on this date. Pt gives good effort , no adverse effects noted  -HR    Impairments Still Limiting Function (PT) strength decreased; impaired balance; impaired functional activity tolerance  -RG strength decreased; impaired balance; impaired functional activity tolerance  -HR    Row Name 02/22/22 1447 02/22/22 1420       Therapy Plan Review/Discharge Plan (PT)    Expected Discharge Disposition (PT Eval) home with caregiver; home with home health care  -RG home with caregiver; home with home health care  -HR          User Key  (r) = Recorded By, (t) = Taken By, (c) = Cosigned By    Initials Name Provider Type    RG Jose Martin Hernández PTA Physical Therapy Assistant    HR Reina Pat PTA Physical Therapy Assistant              Wound 02/10/22 1557 midline abdomen Incision (Active)   Dressing Appearance open to air 02/21/22 1901   Closure Staples 02/22/22 1352   Base red; pink 02/22/22 1352    Drainage Amount none 02/22/22 1352   Care, Wound other (see comments) 02/21/22 1901   Dressing Care open to air 02/21/22 1901       Wound 02/10/22 1602 Left upper back Incision (Active)   Dressing Appearance open to air 02/21/22 1901   Closure Liquid skin adhesive 02/21/22 1901   Base dry; clean 02/22/22 1352   Periwound Temperature warm 02/22/22 1352   Drainage Amount none 02/22/22 1352   Care, Wound other (see comments) 02/21/22 1901   Dressing Care open to air 02/21/22 1901     Physical Therapy Education                 Title: PT OT SLP Therapies (Done)     Topic: Physical Therapy (Done)     Point: Mobility training (Done)     Learning Progress Summary           Patient Acceptance, E,D, VU,NR by  at 2/22/2022 1450      Show all documentation for this point (15)                 Point: Home exercise program (Done)     Learning Progress Summary           Patient Acceptance, E,D, VU,NR by  at 2/22/2022 1450      Show all documentation for this point (15)                 Point: Body mechanics (Done)     Learning Progress Summary           Patient Acceptance, E,D, VU,NR by  at 2/22/2022 1450      Show all documentation for this point (15)                 Point: Precautions (Done)     Learning Progress Summary           Patient Acceptance, E,D, VU,NR by  at 2/22/2022 1450      Show all documentation for this point (15)                             User Key     Initials Effective Dates Name Provider Type Discipline     06/16/21 -  Jose Martin Hernández PTA Physical Therapy Assistant PT                PT Recommendation and Plan    Frequency of Treatment (PT): 5 times per week  Anticipated Equipment Needs at Discharge (PT Eval):  (tbd)  Daily Progress Summary (PT)  Impairments Still Limiting Function (PT): strength decreased, impaired balance, impaired functional activity tolerance               Time Calculation:      PT Charges     Row Name 02/22/22 1450 02/22/22 1426          Time Calculation    Start Time 1045  -  1330  -HR     Stop Time 1130  -RG 1415  -HR     Time Calculation (min) 45 min  -RG 45 min  -HR     PT Received On 02/22/22  -RG 02/22/22  -HR            Time Calculation- PT    Total Timed Code Minutes- PT 45 minute(s)  -RG 45 minute(s)  -HR           User Key  (r) = Recorded By, (t) = Taken By, (c) = Cosigned By    Initials Name Provider Type     Jose Martin Hernández PTA Physical Therapy Assistant    HR Reina Pat PTA Physical Therapy Assistant                Therapy Charges for Today     Code Description Service Date Service Provider Modifiers Qty    88706148861 HC GAIT TRAINING EA 15 MIN 2/21/2022 Jose Martin Hernández PTA GP, CQ 1    37367416053 HC PT THERAPEUTIC ACT EA 15 MIN 2/21/2022 Jose Martin Hernández PTA GP, CQ 1    64270772086 HC PT THER PROC EA 15 MIN 2/21/2022 Jose Martin Hernández PTA GP, CQ 1    32229430575 HC GAIT TRAINING EA 15 MIN 2/22/2022 Jose Martin Hernández PTA GP, CQ 1    88009743995 HC PT THER PROC EA 15 MIN 2/22/2022 Jose Martin Hernández PTA GP, CQ 2                   Jose Martin Hernández PTA  2/22/2022

## 2022-02-22 NOTE — PROGRESS NOTES
Rehabilitation Nursing  Inpatient Rehabilitation Plan of Care Note    Plan of Care  Copy from POC    Pain    Pain Management (Active)  Current Status (2/10/2022 3:38:00 PM): Potential for pain  Weekly Goal: No pain this week  Discharge Goal: No pain    Safety    Potential for Injury (Active)  Current Status (2/10/2022 3:38:00 PM): At risk for injury  Weekly Goal: No injury this week  Discharge Goal: No injuries      RN Interventions    Safety -  RN: Assess safety Q 1 hour and prn..Side rails up Xs 2..Call bell and items in  reach..Meds per MD order. [RN]    Pain -  RN: Assess pain Q shift and prn..Meds per MD order..Call bell and items in reach  [RN]    Signed by: Nurse George

## 2022-02-22 NOTE — NURSING NOTE
At midnight safety check, I found patient out of the bed standing next to the window.  His tube feeding was still attached per J tube.  Patient unsure of where he was.  Assisted patient back into bed.  Applied bed alarm as there are no empty beds closer to the nursing station.  Patient may need to be moved closer to nurses station at first available bed opening.

## 2022-02-22 NOTE — SIGNIFICANT NOTE
02/22/22 1620   Plan   Plan Team conference held today.  Spoke to pt, spouse, daughter Lupe and son-in-law Rosita about plans for discharge on 2-25-22 if medically stable, recommendation for home health PT, OT, nursing for j-tube feeding, mart drain care, g-tube emptying, hospital bed, tube feeding for home which is continuous, how pt is doing in therapy and family receiving teaching on 2-23-22 from nursing for tube feeding, mart drain, and emptying g-tube.  Spouse, daughter and son-in-law will receive education on 2-23-22 around 3:00 pm-4:00 pm from nurse.  [facility] preferred.  Pt will return home with spouse at discharge.  Spouse, daughter and son-in-law will assist with pt's care at home.   Patient/Family in Agreement with Plan yes

## 2022-02-22 NOTE — THERAPY TREATMENT NOTE
Inpatient Rehabilitation - Occupational Therapy Treatment Note     [place]     Patient Name: Sandro Argueta  : 1945  MRN: [id number]    Today's Date: 2022                 Admit Date: 2/10/2022       No diagnosis found.    Patient Active Problem List   Diagnosis   • Left elbow pain   • Hypertension   • DM2 (diabetes mellitus, type 2) (HCC)   • Elevated prostate specific antigen (PSA)   • Epigastric pain   • Epigastric abdominal pain   • Elevated liver enzymes   • Weight loss, abnormal   • Acute hepatitis   • Anemia   • Obstructive jaundice   • Pneumothorax, left   • Cytokine release syndrome, grade 1   • Esophageal rupture       Past Medical History:   Diagnosis Date   • Acute UTI (urinary tract infection) 2021   • Arthritis    • Borderline diabetes    • Coronary artery disease    • Diabetes mellitus (HCC)    • Elevated prostate specific antigen (PSA) 2020   • GERD (gastroesophageal reflux disease)    • Hypertension    • Left elbow pain        Past Surgical History:   Procedure Laterality Date   • COLONOSCOPY     • ENDOSCOPY N/A 2021    Procedure: ESOPHAGOGASTRODUODENOSCOPY WITH BIOPSY dilitation;  Surgeon: Bela Mcdaniel MD;  Location: [facility];  Service: Gastroenterology;  Laterality: N/A;   • ERCP N/A 2021    Procedure: ENDOSCOPIC RETROGRADE CHOLANGIOPANCREATOGRAPHY WITH STENT PLACEMENT;  Surgeon: Matthew Moreira MD;  Location: [facility] ENDOSCOPY;  Service: Gastroenterology;  Laterality: N/A;  with sphincterotomy, and brushing of common bile duct, and placement of [id number] wall stent     • PACEMAKER IMPLANTATION               IRF OT ASSESSMENT FLOWSHEET (last 12 hours)     IRF OT Evaluation and Treatment     Row Name 22 1444          OT Time and Intention    Document Type daily treatment  -     Mode of Treatment occupational therapy  -CJ     Row Name 22 1444          General Information    Patient/Family/Caregiver Comments/Observations  agreeable to therapy  -     Existing Precautions/Restrictions fall  Kamari drain, G and J tubes, decreased skin integrity  -     Row Name 02/22/22 1444          Transfers    Bed-Chair [place] (Transfers) contact guard; verbal cues  -     Chair-Bed [place] (Transfers) contact guard; verbal cues  -     Assistive Device (Bed-Chair Transfers) wheelchair  -     Row Name 02/22/22 1444          Chair-Bed Transfer    Assistive Device (Chair-Bed Transfers) wheelchair  -     Row Name 02/22/22 1444          Motor Skills    Motor Skills functional endurance  -     Motor Control/Coordination Interventions therapeutic exercise/ROM; gross motor coordination activities  BUE ther ex/act, AROM, bilat coord ex, GMC, hand exerciser  -     Therapeutic Exercise --  dowel wrist rolls X 2  -     Row Name 02/22/22 1444          Grooming    [place] Level (Grooming) set up  -     Row Name 02/22/22 1444          Positioning and Restraints    Post Treatment Position bed  -     In Bed call light within reach; encouraged to call for assist; exit alarm on; heels elevated; notified nsg  both sessions  -           User Key  (r) = Recorded By, (t) = Taken By, (c) = Cosigned By    Initials Name Effective Dates     Elena Pisano, OT 06/16/21 -                  Occupational Therapy Education                 Title: PT OT SLP Therapies (Done)     Topic: Occupational Therapy (Done)     Point: ADL training (Done)     Description:   Instruct learner(s) on proper safety adaptation and remediation techniques during self care or transfers.   Instruct in proper use of assistive devices.              Learning Progress Summary           Patient Acceptance, E,D, VU,NR by  at 2/22/2022 1448      Show all documentation for this point (10)                 Point: Precautions (Done)     Description:   Instruct learner(s) on prescribed precautions during self-care and functional transfers.              Learning Progress  Summary           Patient Acceptance, E,D, VU,NR by  at 2/22/2022 1448      Show all documentation for this point (10)                             User Key     Initials Effective Dates Name Provider Type [facility] 06/16/21 -  Elena Pisano OT Occupational Therapist OT                    OT Recommendation and Plan    Planned Therapy Interventions (OT): activity tolerance training, adaptive equipment training, BADL retraining, ROM/therapeutic exercise, strengthening exercise, transfer/mobility retraining, patient/caregiver education/training                    Time Calculation:      Time Calculation- OT     Row Name 02/22/22 1449 02/22/22 1448          Time Calculation- OT    OT Start Time 1230  - 0800  -     OT Stop Time 1245  - 0915  -     OT Time Calculation (min) 15 min  - 75 min  -     Total Timed Code Minutes- OT 15 minute(s)  - 75 minute(s)  -           User Key  (r) = Recorded By, (t) = Taken By, (c) = Cosigned By    Initials Name Provider Type     Elena Pisano OT Occupational Therapist              Therapy Charges for Today     Code Description Service Date Service Provider Modifiers Qty    13268076816 HC OT SELF CARE/MGMT/TRAIN EA 15 MIN 2/21/2022 Elena Pisano OT GO 2    54872169769 HC OT THER PROC EA 15 MIN 2/21/2022 Elena Pisano OT GO 2    77419570750 HC OT THERAPEUTIC ACT EA 15 MIN 2/21/2022 Elena Pisano OT GO 2    28220062096 HC OT SELF CARE/MGMT/TRAIN EA 15 MIN 2/22/2022 Elena Pisano OT GO 1    51838860649 HC OT THERAPEUTIC ACT EA 15 MIN 2/22/2022 Elena Pisano OT GO 3    30707275122 HC OT THER PROC EA 15 MIN 2/22/2022 Elena Pisano OT GO 2                   Elena Pisano OT  2/22/2022

## 2022-02-23 LAB
ANION GAP SERPL CALCULATED.3IONS-SCNC: 9.3 MMOL/L (ref 5–15)
ANION GAP SERPL CALCULATED.3IONS-SCNC: 9.6 MMOL/L (ref 5–15)
BASOPHILS # BLD AUTO: 0.06 10*3/MM3 (ref 0–0.2)
BASOPHILS NFR BLD AUTO: 0.6 % (ref 0–1.5)
BUN SERPL-MCNC: 14 MG/DL (ref 8–23)
BUN SERPL-MCNC: 15 MG/DL (ref 8–23)
BUN/CREAT SERPL: 22.1 (ref 7–25)
BUN/CREAT SERPL: 22.2 (ref 7–25)
CALCIUM SPEC-SCNC: 8.4 MG/DL (ref 8.6–10.5)
CALCIUM SPEC-SCNC: 8.6 MG/DL (ref 8.6–10.5)
CHLORIDE SERPL-SCNC: 89 MMOL/L (ref 98–107)
CHLORIDE SERPL-SCNC: 93 MMOL/L (ref 98–107)
CO2 SERPL-SCNC: 25.7 MMOL/L (ref 22–29)
CO2 SERPL-SCNC: 27.4 MMOL/L (ref 22–29)
CREAT SERPL-MCNC: 0.63 MG/DL (ref 0.76–1.27)
CREAT SERPL-MCNC: 0.68 MG/DL (ref 0.76–1.27)
DEPRECATED RDW RBC AUTO: 41.6 FL (ref 37–54)
EOSINOPHIL # BLD AUTO: 0.33 10*3/MM3 (ref 0–0.4)
EOSINOPHIL NFR BLD AUTO: 3.2 % (ref 0.3–6.2)
ERYTHROCYTE [DISTWIDTH] IN BLOOD BY AUTOMATED COUNT: 13.2 % (ref 12.3–15.4)
GFR SERPL CREATININE-BSD FRML MDRD: 114 ML/MIN/1.73
GFR SERPL CREATININE-BSD FRML MDRD: 124 ML/MIN/1.73
GLUCOSE BLDC GLUCOMTR-MCNC: 222 MG/DL (ref 70–130)
GLUCOSE BLDC GLUCOMTR-MCNC: 356 MG/DL (ref 70–130)
GLUCOSE SERPL-MCNC: 181 MG/DL (ref 65–99)
GLUCOSE SERPL-MCNC: 325 MG/DL (ref 65–99)
HCT VFR BLD AUTO: 33.2 % (ref 37.5–51)
HGB BLD-MCNC: 10.2 G/DL (ref 13–17.7)
IMM GRANULOCYTES # BLD AUTO: 0.05 10*3/MM3 (ref 0–0.05)
IMM GRANULOCYTES NFR BLD AUTO: 0.5 % (ref 0–0.5)
LYMPHOCYTES # BLD AUTO: 1.87 10*3/MM3 (ref 0.7–3.1)
LYMPHOCYTES NFR BLD AUTO: 18.1 % (ref 19.6–45.3)
MCH RBC QN AUTO: 26.7 PG (ref 26.6–33)
MCHC RBC AUTO-ENTMCNC: 30.7 G/DL (ref 31.5–35.7)
MCV RBC AUTO: 86.9 FL (ref 79–97)
MONOCYTES # BLD AUTO: 1.24 10*3/MM3 (ref 0.1–0.9)
MONOCYTES NFR BLD AUTO: 12 % (ref 5–12)
NEUTROPHILS NFR BLD AUTO: 6.79 10*3/MM3 (ref 1.7–7)
NEUTROPHILS NFR BLD AUTO: 65.6 % (ref 42.7–76)
NRBC BLD AUTO-RTO: 0 /100 WBC (ref 0–0.2)
PLATELET # BLD AUTO: 345 10*3/MM3 (ref 140–450)
PMV BLD AUTO: 10.4 FL (ref 6–12)
POTASSIUM SERPL-SCNC: 4.5 MMOL/L (ref 3.5–5.2)
POTASSIUM SERPL-SCNC: 4.7 MMOL/L (ref 3.5–5.2)
RBC # BLD AUTO: 3.82 10*6/MM3 (ref 4.14–5.8)
SODIUM SERPL-SCNC: 124 MMOL/L (ref 136–145)
SODIUM SERPL-SCNC: 130 MMOL/L (ref 136–145)
WBC NRBC COR # BLD: 10.34 10*3/MM3 (ref 3.4–10.8)

## 2022-02-23 PROCEDURE — 80048 BASIC METABOLIC PNL TOTAL CA: CPT | Performed by: FAMILY MEDICINE

## 2022-02-23 PROCEDURE — 97110 THERAPEUTIC EXERCISES: CPT

## 2022-02-23 PROCEDURE — 97116 GAIT TRAINING THERAPY: CPT

## 2022-02-23 PROCEDURE — 63710000001 INSULIN ASPART PER 5 UNITS: Performed by: FAMILY MEDICINE

## 2022-02-23 PROCEDURE — 25010000002 ENOXAPARIN PER 10 MG: Performed by: FAMILY MEDICINE

## 2022-02-23 PROCEDURE — 97535 SELF CARE MNGMENT TRAINING: CPT

## 2022-02-23 PROCEDURE — 82962 GLUCOSE BLOOD TEST: CPT

## 2022-02-23 PROCEDURE — 85025 COMPLETE CBC W/AUTO DIFF WBC: CPT | Performed by: FAMILY MEDICINE

## 2022-02-23 PROCEDURE — 97530 THERAPEUTIC ACTIVITIES: CPT

## 2022-02-23 PROCEDURE — 99231 SBSQ HOSP IP/OBS SF/LOW 25: CPT | Performed by: FAMILY MEDICINE

## 2022-02-23 PROCEDURE — 80048 BASIC METABOLIC PNL TOTAL CA: CPT | Performed by: INTERNAL MEDICINE

## 2022-02-23 RX ADMIN — INSULIN ASPART 12 UNITS: 100 INJECTION, SOLUTION INTRAVENOUS; SUBCUTANEOUS at 12:17

## 2022-02-23 RX ADMIN — INSULIN ASPART 12 UNITS: 100 INJECTION, SOLUTION INTRAVENOUS; SUBCUTANEOUS at 08:53

## 2022-02-23 RX ADMIN — ACETAMINOPHEN 650 MG: 160 SOLUTION ORAL at 16:18

## 2022-02-23 RX ADMIN — INSULIN ASPART 5 UNITS: 100 INJECTION, SOLUTION INTRAVENOUS; SUBCUTANEOUS at 17:15

## 2022-02-23 RX ADMIN — BETAMETHASONE DIPROPIONATE: 0.5 CREAM TOPICAL at 12:17

## 2022-02-23 RX ADMIN — METOPROLOL TARTRATE 25 MG: 25 TABLET ORAL at 20:41

## 2022-02-23 RX ADMIN — BETAMETHASONE DIPROPIONATE 1 APPLICATION: 0.5 CREAM TOPICAL at 20:41

## 2022-02-23 RX ADMIN — METOPROLOL TARTRATE 25 MG: 25 TABLET ORAL at 08:52

## 2022-02-23 RX ADMIN — OXYCODONE 5 MG: 5 TABLET ORAL at 20:41

## 2022-02-23 RX ADMIN — PANCRELIPASE 12000 UNITS OF LIPASE: 60000; 12000; 38000 CAPSULE, DELAYED RELEASE PELLETS ORAL at 17:15

## 2022-02-23 RX ADMIN — PANTOPRAZOLE SODIUM 40 MG: 40 INJECTION, POWDER, FOR SOLUTION INTRAVENOUS at 05:09

## 2022-02-23 RX ADMIN — PANCRELIPASE 12000 UNITS OF LIPASE: 60000; 12000; 38000 CAPSULE, DELAYED RELEASE PELLETS ORAL at 12:17

## 2022-02-23 RX ADMIN — ENOXAPARIN SODIUM 40 MG: 40 INJECTION SUBCUTANEOUS at 17:14

## 2022-02-23 NOTE — NURSING NOTE
Provided education with wife, daughter and son in law on gtube care and emptying as well as mart drain emptying and care. j tube education feeding tube flushing and medication administration and feeding tube feeding starting and stopping. All verbalized understanding and requested printed instructions at discharge.

## 2022-02-23 NOTE — THERAPY TREATMENT NOTE
Inpatient Rehabilitation - Occupational Therapy Treatment Note    [facility]     Patient Name: Sandro Argueta  : 1945  MRN: [id number]    Today's Date: 2022                 Admit Date: 2/10/2022       No diagnosis found.    Patient Active Problem List   Diagnosis   • Left elbow pain   • Hypertension   • DM2 (diabetes mellitus, type 2) (HCC)   • Elevated prostate specific antigen (PSA)   • Epigastric pain   • Epigastric abdominal pain   • Elevated liver enzymes   • Weight loss, abnormal   • Acute hepatitis   • Anemia   • Obstructive jaundice   • Pneumothorax, left   • Cytokine release syndrome, grade 1   • Esophageal rupture       Past Medical History:   Diagnosis Date   • Acute UTI (urinary tract infection) 2021   • Arthritis    • Borderline diabetes    • Coronary artery disease    • Diabetes mellitus (HCC)    • Elevated prostate specific antigen (PSA) 2020   • GERD (gastroesophageal reflux disease)    • Hypertension    • Left elbow pain        Past Surgical History:   Procedure Laterality Date   • COLONOSCOPY     • ENDOSCOPY N/A 2021    Procedure: ESOPHAGOGASTRODUODENOSCOPY WITH BIOPSY dilitation;  Surgeon: Bela Mcdaniel MD;  Location: [facility];  Service: Gastroenterology;  Laterality: N/A;   • ERCP N/A 2021    Procedure: ENDOSCOPIC RETROGRADE CHOLANGIOPANCREATOGRAPHY WITH STENT PLACEMENT;  Surgeon: Matthew Moreira MD;  Location: [facility] ENDOSCOPY;  Service: Gastroenterology;  Laterality: N/A;  with sphincterotomy, and brushing of common bile duct, and placement of [id number] wall stent     • PACEMAKER IMPLANTATION               IRF OT ASSESSMENT FLOWSHEET (last 12 hours)     IRF OT Evaluation and Treatment     Row Name 22 1437          OT Time and Intention    Document Type daily treatment  -CJ     Mode of Treatment occupational therapy  -CJ     Symptoms Noted During/After Treatment none  -CJ     Row Name 22 3823          General Information     Patient/Family/Caregiver Comments/Observations agreeable to therapy  -     Existing Precautions/Restrictions fall  Kamari drain, G and J tubes, decreased skin integrity  -     Row Name 02/23/22 1437          Transfers    Bed-Chair [place] (Transfers) contact guard; verbal cues  -     Assistive Device (Bed-Chair Transfers) wheelchair  -     Row Name 02/23/22 1437          Motor Skills    Motor Skills functional endurance  -     Motor Control/Coordination Interventions therapeutic exercise/ROM; gross motor coordination activities  BUE ther ex/act, AROM, bilat coord ex, GMC  -     Therapeutic Exercise --  dowel ex- 15 reps X 2, 0 lbs  -     Row Name 02/23/22 1437          Lower Body Dressing    [place] Level (Lower Body Dressing) minimum assist (75% patient effort); verbal cues  -     Row Name 02/23/22 1437          Grooming    [place] Level (Grooming) set up  -     Row Name 02/23/22 1437          Toileting    [place] Level (Toileting) minimum assist (75% patient effort); contact guard assist; verbal cues  -     Assistive Device Use (Toileting) urinal  -     Row Name 02/23/22 1437          Positioning and Restraints    Post Treatment Position bed  -     In Bed call light within reach; encouraged to call for assist; exit alarm on; heels elevated; notified nsg  pm tx;  seen at bedside in pm  -     In Wheelchair with PT  am tx  -           User Key  (r) = Recorded By, (t) = Taken By, (c) = Cosigned By    Initials Name Effective Dates     Elena Pisano OT 06/16/21 -                  Occupational Therapy Education                 Title: PT OT SLP Therapies (Done)     Topic: Occupational Therapy (Done)     Point: ADL training (Done)     Description:   Instruct learner(s) on proper safety adaptation and remediation techniques during self care or transfers.   Instruct in proper use of assistive devices.              Learning Progress Summary           Patient Acceptance,  E,D, VU,NR by  at 2/23/2022 1442      Show all documentation for this point (11)                 Point: Precautions (Done)     Description:   Instruct learner(s) on prescribed precautions during self-care and functional transfers.              Learning Progress Summary           Patient Acceptance, E,D, VU,NR by  at 2/23/2022 1442      Show all documentation for this point (11)                             User Key     Initials Effective Dates Name Provider Type Discipline     06/16/21 -  Elena Pisano OT Occupational Therapist OT                    OT Recommendation and Plan    Planned Therapy Interventions (OT): activity tolerance training, adaptive equipment training, BADL retraining, ROM/therapeutic exercise, strengthening exercise, transfer/mobility retraining, patient/caregiver education/training                    Time Calculation:      Time Calculation- OT     Row Name 02/23/22 1442 02/23/22 1441          Time Calculation- OT    OT Start Time 1320  - 0805  -     OT Stop Time 1340  - 0915  -     OT Time Calculation (min) 20 min  - 70 min  -     Total Timed Code Minutes- OT 20 minute(s)  - 70 minute(s)  -           User Key  (r) = Recorded By, (t) = Taken By, (c) = Cosigned By    Initials Name Provider Type     Elena Pisano OT Occupational Therapist              Therapy Charges for Today     Code Description Service Date Service Provider Modifiers Qty    29380699463 HC OT SELF CARE/MGMT/TRAIN EA 15 MIN 2/22/2022 Elena Pisano OT GO 1    33346788538 HC OT THERAPEUTIC ACT EA 15 MIN 2/22/2022 Elena Pisano OT GO 3    96635395464 HC OT THER PROC EA 15 MIN 2/22/2022 Elena Pisano OT GO 2    47879375840 HC OT SELF CARE/MGMT/TRAIN EA 15 MIN 2/23/2022 Elena Pisano OT GO 2    50724112784 HC OT THER PROC EA 15 MIN 2/23/2022 Elena Pisano OT GO 2    89747614732 HC OT THERAPEUTIC ACT EA 15 MIN 2/23/2022 Elena Pisano OT GO 2                   Elena Pisano  OT  2/23/2022

## 2022-02-23 NOTE — SIGNIFICANT NOTE
02/23/22 1500   Plan   Plan Informed daughter and spouse about [facility] to deliver tube feeding, pump, supplies and hospital bed to pt's home on 2-24-22.

## 2022-02-23 NOTE — PROGRESS NOTES
[facility]  PROGRESS NOTE     Patient Identification:  Name:  Sandro Argueta  Age:  76 y.o.  Sex:  male  :  1945  MRN:  [id number]  Visit Number:  [id number]  ROOM: [facility]     Primary Care Provider:  Misti Nguyễn MD    Length of stay in inpatient status:  13    Subjective     Chief Compliant:  No chief complaint on file.      History of Presenting Illness: 76-year-old gentleman with history of hypertension, CAD, diabetes mellitus and GERD.  Patient is status post esophageal perforation with leakage of GI contents in the thoracic cavity.  Patient did require multiple chest tubes, repair of esophageal perforation and eventual esophageal stent placement.  Patient states he is doing reasonably well at this time has no new complaints.  I discussed case with his thoracic surgeon at the [facility] today and she felt that since things were going reasonably well we will leave chest tube in place and she will follow up with him next week.    Objective     Current Hospital Meds:betamethasone augmented + lubriderm, , Topical, Q12H  enoxaparin, 40 mg, Subcutaneous, Q24H  insulin aspart, 0-14 Units, Subcutaneous, TID AC  metoprolol tartrate, 25 mg, Per J Tube, Q12H  pancrelipase (Lip-Prot-Amyl), 12,000 units of lipase, Per J Tube, TID With Meals  pantoprazole, 40 mg, Intravenous, Q AM       ----------------------------------------------------------------------------------------------------------------------  Vital Signs:  Temp:  [98.5 °F (36.9 °C)-98.7 °F (37.1 °C)] 98.7 °F (37.1 °C)  Heart Rate:  [75-90] 90  Resp:  [20] 20  BP: (121-134)/(66-68) 121/68  SpO2:  [93 %-94 %] 93 %  on   ;   Device (Oxygen Therapy): room air  Body mass index is 17.84 kg/m².    Wt Readings from Last 3 Encounters:   22 64.3 kg (141 lb 12.8 oz)   22 75.9 kg (167 lb 4.8 oz)   21 79.4 kg (175 lb)     Intake & Output (last 3 days)        0701   0700  0701   07  0701 02/23 0700 02/23 0701 02/24 0700    P.O. 250 770 960 240    Other  606 316     NG/GT 0300 831 7905     IV Piggyback 1000       Total Intake(mL/kg) 2440 (36.6) 2263 (35.2) 2710 (42.1) 240 (3.7)    Urine (mL/kg/hr) 1175 (0.7) 975 (0.6) 0 (0)     Emesis/NG output 2 90 77     Drains 125 100 115     Stool 0       Total Output 1302 1165 192     Net +1138 +1098 +2518 +240            Urine Unmeasured Occurrence 1 x 3 x 8 x 2 x    Stool Unmeasured Occurrence 1 x           No diet orders on file  ----------------------------------------------------------------------------------------------------------------------  Physical exam:  Constitutional:   No acute distress  HEENT: Normocephalic atraumatic  Neck: Supple   Cardiovascular: Regular rate and rhythm  Pulmonary/Chest: Clear to auscultation Kamari drain in place  Abdominal: Positive bowel sounds soft.  J-tube in place  Musculoskeletal: No arthropathy  Neurological: No focal deficits  Skin: No rash  Peripheral vascular:  Genitourinary:  ----------------------------------------------------------------------------------------------------------------------    Last echocardiogram:  Results for orders placed during the hospital encounter of 07/29/20    Adult Transthoracic Echo Complete W/ Cont if Necessary Per Protocol    Interpretation Summary  · Left ventricular wall thickness is consistent with mild concentric hypertrophy.  · Left ventricular systolic function is normal.  · Estimated EF appears to be in the range of 61 - 65%.  · Left ventricular diastolic dysfunction (grade I) consistent with impaired relaxation.  · Normal right ventricular cavity size and systolic function noted. Electronic lead present in the ventricle.  · Normal cardiac chambers dimensions.  · Mild aortic valve regurgitation is present.  · Mild mitral valve regurgitation is present  · Mild tricuspid valve regurgitation is present. No evidence of pulmonary hypertension is present.  · There is no  evidence of pericardial effusion.    ----------------------------------------------------------------------------------------------------------------------  Results from last 7 days   Lab Units 02/23/22  0115 02/22/22  1033 02/21/22  0012   CRP mg/dL  --  4.17*  --    WBC 10*3/mm3 10.34 10.84* 10.20   HEMOGLOBIN g/dL 10.2* 10.6* 10.0*   HEMATOCRIT % 33.2* 34.8* 33.1*   MCV fL 86.9 87.0 88.5   MCHC g/dL 30.7* 30.5* 30.2*   PLATELETS 10*3/mm3 345 376 391         Results from last 7 days   Lab Units 02/23/22  0115 02/22/22  1630 02/22/22  1033 02/21/22  0012 02/21/22  0012 02/20/22  0001 02/19/22  0110   SODIUM mmol/L 124* 124* 124*   < > 128*   < > 127*   POTASSIUM mmol/L 4.7  --  4.8  --  4.6   < > 5.1   CHLORIDE mmol/L 89*  --  89*  --  93*   < > 93*   CO2 mmol/L 25.7  --  25.5  --  24.4   < > 24.1   BUN mg/dL 14  --  13  --  12   < > 15   CREATININE mg/dL 0.63*  --  0.69*  --  0.62*   < > 0.67*   EGFR IF NONAFRICN AM mL/min/1.73 124  --  112  --  126   < > 116   CALCIUM mg/dL 8.4*  --  8.6  --  8.1*   < > 8.2*   GLUCOSE mg/dL 325*  --  333*  --  312*   < > 320*   ALBUMIN g/dL  --   --   --   --   --   --  2.45*   BILIRUBIN mg/dL  --   --   --   --   --   --  0.3   ALK PHOS U/L  --   --   --   --   --   --  110   AST (SGOT) U/L  --   --   --   --   --   --  49*   ALT (SGPT) U/L  --   --   --   --   --   --  70*    < > = values in this interval not displayed.   Estimated Creatinine Clearance: 72.6 mL/min (A) (by C-G formula based on SCr of 0.63 mg/dL (L)).  No results found for: AMMONIA              Glucose   Date/Time Value Ref Range Status   02/23/2022 0558 356 (H) 70 - 130 mg/dL Final     Comment:     Meter: [id number] : [id number] Herminia Nicole   02/22/2022 1615 282 (H) 70 - 130 mg/dL Final     Comment:     Meter: [id number] : [id number] annalisa edmonds   02/22/2022 1131 351 (H) 70 - 130 mg/dL Final     Comment:     Meter: [id number] : [id number] Dominick Galarza   02/22/2022 0611 324 (H) 70 - 130 mg/dL  Final     Comment:     Meter: [id number] : [id number] Kai WARREN   02/21/2022 1608 306 (H) 70 - 130 mg/dL Final     Comment:     Meter: [id number] : [id number] Dominick Michell   02/21/2022 1139 188 (H) 70 - 130 mg/dL Final     Comment:     Meter: [id number] : [id number] Dominick Michell   02/21/2022 0550 321 (H) 70 - 130 mg/dL Final     Comment:     Meter: [id number] : [id number] LORIE JOSE   02/20/2022 1624 158 (H) 70 - 130 mg/dL Final     Comment:     Meter: [id number] : [id number] monroberto pettyica     Lab Results   Component Value Date    TSH 1.620 06/27/2021     No results found for: PREGTESTUR, PREGSERUM, HCG, HCGQUANT  Pain Management Panel    There is no flowsheet data to display.       Brief Urine Lab Results  (Last result in the past 365 days)      Color   Clarity   Blood   Leuk Est   Nitrite   Protein   CREAT   Urine HCG        02/22/22 1026 Yellow   Clear   Negative   Negative   Negative   Trace               No results found for: BLOODCX  Results from last 7 days   Lab Units 02/22/22  1026   NITRITE UA  Negative     No results found for: URINECX  No results found for: WOUNDCX  No results found for: STOOLCX  Results from last 7 days   Lab Units 02/22/22  1033   CRP mg/dL 4.17*       I have personally looked at the labs and they are summarized above.  ----------------------------------------------------------------------------------------------------------------------  Detailed radiology reports for the last 24 hours:    Imaging Results (Last 24 Hours)     Procedure Component Value Units Date/Time    CT Chest Without Contrast Diagnostic [[id number]] Collected: 02/22/22 1406     Updated: 02/22/22 1601    Narrative:      CT CHEST WITHOUT CONTRAST DIAGNOSTIC      CLINICAL INDICATION: Followup of complicated loculated effusion//history  of esophageal perforation.       COMPARISON: 02/16/2022     TECHNIQUE: Multiple axial CT images were obtained from lung apex through  upper abdomen without  the administration of IV contrast. Reformatted  images in the coronal and/or sagittal plane(s) were generated from the  axial data set to facilitate diagnostic accuracy and/or surgical  planning.     Radiation dose reduction techniques were utilized per ALARA protocol.  Automated exposure control was initiated through either or Tagrule or  DoseRight software packages by  protocol.    DOSE (DLP mGy-cm): 431.44 mGy.cm        FINDINGS:     LUNGS: Unremarkable. No parenchymal soft tissue nodules.  No focal air  space disease.     HEART: Coronary artery calcifications are noted.     MEDIASTINUM: No masses. No enlarged lymph nodes.  No fluid collections.     PLEURA: Left-sided loculated collection similar to the previous exam.  Small bibasilar pleural effusions, right greater than left.      VASCULATURE: No evidence of aneurysm.     BONES: No acute bony abnormality.     VISUALIZED UPPER ABDOMEN:The upper abdomen is unremarkable as  visualized.     Other: None.       Impression:      Loculated effusion in the left lung and small bilateral  pleural effusions, right greater than left.           This report was finalized on 2/22/2022 3:59 PM by Dr. Antoine Guevara MD.           Final impressions for the last 30 days of radiology reports:    CT Chest Without Contrast Diagnostic    Result Date: 2/22/2022  Loculated effusion in the left lung and small bilateral pleural effusions, right greater than left.    This report was finalized on 2/22/2022 3:59 PM by Dr. Antoine Guevara MD.      CT Chest Without Contrast Diagnostic    Addendum Date: 2/16/2022    Compared with previous study this represents a fairly moderate increase in size of the empyema.  This report was finalized on 2/16/2022 1:01 PM by Dr. Jarek Flores MD.      Result Date: 2/16/2022  1.  Bilobed loculated effusion of the left lower pleural space measuring about 10 cm with internal air, as well as the left upper lobe pleural space measuring about 4.4 cm and  in total length about 16 cm. 2.  Small right pleural effusion.  This report was finalized on 2/16/2022 12:49 PM by Dr. Jarek Flores MD.      XR Chest 1 View    Result Date: 2/18/2022  1.  Persistent and stable appearance of left pleural collections. 2.  Probably small right pleural effusion. 3.  Grossly stable appearance of left basilar airspace opacity.  This report was finalized on 2/18/2022 9:29 AM by Dr. Jarek Flores MD.      XR Chest PA & Lateral    Result Date: 2/21/2022  Small left pleural effusion, similar to the prior study Trace right pleural effusion also similar to the previous exam  This report was finalized on 2/21/2022 8:18 AM by Dr. Antoine Guevara MD.      XR Chest PA & Lateral    Result Date: 2/16/2022    Loculated pleural collection likely measuring 3.6 and 4.9 cm of the left hemithorax. Some locules of air noted within the collection concerning for empyema.  This report was finalized on 2/16/2022 10:54 AM by Dr. Jarek Flores MD.      I have personally looked at the radiology images and read the final radiology report.    Assessment & Plan    Debility after recent esophageal perforation with Kamari drain in place and recent esophageal stent placement after repair.  Patient did have CT scan of the chest yesterday.  Patient still has a fusion in place.  I was able to send images to his thoracic surgeon at [facility] and she reviewed the case today and felt that he is actually improving some radiologically.  Patient otherwise clinically seems to be doing reasonably well.  She states that she would leave tubes in place at this time and follow-up with him next week.  Patient requiring contact-guard for help with bed mobility; contact-guard for transfers; ambulated 180 feet yesterday with front wheel walker and contact-guard.  Continues to work on motor skills to help improve ADLs.    History of Whipple with pancreatic insufficiency--we will restart Creon at this  time    Hyponatremia--nephrology is evaluating patient.  Will  closely.    Diabetes mellitus suboptimal control.  I did adjust insulin protocol yesterday.  Will monitor for now    Hypertension controlled    CAD stable    Nutrition continue J-tube feeds.    VTE Prophylaxis:   Mechanical Order History:     None      Pharmalogical Order History:      Ordered     Dose Route Frequency Stop    02/10/22 8146  enoxaparin (LOVENOX) syringe 40 mg         40 mg SC Every 24 Hours --                    Elder Alexis MD  [facility] Hospitalist  02/23/22  11:44 EST

## 2022-02-23 NOTE — SIGNIFICANT NOTE
02/23/22 4212   Plan   Plan Contacted [employer] [phone] per Scarlett per family request about getting ready to hang tube feeding bags.  [place] is unable to get ready to hang bags from their tube feeding supplier [facility].  Informed son-in-law Rosiat.  Will follow.

## 2022-02-23 NOTE — THERAPY TREATMENT NOTE
Inpatient Rehabilitation - Physical Therapy Treatment Note       [facility]     Patient Name: Sandro Argueta  : 1945  MRN: [id number]    Today's Date: 2022                    Admit Date: 2/10/2022      Visit Dx:   No diagnosis found.    Patient Active Problem List   Diagnosis   • Left elbow pain   • Hypertension   • DM2 (diabetes mellitus, type 2) (HCC)   • Elevated prostate specific antigen (PSA)   • Epigastric pain   • Epigastric abdominal pain   • Elevated liver enzymes   • Weight loss, abnormal   • Acute hepatitis   • Anemia   • Obstructive jaundice   • Pneumothorax, left   • Cytokine release syndrome, grade 1   • Esophageal rupture       Past Medical History:   Diagnosis Date   • Acute UTI (urinary tract infection) 2021   • Arthritis    • Borderline diabetes    • Coronary artery disease    • Diabetes mellitus (HCC)    • Elevated prostate specific antigen (PSA) 2020   • GERD (gastroesophageal reflux disease)    • Hypertension    • Left elbow pain        Past Surgical History:   Procedure Laterality Date   • COLONOSCOPY     • ENDOSCOPY N/A 2021    Procedure: ESOPHAGOGASTRODUODENOSCOPY WITH BIOPSY dilitation;  Surgeon: Bela Mcdaniel MD;  Location: [facility];  Service: Gastroenterology;  Laterality: N/A;   • ERCP N/A 2021    Procedure: ENDOSCOPIC RETROGRADE CHOLANGIOPANCREATOGRAPHY WITH STENT PLACEMENT;  Surgeon: Matthew Moreira MD;  Location: [facility] ENDOSCOPY;  Service: Gastroenterology;  Laterality: N/A;  with sphincterotomy, and brushing of common bile duct, and placement of [id number] wall stent     • PACEMAKER IMPLANTATION         PT ASSESSMENT (last 12 hours)     IRF PT Evaluation and Treatment     Row Name 22 1453          PT Time and Intention    Document Type daily treatment  -HR     Mode of Treatment physical therapy; individual therapy  -HR     Patient/Family/Caregiver Comments/Observations Pt and nursing agreeable to PT  -HR     Row Name  02/23/22 1453 02/23/22 1437       General Information    Patient Profile Reviewed yes  -HR yes  -HR    Existing Precautions/Restrictions fall  -HR --    Limitations/Impairments other (see comments)  medical complexity  -HR other (see comments)  medical complexity  -HR    Row Name 02/23/22 1453 02/23/22 1437       Cognition/Psychosocial    Affect/Mental Status (Cognitive) WFL  -HR WFL  -HR    Orientation Status (Cognition) oriented x 4  -HR oriented x 4  -HR    Follows Commands (Cognition) WFL  -HR WFL  -HR    Personal Safety Interventions gait belt; fall prevention program maintained; nonskid shoes/slippers when out of bed; supervised activity  -HR --    Cognitive Function (Cognitive) WFL  -HR WFL  -HR    Row Name 02/23/22 1453 02/23/22 1437       Pain Scale: FACES Pre/Post-Treatment    Pain: FACES Scale, Pretreatment 0-->no hurt  -HR 0-->no hurt  -HR    Posttreatment Pain Rating 0-->no hurt  -HR 0-->no hurt  -HR    Row Name 02/23/22 1453 02/23/22 1437       Mobility    Extremity Weight-bearing Status right lower extremity; left lower extremity  -HR right lower extremity; left lower extremity  -HR    Left Lower Extremity (Weight-bearing Status) full weight-bearing (FWB)  -HR full weight-bearing (FWB)  -HR    Right Lower Extremity (Weight-bearing Status) full weight-bearing (FWB)  -HR full weight-bearing (FWB)  -HR    Row Name 02/23/22 1453 02/23/22 1437       Bed Mobility    Supine-Sit [place] (Bed Mobility) verbal cues; nonverbal cues (demo/gesture); minimum assist (75% patient effort); contact guard  -HR verbal cues; nonverbal cues (demo/gesture); minimum assist (75% patient effort); contact guard  -HR    Supine-Sit-Supine [place] (Bed Mobility) contact guard; standby assist  -HR contact guard; standby assist  -HR    Bed Mobility, Safety Issues decreased use of legs for bridging/pushing; decreased use of arms for pushing/pulling  -HR decreased use of legs for bridging/pushing; decreased use of arms for  pushing/pulling  -HR    Assistive Device (Bed Mobility) bed rails; other (see comments)  manual assistance from therapist  -HR bed rails; other (see comments)  manual assistance from therapist  -HR    Row Name 02/23/22 1453 02/23/22 1437       Transfer Assessment/Treatment    Transfers sit-stand transfer; stand-sit transfer; bed-chair transfer; stand pivot/stand step transfer  -HR sit-stand transfer; stand-sit transfer; bed-chair transfer; stand pivot/stand step transfer  -HR    Row Name 02/23/22 1453 02/23/22 1437       Transfers    Bed-Chair [place] (Transfers) verbal cues; contact guard  -HR --    Chair-Bed [place] (Transfers) contact guard  -HR contact guard  -HR    Assistive Device (Bed-Chair Transfers) wheelchair  -HR --    Sit-Stand [place] (Transfers) standby assist; verbal cues; nonverbal cues (demo/gesture); contact guard  -HR standby assist; verbal cues; nonverbal cues (demo/gesture); contact guard  -HR    Stand-Sit [place] (Transfers) standby assist; verbal cues; nonverbal cues (demo/gesture)  -HR standby assist; verbal cues; nonverbal cues (demo/gesture)  -HR    Row Name 02/23/22 1453 02/23/22 1437       Chair-Bed Transfer    Assistive Device (Chair-Bed Transfers) wheelchair  -HR wheelchair  -HR    Row Name 02/23/22 1453 02/23/22 1437       Sit-Stand Transfer    Assistive Device (Sit-Stand Transfers) walker, front-wheeled  -HR walker, front-wheeled  -HR    Row Name 02/23/22 1453 02/23/22 1437       Stand Pivot/Stand Step Transfer    Stand Pivot/Stand Step [place] (Transfers) contact guard; verbal cues; nonverbal cues (demo/gesture)  -HR contact guard; verbal cues; nonverbal cues (demo/gesture)  -HR    Assistive Device (Stand Pivot Stand Step Transfer) wheelchair  -HR wheelchair  -HR    Row Name 02/23/22 1453 02/23/22 1437       Gait/Stairs (Locomotion)    [place] Level (Gait) contact guard  -HR contact guard  -HR    Assistive Device (Gait) walker, front-wheeled  -HR walker,  front-wheeled  -HR    Pattern (Gait) step-through  -HR step-through  -HR    Deviations/Abnormal Patterns (Gait) gait speed decreased  -HR gait speed decreased  -HR    Bilateral Gait Deviations forward flexed posture  -HR forward flexed posture  -HR    Row Name 02/23/22 1453 02/23/22 1437       Safety Issues, Functional Mobility    Impairments Affecting Function (Mobility) endurance/activity tolerance; strength; balance  -HR endurance/activity tolerance; strength; balance  -HR    Row Name 02/23/22 1453          Balance    Comment, Balance WC: LAQ, marching,GTB HS curls  -HR     Row Name 02/23/22 1453          Motor Skills    Therapeutic Exercise other (see comments)  supine: ball sq, LTR, heel-slide, hip abd, ankle pumps, inversion/eversion  -HR     Row Name 02/23/22 1453          Hip (Therapeutic Exercise)    Hip Strengthening (Therapeutic Exercise) bilateral; flexion; extension; aBduction; aDduction; marching while seated; sitting; supine; resistance band; green  -HR     Row Name 02/23/22 1453          Knee (Therapeutic Exercise)    Knee Strengthening (Therapeutic Exercise) bilateral; flexion; extension; marching while seated; LAQ (long arc quad); sitting; supine; resistance band; green  -HR     Row Name 02/23/22 1453          Ankle (Therapeutic Exercise)    Ankle Strengthening (Therapeutic Exercise) bilateral; dorsiflexion; plantarflexion; inversion; eversion; supine  -HR     Row Name 02/23/22 1453 02/23/22 1437       Bed Mobility Goal 1 (PT-IRF)    Progress/Outcomes (Bed Mobility Goal 1, PT-IRF) goal ongoing  -HR goal ongoing  -HR    Row Name 02/23/22 1453 02/23/22 1437       Transfer Goal 1 (PT-IRF)    Progress/Outcomes (Transfer Goal 1, PT-IRF) goal ongoing  -HR goal ongoing  -HR    Row Name 02/23/22 1453 02/23/22 1437       Gait/Walking Locomotion Goal 1 (PT-IRF)    Progress/Outcomes (Gait/Walking Locomotion Goal 1, PT-IRF) goal ongoing  -HR goal ongoing  -HR    Row Name 02/23/22 1453          Positioning and  Restraints    Pre-Treatment Position --  WC  -HR     Post Treatment Position bed  -HR     In Bed call light within reach; encouraged to call for assist; exit alarm on  -HR     Row Name 02/23/22 1453 02/23/22 1437       Daily Progress Summary (PT)    Daily Progress Summary (PT) Pt is fatiqued on this date and request to return back to room 20' into Rx, but was agreeable to do supine exercises. no adverse effects noted.  -HR --    Impairments Still Limiting Function (PT) strength decreased; impaired balance; impaired functional activity tolerance  -HR strength decreased; impaired balance; impaired functional activity tolerance  -HR    Row Name 02/23/22 1453 02/23/22 1437       Therapy Plan Review/Discharge Plan (PT)    Expected Discharge Disposition (PT Eval) home with caregiver; home with home health care  -HR home with caregiver; home with home health care  -HR          User Key  (r) = Recorded By, (t) = Taken By, (c) = Cosigned By    Initials Name Provider Type    HR Reina Pat PTA Physical Therapy Assistant              Wound 02/10/22 1557 midline abdomen Incision (Active)   Dressing Appearance open to air 02/23/22 1049   Closure [place] 02/23/22 1049   Base red; pink 02/23/22 1049   Drainage Amount none 02/23/22 1049   Care, Wound other (see comments) 02/22/22 1930   Dressing Care open to air 02/23/22 1049       Wound 02/10/22 1602 Left upper back Incision (Active)   Dressing Appearance open to air 02/23/22 1049   Closure Liquid skin adhesive 02/22/22 1930   Base dry; clean 02/23/22 1049   Periwound Temperature warm 02/23/22 1049   Drainage Amount none 02/23/22 1049   Care, Wound other (see comments) 02/22/22 1930   Dressing Care open to air 02/23/22 1049     Physical Therapy Education                 Title: PT OT SLP Therapies (Done)     Topic: Physical Therapy (Done)     Point: Mobility training (Done)     Learning Progress Summary           Patient Acceptance, E, VU by HR at 2/23/2022 1458      Show all  documentation for this point (16)                 Point: Home exercise program (Done)     Learning Progress Summary           Patient Acceptance, E, VU by HR at 2/23/2022 1458      Show all documentation for this point (16)                 Point: Body mechanics (Done)     Learning Progress Summary           Patient Acceptance, E, VU by HR at 2/23/2022 1458      Show all documentation for this point (16)                 Point: Precautions (Done)     Learning Progress Summary           Patient Acceptance, E, VU by HR at 2/23/2022 1458      Show all documentation for this point (16)                             User Key     Initials Effective Dates Name Provider Type Discipline    HR 01/14/22 -  Reina Pat PTA Physical Therapy Assistant PT                PT Recommendation and Plan          Daily Progress Summary (PT)  Daily Progress Summary (PT): Pt is fatiqued on this date and request to return back to room 20' into Rx, but was agreeable to do supine exercises. no adverse effects noted.  Impairments Still Limiting Function (PT): strength decreased, impaired balance, impaired functional activity tolerance               Time Calculation:      PT Charges     Row Name 02/23/22 1458             Time Calculation    Start Time 0915  -HR      Stop Time 1000  -HR      Time Calculation (min) 45 min  -HR      PT Received On 02/23/22  -HR              Time Calculation- PT    Total Timed Code Minutes- PT 45 minute(s)  -HR            User Key  (r) = Recorded By, (t) = Taken By, (c) = Cosigned By    Initials Name Provider Type    HR Reina Pat PTA Physical Therapy Assistant                Therapy Charges for Today     Code Description Service Date Service Provider Modifiers Qty    70778782171 HC PT THERAPEUTIC ACT EA 15 MIN 2/22/2022 Reina Pat PTA GP, CQ 2    69649100886 HC PT THER PROC EA 15 MIN 2/22/2022 Reina Pta PTA GP, CQ 1    87608648474 HC PT THER PROC EA 15 MIN 2/23/2022 Reina Pat PTA GP, CQ 1    [id number]  HC PT THERAPEUTIC ACT EA 15 MIN 2/23/2022 Reina Pat, ZACKARY GP, CQ 2                   Reina Pat PTA  2/23/2022

## 2022-02-23 NOTE — THERAPY TREATMENT NOTE
Inpatient Rehabilitation - Physical Therapy Treatment Note       [facility]     Patient Name: Sandro Argueta  : 1945  MRN: [id number]    Today's Date: 2022                    Admit Date: 2/10/2022      Visit Dx:   No diagnosis found.    Patient Active Problem List   Diagnosis   • Left elbow pain   • Hypertension   • DM2 (diabetes mellitus, type 2) (HCC)   • Elevated prostate specific antigen (PSA)   • Epigastric pain   • Epigastric abdominal pain   • Elevated liver enzymes   • Weight loss, abnormal   • Acute hepatitis   • Anemia   • Obstructive jaundice   • Pneumothorax, left   • Cytokine release syndrome, grade 1   • Esophageal rupture       Past Medical History:   Diagnosis Date   • Acute UTI (urinary tract infection) 2021   • Arthritis    • Borderline diabetes    • Coronary artery disease    • Diabetes mellitus (HCC)    • Elevated prostate specific antigen (PSA) 2020   • GERD (gastroesophageal reflux disease)    • Hypertension    • Left elbow pain        Past Surgical History:   Procedure Laterality Date   • COLONOSCOPY     • ENDOSCOPY N/A 2021    Procedure: ESOPHAGOGASTRODUODENOSCOPY WITH BIOPSY dilitation;  Surgeon: Bela Mcdaniel MD;  Location: [facility];  Service: Gastroenterology;  Laterality: N/A;   • ERCP N/A 2021    Procedure: ENDOSCOPIC RETROGRADE CHOLANGIOPANCREATOGRAPHY WITH STENT PLACEMENT;  Surgeon: Matthew Moreira MD;  [facility];  Service: Gastroenterology;  Laterality: N/A;  with sphincterotomy, and brushing of common bile duct, and placement of [id number] wall stent     • PACEMAKER IMPLANTATION         PT ASSESSMENT (last 12 hours)     IRF PT Evaluation and Treatment     Row Name 22 1453 22 1452       PT Time and Intention    Document Type daily treatment  -HR daily treatment  -RG    Mode of Treatment physical therapy; individual therapy  -HR physical therapy; individual therapy  -RG    Patient/Family/Caregiver  Comments/Observations Pt and nursing agreeable to PT  -HR Pt and nursing in agreement for skilled PT.  -RG    Row Name 02/23/22 1453 02/23/22 1452       General Information    Patient Profile Reviewed yes  -HR yes  -RG    Existing Precautions/Restrictions fall  -HR fall  -RG    Limitations/Impairments other (see comments)  medical complexity  -HR other (see comments)  medical complexity  -RG    Row Name 02/23/22 1437          General Information    Patient Profile Reviewed yes  -HR     Limitations/Impairments other (see comments)  medical complexity  -HR     Row Name 02/23/22 1453 02/23/22 1452       Cognition/Psychosocial    Affect/Mental Status (Cognitive) WFL  -HR WFL  -RG    Orientation Status (Cognition) oriented x 4  -HR oriented x 4  -RG    Follows Commands (Cognition) WFL  -HR WFL  -RG    Personal Safety Interventions gait belt; fall prevention program maintained; nonskid shoes/slippers when out of bed; supervised activity  -HR gait belt; nonskid shoes/slippers when out of bed; fall prevention program maintained  -RG    Cognitive Function (Cognitive) WFL  -HR WFL  -RG    Row Name 02/23/22 1437          Cognition/Psychosocial    Affect/Mental Status (Cognitive) WFL  -HR     Orientation Status (Cognition) oriented x 4  -HR     Follows Commands (Cognition) WFL  -HR     Cognitive Function (Cognitive) WFL  -HR     Row Name 02/23/22 1453 02/23/22 1452       Pain Scale: FACES Pre/Post-Treatment    Pain: FACES Scale, Pretreatment 0-->no hurt  -HR 0-->no hurt  -RG    Posttreatment Pain Rating 0-->no hurt  -HR 0-->no hurt  -RG    Row Name 02/23/22 1437          Pain Scale: FACES Pre/Post-Treatment    Pain: FACES Scale, Pretreatment 0-->no hurt  -HR     Posttreatment Pain Rating 0-->no hurt  -HR     Row Name 02/23/22 1453 02/23/22 1452       Mobility    Extremity Weight-bearing Status right lower extremity; left lower extremity  -HR right lower extremity; left lower extremity  -RG    Left Lower Extremity  (Weight-bearing Status) full weight-bearing (FWB)  -HR full weight-bearing (FWB)  -RG    Right Lower Extremity (Weight-bearing Status) full weight-bearing (FWB)  -HR full weight-bearing (FWB)  -RG    Row Name 02/23/22 1437          Mobility    Extremity Weight-bearing Status right lower extremity; left lower extremity  -HR     Left Lower Extremity (Weight-bearing Status) full weight-bearing (FWB)  -HR     Right Lower Extremity (Weight-bearing Status) full weight-bearing (FWB)  -HR     Row Name 02/23/22 1453 02/23/22 1452       Bed Mobility    Supine-Sit [place] (Bed Mobility) verbal cues; nonverbal cues (demo/gesture); minimum assist (75% patient effort); contact guard  -HR verbal cues; nonverbal cues (demo/gesture); minimum assist (75% patient effort); contact guard  -RG    Supine-Sit-Supine [place] (Bed Mobility) contact guard; standby assist  -HR contact guard; standby assist  -RG    Bed Mobility, Safety Issues decreased use of legs for bridging/pushing; decreased use of arms for pushing/pulling  -HR decreased use of legs for bridging/pushing; decreased use of arms for pushing/pulling  -RG    Assistive Device (Bed Mobility) bed rails; other (see comments)  manual assistance from therapist  -HR bed rails; other (see comments)  manual assistance from therapist  -RG    Row Name 02/23/22 1437          Bed Mobility    Supine-Sit [place] (Bed Mobility) verbal cues; nonverbal cues (demo/gesture); minimum assist (75% patient effort); contact guard  -HR     Supine-Sit-Supine [place] (Bed Mobility) contact guard; standby assist  -HR     Bed Mobility, Safety Issues decreased use of legs for bridging/pushing; decreased use of arms for pushing/pulling  -HR     Assistive Device (Bed Mobility) bed rails; other (see comments)  manual assistance from therapist  -     Row Name 02/23/22 1453 02/23/22 1452       Transfer Assessment/Treatment    Transfers sit-stand transfer; stand-sit transfer; bed-chair  transfer; stand pivot/stand step transfer  -HR sit-stand transfer; stand-sit transfer; bed-chair transfer; stand pivot/stand step transfer  -RG    Row Name 02/23/22 1437          Transfer Assessment/Treatment    Transfers sit-stand transfer; stand-sit transfer; bed-chair transfer; stand pivot/stand step transfer  -HR     Row Name 02/23/22 1453 02/23/22 1452       Transfers    Bed-Chair [place] (Transfers) verbal cues; contact guard  -HR verbal cues; contact guard  -RG    Chair-Bed [place] (Transfers) contact guard  -HR contact guard  -RG    Assistive Device (Bed-Chair Transfers) wheelchair  -HR wheelchair  -RG    Sit-Stand [place] (Transfers) standby assist; verbal cues; nonverbal cues (demo/gesture); contact guard  -HR standby assist; verbal cues; nonverbal cues (demo/gesture); contact guard  -RG    Stand-Sit [place] (Transfers) standby assist; verbal cues; nonverbal cues (demo/gesture)  -HR standby assist; verbal cues; nonverbal cues (demo/gesture)  -RG    Row Name 02/23/22 1437          Transfers    Chair-Bed [place] (Transfers) contact guard  -HR     Sit-Stand [place] (Transfers) standby assist; verbal cues; nonverbal cues (demo/gesture); contact guard  -HR     Stand-Sit [place] (Transfers) standby assist; verbal cues; nonverbal cues (demo/gesture)  -HR     Row Name 02/23/22 1453 02/23/22 1452       Chair-Bed Transfer    Assistive Device (Chair-Bed Transfers) wheelchair  -HR wheelchair  -RG    Row Name 02/23/22 1437          Chair-Bed Transfer    Assistive Device (Chair-Bed Transfers) wheelchair  -HR     Row Name 02/23/22 1453 02/23/22 1452       Sit-Stand Transfer    Assistive Device (Sit-Stand Transfers) walker, front-wheeled  -HR walker, front-wheeled  -RG    Row Name 02/23/22 1437          Sit-Stand Transfer    Assistive Device (Sit-Stand Transfers) walker, front-wheeled  -HR     Row Name 02/23/22 1453 02/23/22 1452       Stand Pivot/Stand Step Transfer    Stand Pivot/Stand  Step [place] (Transfers) contact guard; verbal cues; nonverbal cues (demo/gesture)  -HR contact guard; verbal cues; nonverbal cues (demo/gesture)  -RG    Assistive Device (Stand Pivot Stand Step Transfer) wheelchair  -HR wheelchair  -RG    Row Name 02/23/22 1437          Stand Pivot/Stand Step Transfer    Stand Pivot/Stand Step [place] (Transfers) contact guard; verbal cues; nonverbal cues (demo/gesture)  -HR     Assistive Device (Stand Pivot Stand Step Transfer) wheelchair  -HR     Row Name 02/23/22 1453 02/23/22 1452       Gait/Stairs (Locomotion)    [place] Level (Gait) contact guard  -HR contact guard  -RG    Assistive Device (Gait) walker, front-wheeled  -HR walker, front-wheeled  -RG    Distance in Feet (Gait) -- 160  -RG    Pattern (Gait) step-through  -HR step-through  -RG    Deviations/Abnormal Patterns (Gait) gait speed decreased  -HR gait speed decreased  -RG    Bilateral Gait Deviations forward flexed posture  -HR forward flexed posture  -RG    Comment (Gait/Stairs) -- Pt c/o fatigue  -RG    Row Name 02/23/22 1437          Gait/Stairs (Locomotion)    [place] Level (Gait) contact guard  -HR     Assistive Device (Gait) walker, front-wheeled  -HR     Pattern (Gait) step-through  -HR     Deviations/Abnormal Patterns (Gait) gait speed decreased  -HR     Bilateral Gait Deviations forward flexed posture  -HR     Row Name 02/23/22 1453 02/23/22 1452       Safety Issues, Functional Mobility    Impairments Affecting Function (Mobility) endurance/activity tolerance; strength; balance  -HR endurance/activity tolerance; strength; balance  -RG    Row Name 02/23/22 1437          Safety Issues, Functional Mobility    Impairments Affecting Function (Mobility) endurance/activity tolerance; strength; balance  -HR     Row Name 02/23/22 1453          Balance    Comment, Balance WC: LAQ, marching,GTB HS curls  -HR     Row Name 02/23/22 1453          Motor Skills    Therapeutic Exercise other (see comments)   supine: ball sq, LTR, heel-slide, hip abd, ankle pumps, inversion/eversion  -HR     Row Name 02/23/22 1453 02/23/22 1452       Hip (Therapeutic Exercise)    Hip Strengthening (Therapeutic Exercise) bilateral; flexion; extension; aBduction; aDduction; marching while seated; sitting; supine; resistance band; green  -HR bilateral; flexion; aBduction; aDduction; marching while seated; sitting; resistance band; 10 repetitions; green; 2 sets  -RG    Row Name 02/23/22 1453 02/23/22 1452       Knee (Therapeutic Exercise)    Knee Strengthening (Therapeutic Exercise) bilateral; flexion; extension; marching while seated; LAQ (long arc quad); sitting; supine; resistance band; green  -HR bilateral; flexion; extension; marching while seated; LAQ (long arc quad); sitting; resistance band; green; 10 repetitions; 2 sets  -RG    Row Name 02/23/22 1453 02/23/22 1452       Ankle (Therapeutic Exercise)    Ankle Strengthening (Therapeutic Exercise) bilateral; dorsiflexion; plantarflexion; inversion; eversion; supine  -HR bilateral; dorsiflexion; plantarflexion; sitting; 10 repetitions; 2 sets  -RG    Row Name 02/23/22 1453 02/23/22 1452       Bed Mobility Goal 1 (PT-IRF)    Progress/Outcomes (Bed Mobility Goal 1, PT-IRF) goal ongoing  -HR goal ongoing  -RG    Row Name 02/23/22 1437          Bed Mobility Goal 1 (PT-IRF)    Progress/Outcomes (Bed Mobility Goal 1, PT-IRF) goal ongoing  -HR     Row Name 02/23/22 1453 02/23/22 1452       Transfer Goal 1 (PT-IRF)    Progress/Outcomes (Transfer Goal 1, PT-IRF) goal ongoing  -HR goal ongoing  -RG    Row Name 02/23/22 1437          Transfer Goal 1 (PT-IRF)    Progress/Outcomes (Transfer Goal 1, PT-IRF) goal ongoing  -HR     Row Name 02/23/22 1453 02/23/22 1452       Gait/Walking Locomotion Goal 1 (PT-IRF)    Progress/Outcomes (Gait/Walking Locomotion Goal 1, PT-IRF) goal ongoing  -HR goal ongoing  -RG    Row Name 02/23/22 1437          Gait/Walking Locomotion Goal 1 (PT-IRF)    Progress/Outcomes  (Gait/Walking Locomotion Goal 1, PT-IRF) goal ongoing  -HR     Row Name 02/23/22 1453 02/23/22 1452       Positioning and Restraints    Pre-Treatment Position --  WC  -HR in bed  -RG    Post Treatment Position bed  -HR bed  -RG    In Bed call light within reach; encouraged to call for assist; exit alarm on  -HR notified nsg; supine; call light within reach; legs elevated  -RG    Row Name 02/23/22 1453 02/23/22 1452       Daily Progress Summary (PT)    Daily Progress Summary (PT) Pt is fatiqued on this date and request to return back to room 20' into Rx, but was agreeable to do supine exercises. no adverse effects noted.  -HR --    Impairments Still Limiting Function (PT) strength decreased; impaired balance; impaired functional activity tolerance  -HR strength decreased; impaired balance; impaired functional activity tolerance  -RG    Row Name 02/23/22 1437          Daily Progress Summary (PT)    Impairments Still Limiting Function (PT) strength decreased; impaired balance; impaired functional activity tolerance  -HR     Row Name 02/23/22 1453 02/23/22 1452       Therapy Plan Review/Discharge Plan (PT)    Expected Discharge Disposition (PT Eval) home with caregiver; home with home health care  -HR home with caregiver; home with home health care  -RG    Row Name 02/23/22 1437          Therapy Plan Review/Discharge Plan (PT)    Expected Discharge Disposition (PT Eval) home with caregiver; home with home health care  -HR           User Key  (r) = Recorded By, (t) = Taken By, (c) = Cosigned By    Initials Name Provider Type    RG Jose Martin Hernández PTA Physical Therapy Assistant    HR Reina Pat PTA Physical Therapy Assistant              Wound 02/10/22 1557 midline abdomen Incision (Active)   Dressing Appearance open to air 02/23/22 1049   Closure Staples 02/23/22 1049   Base red; pink 02/23/22 1049   Drainage Amount none 02/23/22 1049   Care, Wound other (see comments) 02/22/22 1930   Dressing Care open to air  02/23/22 1049       Wound 02/10/22 1602 Left upper back Incision (Active)   Dressing Appearance open to air 02/23/22 1049   Closure Liquid skin adhesive 02/22/22 1930   Base dry; clean 02/23/22 1049   Periwound Temperature warm 02/23/22 1049   Drainage Amount none 02/23/22 1049   Care, Wound other (see comments) 02/22/22 1930   Dressing Care open to air 02/23/22 1049     Physical Therapy Education                 Title: PT OT SLP Therapies (Done)     Topic: Physical Therapy (Done)     Point: Mobility training (Done)     Learning Progress Summary           Patient Acceptance, E,D, VU,NR by  at 2/23/2022 1500      Show all documentation for this point (17)                 Point: Home exercise program (Done)     Learning Progress Summary           Patient Acceptance, E,D, VU,NR by  at 2/23/2022 1500      Show all documentation for this point (17)                 Point: Body mechanics (Done)     Learning Progress Summary           Patient Acceptance, E,D, VU,NR by  at 2/23/2022 1500      Show all documentation for this point (17)                 Point: Precautions (Done)     Learning Progress Summary           Patient Acceptance, E,D, VU,NR by  at 2/23/2022 1500      Show all documentation for this point (17)                             User Key     Initials Effective Dates Name Provider Type Discipline     06/16/21 -  Jose Martin Hernández PTA Physical Therapy Assistant PT                PT Recommendation and Plan    Frequency of Treatment (PT): 5 times per week  Anticipated Equipment Needs at Discharge (PT Eval):  (tbd)  Daily Progress Summary (PT)  Impairments Still Limiting Function (PT): strength decreased, impaired balance, impaired functional activity tolerance               Time Calculation:      PT Charges     Row Name 02/23/22 1500 02/23/22 0965          Time Calculation    Start Time 1045  - 0915  -HR     Stop Time 1130  - 1000  -HR     Time Calculation (min) 45 min  - 45 min  -HR     PT Received On  02/23/22  -RG 02/23/22  -HR            Time Calculation- PT    Total Timed Code Minutes- PT 45 minute(s)  -RG 45 minute(s)  -HR           User Key  (r) = Recorded By, (t) = Taken By, (c) = Cosigned By    Initials Name Provider Type    RG Jose Martin Hernández PTA Physical Therapy Assistant    HR Reina Pat PTA Physical Therapy Assistant                Therapy Charges for Today     Code Description Service Date Service Provider Modifiers Qty    64064733642 HC GAIT TRAINING EA 15 MIN 2/22/2022 Jose Martin Hernández PTA GP, CQ 1    70699475006 HC PT THER PROC EA 15 MIN 2/22/2022 Jose Martin Hernández PTA GP, CQ 2    94832077006 HC GAIT TRAINING EA 15 MIN 2/23/2022 Jose Martin Hernández PTA GP, CQ 1    89602705315 HC PT THERAPEUTIC ACT EA 15 MIN 2/23/2022 Jose Martin Hernández PTA GP, CQ 1    26341244911 HC PT THER PROC EA 15 MIN 2/23/2022 Jose Martin Hernández PTA GP, CQ 1                   Jose Martin Hernández PTA  2/23/2022

## 2022-02-23 NOTE — DISCHARGE INSTR - OTHER ORDERS
[facility] [phone] for PT/OT 2-3 x wk x 8 wks and nursing.    [facility] [phone] for hospital bed, Nutren 1.5 (Osmolite) tube feeding at 65 ml/hr continuous, pump, & supplies.

## 2022-02-23 NOTE — SIGNIFICANT NOTE
02/23/22 1341   Plan   Plan Contacted [facility] [phone] per preference per Scarlett with discharge on 2-25-22 and pt needing Nutren (Osmolite) 1.5 tube feeding at 65 ml/hr continuous, tube feeding pump,and supplies;  H20 flush is on hold until sodium increases and hospital bed.  [facility] can deliver tube feeding, pump,supplies and hospital bed to pt's home on 2-24-22.  Spouse or son-in-law Rosita to be contacted about delivery.  Faxed face sheet, H&P, PT/OT notes, MD progress note and tube feeding hospital orders via [facility] to [facility].  Home tube feeding orders and hospital bed order to be faxed when entered by MD at discharge.  Contacted [facility] [phone] per preference per Mikayla with referral, discharge on 2-25-22 and pt needing PT 3 x wk x 8wks for strengthening, endurance, gait training, transfer training, therapeutic exercise, bed mobility, home safety, OT 2-3 x wk x 8 wks for ADL re-training, home safety, functional mobility, strengthening, nursing for J-tube education, Nutren (Osmolite) 1.5 at 65 ml/hr continuous, informed them pt's H2O flush is on hold until sodium increases, empty g-tube (for decompression) to drain to gravity into a graduate BID, empty mart drain BID, clean J-tube and G-tube with soap and water daily.  Faxed face sheet, H&P, MD progress notes, Pulmonary progress note, nursing note, hospital tube feeding orders, and PT/OT notes to [facility] via M[facility].  HH to be contacted at discharge.  Ambulatory referral for home health with face to face and discharge summary to be faxed to HH at discharge.   nurse to visit pt after he arrives home on 2-25-22 for tube feeding education.  Informed son-in-law about arrangements for tube feeding, hospital bed and home health services.  Will follow.   Patient/Family in Agreement with Plan yes

## 2022-02-23 NOTE — CONSULTS
Nephrology Consult Note    Referring Provider: Dr. Alexis  Reason for Consultation: Hyponatremia    Subjective       History of present illness:  Sandro Argueta is a 76 y.o. male who presented to [facility] emergency department with chief complaint of debility. Pt is known to have hypertension, coronary disease, diabetes mellitus, GERD, history of Whipple procedure because of the high suspicion for pancreatic cancer. Pt had recently found to have sponta esophageal rupture s/p thoracotomy, currently on tube feed has been transferred from [place]. Nephrology was asked to assess for hyponatremia.   Pt denied any history of Chronic NSAIDS use. Patient denies hematuria, dysuria, difficulty passing urine. No prior history of renal stones. No family history of renal disease     History  Past Medical History:   Diagnosis Date   • Acute UTI (urinary tract infection) 2/20/2021   • Arthritis    • Borderline diabetes    • Coronary artery disease    • Diabetes mellitus (HCC)    • Elevated prostate specific antigen (PSA) 8/17/2020   • GERD (gastroesophageal reflux disease)    • Hypertension    • Left elbow pain    ,   Past Surgical History:   Procedure Laterality Date   • COLONOSCOPY     • ENDOSCOPY N/A 2/9/2021    Procedure: ESOPHAGOGASTRODUODENOSCOPY WITH BIOPSY dilitation;  Surgeon: Bela Mcdaniel MD;  Location: [facility] OR;  Service: Gastroenterology;  Laterality: N/A;   • ERCP N/A 2/21/2021    Procedure: ENDOSCOPIC RETROGRADE CHOLANGIOPANCREATOGRAPHY WITH STENT PLACEMENT;  Surgeon: Matthew Moreira MD;  Location: [facility] ENDOSCOPY;  Service: Gastroenterology;  Laterality: N/A;  with sphincterotomy, and brushing of common bile duct, and placement of [id number] wall stent     • PACEMAKER IMPLANTATION     ,   Family History   Problem Relation Age of Onset   • Diabetes Mother    • Diabetes Sister    • Diabetes Brother    ,   Social History     Tobacco Use   • Smoking status: Never Smoker   •  Smokeless tobacco: Never Used   Vaping Use   • Vaping Use: Never used   Substance Use Topics   • Alcohol use: No   • Drug use: No   ,   Medications Prior to Admission   Medication Sig Dispense Refill Last Dose   • amLODIPine (NORVASC) 5 MG tablet Take 5 mg by mouth Daily.   Unknown at Unknown time   • Insulin Glargine, 1 Unit Dial, (TOUJEO) 300 UNIT/ML solution pen-injector injection Inject 15 Units under the skin into the appropriate area as directed Daily.   Unknown at Unknown time   • omeprazole (priLOSEC) 40 MG capsule Take 40 mg by mouth Daily.   Unknown at Unknown time   • Pancrelipase, Lip-Prot-Amyl, (CREON) 36791-861872 units capsule delayed-release particles capsule Take 72,000 units of lipase by mouth 3 (Three) Times a Day With Meals. Take 2 capsules with meals and 1 capsule with snacks . Max of 8 caps per days   Unknown at Unknown time   • Pancrelipase, Lip-Prot-Amyl, (CREON) 47412-443842 units capsule delayed-release particles capsule Take 36,000 units of lipase by mouth 2 (Two) Times a Day. Take 2 capsules with meals and 1 capsule with snacks . Max of 8 caps per days   Unknown at Unknown time   , Scheduled Meds:  betamethasone augmented + lubriderm, , Topical, Q12H  enoxaparin, 40 mg, Subcutaneous, Q24H  insulin aspart, 0-14 Units, Subcutaneous, TID AC  metoprolol tartrate, 25 mg, Per J Tube, Q12H  pancrelipase (Lip-Prot-Amyl), 12,000 units of lipase, Per J Tube, TID With Meals  pantoprazole, 40 mg, Intravenous, Q AM    , Continuous Infusions:   , PRN Meds:  •  acetaminophen  •  dextrose  •  dextrose  •  glucagon (human recombinant)  •  oxyCODONE  •  sodium chloride and Allergies:  Other    Review of Systems  More than 10 point review of systems was done. Pertinent items are noted in HPI, all other systems reviewed and negative    Objective     Vital Signs  Temp:  [98.5 °F (36.9 °C)-98.7 °F (37.1 °C)] 98.7 °F (37.1 °C)  Heart Rate:  [75-90] 90  Resp:  [20] 20  BP: (121-134)/(66-68) 121/68    I/O this  shift:  In: 240 [P.O.:240]  Out: -   I/O last 3 completed shifts:  In: 3928 [P.O.:1010; Other:802; NG/GT:2116]  Out: 677 [Urine:375; Emesis/NG output:137; Drains:165]    Physical Examination:  General Appearance: not in acute distress  No  JVD  Lungs: Clear to auscultation, respirations regular and unlabored  Heart: Regular rhythm & normal rate, normal S1, S2, no murmur, no gallop, no rub   Abdomen: Normal bowel sounds, no masses and soft non-tender  Extremities: no edema  Pulses: Palpable and equal bilaterally  Neurologic: Orientated to person, place, time, grossly no focal deficitis    Laboratory Data :      WBC WBC   Date Value Ref Range Status   02/23/2022 10.34 3.40 - 10.80 10*3/mm3 Final   02/22/2022 10.84 (H) 3.40 - 10.80 10*3/mm3 Final   02/21/2022 10.20 3.40 - 10.80 10*3/mm3 Final      HGB Hemoglobin   Date Value Ref Range Status   02/23/2022 10.2 (L) 13.0 - 17.7 g/dL Final   02/22/2022 10.6 (L) 13.0 - 17.7 g/dL Final   02/21/2022 10.0 (L) 13.0 - 17.7 g/dL Final      HCT Hematocrit   Date Value Ref Range Status   02/23/2022 33.2 (L) 37.5 - 51.0 % Final   02/22/2022 34.8 (L) 37.5 - 51.0 % Final   02/21/2022 33.1 (L) 37.5 - 51.0 % Final      Platlets No results found for: LABPLAT   MCV MCV   Date Value Ref Range Status   02/23/2022 86.9 79.0 - 97.0 fL Final   02/22/2022 87.0 79.0 - 97.0 fL Final   02/21/2022 88.5 79.0 - 97.0 fL Final          Sodium Sodium   Date Value Ref Range Status   02/23/2022 124 (L) 136 - 145 mmol/L Final   02/22/2022 124 (L) 136 - 145 mmol/L Final   02/22/2022 124 (L) 136 - 145 mmol/L Final   02/21/2022 128 (L) 136 - 145 mmol/L Final      Potassium Potassium   Date Value Ref Range Status   02/23/2022 4.7 3.5 - 5.2 mmol/L Final   02/22/2022 4.8 3.5 - 5.2 mmol/L Final     Comment:     Slight hemolysis detected by analyzer. Results may be affected.   02/21/2022 4.6 3.5 - 5.2 mmol/L Final      Chloride Chloride   Date Value Ref Range Status   02/23/2022 89 (L) 98 - 107 mmol/L Final    02/22/2022 89 (L) 98 - 107 mmol/L Final   02/21/2022 93 (L) 98 - 107 mmol/L Final      CO2 CO2   Date Value Ref Range Status   02/23/2022 25.7 22.0 - 29.0 mmol/L Final   02/22/2022 25.5 22.0 - 29.0 mmol/L Final   02/21/2022 24.4 22.0 - 29.0 mmol/L Final      BUN BUN   Date Value Ref Range Status   02/23/2022 14 8 - 23 mg/dL Final   02/22/2022 13 8 - 23 mg/dL Final   02/21/2022 12 8 - 23 mg/dL Final      Creatinine Creatinine   Date Value Ref Range Status   02/23/2022 0.63 (L) 0.76 - 1.27 mg/dL Final   02/22/2022 0.69 (L) 0.76 - 1.27 mg/dL Final   02/21/2022 0.62 (L) 0.76 - 1.27 mg/dL Final      Calcium Calcium   Date Value Ref Range Status   02/23/2022 8.4 (L) 8.6 - 10.5 mg/dL Final   02/22/2022 8.6 8.6 - 10.5 mg/dL Final   02/21/2022 8.1 (L) 8.6 - 10.5 mg/dL Final      PO4 No results found for: CAPO4   Albumin No results found for: ALBUMIN   Magnesium No results found for: MG   Uric Acid No results found for: URICACID     Radiology results :     Imaging Results (Last 72 Hours)     Procedure Component Value Units Date/Time    CT Chest Without Contrast Diagnostic [[id number]] Collected: 02/22/22 1406     Updated: 02/22/22 1601    Narrative:      CT CHEST WITHOUT CONTRAST DIAGNOSTIC      CLINICAL INDICATION: Followup of complicated loculated effusion//history  of esophageal perforation.       COMPARISON: 02/16/2022     TECHNIQUE: Multiple axial CT images were obtained from lung apex through  upper abdomen without the administration of IV contrast. Reformatted  images in the coronal and/or sagittal plane(s) were generated from the  axial data set to facilitate diagnostic accuracy and/or surgical  planning.     Radiation dose reduction techniques were utilized per ALARA protocol.  Automated exposure control was initiated through either or [employer] or  [employer] software packages by  protocol.    DOSE (DLP mGy-cm): 431.44 mGy.cm        FINDINGS:     LUNGS: Unremarkable. No parenchymal soft tissue nodules.   No focal air  space disease.     HEART: Coronary artery calcifications are noted.     MEDIASTINUM: No masses. No enlarged lymph nodes.  No fluid collections.     PLEURA: Left-sided loculated collection similar to the previous exam.  Small bibasilar pleural effusions, right greater than left.      VASCULATURE: No evidence of aneurysm.     BONES: No acute bony abnormality.     VISUALIZED UPPER ABDOMEN:The upper abdomen is unremarkable as  visualized.     Other: None.       Impression:      Loculated effusion in the left lung and small bilateral  pleural effusions, right greater than left.           This report was finalized on 2/22/2022 3:59 PM by Dr. Antoine Guevara MD.       XR Chest PA & Lateral [[id number]] Collected: 02/21/22 0817     Updated: 02/21/22 0820    Narrative:      EXAMINATION: XR CHEST PA AND LATERAL-      CLINICAL INDICATION: pleural effusion        COMPARISON: 02/18/2022      TECHNIQUE: XR CHEST PA AND LATERAL-      FINDINGS:   LUNGS: Small left pleural effusion      HEART AND MEDIASTINUM: Heart and mediastinal contours are unremarkable        SKELETON: Bony and soft tissue structures are unremarkable.             Impression:      Small left pleural effusion, similar to the prior study  Trace right pleural effusion also similar to the previous exam     This report was finalized on 2/21/2022 8:18 AM by Dr. Antoine Guevara MD.               Medications:      betamethasone augmented + lubriderm, , Topical, Q12H  enoxaparin, 40 mg, Subcutaneous, Q24H  insulin aspart, 0-14 Units, Subcutaneous, TID AC  metoprolol tartrate, 25 mg, Per J Tube, Q12H  pancrelipase (Lip-Prot-Amyl), 12,000 units of lipase, Per J Tube, TID With Meals  pantoprazole, 40 mg, Intravenous, Q AM        Assessment/Plan       Esophageal rupture      1. Hyponatremia, presumed chronic  2. Esophageal rupture s/p repair on enteral nutrition  3. DM-II  4. Essential hypertension    Sodium has been dropping for past couple of days, stopped free  water with tube feed yesterday, Na stayed at 124. Clinically volume depleted but U Na is 56 that is against volume depletion, hypovolumic hyponatremia is less likely, will send to [facility] as well.   Repeat BMP and decide about maintenance fluids to be tried likely NS to go for clinical diagnosis of hypovolumic hyponatremia    Thanks Dr Alexis for the consult. Nephrology will follow the patient.   I discussed the patient's findings and my recommendations with patient and nursing staff    Luis Carlos Koch MD  02/23/22  13:34 EST

## 2022-02-23 NOTE — PROGRESS NOTES
Case Management  Inpatient Rehabilitation Team Conference    Conference Date/Time: 2/22/2022 1:50:48 PM    Team Conference Attendees:  MD Helen Ayon SW Jessica Bill, RN,   PARAMJIT Holden, PT  Elena Pisano OT    Demographics            Age: 76Y            Gender: Male    Admission Date: 2/10/2022 3:34:00 PM  Rehabilitation Diagnosis:  debility  Comorbidities: I25.10 Atherosclerotic heart disease of native coronary artery  without angina pectoris  I10 Essential (primary) hypertension  E11.9 Type 2 diabetes mellitus without complications  Z93.4 Other artificial openings of gastrointestinal tract status  Z95.0 Presence of cardiac pacemaker  R53.81 Other malaise      Plan of Care  Anticipated Discharge Date/Estimated Length of Stay: 2-25-22  Anticipated Discharge Destination: Community discharge with assistance  Discharge Plan : Pt plans to return home with spouse at discharge.  Spouse,  children and son-in-law are supportive and will assist pt with needs.  Medical Necessity Expected Level Rationale: good  Intensity and Duration: an average of 3 hours/5 days per week  Medical Supervision and 24 Hour Rehab Nursing: x  Physical Therapy: x  PT Intensity/Duration: PT 1.5 hours per day/5 days per week  Occupational Therapy: x  OT Intensity/Duration: OT 1.5 hours per day/5 days per week  Social Work: x  Therapeutic Recreation: x  Updated (if changes indicated)    Anticipated Discharge Date/Estimated Length of Stay:   2-25-22      Discharge Plan of Care: Home Health Services Physical Therapy strengthening,  endurance, gait training, transfer training, therapeutic exercise, bed mobility,  home safety 3 times per week for 4 weeks Occupational Therapy ADL re-training,  home safety, functional mobility, strengthening,  endurance 3 times per week for  4 weeks Hospital beds and Accessories: Semi-electric bed with mattress Other:  Nutren 1.5 tube feeding 65 ml/hr  continuous    Based on the patient's medical and functional status, their prognosis and  expected level of functional improvement is: good      Interdisciplinary Problem/Goals/Status  Mobility    [PT] Bed/Chair/Wheelchair(Active)  Current Status(02/11/2022): min A  Weekly Goal(02/18/2022): Sup  Discharge Goal: Sup    [PT] Walk(Active)  Current Status(02/11/2022): amb 80' RW CGA  Weekly Goal(02/18/2022): amb 300' RW Sup  Discharge Goal: amb 300' RW Sup        Pain    [RN] Pain Management(Active)  Current Status(02/10/2022): Potential for pain  Weekly Goal(02/25/2022): No pain this week  Discharge Goal: No pain        Safety    [RN] Potential for Injury(Active)  Current Status(02/10/2022): At risk for injury  Weekly Goal(02/25/2022): No injury this week  Discharge Goal: No injuries        Self Care    [OT] Dressing (Lower)(Active)  Current Status(02/21/2022): ModA  Weekly Goal(03/01/2022): Jonny  Discharge Goal: Jonny    Comments: Pt plans to return home with spouse at discharge.  Spouse, daughter  and son-in-law to receive education on tube feeding, mart drain, and emptying  g-tube with nursing on 2-23-22.    Signed by: JOSIE Schwartz    Physician CoSigned By: Elder Alexis 02/23/2022 10:21:52

## 2022-02-24 LAB
ANION GAP SERPL CALCULATED.3IONS-SCNC: 12.5 MMOL/L (ref 5–15)
BUN SERPL-MCNC: 17 MG/DL (ref 8–23)
BUN/CREAT SERPL: 22.7 (ref 7–25)
CALCIUM SPEC-SCNC: 8.6 MG/DL (ref 8.6–10.5)
CHLORIDE SERPL-SCNC: 90 MMOL/L (ref 98–107)
CO2 SERPL-SCNC: 26.5 MMOL/L (ref 22–29)
CREAT SERPL-MCNC: 0.75 MG/DL (ref 0.76–1.27)
GFR SERPL CREATININE-BSD FRML MDRD: 102 ML/MIN/1.73
GLUCOSE BLDC GLUCOMTR-MCNC: 236 MG/DL (ref 70–130)
GLUCOSE BLDC GLUCOMTR-MCNC: 288 MG/DL (ref 70–130)
GLUCOSE BLDC GLUCOMTR-MCNC: 320 MG/DL (ref 70–130)
GLUCOSE SERPL-MCNC: 370 MG/DL (ref 65–99)
POTASSIUM SERPL-SCNC: 4.8 MMOL/L (ref 3.5–5.2)
SODIUM SERPL-SCNC: 129 MMOL/L (ref 136–145)

## 2022-02-24 PROCEDURE — 82962 GLUCOSE BLOOD TEST: CPT

## 2022-02-24 PROCEDURE — 97530 THERAPEUTIC ACTIVITIES: CPT

## 2022-02-24 PROCEDURE — 97110 THERAPEUTIC EXERCISES: CPT

## 2022-02-24 PROCEDURE — 25010000002 ENOXAPARIN PER 10 MG: Performed by: FAMILY MEDICINE

## 2022-02-24 PROCEDURE — 80048 BASIC METABOLIC PNL TOTAL CA: CPT | Performed by: INTERNAL MEDICINE

## 2022-02-24 PROCEDURE — 97535 SELF CARE MNGMENT TRAINING: CPT

## 2022-02-24 PROCEDURE — 97116 GAIT TRAINING THERAPY: CPT

## 2022-02-24 PROCEDURE — 63710000001 INSULIN ASPART PER 5 UNITS: Performed by: FAMILY MEDICINE

## 2022-02-24 RX ADMIN — PANCRELIPASE 12000 UNITS OF LIPASE: 60000; 12000; 38000 CAPSULE, DELAYED RELEASE PELLETS ORAL at 08:53

## 2022-02-24 RX ADMIN — INSULIN ASPART 10 UNITS: 100 INJECTION, SOLUTION INTRAVENOUS; SUBCUTANEOUS at 08:52

## 2022-02-24 RX ADMIN — PANCRELIPASE 12000 UNITS OF LIPASE: 60000; 12000; 38000 CAPSULE, DELAYED RELEASE PELLETS ORAL at 11:25

## 2022-02-24 RX ADMIN — PANTOPRAZOLE SODIUM 40 MG: 40 INJECTION, POWDER, FOR SOLUTION INTRAVENOUS at 05:15

## 2022-02-24 RX ADMIN — BETAMETHASONE DIPROPIONATE: 0.5 CREAM TOPICAL at 08:53

## 2022-02-24 RX ADMIN — METOPROLOL TARTRATE 25 MG: 25 TABLET ORAL at 20:37

## 2022-02-24 RX ADMIN — INSULIN ASPART 5 UNITS: 100 INJECTION, SOLUTION INTRAVENOUS; SUBCUTANEOUS at 17:05

## 2022-02-24 RX ADMIN — INSULIN ASPART 8 UNITS: 100 INJECTION, SOLUTION INTRAVENOUS; SUBCUTANEOUS at 11:25

## 2022-02-24 RX ADMIN — ENOXAPARIN SODIUM 40 MG: 40 INJECTION SUBCUTANEOUS at 17:06

## 2022-02-24 RX ADMIN — BETAMETHASONE DIPROPIONATE: 0.5 CREAM TOPICAL at 20:38

## 2022-02-24 RX ADMIN — PANCRELIPASE 12000 UNITS OF LIPASE: 60000; 12000; 38000 CAPSULE, DELAYED RELEASE PELLETS ORAL at 17:06

## 2022-02-24 RX ADMIN — METOPROLOL TARTRATE 25 MG: 25 TABLET ORAL at 08:53

## 2022-02-24 NOTE — SIGNIFICANT NOTE
02/24/22 1558   Plan   Plan Spoke to son-in-law Rosita who says pt's spouse and daughter will come for discharge around 11:30 am tomorrow.  Family will transport pt home.

## 2022-02-24 NOTE — THERAPY TREATMENT NOTE
Inpatient Rehabilitation - Occupational Therapy Treatment Note    [facility]     Patient Name: Sandro Argueta  : 1945  MRN: [id number]    Today's Date: 2022                 Admit Date: 2/10/2022       No diagnosis found.    Patient Active Problem List   Diagnosis   • Left elbow pain   • Hypertension   • DM2 (diabetes mellitus, type 2) (HCC)   • Elevated prostate specific antigen (PSA)   • Epigastric pain   • Epigastric abdominal pain   • Elevated liver enzymes   • Weight loss, abnormal   • Acute hepatitis   • Anemia   • Obstructive jaundice   • Pneumothorax, left   • Cytokine release syndrome, grade 1   • Esophageal rupture       Past Medical History:   Diagnosis Date   • Acute UTI (urinary tract infection) 2021   • Arthritis    • Borderline diabetes    • Coronary artery disease    • Diabetes mellitus (HCC)    • Elevated prostate specific antigen (PSA) 2020   • GERD (gastroesophageal reflux disease)    • Hypertension    • Left elbow pain        Past Surgical History:   Procedure Laterality Date   • COLONOSCOPY     • ENDOSCOPY N/A 2021    Procedure: ESOPHAGOGASTRODUODENOSCOPY WITH BIOPSY dilitation;  Surgeon: Bela Mcdaniel MD;  Location: [facility] OR;  Service: Gastroenterology;  Laterality: N/A;   • ERCP N/A 2021    Procedure: ENDOSCOPIC RETROGRADE CHOLANGIOPANCREATOGRAPHY WITH STENT PLACEMENT;  Surgeon: Matthew Moreira MD;  Location: [facility] ENDOSCOPY;  Service: Gastroenterology;  Laterality: N/A;  with sphincterotomy, and brushing of common bile duct, and placement of 44nmy99tq wall stent     • PACEMAKER IMPLANTATION               IRF OT ASSESSMENT FLOWSHEET (last 12 hours)     IRF OT Evaluation and Treatment     Row Name 22 1201          OT Time and Intention    Document Type daily treatment  -CJ     Mode of Treatment occupational therapy  -CJ     Symptoms Noted During/After Treatment fatigue  -CJ     Row Name 22 1201          General Information     Patient/Family/Caregiver Comments/Observations agreeable to therapy  -     Existing Precautions/Restrictions fall  Kamari drain, G and J tubes, decreased skin integrity  -     Row Name 02/24/22 1201          Transfers    Bed-Chair [place] (Transfers) contact guard; verbal cues  -     Chair-Bed [place] (Transfers) contact guard; verbal cues  -     Assistive Device (Bed-Chair Transfers) wheelchair  -     Row Name 02/24/22 1201          Chair-Bed Transfer    Assistive Device (Chair-Bed Transfers) wheelchair  -     Row Name 02/24/22 1201          Motor Skills    Motor Skills functional endurance  -     Motor Control/Coordination Interventions therapeutic exercise/ROM; gross motor coordination activities  BUE ther ex/act, AROM, bilat coord ex, GMC  -     Therapeutic Exercise --  dowel wrist rolls X 2, hand exerciser  -     Row Name 02/24/22 1201          Grooming    [place] Level (Grooming) set up  -     Row Name 02/24/22 1201          Positioning and Restraints    Post Treatment Position bed  -     In Bed call light within reach; exit alarm on; encouraged to call for assist; heels elevated; notified nsg  both sessions  -           User Key  (r) = Recorded By, (t) = Taken By, (c) = Cosigned By    Initials Name Effective Dates     Elena Pisano OT 06/16/21 -                  Occupational Therapy Education                 Title: PT OT SLP Therapies (Done)     Topic: Occupational Therapy (Done)     Point: ADL training (Done)     Description:   Instruct learner(s) on proper safety adaptation and remediation techniques during self care or transfers.   Instruct in proper use of assistive devices.              Learning Progress Summary           Patient Acceptance, E,D, VU,NR by  at 2/24/2022 1203      Show all documentation for this point (12)                 Point: Precautions (Done)     Description:   Instruct learner(s) on prescribed precautions during self-care and  functional transfers.              Learning Progress Summary           Patient Acceptance, E,D, VU,NR by  at 2/24/2022 1203      Show all documentation for this point (12)                             User Key     Initials Effective Dates Name Provider Type Discipline     06/16/21 -  Elena Pisano OT Occupational Therapist OT                    OT Recommendation and Plan    Planned Therapy Interventions (OT): activity tolerance training, adaptive equipment training, BADL retraining, ROM/therapeutic exercise, strengthening exercise, transfer/mobility retraining, patient/caregiver education/training                    Time Calculation:      Time Calculation- OT     Row Name 02/24/22 1445 02/24/22 1444          Time Calculation- OT    OT Start Time 1405  - 0810  -     OT Stop Time 1425  - 0920  -     OT Time Calculation (min) 20 min  - 70 min  -     Total Timed Code Minutes- OT 20 minute(s)  - 70 minute(s)  -           User Key  (r) = Recorded By, (t) = Taken By, (c) = Cosigned By    Initials Name Provider Type     Elnea Pisano OT Occupational Therapist              Therapy Charges for Today     Code Description Service Date Service Provider Modifiers Qty    29726479010 HC OT SELF CARE/MGMT/TRAIN EA 15 MIN 2/23/2022 Elena Pisano OT GO 2    43023925814 HC OT THER PROC EA 15 MIN 2/23/2022 Elena Pisano OT GO 2    98058110082 HC OT THERAPEUTIC ACT EA 15 MIN 2/23/2022 Elena Pisano OT GO 2    69058184496 HC OT SELF CARE/MGMT/TRAIN EA 15 MIN 2/24/2022 Elena Pisano OT GO 1    29687007486 HC OT THERAPEUTIC ACT EA 15 MIN 2/24/2022 Elena Pisano OT GO 2    81604329848 HC OT THER PROC EA 15 MIN 2/24/2022 Elena Pisano OT GO 3                   Elena Pisano OT  2/24/2022

## 2022-02-24 NOTE — THERAPY TREATMENT NOTE
Inpatient Rehabilitation - Physical Therapy Treatment Note       [facility]     Patient Name: Sandro Argueta  : 1945  MRN: [id number]    Today's Date: 2022                    Admit Date: 2/10/2022      Visit Dx:   No diagnosis found.    Patient Active Problem List   Diagnosis   • Left elbow pain   • Hypertension   • DM2 (diabetes mellitus, type 2) (HCC)   • Elevated prostate specific antigen (PSA)   • Epigastric pain   • Epigastric abdominal pain   • Elevated liver enzymes   • Weight loss, abnormal   • Acute hepatitis   • Anemia   • Obstructive jaundice   • Pneumothorax, left   • Cytokine release syndrome, grade 1   • Esophageal rupture       Past Medical History:   Diagnosis Date   • Acute UTI (urinary tract infection) 2021   • Arthritis    • Borderline diabetes    • Coronary artery disease    • Diabetes mellitus (HCC)    • Elevated prostate specific antigen (PSA) 2020   • GERD (gastroesophageal reflux disease)    • Hypertension    • Left elbow pain        Past Surgical History:   Procedure Laterality Date   • COLONOSCOPY     • ENDOSCOPY N/A 2021    Procedure: ESOPHAGOGASTRODUODENOSCOPY WITH BIOPSY dilitation;  Surgeon: Bela Mcdaniel MD;  Location: [facility];  Service: Gastroenterology;  Laterality: N/A;   • ERCP N/A 2021    Procedure: ENDOSCOPIC RETROGRADE CHOLANGIOPANCREATOGRAPHY WITH STENT PLACEMENT;  Surgeon: Matthew Moreira MD;  Location: [facility] ENDOSCOPY;  Service: Gastroenterology;  Laterality: N/A;  with sphincterotomy, and brushing of common bile duct, and placement of [id number] wall stent     • PACEMAKER IMPLANTATION         PT ASSESSMENT (last 12 hours)     IRF PT Evaluation and Treatment     Row Name 22 1453 22 1451       PT Time and Intention    Document Type daily treatment  -HR daily treatment  -RG    Mode of Treatment physical therapy; individual therapy  -HR physical therapy; individual therapy  -RG    Patient/Family/Caregiver  "Comments/Observations Pt and nursing in agreemnt for PT  -HR Pt stated that he was \"so tired that I just want to lay here for a while\" when therapy went to get him for therapy.  Pt was in agareement for supine exercises in his room.  -RG    Row Name 02/24/22 1453 02/24/22 1451       General Information    Patient Profile Reviewed yes  -HR yes  -RG    Existing Precautions/Restrictions fall  -HR fall  -RG    Limitations/Impairments other (see comments)  medical complexity  -HR other (see comments)  medical complexity  -RG    Row Name 02/24/22 1453 02/24/22 1451       Cognition/Psychosocial    Affect/Mental Status (Cognitive) WFL  -HR WFL  -RG    Orientation Status (Cognition) oriented x 4  -HR oriented x 4  -RG    Follows Commands (Cognition) WFL  -HR WFL  -RG    Personal Safety Interventions fall prevention program maintained; gait belt; nonskid shoes/slippers when out of bed; supervised activity  -HR gait belt; nonskid shoes/slippers when out of bed; fall prevention program maintained  -RG    Cognitive Function (Cognitive) WFL  -HR WFL  -RG    Row Name 02/24/22 1453 02/24/22 1451       Pain Scale: FACES Pre/Post-Treatment    Pain: FACES Scale, Pretreatment 0-->no hurt  -HR 0-->no hurt  -RG    Posttreatment Pain Rating 0-->no hurt  -HR 0-->no hurt  -RG    Row Name 02/24/22 1453 02/24/22 1451       Mobility    Extremity Weight-bearing Status right lower extremity; left lower extremity  -HR right lower extremity; left lower extremity  -RG    Left Lower Extremity (Weight-bearing Status) full weight-bearing (FWB)  -HR full weight-bearing (FWB)  -RG    Right Lower Extremity (Weight-bearing Status) full weight-bearing (FWB)  -HR full weight-bearing (FWB)  -RG    Row Name 02/24/22 1453 02/24/22 1451       Bed Mobility    Supine-Sit [place] (Bed Mobility) verbal cues; nonverbal cues (demo/gesture); minimum assist (75% patient effort); contact guard  -HR verbal cues; nonverbal cues (demo/gesture); minimum assist (75% " patient effort); contact guard  -RG    Supine-Sit-Supine [place] (Bed Mobility) contact guard; standby assist  -HR contact guard; standby assist  -RG    Bed Mobility, Safety Issues decreased use of legs for bridging/pushing; decreased use of arms for pushing/pulling  -HR decreased use of legs for bridging/pushing; decreased use of arms for pushing/pulling  -RG    Assistive Device (Bed Mobility) bed rails; other (see comments)  manual assistance from therapist  -HR bed rails; other (see comments)  manual assistance from therapist  -RG    Row Name 02/24/22 1453 02/24/22 1451       Transfer Assessment/Treatment    Transfers sit-stand transfer; stand-sit transfer; bed-chair transfer; stand pivot/stand step transfer  -HR sit-stand transfer; stand-sit transfer; bed-chair transfer; stand pivot/stand step transfer  -RG    Row Name 02/24/22 1453          Transfers    Bed-Chair [place] (Transfers) verbal cues; contact guard  -HR     Chair-Bed [place] (Transfers) contact guard  -HR     Assistive Device (Bed-Chair Transfers) wheelchair  -HR     Sit-Stand [place] (Transfers) standby assist; verbal cues; nonverbal cues (demo/gesture); contact guard  -HR     Stand-Sit [place] (Transfers) standby assist; verbal cues; nonverbal cues (demo/gesture)  -HR     Row Name 02/24/22 1453          Chair-Bed Transfer    Assistive Device (Chair-Bed Transfers) wheelchair  -HR     Row Name 02/24/22 1453          Sit-Stand Transfer    Assistive Device (Sit-Stand Transfers) walker, front-wheeled  -HR     Row Name 02/24/22 1453          Stand Pivot/Stand Step Transfer    Stand Pivot/Stand Step [place] (Transfers) contact guard; verbal cues; nonverbal cues (demo/gesture)  -HR     Assistive Device (Stand Pivot Stand Step Transfer) wheelchair  -HR     Row Name 02/24/22 1453 02/24/22 1451       Gait/Stairs (Locomotion)    [place] Level (Gait) contact guard  -HR contact guard  -RG    Assistive Device (Gait) walker,  front-wheeled  -HR walker, front-wheeled  -RG    Distance in Feet (Gait) 160' x 2 with RW CGA  -HR --    Pattern (Gait) step-through  -HR step-through  -RG    Deviations/Abnormal Patterns (Gait) gait speed decreased  -HR gait speed decreased  -RG    Bilateral Gait Deviations forward flexed posture  -HR forward flexed posture  -RG    [place] Level (Stairs) contact guard  -HR --    Handrail Location (Stairs) both sides  -HR --    Number of Steps (Stairs) 5  -HR --    Ascending Technique (Stairs) step-over-step  -HR --    Descending Technique (Stairs) step-over-step  -HR --    Comment (Gait/Stairs) Pt uses both handrails to get up and down stairs, CGA  -HR did not attempt in am  -RG    Row Name 02/24/22 1453 02/24/22 1451       Safety Issues, Functional Mobility    Impairments Affecting Function (Mobility) endurance/activity tolerance; strength; balance  -HR endurance/activity tolerance; strength; balance  -RG    Row Name 02/24/22 1453          Balance    Comment, Balance Stairs, // Bars - hip abd, WC: LAQ,march, HS curls  -HR     Row Name 02/24/22 1453 02/24/22 1451       Hip (Therapeutic Exercise)    Hip Strengthening (Therapeutic Exercise) bilateral; flexion; extension; aBduction; aDduction; marching while seated; sitting; standing  -HR bilateral; flexion; aBduction; aDduction; external rotation; internal rotation; heel slides; supine; 10 repetitions; 2 sets  -RG    Row Name 02/24/22 1453 02/24/22 1451       Knee (Therapeutic Exercise)    Knee Strengthening (Therapeutic Exercise) bilateral; flexion; extension; marching while seated; LAQ (long arc quad); hamstring curls; sitting  -HR bilateral; flexion; extension; heel slides; SAQ (short arc quad); supine; 10 repetitions; 2 sets  -RG    Row Name 02/24/22 1453 02/24/22 1451       Ankle (Therapeutic Exercise)    Ankle Strengthening (Therapeutic Exercise) bilateral; dorsiflexion; plantarflexion  -HR bilateral; dorsiflexion; plantarflexion; supine; 10 repetitions; 2  sets  -RG    Row Name 02/24/22 1453 02/24/22 1451       Bed Mobility Goal 1 (PT-IRF)    Progress/Outcomes (Bed Mobility Goal 1, PT-IRF) goal ongoing  -HR goal ongoing  -RG    Row Name 02/24/22 1453 02/24/22 1451       Transfer Goal 1 (PT-IRF)    Progress/Outcomes (Transfer Goal 1, PT-IRF) goal ongoing  -HR goal ongoing  -RG    Row Name 02/24/22 1453 02/24/22 1451       Gait/Walking Locomotion Goal 1 (PT-IRF)    Progress/Outcomes (Gait/Walking Locomotion Goal 1, PT-IRF) goal ongoing  -HR goal ongoing  -RG    Row Name 02/24/22 1453 02/24/22 1451       Positioning and Restraints    Pre-Treatment Position in bed  -HR in bed  -RG    Post Treatment Position wheelchair  -HR bed  -RG    In Bed -- notified nsg; supine; call light within reach; encouraged to call for assist; exit alarm on  -RG    In Wheelchair with other staff; with family/caregiver  -HR --    Row Name 02/24/22 1453 02/24/22 1451       Daily Progress Summary (PT)    Daily Progress Summary (PT) Pt done well with RX, and had more endurance on this date. Pt does still require rest breaks, but are becoming shorter. Pt walked 160' x 2 with RW CGA, and completed stairs on this date.  -HR --    Impairments Still Limiting Function (PT) strength decreased; impaired balance; impaired functional activity tolerance  -HR strength decreased; impaired balance; impaired functional activity tolerance  -RG    Row Name 02/24/22 1453 02/24/22 1451       Therapy Plan Review/Discharge Plan (PT)    Expected Discharge Disposition (PT Eval) home with caregiver; home with home health care  -HR home with caregiver; home with home health care  -RG          User Key  (r) = Recorded By, (t) = Taken By, (c) = Cosigned By    Initials Name Provider Type    RG Jose Martin Hernández PTA Physical Therapy Assistant    HR Reina Pat PTA Physical Therapy Assistant              Wound 02/10/22 1557 midline abdomen Incision (Active)   Dressing Appearance open to air 02/24/22 0730   Closure [place];  Approximated 02/23/22 1930   Base clean; dry; pink; red 02/23/22 1930   Drainage Amount none 02/23/22 1930   Care, Wound other (see comments) 02/23/22 1930   Dressing Care open to air 02/24/22 0730       Wound 02/10/22 1602 Left upper back Incision (Active)   Dressing Appearance open to air 02/24/22 0730   Closure Liquid skin adhesive 02/23/22 1930   Base clean; dry 02/23/22 1930   Drainage Amount none 02/23/22 1930   Care, Wound other (see comments) 02/23/22 1930   Dressing Care open to air 02/24/22 0730     Physical Therapy Education                 Title: PT OT SLP Therapies (Done)     Topic: Physical Therapy (Done)     Point: Mobility training (Done)     Learning Progress Summary           Patient Acceptance, E, VU,DU by HR at 2/24/2022 1459      Show all documentation for this point (19)                 Point: Home exercise program (Done)     Learning Progress Summary           Patient Acceptance, E, VU,DU by HR at 2/24/2022 1459      Show all documentation for this point (19)                 Point: Body mechanics (Done)     Learning Progress Summary           Patient Acceptance, E, VU,DU by HR at 2/24/2022 1459      Show all documentation for this point (19)                 Point: Precautions (Done)     Learning Progress Summary           Patient Acceptance, E, VU,DU by HR at 2/24/2022 1459      Show all documentation for this point (19)                             User Key     Initials Effective Dates Name Provider Type Discipline    HR 01/14/22 -  Reina Pat PTA Physical Therapy Assistant PT                PT Recommendation and Plan          Daily Progress Summary (PT)  Daily Progress Summary (PT): Pt done well with RX, and had more endurance on this date. Pt does still require rest breaks, but are becoming shorter. Pt walked 160' x 2 with RW CGA, and completed stairs on this date.  Impairments Still Limiting Function (PT): strength decreased, impaired balance, impaired functional activity  tolerance               Time Calculation:      PT Charges     Row Name 02/24/22 1459 02/24/22 1457          Time Calculation    Start Time 1245  -HR 1000  -RG     Stop Time 1330  -HR 1045  -RG     Time Calculation (min) 45 min  -HR 45 min  -RG     PT Received On 02/24/22  -HR 02/24/22  -RG            Time Calculation- PT    Total Timed Code Minutes- PT 45 minute(s)  -HR 45 minute(s)  -RG           User Key  (r) = Recorded By, (t) = Taken By, (c) = Cosigned By    Initials Name Provider Type    RG Jose Martin Hernández PTA Physical Therapy Assistant    HR Reina Pat PTA Physical Therapy Assistant                Therapy Charges for Today     Code Description Service Date Service Provider Modifiers Qty    74806909618 HC PT THER PROC EA 15 MIN 2/23/2022 Reina Pat PTA GP, CQ 1    98654828820 HC PT THERAPEUTIC ACT EA 15 MIN 2/23/2022 Reina Pat PTA GP, CQ 2    39102875232 HC GAIT TRAINING EA 15 MIN 2/24/2022 Reina Pat PTA GP, CQ 2    71014342655 HC PT THER PROC EA 15 MIN 2/24/2022 Reina Pat PTA GP, CQ 1                   Reina Pat PTA  2/24/2022

## 2022-02-24 NOTE — PROGRESS NOTES
Nephrology Progress Note      Subjective     Patient feels good, no chest pain or shortness of breath    Objective       Vital signs :     Temp:  [98.2 °F (36.8 °C)] 98.2 °F (36.8 °C)  Heart Rate:  [84-89] 84  Resp:  [20] 20  BP: (126-134)/(68) 126/68      Intake/Output Summary (Last 24 hours) at 2/24/2022 1134  Last data filed at 2/24/2022 0900  Gross per 24 hour   Intake 2427 ml   Output 110 ml   Net 2317 ml       Physical Exam:    General Appearance : not in acute distress  Lungs : clear to auscultation, respirations regular  Heart :  regular rhythm & normal rate, normal S1, S2 and no murmur, no rub  Abdomen : normal bowel sounds, no masses, no hepatomegaly, no splenomegaly, soft non-tender and no guarding  Extremities : no edema, no cyanosis and no redness  Neurologic :   orientated to person, place, time and situation, Grossly no focal deficits      Laboratory Data :     Albumin No results found for: ALBUMIN   Magnesium No results found for: MG       PTH               No results found for: PTH    CBC and coagulation:  Results from last 7 days   Lab Units 02/23/22  0115 02/22/22  1033 02/21/22  0012   CRP mg/dL  --  4.17*  --    WBC 10*3/mm3 10.34 10.84* 10.20   HEMOGLOBIN g/dL 10.2* 10.6* 10.0*   HEMATOCRIT % 33.2* 34.8* 33.1*   MCV fL 86.9 87.0 88.5   MCHC g/dL 30.7* 30.5* 30.2*   PLATELETS 10*3/mm3 345 376 391     Acid/base balance:      Renal and electrolytes:  Results from last 7 days   Lab Units 02/24/22  0918 02/23/22  1417 02/23/22  0115 02/22/22  1630 02/22/22  1033 02/22/22  1033 02/21/22  0012 02/21/22  0012   SODIUM mmol/L 129* 130* 124* 124*  --  124*   < > 128*   POTASSIUM mmol/L 4.8 4.5 4.7  --    < > 4.8   < > 4.6   CHLORIDE mmol/L 90* 93* 89*  --    < > 89*   < > 93*   CO2 mmol/L 26.5 27.4 25.7  --    < > 25.5   < > 24.4   BUN mg/dL 17 15 14  --    < > 13   < > 12   CREATININE mg/dL 0.75* 0.68* 0.63*  --   --  0.69*  --  0.62*   EGFR IF NONAFRICN AM mL/min/1.73 102 114 124  --    < > 112   < >  126   CALCIUM mg/dL 8.6 8.6 8.4*  --    < > 8.6   < > 8.1*    < > = values in this interval not displayed.     Estimated Creatinine Clearance: 72 mL/min (A) (by C-G formula based on SCr of 0.75 mg/dL (L)).    Liver and pancreatic function:  Results from last 7 days   Lab Units 02/19/22  0110   ALBUMIN g/dL 2.45*   BILIRUBIN mg/dL 0.3   ALK PHOS U/L 110   AST (SGOT) U/L 49*   ALT (SGPT) U/L 70*         Cardiac:      Liver and pancreatic function:  Results from last 7 days   Lab Units 02/19/22  0110   ALBUMIN g/dL 2.45*   BILIRUBIN mg/dL 0.3   ALK PHOS U/L 110   AST (SGOT) U/L 49*   ALT (SGPT) U/L 70*       Medications :     betamethasone augmented + lubriderm, , Topical, Q12H  enoxaparin, 40 mg, Subcutaneous, Q24H  insulin aspart, 0-14 Units, Subcutaneous, TID AC  metoprolol tartrate, 25 mg, Per J Tube, Q12H  pancrelipase (Lip-Prot-Amyl), 12,000 units of lipase, Per J Tube, TID With Meals  pantoprazole, 40 mg, Intravenous, Q AM             Assessment/Plan     1. Hyponatremia, presumed chronic  2. Esophageal rupture s/p repair on enteral nutrition  3. DM-II  4. Essential hypertension    Sodium have improved to 130, will continue holding free water flushes with NGT feed.   Check BMP today and follow up.          Luis Carlos Koch MD  02/24/22  11:34 EST

## 2022-02-24 NOTE — THERAPY TREATMENT NOTE
"Inpatient Rehabilitation - Physical Therapy Treatment Note        [place]     Patient Name: Sandro Argueta  : 1945  MRN: [id number]    Today's Date: 2022                    Admit Date: 2/10/2022      Visit Dx:   No diagnosis found.    Patient Active Problem List   Diagnosis   • Left elbow pain   • Hypertension   • DM2 (diabetes mellitus, type 2) (HCC)   • Elevated prostate specific antigen (PSA)   • Epigastric pain   • Epigastric abdominal pain   • Elevated liver enzymes   • Weight loss, abnormal   • Acute hepatitis   • Anemia   • Obstructive jaundice   • Pneumothorax, left   • Cytokine release syndrome, grade 1   • Esophageal rupture       Past Medical History:   Diagnosis Date   • Acute UTI (urinary tract infection) 2021   • Arthritis    • Borderline diabetes    • Coronary artery disease    • Diabetes mellitus (HCC)    • Elevated prostate specific antigen (PSA) 2020   • GERD (gastroesophageal reflux disease)    • Hypertension    • Left elbow pain        Past Surgical History:   Procedure Laterality Date   • COLONOSCOPY     • ENDOSCOPY N/A 2021    Procedure: ESOPHAGOGASTRODUODENOSCOPY WITH BIOPSY dilitation;  Surgeon: Bela Mcdaniel MD;  Location: [facility];  Service: Gastroenterology;  Laterality: N/A;   • ERCP N/A 2021    Procedure: ENDOSCOPIC RETROGRADE CHOLANGIOPANCREATOGRAPHY WITH STENT PLACEMENT;  Surgeon: Matthew Moreira MD;  Location: [facility] ENDOSCOPY;  Service: Gastroenterology;  Laterality: N/A;  with sphincterotomy, and brushing of common bile duct, and placement of [id number] wall stent     • PACEMAKER IMPLANTATION         PT ASSESSMENT (last 12 hours)     IRF PT Evaluation and Treatment     Row Name 22 1451          PT Time and Intention    Document Type daily treatment  -RG     Mode of Treatment physical therapy; individual therapy  -RG     Patient/Family/Caregiver Comments/Observations Pt stated that he was \"so tired that I just want to " "lay here for a while\" when therapy went to get him for therapy.  Pt was in agareement for supine exercises in his room.  -     Row Name 02/24/22 1451          General Information    Patient Profile Reviewed yes  -RG     Existing Precautions/Restrictions fall  -RG     Limitations/Impairments other (see comments)  medical complexity  -RG     Row Name 02/24/22 1451          Cognition/Psychosocial    Affect/Mental Status (Cognitive) WFL  -RG     Orientation Status (Cognition) oriented x 4  -RG     Follows Commands (Cognition) WFL  -RG     Personal Safety Interventions gait belt; nonskid shoes/slippers when out of bed; fall prevention program maintained  -RG     Cognitive Function (Cognitive) WFL  -RG     Row Name 02/24/22 1451          Pain Scale: FACES Pre/Post-Treatment    Pain: FACES Scale, Pretreatment 0-->no hurt  -RG     Posttreatment Pain Rating 0-->no hurt  -RG     Row Name 02/24/22 1451          Mobility    Extremity Weight-bearing Status right lower extremity; left lower extremity  -RG     Left Lower Extremity (Weight-bearing Status) full weight-bearing (FWB)  -RG     Right Lower Extremity (Weight-bearing Status) full weight-bearing (FWB)  -RG     Row Name 02/24/22 1451          Bed Mobility    Supine-Sit [place] (Bed Mobility) verbal cues; nonverbal cues (demo/gesture); minimum assist (75% patient effort); contact guard  -RG     Supine-Sit-Supine [place] (Bed Mobility) contact guard; standby assist  -RG     Bed Mobility, Safety Issues decreased use of legs for bridging/pushing; decreased use of arms for pushing/pulling  -RG     Assistive Device (Bed Mobility) bed rails; other (see comments)  manual assistance from therapist  -     Row Name 02/24/22 1451          Transfer Assessment/Treatment    Transfers sit-stand transfer; stand-sit transfer; bed-chair transfer; stand pivot/stand step transfer  -     Row Name 02/24/22 1451          Gait/Stairs (Locomotion)    [place] Level (Gait) contact " guard  -RG     Assistive Device (Gait) walker, front-wheeled  -RG     Pattern (Gait) step-through  -RG     Deviations/Abnormal Patterns (Gait) gait speed decreased  -RG     Bilateral Gait Deviations forward flexed posture  -RG     Comment (Gait/Stairs) did not attempt in am  -RG     Row Name 02/24/22 1451          Safety Issues, Functional Mobility    Impairments Affecting Function (Mobility) endurance/activity tolerance; strength; balance  -RG     Row Name 02/24/22 1451          Hip (Therapeutic Exercise)    Hip Strengthening (Therapeutic Exercise) bilateral; flexion; aBduction; aDduction; external rotation; internal rotation; heel slides; supine; 10 repetitions; 2 sets  -RG     Row Name 02/24/22 1451          Knee (Therapeutic Exercise)    Knee Strengthening (Therapeutic Exercise) bilateral; flexion; extension; heel slides; SAQ (short arc quad); supine; 10 repetitions; 2 sets  -RG     Row Name 02/24/22 1451          Ankle (Therapeutic Exercise)    Ankle Strengthening (Therapeutic Exercise) bilateral; dorsiflexion; plantarflexion; supine; 10 repetitions; 2 sets  -RG     Row Name 02/24/22 1451          Bed Mobility Goal 1 (PT-IRF)    Progress/Outcomes (Bed Mobility Goal 1, PT-IRF) goal ongoing  -RG     Row Name 02/24/22 1451          Transfer Goal 1 (PT-IRF)    Progress/Outcomes (Transfer Goal 1, PT-IRF) goal ongoing  -RG     Row Name 02/24/22 1451          Gait/Walking Locomotion Goal 1 (PT-IRF)    Progress/Outcomes (Gait/Walking Locomotion Goal 1, PT-IRF) goal ongoing  -RG     Row Name 02/24/22 1451          Positioning and Restraints    Pre-Treatment Position in bed  -RG     Post Treatment Position bed  -RG     In Bed notified nsg; supine; call light within reach; encouraged to call for assist; exit alarm on  -RG     Row Name 02/24/22 1451          Daily Progress Summary (PT)    Impairments Still Limiting Function (PT) strength decreased; impaired balance; impaired functional activity tolerance  -RG     Row Name  02/24/22 1451          Therapy Plan Review/Discharge Plan (PT)    Expected Discharge Disposition (PT Eval) home with caregiver; home with home health care  -           User Key  (r) = Recorded By, (t) = Taken By, (c) = Cosigned By    Initials Name Provider Type    RG Jose Martin Hernández PTA Physical Therapy Assistant              Wound 02/10/22 1557 midline abdomen Incision (Active)   Dressing Appearance open to air 02/24/22 0730   Closure Buster; Approximated 02/23/22 1930   Base clean; dry; pink; red 02/23/22 1930   Drainage Amount none 02/23/22 1930   Care, Wound other (see comments) 02/23/22 1930   Dressing Care open to air 02/24/22 0730       Wound 02/10/22 1602 Left upper back Incision (Active)   Dressing Appearance open to air 02/24/22 0730   Closure Liquid skin adhesive 02/23/22 1930   Base clean; dry 02/23/22 1930   Drainage Amount none 02/23/22 1930   Care, Wound other (see comments) 02/23/22 1930   Dressing Care open to air 02/24/22 0730     Physical Therapy Education                 Title: PT OT SLP Therapies (Done)     Topic: Physical Therapy (Done)     Point: Mobility training (Done)     Learning Progress Summary           Patient Acceptance, E,D, VU,NR by  at 2/24/2022 1456      Show all documentation for this point (18)                 Point: Home exercise program (Done)     Learning Progress Summary           Patient Acceptance, E,D, VU,NR by  at 2/24/2022 1456      Show all documentation for this point (18)                 Point: Body mechanics (Done)     Learning Progress Summary           Patient Acceptance, E,D, VU,NR by  at 2/24/2022 1456      Show all documentation for this point (18)                 Point: Precautions (Done)     Learning Progress Summary           Patient Acceptance, E,D, VU,NR by  at 2/24/2022 1456      Show all documentation for this point (18)                             User Key     Initials Effective Dates Name Provider Type Discipline    LENI 06/16/21 -  Edgar  ZACKARY Philippe Physical Therapy Assistant PT                PT Recommendation and Plan    Frequency of Treatment (PT): 5 times per week  Anticipated Equipment Needs at Discharge (PT Eval):  (tbd)  Daily Progress Summary (PT)  Impairments Still Limiting Function (PT): strength decreased, impaired balance, impaired functional activity tolerance               Time Calculation:      PT Charges     Row Name 02/24/22 1457             Time Calculation    Start Time 1000  -RG      Stop Time 1045  -RG      Time Calculation (min) 45 min  -RG      PT Received On 02/24/22  -RG              Time Calculation- PT    Total Timed Code Minutes- PT 45 minute(s)  -RG            User Key  (r) = Recorded By, (t) = Taken By, (c) = Cosigned By    Initials Name Provider Type    Jose Martin Jordan PTA Physical Therapy Assistant                Therapy Charges for Today     Code Description Service Date Service Provider Modifiers Qty    46294429753 HC GAIT TRAINING EA 15 MIN 2/23/2022 Jose Martin Hernández PTA GP, CQ 1    53385342350 HC PT THERAPEUTIC ACT EA 15 MIN 2/23/2022 Jose Martin Hernández PTA GP, CQ 1    54952891490 HC PT THER PROC EA 15 MIN 2/23/2022 Jose Martin Hernández PTA GP, CQ 1    75123637482 HC PT THERAPEUTIC ACT EA 15 MIN 2/24/2022 Joes Martin Hernández PTA GP, CQ 1    25886919835 HC PT THER PROC EA 15 MIN 2/24/2022 Jose Martin Hernández PTA GP, CQ 2                   Jose Martin Hernández PTA  2/24/2022

## 2022-02-25 ENCOUNTER — APPOINTMENT (OUTPATIENT)
Dept: GENERAL RADIOLOGY | Facility: HOSPITAL | Age: 77
End: 2022-02-25

## 2022-02-25 LAB
ANION GAP SERPL CALCULATED.3IONS-SCNC: 10.7 MMOL/L (ref 5–15)
BASOPHILS # BLD AUTO: 0.07 10*3/MM3 (ref 0–0.2)
BASOPHILS NFR BLD AUTO: 0.6 % (ref 0–1.5)
BUN SERPL-MCNC: 19 MG/DL (ref 8–23)
BUN/CREAT SERPL: 29.2 (ref 7–25)
CALCIUM SPEC-SCNC: 8.6 MG/DL (ref 8.6–10.5)
CHLORIDE SERPL-SCNC: 94 MMOL/L (ref 98–107)
CO2 SERPL-SCNC: 25.3 MMOL/L (ref 22–29)
CREAT SERPL-MCNC: 0.65 MG/DL (ref 0.76–1.27)
DEPRECATED RDW RBC AUTO: 42.5 FL (ref 37–54)
EOSINOPHIL # BLD AUTO: 0.1 10*3/MM3 (ref 0–0.4)
EOSINOPHIL NFR BLD AUTO: 0.8 % (ref 0.3–6.2)
ERYTHROCYTE [DISTWIDTH] IN BLOOD BY AUTOMATED COUNT: 13.3 % (ref 12.3–15.4)
GFR SERPL CREATININE-BSD FRML MDRD: 120 ML/MIN/1.73
GLUCOSE BLDC GLUCOMTR-MCNC: 272 MG/DL (ref 70–130)
GLUCOSE BLDC GLUCOMTR-MCNC: 304 MG/DL (ref 70–130)
GLUCOSE BLDC GLUCOMTR-MCNC: 361 MG/DL (ref 70–130)
GLUCOSE SERPL-MCNC: 348 MG/DL (ref 65–99)
HCT VFR BLD AUTO: 34.5 % (ref 37.5–51)
HGB BLD-MCNC: 10.6 G/DL (ref 13–17.7)
IMM GRANULOCYTES # BLD AUTO: 0.06 10*3/MM3 (ref 0–0.05)
IMM GRANULOCYTES NFR BLD AUTO: 0.5 % (ref 0–0.5)
LYMPHOCYTES # BLD AUTO: 1.7 10*3/MM3 (ref 0.7–3.1)
LYMPHOCYTES NFR BLD AUTO: 13.6 % (ref 19.6–45.3)
MCH RBC QN AUTO: 26.6 PG (ref 26.6–33)
MCHC RBC AUTO-ENTMCNC: 30.7 G/DL (ref 31.5–35.7)
MCV RBC AUTO: 86.7 FL (ref 79–97)
MONOCYTES # BLD AUTO: 1.46 10*3/MM3 (ref 0.1–0.9)
MONOCYTES NFR BLD AUTO: 11.7 % (ref 5–12)
NEUTROPHILS NFR BLD AUTO: 72.8 % (ref 42.7–76)
NEUTROPHILS NFR BLD AUTO: 9.1 10*3/MM3 (ref 1.7–7)
NRBC BLD AUTO-RTO: 0 /100 WBC (ref 0–0.2)
PLATELET # BLD AUTO: 341 10*3/MM3 (ref 140–450)
PMV BLD AUTO: 10.5 FL (ref 6–12)
POTASSIUM SERPL-SCNC: 4.9 MMOL/L (ref 3.5–5.2)
RBC # BLD AUTO: 3.98 10*6/MM3 (ref 4.14–5.8)
SODIUM SERPL-SCNC: 130 MMOL/L (ref 136–145)
WBC NRBC COR # BLD: 12.49 10*3/MM3 (ref 3.4–10.8)

## 2022-02-25 PROCEDURE — 71045 X-RAY EXAM CHEST 1 VIEW: CPT | Performed by: RADIOLOGY

## 2022-02-25 PROCEDURE — 63710000001 INSULIN DETEMIR PER 5 UNITS: Performed by: FAMILY MEDICINE

## 2022-02-25 PROCEDURE — 71045 X-RAY EXAM CHEST 1 VIEW: CPT

## 2022-02-25 PROCEDURE — 63710000001 INSULIN ASPART PER 5 UNITS: Performed by: FAMILY MEDICINE

## 2022-02-25 PROCEDURE — 99238 HOSP IP/OBS DSCHRG MGMT 30/<: CPT | Performed by: FAMILY MEDICINE

## 2022-02-25 PROCEDURE — 97535 SELF CARE MNGMENT TRAINING: CPT

## 2022-02-25 PROCEDURE — 25010000002 ENOXAPARIN PER 10 MG: Performed by: FAMILY MEDICINE

## 2022-02-25 PROCEDURE — 80048 BASIC METABOLIC PNL TOTAL CA: CPT | Performed by: FAMILY MEDICINE

## 2022-02-25 PROCEDURE — 82962 GLUCOSE BLOOD TEST: CPT

## 2022-02-25 PROCEDURE — 97530 THERAPEUTIC ACTIVITIES: CPT

## 2022-02-25 PROCEDURE — 85025 COMPLETE CBC W/AUTO DIFF WBC: CPT | Performed by: FAMILY MEDICINE

## 2022-02-25 RX ORDER — INSULIN ASPART 100 [IU]/ML
0-14 INJECTION, SOLUTION INTRAVENOUS; SUBCUTANEOUS
Qty: 15 ML | Refills: 12 | Status: SHIPPED | OUTPATIENT
Start: 2022-02-25

## 2022-02-25 RX ORDER — LANSOPRAZOLE
30 KIT DAILY
Qty: 300 ML | Refills: 1 | Status: SHIPPED | OUTPATIENT
Start: 2022-02-25 | End: 2022-12-27

## 2022-02-25 RX ORDER — FLUOCINONIDE 0.5 MG/G
1 CREAM TOPICAL EVERY 12 HOURS SCHEDULED
Qty: 1 BOTTLE | Refills: 2 | Status: SHIPPED | OUTPATIENT
Start: 2022-02-25 | End: 2022-03-11

## 2022-02-25 RX ORDER — OXYCODONE HYDROCHLORIDE 5 MG/1
5 TABLET ORAL EVERY 6 HOURS PRN
Qty: 20 TABLET | Refills: 0 | Status: SHIPPED | OUTPATIENT
Start: 2022-02-25 | End: 2022-03-02

## 2022-02-25 RX ORDER — ACETAMINOPHEN 160 MG/5ML
650 SOLUTION ORAL EVERY 6 HOURS PRN
Qty: 473 ML | Refills: 0 | Status: SHIPPED | OUTPATIENT
Start: 2022-02-25 | End: 2022-02-25

## 2022-02-25 RX ORDER — ACETAMINOPHEN 160 MG/5ML
650 SOLUTION ORAL EVERY 6 HOURS PRN
Qty: 473 ML | Refills: 0 | Status: SHIPPED | OUTPATIENT
Start: 2022-02-25 | End: 2022-03-11

## 2022-02-25 RX ADMIN — PANTOPRAZOLE SODIUM 40 MG: 40 INJECTION, POWDER, FOR SOLUTION INTRAVENOUS at 05:13

## 2022-02-25 RX ADMIN — INSULIN DETEMIR 15 UNITS: 100 INJECTION, SOLUTION SUBCUTANEOUS at 20:24

## 2022-02-25 RX ADMIN — INSULIN ASPART 10 UNITS: 100 INJECTION, SOLUTION INTRAVENOUS; SUBCUTANEOUS at 11:45

## 2022-02-25 RX ADMIN — PANCRELIPASE 12000 UNITS OF LIPASE: 60000; 12000; 38000 CAPSULE, DELAYED RELEASE PELLETS ORAL at 08:27

## 2022-02-25 RX ADMIN — PANCRELIPASE 12000 UNITS OF LIPASE: 60000; 12000; 38000 CAPSULE, DELAYED RELEASE PELLETS ORAL at 17:03

## 2022-02-25 RX ADMIN — INSULIN ASPART 12 UNITS: 100 INJECTION, SOLUTION INTRAVENOUS; SUBCUTANEOUS at 08:26

## 2022-02-25 RX ADMIN — BETAMETHASONE DIPROPIONATE: 0.5 CREAM TOPICAL at 08:28

## 2022-02-25 RX ADMIN — PANCRELIPASE 12000 UNITS OF LIPASE: 60000; 12000; 38000 CAPSULE, DELAYED RELEASE PELLETS ORAL at 11:45

## 2022-02-25 RX ADMIN — BETAMETHASONE DIPROPIONATE: 0.5 CREAM TOPICAL at 20:24

## 2022-02-25 RX ADMIN — METOPROLOL TARTRATE 25 MG: 25 TABLET ORAL at 20:20

## 2022-02-25 RX ADMIN — ENOXAPARIN SODIUM 40 MG: 40 INJECTION SUBCUTANEOUS at 17:03

## 2022-02-25 RX ADMIN — INSULIN ASPART 8 UNITS: 100 INJECTION, SOLUTION INTRAVENOUS; SUBCUTANEOUS at 16:53

## 2022-02-25 RX ADMIN — METOPROLOL TARTRATE 25 MG: 25 TABLET ORAL at 08:27

## 2022-02-25 NOTE — THERAPY DISCHARGE NOTE
Inpatient Rehabilitation - Physical Therapy Treatment Note/Discharge   [place]     Patient Name: Sandro Argueta  : 1945  MRN: [id number]  Today's Date: 2022                Admit Date: 2/10/2022    Visit Dx:    ICD-10-CM ICD-9-CM   1. Esophageal rupture  K22.3 530.4     Patient Active Problem List   Diagnosis   • Left elbow pain   • Hypertension   • DM2 (diabetes mellitus, type 2) (HCC)   • Elevated prostate specific antigen (PSA)   • Epigastric pain   • Epigastric abdominal pain   • Elevated liver enzymes   • Weight loss, abnormal   • Acute hepatitis   • Anemia   • Obstructive jaundice   • Pneumothorax, left   • Cytokine release syndrome, grade 1   • Esophageal rupture     Past Medical History:   Diagnosis Date   • Acute UTI (urinary tract infection) 2021   • Arthritis    • Borderline diabetes    • Coronary artery disease    • Diabetes mellitus (HCC)    • Elevated prostate specific antigen (PSA) 2020   • GERD (gastroesophageal reflux disease)    • Hypertension    • Left elbow pain      Past Surgical History:   Procedure Laterality Date   • COLONOSCOPY     • ENDOSCOPY N/A 2021    Procedure: ESOPHAGOGASTRODUODENOSCOPY WITH BIOPSY dilitation;  Surgeon: Bela Mcdaniel MD;  Location: [facility];  Service: Gastroenterology;  Laterality: N/A;   • ERCP N/A 2021    Procedure: ENDOSCOPIC RETROGRADE CHOLANGIOPANCREATOGRAPHY WITH STENT PLACEMENT;  Surgeon: Matthew Moreira MD;  Location: [facility] ENDOSCOPY;  Service: Gastroenterology;  Laterality: N/A;  with sphincterotomy, and brushing of common bile duct, and placement of [id number] wall stent     • PACEMAKER IMPLANTATION         PT ASSESSMENT (last 12 hours)     IRF PT Evaluation and Treatment     Row Name 22 1423          PT Time and Intention    Document Type discharge evaluation  -HR     Mode of Treatment physical therapy; individual therapy  -HR     Patient/Family/Caregiver Comments/Observations Pt to discharge  on this date, education for home saftey and HEP.  -HR     Row Name 02/25/22 1423          General Information    Patient Profile Reviewed yes  -HR     Existing Precautions/Restrictions fall  -HR     Limitations/Impairments other (see comments)  medical complexity  -HR     Row Name 02/25/22 1423          Cognition/Psychosocial    Affect/Mental Status (Cognitive) WFL  -HR     Orientation Status (Cognition) oriented x 4  -HR     Follows Commands (Cognition) WFL  -HR     Cognitive Function (Cognitive) WFL  -HR     Row Name 02/25/22 1423          Pain Scale: FACES Pre/Post-Treatment    Pain: FACES Scale, Pretreatment 0-->no hurt  -HR     Posttreatment Pain Rating 0-->no hurt  -HR     Row Name 02/25/22 1423          Mobility    Extremity Weight-bearing Status right lower extremity; left lower extremity  -HR     Left Lower Extremity (Weight-bearing Status) full weight-bearing (FWB)  -HR     Right Lower Extremity (Weight-bearing Status) full weight-bearing (FWB)  -HR     Row Name 02/25/22 1423          Bed Mobility    Supine-Sit [place] (Bed Mobility) verbal cues; nonverbal cues (demo/gesture); minimum assist (75% patient effort); contact guard  -HR     Supine-Sit-Supine [place] (Bed Mobility) contact guard; standby assist  -HR     Bed Mobility, Safety Issues decreased use of legs for bridging/pushing; decreased use of arms for pushing/pulling  -HR     Assistive Device (Bed Mobility) bed rails; other (see comments)  manual assistance from therapist  -HR     Row Name 02/25/22 1423          Transfer Assessment/Treatment    Transfers sit-stand transfer; stand-sit transfer; bed-chair transfer; stand pivot/stand step transfer  -HR     Row Name 02/25/22 1423          Transfers    Bed-Chair [place] (Transfers) verbal cues; contact guard  -HR     Chair-Bed [place] (Transfers) contact guard  -HR     Assistive Device (Bed-Chair Transfers) wheelchair  -HR     Sit-Stand [place] (Transfers) standby assist;  verbal cues; nonverbal cues (demo/gesture); contact guard  -HR     Stand-Sit [place] (Transfers) standby assist; verbal cues; nonverbal cues (demo/gesture)  -HR     Row Name 02/25/22 1423          Chair-Bed Transfer    Assistive Device (Chair-Bed Transfers) wheelchair  -HR     Row Name 02/25/22 1423          Sit-Stand Transfer    Assistive Device (Sit-Stand Transfers) walker, front-wheeled  -HR     Row Name 02/25/22 1423          Stand Pivot/Stand Step Transfer    Stand Pivot/Stand Step [place] (Transfers) contact guard; verbal cues; nonverbal cues (demo/gesture)  -HR     Assistive Device (Stand Pivot Stand Step Transfer) wheelchair  -HR     Row Name 02/25/22 1423          Gait/Stairs (Locomotion)    [place] Level (Gait) contact guard  -HR     Assistive Device (Gait) walker, front-wheeled  -HR     Pattern (Gait) step-through  -HR     Deviations/Abnormal Patterns (Gait) gait speed decreased  -HR     Bilateral Gait Deviations forward flexed posture  -HR     [place] Level (Stairs) contact guard  -HR     Handrail Location (Stairs) both sides  -HR     Ascending Technique (Stairs) step-over-step  -HR     Descending Technique (Stairs) step-over-step  -HR     Row Name 02/25/22 1423          Safety Issues, Functional Mobility    Impairments Affecting Function (Mobility) endurance/activity tolerance; strength; balance  -HR     Row Name 02/25/22 1423          Bed Mobility Goal 1 (PT-IRF)    Progress/Outcomes (Bed Mobility Goal 1, PT-IRF) goal partially met; good progress toward goal  -HR     Row Name 02/25/22 1423          Transfer Goal 1 (PT-IRF)    Progress/Outcomes (Transfer Goal 1, PT-IRF) goal partially met; good progress toward goal  -HR     Row Name 02/25/22 1423          Gait/Walking Locomotion Goal 1 (PT-IRF)    Progress/Outcomes (Gait/Walking Locomotion Goal 1, PT-IRF) goal partially met  -HR     Row Name 02/25/22 1423          Positioning and Restraints    Pre-Treatment Position in bed  -HR      Post Treatment Position bed  -HR     In Bed supine; call light within reach; encouraged to call for assist; exit alarm on  -HR     Row Name 02/25/22 1423          Daily Progress Summary (PT)    Daily Progress Summary (PT) Pt education on this date for home saftey and HEP. Pt demonstrates a good understanding of HEP.  -HR     Impairments Still Limiting Function (PT) strength decreased; impaired balance; impaired functional activity tolerance  -HR     Row Name 02/25/22 1423          Therapy Plan Review/Discharge Plan (PT)    Expected Discharge Disposition (PT Eval) home with caregiver; home with home health care  -HR           User Key  (r) = Recorded By, (t) = Taken By, (c) = Cosigned By    Initials Name Provider Type    HR Reina Pat PTA Physical Therapy Assistant                Physical Therapy Education                 Title: PT OT SLP Therapies (Resolved)     Topic: Physical Therapy (Resolved)     Point: Mobility training (Resolved)     Learning Progress Summary           Patient Acceptance, E, VU by HR at 2/25/2022 1429      Show all documentation for this point (20)                 Point: Home exercise program (Resolved)     Learning Progress Summary           Patient Acceptance, E, VU by HR at 2/25/2022 1429      Show all documentation for this point (20)                 Point: Body mechanics (Resolved)     Learning Progress Summary           Patient Acceptance, E, VU by HR at 2/25/2022 1429      Show all documentation for this point (20)                 Point: Precautions (Resolved)     Learning Progress Summary           Patient Acceptance, E, VU by HR at 2/25/2022 1429      Show all documentation for this point (20)                             User Key     Initials Effective Dates Name Provider Type Discipline    HR 01/14/22 -  Reina Pat PTA Physical Therapy Assistant PT                PT Recommendation and Plan        Daily Progress Summary (PT)  Daily Progress Summary (PT): Pt education on this  date for home saftey and HEP. Pt demonstrates a good understanding of HEP.  Impairments Still Limiting Function (PT): strength decreased, impaired balance, impaired functional activity tolerance         Time Calculation:    PT Charges     Row Name 02/25/22 1430             Time Calculation    PT Received On 02/25/22  -HR              Time Calculation- PT    Total Timed Code Minutes- PT 10 minute(s)  -HR            User Key  (r) = Recorded By, (t) = Taken By, (c) = Cosigned By    Initials Name Provider Type    HR Reina Pat PTA Physical Therapy Assistant                Therapy Charges for Today     Code Description Service Date Service Provider Modifiers Qty    02833424637 HC GAIT TRAINING EA 15 MIN 2/24/2022 Reina Pat PTA GP, CQ 2    49080827928 HC PT THER PROC EA 15 MIN 2/24/2022 Reina Pat PTA GP, CQ 1    21569394916 HC PT THERAPEUTIC ACT EA 15 MIN 2/25/2022 Reina Pat PTA GP, CQ 1                    Reina Pat PTA  2/25/2022

## 2022-02-25 NOTE — THERAPY DISCHARGE NOTE
Inpatient Rehabilitation - IRF Occupational Therapy   /Discharge  [facility]     Patient Name: Sandro Argueta  : 1945  MRN: [id number]  Today's Date: 2022               Admit Date: 2/10/2022       ICD-10-CM ICD-9-CM   1. Esophageal rupture  K22.3 530.4     Patient Active Problem List   Diagnosis   • Left elbow pain   • Hypertension   • DM2 (diabetes mellitus, type 2) (HCC)   • Elevated prostate specific antigen (PSA)   • Epigastric pain   • Epigastric abdominal pain   • Elevated liver enzymes   • Weight loss, abnormal   • Acute hepatitis   • Anemia   • Obstructive jaundice   • Pneumothorax, left   • Cytokine release syndrome, grade 1   • Esophageal rupture     Past Medical History:   Diagnosis Date   • Acute UTI (urinary tract infection) 2021   • Arthritis    • Borderline diabetes    • Coronary artery disease    • Diabetes mellitus (HCC)    • Elevated prostate specific antigen (PSA) 2020   • GERD (gastroesophageal reflux disease)    • Hypertension    • Left elbow pain      Past Surgical History:   Procedure Laterality Date   • COLONOSCOPY     • ENDOSCOPY N/A 2021    Procedure: ESOPHAGOGASTRODUODENOSCOPY WITH BIOPSY dilitation;  Surgeon: Bela Mcdaniel MD;  Location: [facility];  Service: Gastroenterology;  Laterality: N/A;   • ERCP N/A 2021    Procedure: ENDOSCOPIC RETROGRADE CHOLANGIOPANCREATOGRAPHY WITH STENT PLACEMENT;  Surgeon: Matthew Moreira MD;  Location: [facility] ENDOSCOPY;  Service: Gastroenterology;  Laterality: N/A;  with sphincterotomy, and brushing of common bile duct, and placement of [id number] wall stent     • PACEMAKER IMPLANTATION         IRF OT ASSESSMENT FLOWSHEET (last 12 hours)     IRF OT Evaluation and Treatment     Row Name 22 1206          OT Time and Intention    Document Type discharge evaluation  -CJ     Mode of Treatment occupational therapy  -CJ     Row Name 22 1206          General Information    Existing  Precautions/Restrictions fall  Kamari drain, G and J tubes, decreased skin integrity  -[facility] Name 02/25/22 1206          Vision Assessment/Intervention    Visual Impairment/Limitations corrective lenses for reading  -[facility] Name 02/25/22 1206          Cognition/Psychosocial    Follows Commands (Cognition) WFL  [facility] Name 02/25/22 1206          Range of Motion Comprehensive    General Range of Motion bilateral upper extremity ROM WFL  -[facility] Name 02/25/22 1206          Upper Extremity (Manual Muscle Testing)    Comment, MMT: Upper Extremity BUE - deferred  -[facility] Name 02/25/22 1206          Transfers    Clark Level (Toilet Transfer) contact guard; verbal cues  -     Assistive Device (Toilet Transfer) grab bars/safety frame; raised toilet seat  -[facility] Name 02/25/22 1206          Bathing    Comment (Bathing) Min/ModA  -CJ     Row Name 02/25/22 1206          Upper Body Dressing    Comment (Upper Body Dressing) Set up  -[facility] 02/25/22 1206          Lower Body Dressing    Comment (Lower Body Dressing) Jonny  [facility] Name 02/25/22 1206          Grooming    Comment (Grooming) Set up  -[facility] Name 02/25/22 1206          Toileting    Comment (Toileting) Jonny  [facility] Name 02/25/22 1206          Self-Feeding    Comment (Self-Feeding) Dependent - tube feeding  -[facility] Name 02/25/22 1206          LB Dressing Goal 1 (OT-IRF)    Progress/Outcomes (LB Dressing Goal 1, OT-IRF) goal met  -CJ     Row Name 02/25/22 1206          LB Dressing Goal 2 (OT-IRF)    Progress/Outcomes (LB Dressing Goal 2, OT-IRF) goal met  -CJ     Row Name 02/25/22 1206          Toileting Goal 1 (OT-IRF)    Progress/Outcomes (Toileting Goal 1, OT-IRF) goal met  -[facility] Name 02/25/22 1206          Toileting Goal 2 (OT-IRF)    Progress/Outcomes (Toileting Goal 2, OT-IRF) goal met  -CJ     Row Name 02/25/22 1206          Positioning and Restraints    Post Treatment Position bed  -     In Bed  call light within reach; encouraged to call for assist; exit alarm on; notified nsg; with family/caregiver  -     Row Name 02/25/22 1206          Discharge Summary (OT)    Additional Documentation Discharge Summary (OT) (Group)  -MELECIO     Row Name 02/25/22 1206          Discharge Summary (OT)    Discharge Summary Statement (OT) Education provided re:  ADL status, safety, DME, home program, supervision, precautions and D/C concerns.  Verbalized understanding.  -           User Key  (r) = Recorded By, (t) = Taken By, (c) = Cosigned By    Initials Name Effective Dates     Elena Pisano LEONARDO, OT 06/16/21 -               Wound 02/10/22 1557 midline abdomen Incision (Active)   Dressing Appearance open to air 02/25/22 0730   Closure Buster; Approximated 02/24/22 2109   Base clean; dry; pink; red 02/24/22 2109   Dressing Care open to air 02/25/22 0730       Wound 02/10/22 1602 Left upper back Incision (Active)   Dressing Appearance open to air 02/25/22 0730   Dressing Care open to air 02/25/22 0730       Occupational Therapy Education                 Title: PT OT SLP Therapies (Done)     Topic: Occupational Therapy (Resolved)     Point: ADL training (Resolved)     Description:   Instruct learner(s) on proper safety adaptation and remediation techniques during self care or transfers.   Instruct in proper use of assistive devices.              Learning Progress Summary           Patient Acceptance, E,D,H, VU by  at 2/25/2022 1403   Family Acceptance, E,D,H, VU by  at 2/25/2022 1403      Show all documentation for this point (13)                 Point: Home exercise program (Resolved)     Description:   Instruct learner(s) on appropriate technique for monitoring, assisting and/or progressing therapeutic exercises/activities.              Learning Progress Summary           Patient Acceptance, E,D,H, VU by MELECIO at 2/25/2022 1403   Family Acceptance, E,D,H, VU by  at 2/25/2022 1403                   Point: Precautions  (Resolved)     Description:   Instruct learner(s) on prescribed precautions during self-care and functional transfers.              Learning Progress Summary           Patient Acceptance, E,D,H, VU by  at 2/25/2022 1403   Family Acceptance, E,D,H, VU by  at 2/25/2022 1403      Show all documentation for this point (13)                             User Key     Initials Effective Dates Name Provider Type [facility] 06/16/21 -  Elena Pisano OT Occupational Therapist OT                OT Recommendation and Plan  Anticipated Discharge Disposition (OT): home with home health, home with [address] [place] [address]  Planned Therapy Interventions (OT): activity tolerance training, adaptive equipment training, BADL retraining, ROM/therapeutic exercise, strengthening exercise, transfer/mobility retraining, patient/caregiver education/training           OT IRF GOALS     Row Name 02/25/22 1206 02/21/22 1508 02/21/22 1452       LB Dressing Goal 1 (OT-IRF)    Reynolds (LB Dressing Goal 1, OT-IRF) -- minimum assist (75% or more patient effort)  - moderate assist (50-74% patient effort)  -    Time Frame (LB Dressing Goal 1, OT-IRF) -- short-term goal (STG)  - --    Progress/Outcomes (LB Dressing Goal 1, OT-IRF) goal met  - -- goal met  -       LB Dressing Goal 2 (OT-IRF)    Progress/Outcomes (LB Dressing Goal 2, OT-IRF) goal met  - -- --       Toileting Goal 1 (OT-IRF)    Reynolds Level (Toileting Goal 1, OT-IRF) -- -- moderate assist (50-74% patient effort)  -    Progress/Outcomes (Toileting Goal 1, OT-IRF) goal met  - -- goal met  -       Toileting Goal 2 (OT-IRF)    Progress/Outcomes (Toileting Goal 2, OT-IRF) goal met  - -- --          User Key  (r) = Recorded By, (t) = Taken By, (c) = Cosigned By    Initials Name Provider Type     Elena Pisano OT Occupational Therapist                    Time Calculation:       Therapy Charges for Today     Code Description Service Date Service Provider Modifiers  Qty    35476780371 HC OT SELF CARE/MGMT/TRAIN EA 15 MIN 2/24/2022 Elena Pisano OT GO 1    73308792179 HC OT THERAPEUTIC ACT EA 15 MIN 2/24/2022 Elena Pisano OT GO 2    14503312848 HC OT THER PROC EA 15 MIN 2/24/2022 Elena Pisano OT GO 3    43781547092 HC OT SELF CARE/MGMT/TRAIN EA 15 MIN 2/25/2022 Elena Pisano OT GO 1               OT Discharge Summary  Anticipated Discharge Disposition (OT): home with home health, home with 24/7 care  Reason for Discharge: Discharge from facility  Discharge Destination: Home with home health, Home with assist    Elena Pisano OT  2/25/2022

## 2022-02-25 NOTE — PROGRESS NOTES
Occupational Therapy:    Physical Therapy: Individual: 10 minutes.    Speech Language Pathology:    Signed by: Reina Pat PTA

## 2022-02-25 NOTE — SIGNIFICANT NOTE
02/25/22 1411   OTHER   Discipline occupational therapist   Rehab Time/Intention   Session Not Performed   (Pt was scheduled for D/C on this date, however, D/C postponed per RN.)

## 2022-02-25 NOTE — PLAN OF CARE
Goal Outcome Evaluation:  Plan of Care Reviewed With: patient        Progress: improving   Pt progressing with therapies, cont POC.

## 2022-02-25 NOTE — DISCHARGE SUMMARY
[facility] DISCHARGE SUMMARY    Patient Identification:  Name:  Sandro Argueta  Age:  76 y.o.  Sex:  male  :  1945  MRN:  [id number]  Visit Number:  [id number]    Date of Admission: 2/10/2022  Date of Discharge:  22     PCP: Misti Nguyễn MD    DISCHARGE DIAGNOSIS  Debility after recent esophageal perforation which necessitated esophageal repair, esophageal stent placement, decortication of the lungs as well as Kamari chest tube.    Nutrition--J-tube feeds  Hyponatremia  Diabetes mellitus  Hypertension    CONSULTS   Pulmonology--Dr Vu Koch    PROCEDURES PERFORMED      HOSPITAL COURSE  Patient is a 76 y.o. male presented to [facility] inpatient physical rehab in transfer from the [facility].  Patient 76-year-old gentleman with a history of hypertension, CAD, diabetes mellitus, GERD.  Approximately year ago did have Whipple surgery because of high suspicion of pancreatic cancer.  This was negative for cancer on biopsy.  Patient had been doing reasonably well up until having severe vomiting and then developed an esophageal perforation.  Patient did have spillage of GI contents into the thoracic cavity.  Patient was transferred [facility] where he did receive broad-spectrum IV antibiotics and micafungin.  Patient did have decortication, esophageal repair and eventual esophageal stent placement.  Kamari chest tube still in place at this time.  Patient also did have PEG and J-tube placed and is currently on J-tube feedings.  Patient was sent for further treatment of debility.  Patient will also did also complete his micafungin course while in inpatient rehab.    Debility.  --- At the time of admission patient required minimum assistance for transfers.  Moderate assistance for for bathing; minimum assistance for upper body dressing; moderate to maximum assist for lower body dressing;.  Set up for grooming; moderate to maximal assist for  toileting.  At the time of admission, patient was able to walk 80 feet with front wheel walker.  Did work on balance issues as well as strengthening during the admission.  At the time of discharge, require contact-guard for bed mobility; contact-guard for some transfers.  Ambulated 160 feet times 2 in the morning with rolling walker and contact-guard.  Patient has improved with his ADLs as well.    History of esophageal perforation with esophageal stent.  Patient still has Kamari tube in place.  Did repeat CT scan and discussed the case with his thoracic surgeon at [facility].  She felt that things had improved she was able to view the CT scan as well.  He has been afebrile throughout the course.  Did complete his course of micafungin during admission as well..  Patient will follow up with [facility] thoracic surgery service next week    Nutrition--continue J-tube feedings.  Patient it was noted to have some weight loss and we did increase feedings.  Have held free water flushes secondary to hyponatremia during admission per nephrology recommendations.    Hyponatremia has improved with fluid restriction over the last couple of days.  Patient will have BMP repeated on Monday per home health and also on Wednesday per home health and then will follow up with nephrology on Thursday of next week.  Did place patient on free water 25 cc/h yesterday.  Patient has tolerated well and sodium is 132 at the time of discharge.  Hopefully can titrate up free water administration.  Home health and nephrology will be following as an outpatient    Diabetes mellitus--at times have had some elevated blood sugars continue Lantus and sliding scale coverage at discharge.  Patient is to increase Toujeo to 20 units nightly    Hypertension fair control      VITAL SIGNS:  Temp:  [98.1 °F (36.7 °C)-98.6 °F (37 °C)] 98.6 °F (37 °C)  Heart Rate:  [] 98  Resp:  [20] 20  BP: (128-142)/(62-78) 142/78  SpO2:  [93 %] 93 %   on   ;   Device (Oxygen Therapy): room air    Body mass index is 17.53 kg/m².  Wt Readings from Last 3 Encounters:   02/25/22 63.2 kg (139 lb 6.4 oz)   01/22/22 75.9 kg (167 lb 4.8 oz)   12/29/21 79.4 kg (175 lb)       PHYSICAL EXAM:  Constitutional: No acute distress  HEENT: Normocephalic atraumatic  Neck: Supple  Cardiovascular: Regular rate and rhythm that murmurs rubs gallops  Pulmonary/Chest: Clear to auscultation; Kamari tube in place  Abdominal:  .  Positive bowel sounds soft; G-tube and J-tube in place  Musculoskeletal: No arthropathy  Neurological: No focal deficits  Skin: No rash  Peripheral vascular:  Genitourinary::    DISCHARGE DISPOSITION   Stable    DISCHARGE MEDICATIONS:     Discharge Medications      New Medications      Instructions Start Date   acetaminophen 160 MG/5ML solution  Commonly known as: TYLENOL   650 mg, Per J Tube, Every 6 Hours PRN      betamethasone augmented + lubriderm   1 application, Topical, Every 12 Hours Scheduled      lansoprazole 3 MG/ML suspension oral suspension   30 mg, Per J Tube, Daily      metoprolol tartrate 25 MG tablet  Commonly known as: LOPRESSOR   25 mg, Per J Tube, Every 12 Hours Scheduled      NovoLOG FlexPen 100 UNIT/ML solution pen-injector sc pen  Generic drug: insulin aspart   0-14 Units, Subcutaneous, 3 Times Daily Before Meals, Blood glucose 150-199 mg/dL - 3 units, Blood glucose 200-249 mg/dL - 5 units, Blood glucose 250-299 mg/dL - 8 units, Blood glucose 300-349 mg/dL - 10 units, Blood glucose 350-400 mg/dL - 12 units, Blood glucose greater than 400 mg/dL - 14 units and call provider      oxyCODONE 5 MG immediate release tablet  Commonly known as: ROXICODONE   5 mg, Per J Tube, Every 6 Hours PRN      pancrelipase (Lip-Prot-Amyl) 66747-91837 units capsule delayed-release particles capsule  Commonly known as: CREON  Replaces: Pancrelipase (Lip-Prot-Amyl) 09337-818030 units capsule delayed-release particles capsule   12,000 units of lipase, Per J Tube, 3  Times Daily With Meals         Continue These Medications      Instructions Start Date   Insulin Glargine (1 Unit Dial) 300 UNIT/ML solution pen-injector injection  Commonly known as: TOUJEO   15 Units, Subcutaneous, Daily         Stop These Medications    amLODIPine 5 MG tablet  Commonly known as: NORVASC     omeprazole 40 MG capsule  Commonly known as: priLOSEC     Pancrelipase (Lip-Prot-Amyl) 86064-076642 units capsule delayed-release particles capsule  Commonly known as: CREON  Replaced by: pancrelipase (Lip-Prot-Amyl) 12105-34527 units capsule delayed-release particles capsule             Your medication list      START taking these medications      Instructions Last Dose Given Next Dose Due   acetaminophen 160 MG/5ML solution  Commonly known as: TYLENOL      Administer 20.3 mL per J tube Every 6 (Six) Hours As Needed for Mild Pain  for up to 14 days.       betamethasone augmented + lubriderm      Apply 1 application topically to the appropriate area as directed Every 12 (Twelve) Hours for 14 days.       lansoprazole 3 MG/ML suspension oral suspension      Administer 10 mL per J tube Daily.       metoprolol tartrate 25 MG tablet  Commonly known as: LOPRESSOR      Administer 1 tablet per J tube Every 12 (Twelve) Hours for 30 days.       NovoLOG FlexPen 100 UNIT/ML solution pen-injector sc pen  Generic drug: insulin aspart      Inject 0-14 Units under the skin into the appropriate area as directed 3 (Three) Times a Day Before Meals. Blood glucose 150-199 mg/dL - 3 units, Blood glucose 200-249 mg/dL - 5 units, Blood glucose 250-299 mg/dL - 8 units, Blood glucose 300-349 mg/dL - 10 units, Blood glucose 350-400 mg/dL - 12 units, Blood glucose greater than 400 mg/dL - 14 units and call provider       oxyCODONE 5 MG immediate release tablet  Commonly known as: ROXICODONE      Administer 1 tablet per J tube Every 6 (Six) Hours As Needed for Moderate Pain  for up to 5 days.       pancrelipase (Lip-Prot-Amyl) 68046-16340  units capsule delayed-release particles capsule  Commonly known as: CREON  Replaces: Pancrelipase (Lip-Prot-Amyl) 47861-681516 units capsule delayed-release particles capsule      Administer 1 capsule per J tube 3 (Three) Times a Day With Meals for 30 days.          CONTINUE taking these medications      Instructions Last Dose Given Next Dose Due   Insulin Glargine (1 Unit Dial) 300 UNIT/ML solution pen-injector injection  Commonly known as: TOUJEO      Inject 15 Units under the skin into the appropriate area as directed Daily.          STOP taking these medications    amLODIPine 5 MG tablet  Commonly known as: NORVASC        omeprazole 40 MG capsule  Commonly known as: priLOSEC        Pancrelipase (Lip-Prot-Amyl) 55826-883789 units capsule delayed-release particles capsule  Commonly known as: CREON  Replaced by: pancrelipase (Lip-Prot-Amyl) 76541-80308 units capsule delayed-release particles capsule              Where to Get Your Medications      These medications were sent to [facility] [place], [place] - [address] AT [facility] [address] [facility] [phone] [place] [phone]   [address] [place] [address]    Phone: [phone]   · acetaminophen 160 MG/5ML solution  · lansoprazole 3 MG/ML suspension oral suspension  · metoprolol tartrate 25 MG tablet  · NovoLOG FlexPen 100 UNIT/ML solution pen-injector sc pen  · oxyCODONE 5 MG immediate release tablet  · pancrelipase (Lip-Prot-Amyl) 56313-77273 units capsule delayed-release particles capsule     You can get these medications from any pharmacy    Bring a paper prescription for each of these medications  · betamethasone augmented + lubriderm         Diet Instructions     Diet: Tube Feeding; Continuous; nutren 1.5 65ml/hr with auto h2o flush 40ml/hr(h20 flush has been on hold til na increases)      Discharge Diet: Tube Feeding    Feeding Type: Continuous    Formula & Rate: nutren 1.5 65ml/hr with auto h2o flush 40ml/hr(h20  flush has been on hold til na increases)        Future Appointments   Date Time Provider Department Center   3/31/2022 10:00 AM Bela Mcdaniel MD [facility]     Additional Instructions for the Follow-ups that You Need to Schedule     Ambulatory Referral to Home Health   As directed      Face to Face Visit Date: 2/25/2022    Follow-up provider for Plan of Care?: I treated the patient in an acute care facility and will not continue treatment after discharge.    Follow-up provider: MISTI NGUYỄN [[id number]]    Reason/Clinical Findings: esophageal perforation/chest tube/j tube    Describe mobility limitations that make leaving home difficult: debility from severe illness    Nursing/Therapeutic Services Requested: Skilled Nursing Physical Therapy Occupational Therapy    Skilled nursing orders: Medication education (needs bmp on monday and wednesday next week--hyponatremia) Wound care dressing/changes Other    Instructions: j tube feeding; empty mart drain bid; empty g tube, drain into gravity    PT orders: Therapeutic exercise Gait Training Transfer training Strengthening Home safety assessment    Weight Bearing Status: As Tolerated    Occupational orders: Activities of daily living Strengthening Home safety assessment    Frequency: 1 Week 1         Discharge Follow-up with PCP   As directed       Currently Documented PCP:    Misti Nguyễn MD    PCP Phone Number:    [phone]     Follow Up Details: 2weeks         Discharge Follow-up with Specified Provider: Dr Koch next thursday   As directed      To: Dr Koch next thursday    Follow Up Details: hyponatremia         Discharge Follow-up with Specified Provider: [facility] thoracic surgery on wednesday of next week   As directed      To: [facility] thoracic surgery on wednesday of next week    Follow Up Details: f/u of chest tube/esophageal perforation wth stent placement            Contact information for follow-up providers     Misti Nguyễn MD. Go on  3/8/2022.    Specialty: Geriatric Medicine  Why: 9:30 AM for hospital follow up  Contact information:  [id number]  [facility] [address]  [phone]             Kassy Choudhury MD. Go on 3/2/2022.    Specialty: Thoracic Surgery  Why: at 1:45 PM for X-Ray, then 2:30 PM for appointment.  This is located on the 2nd floor of [facility].  Contact information:  [address]  SECOND FLOOR  [facility] [address]  [phone]             Luis Carlos Koch MD. Go on 3/3/2022.    Specialty: Nephrology  Why: 12:00 PM for nephrology follow up  Contact information:  [address] [facility] [address]  [phone]             Misti Nguyễn MD .    Specialty: Geriatric Medicine  Why: 2weeks  Contact information:  [id number]  [facility] [address]  [phone]                   Contact information for after-discharge care     Durable Medical Equipment     [facility] .    Service: Durable Medical Equipment  Contact information:  [address] [facility] [address]  [phone]                 Home Medical Care     [facility] .    Services: Home Health Services, Home Rehabilitation, Home Nursing  Contact information:  [address] [place] [address]  [phone]                              TEST  RESULTS PENDING AT DISCHARGE       CODE STATUS  Code Status and Medical Interventions:   Ordered at: 02/10/22 1557     Level Of Support Discussed With:    Patient     Code Status (Patient has no pulse and is not breathing):    CPR (Attempt to Resuscitate)     Medical Interventions (Patient has pulse or is breathing):    Full Support       Elder Alexis MD  [facility]  02/25/22  11:32 EST    Please note that this discharge summary required less than 30 minutes to complete.

## 2022-02-25 NOTE — PROGRESS NOTES
Occupational Therapy: Individual: 15 minutes.    Physical Therapy:    Speech Language Pathology:    Signed by: Elena Pisano, Occupational Therapist

## 2022-02-25 NOTE — PROGRESS NOTES
Patient Assessment Instrument  Quality Indicators - Discharge    Section GG. Self-Care Performance      Section GG. Mobility Performance     Roll Left and Right: Patient completed the activities by him/herself with no  assistance from a helper.   Sit to Lying: [place] sets up or cleans up; patient completes activity. [place]  assists only prior to or following the activity.   Lying to Sitting on Side of Bed: [place] provides verbal cues and/or  touching/steadying and/or contact guard assistance as patient completes  activity. Assistance may be provided throughout the activity or intermittently.   Sit to Stand: [place] provides verbal cues and/or touching/steadying and/or  contact guard assistance as patient completes activity. Assistance may be  provided throughout the activity or intermittently.   Chair/Bed to Chair Transfer: [place] provides verbal cues and/or  touching/steadying and/or contact guard assistance as patient completes  activity. Assistance may be provided throughout the activity or intermittently.   Toilet Transfer [place] provides verbal cues and/or touching/steadying and/or  contact guard assistance as patient completes activity. Assistance may be  provided throughout the activity or intermittently.   Car Transfer: Not attempted due to medical or safety concerns.   Walk 10 Feet:   [place] provides verbal cues and/or touching/steadying and/or  contact guard assistance as patient completes activity. Assistance may be  provided throughout the activity or intermittently.  Walk 50 Feet with 2 Turns:   [place] provides verbal cues and/or  touching/steadying and/or contact guard assistance as patient completes  activity. Assistance may be provided throughout the activity or intermittently.  Walk 150 Feet:   [place] provides verbal cues and/or touching/steadying and/or  contact guard assistance as patient completes activity. Assistance may be  provided throughout the activity or intermittently.  Walking 10 Feet on  Uneven Surfaces:   Not attempted due to medical or safety  concerns.  1 Step Over Curb or Up/Down Stair:   [place] provides verbal cues and/or  touching/steadying and/or contact guard assistance as patient completes  activity. Assistance may be provided throughout the activity or intermittently.  4 Steps Up and Down, With/Without Rail:   [place] provides verbal cues and/or  touching/steadying and/or contact guard assistance as patient completes  activity. Assistance may be provided throughout the activity or intermittently.  12 Steps Up and Down, With/Without Rail:   Not attempted due to medical or  safety concerns.  Picking up an Object:   Not attempted due to medical or safety concerns. Uses  Wheelchair and/or Scooter: No    Section J. Health Conditions Discharge      Section M. Skin Conditions Discharge      . Current Number of Unhealed Pressure Ulcers      Section N. Medication    Signed by: Reina Pat PTA

## 2022-02-25 NOTE — SIGNIFICANT NOTE
02/25/22 1026   Plan   Plan Faxed tube feeding orders and hospital bed order to [facility] via [facility].  Contacted [facility] [phone] per Scarlett about discharge and faxing orders via [facility].  Pt's hospital bed and tube feeding, pump and supplies have been delivered to pt's home.  Contacted [facility] [phone] per Lidia nurse, about pt being discharged today and new order for BMP to be drawn on Monday 2-28-22 and Wednesday 3-2-22 then call results to Dr. Koch, Nephrology.  Family to contact  today when they arrive home with pt and nurse will start care today.  Faxed ambulatory referral for home health with face to face, tube feeding orders, and today's BMP results to  via [employer].  Pt is going home today with spouse Alesha.  Spouse, daughter Lupe and son-in-law Rosita have been educated about tube feeding, j-tube/ g-tube care, emptying g-tube BID and mart drain BID.  Family will transport pt home today.   Final Discharge Disposition Code 06 - home with home health care

## 2022-02-25 NOTE — PROGRESS NOTES
Nephrology Progress Note      Subjective     No chest pain or shortness of breath. Doing fine    Objective       Vital signs :     Temp:  [98.1 °F (36.7 °C)] 98.1 °F (36.7 °C)  Heart Rate:  [101] 101  Resp:  [20] 20  BP: (128)/(62) 128/62      Intake/Output Summary (Last 24 hours) at 2/25/2022 0757  Last data filed at 2/24/2022 2109  Gross per 24 hour   Intake 720 ml   Output 15 ml   Net 705 ml       Physical Exam:    General Appearance : not in acute distress  Lungs : clear to auscultation, respirations regular  Heart :  regular rhythm & normal rate, normal S1, S2 and no murmur, no rub  Abdomen : normal bowel sounds, no masses, no hepatomegaly, no splenomegaly, soft non-tender and no guarding  Extremities : no edema, no cyanosis and no redness  Neurologic :   orientated to person, place, time and situation, Grossly no focal deficits      Laboratory Data :     Albumin No results found for: ALBUMIN   Magnesium No results found for: MG       PTH               No results found for: PTH    CBC and coagulation:  Results from last 7 days   Lab Units 02/25/22  0024 02/23/22  0115 02/22/22  1033   CRP mg/dL  --   --  4.17*   WBC 10*3/mm3 12.49* 10.34 10.84*   HEMOGLOBIN g/dL 10.6* 10.2* 10.6*   HEMATOCRIT % 34.5* 33.2* 34.8*   MCV fL 86.7 86.9 87.0   MCHC g/dL 30.7* 30.7* 30.5*   PLATELETS 10*3/mm3 341 345 376     Acid/base balance:      Renal and electrolytes:  Results from last 7 days   Lab Units 02/25/22  0024 02/24/22  0918 02/23/22  1417 02/23/22  0115 02/23/22  0115 02/22/22  1630 02/22/22  1033 02/22/22  1033   SODIUM mmol/L 130* 129* 130*  --  124* 124*   < > 124*   POTASSIUM mmol/L 4.9 4.8 4.5   < > 4.7  --    < > 4.8   CHLORIDE mmol/L 94* 90* 93*   < > 89*  --    < > 89*   CO2 mmol/L 25.3 26.5 27.4   < > 25.7  --    < > 25.5   BUN mg/dL 19 17 15   < > 14  --    < > 13   CREATININE mg/dL 0.65* 0.75* 0.68*  --  0.63*  --   --  0.69*   EGFR IF NONAFRICN AM mL/min/1.73 120 102 114   < > 124  --    < > 112   CALCIUM  mg/dL 8.6 8.6 8.6   < > 8.4*  --    < > 8.6    < > = values in this interval not displayed.     Estimated Creatinine Clearance: 71.3 mL/min (A) (by C-G formula based on SCr of 0.65 mg/dL (L)).    Liver and pancreatic function:  Results from last 7 days   Lab Units 02/19/22  0110   ALBUMIN g/dL 2.45*   BILIRUBIN mg/dL 0.3   ALK PHOS U/L 110   AST (SGOT) U/L 49*   ALT (SGPT) U/L 70*         Cardiac:      Liver and pancreatic function:  Results from last 7 days   Lab Units 02/19/22  0110   ALBUMIN g/dL 2.45*   BILIRUBIN mg/dL 0.3   ALK PHOS U/L 110   AST (SGOT) U/L 49*   ALT (SGPT) U/L 70*       Medications :     betamethasone augmented + lubriderm, , Topical, Q12H  enoxaparin, 40 mg, Subcutaneous, Q24H  insulin aspart, 0-14 Units, Subcutaneous, TID AC  metoprolol tartrate, 25 mg, Per J Tube, Q12H  pancrelipase (Lip-Prot-Amyl), 12,000 units of lipase, Per J Tube, TID With Meals  pantoprazole, 40 mg, Intravenous, Q AM             Assessment/Plan     1. Hyponatremia, presumed chronic  2. Esophageal rupture s/p repair on enteral nutrition  3. DM-II  4. Essential hypertension    Sodium is stable around 130, educated and counseled the patient on water restriction, if patient gets discharged today, will get BMP on Monday, Wednesday and follow up in clinic on Thursday.   continue holding free water flushes with NGT feed.    JUAN LUIS Koch MD  02/25/22  07:57 EST

## 2022-02-25 NOTE — NURSING NOTE
Discharge is on hold until 02/26/22 per Dr. Alexis for continued monitoring.  Spoke to Mikayla at [facility] [phone] to notify that pt is not discharging home today.  RN to notify home health when pt is discharged home on 2/26.

## 2022-02-25 NOTE — SIGNIFICANT NOTE
02/25/22 1033   Plan   Plan Faxed Nephrology note to [facility] via [facility] which explains water restriction.

## 2022-02-25 NOTE — PROGRESS NOTES
Patient Assessment Instrument  Quality Indicators - Discharge    Section GG. Self-Care Performance     Oral Hygiene: [place] sets up or cleans up; patient completes activity. [place]  assists only prior to or following the activity.   Toileting Hygiene: : [place] provides verbal cues and/or touching/steadying  and/or contact guard assistance as patient completes activity.   Shower/Bathe Self: [place] does less than half the effort. [place] lifts, holds  or supports trunk or limbs but provides less than half the effort.   Upper Body Dressing: [place] sets up or cleans up; patient completes activity.  [place] assists only prior to or following the activity.   Lower Body Dressing: [place] provides verbal cues and/or touching/steadying  and/or contact guard assistance as patient completes activity.   Putting On/Taking Off Footwear: [place] provides verbal cues and/or  touching/steadying and/or contact guard assistance as patient completes  activity.    Section GG. Mobility Performance      Section J. Health Conditions Discharge      Section M. Skin Conditions Discharge      . Current Number of Unhealed Pressure Ulcers      Section N. Medication    Signed by: Elena Pisano, Occupational Therapist

## 2022-02-26 VITALS
RESPIRATION RATE: 20 BRPM | HEART RATE: 64 BPM | OXYGEN SATURATION: 96 % | WEIGHT: 137.9 LBS | DIASTOLIC BLOOD PRESSURE: 72 MMHG | SYSTOLIC BLOOD PRESSURE: 130 MMHG | BODY MASS INDEX: 17.15 KG/M2 | HEIGHT: 75 IN | TEMPERATURE: 97.9 F

## 2022-02-26 LAB
ANION GAP SERPL CALCULATED.3IONS-SCNC: 11.7 MMOL/L (ref 5–15)
BASOPHILS # BLD AUTO: 0.07 10*3/MM3 (ref 0–0.2)
BASOPHILS NFR BLD AUTO: 0.6 % (ref 0–1.5)
BUN SERPL-MCNC: 22 MG/DL (ref 8–23)
BUN/CREAT SERPL: 32.4 (ref 7–25)
CALCIUM SPEC-SCNC: 8.6 MG/DL (ref 8.6–10.5)
CHLORIDE SERPL-SCNC: 93 MMOL/L (ref 98–107)
CO2 SERPL-SCNC: 27.3 MMOL/L (ref 22–29)
CREAT SERPL-MCNC: 0.68 MG/DL (ref 0.76–1.27)
DEPRECATED RDW RBC AUTO: 41.8 FL (ref 37–54)
EOSINOPHIL # BLD AUTO: 0.05 10*3/MM3 (ref 0–0.4)
EOSINOPHIL NFR BLD AUTO: 0.4 % (ref 0.3–6.2)
ERYTHROCYTE [DISTWIDTH] IN BLOOD BY AUTOMATED COUNT: 13.2 % (ref 12.3–15.4)
GFR SERPL CREATININE-BSD FRML MDRD: 114 ML/MIN/1.73
GLUCOSE BLDC GLUCOMTR-MCNC: 347 MG/DL (ref 70–130)
GLUCOSE SERPL-MCNC: 317 MG/DL (ref 65–99)
HCT VFR BLD AUTO: 34.9 % (ref 37.5–51)
HGB BLD-MCNC: 10.5 G/DL (ref 13–17.7)
IMM GRANULOCYTES # BLD AUTO: 0.05 10*3/MM3 (ref 0–0.05)
IMM GRANULOCYTES NFR BLD AUTO: 0.4 % (ref 0–0.5)
LYMPHOCYTES # BLD AUTO: 1.82 10*3/MM3 (ref 0.7–3.1)
LYMPHOCYTES NFR BLD AUTO: 15.2 % (ref 19.6–45.3)
MCH RBC QN AUTO: 26.1 PG (ref 26.6–33)
MCHC RBC AUTO-ENTMCNC: 30.1 G/DL (ref 31.5–35.7)
MCV RBC AUTO: 86.6 FL (ref 79–97)
MONOCYTES # BLD AUTO: 1.53 10*3/MM3 (ref 0.1–0.9)
MONOCYTES NFR BLD AUTO: 12.7 % (ref 5–12)
NEUTROPHILS NFR BLD AUTO: 70.7 % (ref 42.7–76)
NEUTROPHILS NFR BLD AUTO: 8.49 10*3/MM3 (ref 1.7–7)
NRBC BLD AUTO-RTO: 0 /100 WBC (ref 0–0.2)
PLATELET # BLD AUTO: 332 10*3/MM3 (ref 140–450)
PMV BLD AUTO: 10.5 FL (ref 6–12)
POTASSIUM SERPL-SCNC: 4.6 MMOL/L (ref 3.5–5.2)
RBC # BLD AUTO: 4.03 10*6/MM3 (ref 4.14–5.8)
SODIUM SERPL-SCNC: 132 MMOL/L (ref 136–145)
WBC NRBC COR # BLD: 12.01 10*3/MM3 (ref 3.4–10.8)

## 2022-02-26 PROCEDURE — 80048 BASIC METABOLIC PNL TOTAL CA: CPT | Performed by: FAMILY MEDICINE

## 2022-02-26 PROCEDURE — 85025 COMPLETE CBC W/AUTO DIFF WBC: CPT | Performed by: FAMILY MEDICINE

## 2022-02-26 PROCEDURE — 82962 GLUCOSE BLOOD TEST: CPT

## 2022-02-26 PROCEDURE — 63710000001 INSULIN ASPART PER 5 UNITS: Performed by: FAMILY MEDICINE

## 2022-02-26 RX ADMIN — BETAMETHASONE DIPROPIONATE: 0.5 CREAM TOPICAL at 08:58

## 2022-02-26 RX ADMIN — PANCRELIPASE 12000 UNITS OF LIPASE: 60000; 12000; 38000 CAPSULE, DELAYED RELEASE PELLETS ORAL at 08:57

## 2022-02-26 RX ADMIN — INSULIN ASPART 10 UNITS: 100 INJECTION, SOLUTION INTRAVENOUS; SUBCUTANEOUS at 09:52

## 2022-02-26 RX ADMIN — METOPROLOL TARTRATE 25 MG: 25 TABLET ORAL at 08:58

## 2022-02-26 NOTE — PLAN OF CARE
Goal Outcome Evaluation:  Plan of Care Reviewed With: patient        Progress: improving  Outcome Summary: Continue POC.

## 2022-02-26 NOTE — PROGRESS NOTES
Patient Assessment Instrument  Quality Indicators - Discharge    Section GG. Self-Care Performance      Section GG. Mobility Performance      Section J. Health Conditions Discharge      Section M. Skin Conditions Discharge  Unhealed Pressure Ulcer(s)/Injurie(s) at Stage 1 or Higher:  No    . Current Number of Unhealed Pressure Ulcers      Section N. Medication    Signed by: Ekta Mc Nurse

## 2022-02-28 NOTE — PROGRESS NOTES
Patient Assessment Instrument  Quality Indicators - Discharge    Section GG. Self-Care Performance      Section GG. Mobility Performance      Section J. Health Conditions Discharge  Fall(s) Since Admission:  No    Section M. Skin Conditions Discharge      . Current Number of Unhealed Pressure Ulcers      Section N. Medication    Medication Intervention: N/A - There were no potential clinically significant  medication issues identified since admission or patient is not taking any  medications.    Signed by: Melina Mccabe, Supervisor

## 2022-02-28 NOTE — PROGRESS NOTES
PPS CMG Coordinator  Inpatient Rehabilitation Discharge    Mode of Locomotion: Both walking and wheelchair.    Discharge Against Medical Advice:  No.  Discharge Information  Patient Discharged Alive:  Yes  Discharge Destination/Living Setting: Home with Home Health Services  Diagnosis for Interruption/Death:    Impairment Group: 16 Debility (non-cardiac, non-pulmonary)    Comorbidities: Rank Code      Description      1    I25.10    Atherosclerotic heart disease of native                 coronary artery without angina pectoris  2    I10       Essential (primary) hypertension  3    E11.9     Type 2 diabetes mellitus without complications  4    Z93.4     Other artificial openings of gastrointestinal                 tract status  5    Z95.0     Presence of cardiac pacemaker  6    R53.81    Other malaise    Complications:      MAGAN Bladder Accidents: 0  - Accidents.  Bladder Score = 4.  Two (2) bladder accidents.  MAGAN Bowel Accident: 0  - Accidents.  Bowel Score = 7. Patient has no accidents.    Signed by: Kari Snyder Nurse

## 2022-03-07 ENCOUNTER — HOSPITAL ENCOUNTER (EMERGENCY)
Facility: HOSPITAL | Age: 77
Discharge: HOME | End: 2022-03-07
Attending: STUDENT IN AN ORGANIZED HEALTH CARE EDUCATION/TRAINING PROGRAM | Admitting: STUDENT IN AN ORGANIZED HEALTH CARE EDUCATION/TRAINING PROGRAM

## 2022-03-07 VITALS
WEIGHT: 137 LBS | DIASTOLIC BLOOD PRESSURE: 66 MMHG | OXYGEN SATURATION: 95 % | HEIGHT: 73 IN | RESPIRATION RATE: 18 BRPM | TEMPERATURE: 97.7 F | HEART RATE: 86 BPM | SYSTOLIC BLOOD PRESSURE: 126 MMHG | BODY MASS INDEX: 18.16 KG/M2

## 2022-03-07 DIAGNOSIS — T85.598A OBSTRUCTION OF FEEDING TUBE, INITIAL ENCOUNTER: Primary | ICD-10-CM

## 2022-03-07 PROCEDURE — 99282 EMERGENCY DEPT VISIT SF MDM: CPT

## 2022-03-07 NOTE — EXTERNAL PATIENT INSTRUCTIONS
Patient Education   Table of Contents       Parenteral Nutrition Information     To view videos and all your education online visit,   [email or web address]   or scan this QR code with your smartphone.                  Parenteral Nutrition Information        Parenteral nutrition is a way to get all the nutrients and fluids you need when you are unable to swallow or digest food or liquid through your regular digestive system. With parenteral nutrition, you get your nutrients in a liquid that flows into one of your veins through a flexible tube (catheter). The nutrients go directly into your bloodstream. The nutrition solution consists of carbohydrates, protein, fat, vitamins, and minerals.   Getting nutrition this way enables you to absorb nutrients and fluids without using your digestive system. In some cases, parenteral nutrition is the only feeding you get (total parenteral nutrition).   Sometimes, you may need parenteral nutrition just for a week or so while you are in the hospital. In other cases, long-term parenteral nutrition may continue for months or years after you leave the hospital.     Reasons for parenteral nutrition    You may have this type of feeding in the hospital if you have a condition that prevents your body from absorbing enough food or nutrients through your digestive system. Many conditions may require parenteral nutrition. You may need this kind of nutrition if:       Your digestive system is unable to absorb enough nutrients (malabsorption) from the food you eat.       You have a disease of your digestive system that blocks absorption of nutrients or prevents food and fluid from moving through your digestive system. Such conditions can include cancer, inflammatory bowel disease, infection, or poor blood supply.       You had surgery of the digestive system that removed a large part of your intestine (short bowel syndrome) or caused scarring and obstruction of your digestive tract.        You have a nervous system (neurological) condition that has affected your ability to swallow, such as a stroke.     Risks and complications    Generally, this is a safe way to receive nutrition. However, possible problems include:       Infection.       High or low blood sugar.       An imbalance of the salts and minerals in the blood (electrolytes).       Problems with the liver or gallbladder.       Catheter blockage, damage, or movement out of position.       Blood clot.       Allergic reaction.       Vitamin and mineral deficiencies.     Catheter placement    Parenteral nutrition is given through different types of catheters depending on how long you need it. If you need parenteral nutrition for:       A few days, you may have a peripheral venous catheter placed into a vein in your arm.       About a week, you may have a non-tunneled central venous catheter placed into a vein in your neck. This type of catheter will be advanced into a large vein that enters your heart.       Weeks or months, you may have a peripherally inserted central catheter (PICC) inserted into a vein in your arm. The catheter will be advanced to reach the large vein that enters your heart.       Months or years, you may have a tunneled central catheter placed into a vein in your upper chest and advanced into your heart. In this case, the catheter is inserted at a spot a few inches from the vein and is tunneled under the skin to reach the vein.     You may need a minor surgery to have a catheter placed in your chest or neck area. Your catheter insertion site will be checked regularly for any signs of infection or other problems.   How parenteral nutrition is given   Your feeding solution will be attached to a pump that infuses the solution into your bloodstream over time through the catheter.   Your health care provider will decide what type and how much nutrition you need as well as how often and how long you will get it. You will  have blood tests to make sure your nutritional needs are being met and that parenteral nutrition is not causing any problems.   Parenteral nutrition at home    If you are able to eat and drink enough by mouth to meet your nutrition needs when you leave the hospital, your catheter will be removed. If you are unable to eat and drink enough to meet your nutrition needs, you may have to continue parenteral nutrition at home. Make sure you:       Understand and follow specific instructions for feedings or any eating or drinking restrictions.       Take over-the-counter and prescription medicines only as told by your health care provider.       Keep all follow-up visits as told by your health care provider. This is important.       Return to your normal activities as told by your health care provider. Ask your health care provider what activities are safe for you.     Summary         Parenteral nutrition is a way to get nutrition when you are unable to swallow or digest foods or liquids.       You will get nutrition in a liquid that will flow into a vein through a catheter.       You may need parenteral nutrition for a week or more while you are in the hospital. Long-term parenteral nutrition may continue for months or years outside the hospital.       Parenteral nutrition is given through different types of catheters depending on how long you will need this type of nutrition.       If you are continuing parenteral nutrition at home, you will be given specific instructions for feedings. Make sure you understand and follow these instructions.     This information is not intended to replace advice given to you by your health care provider. Make sure you discuss any questions you have with your health care provider.     Document Released: 07/08/2020Document Revised: 07/08/2020Document Reviewed: 07/08/2020     [employer] Patient Education ? 2021 [employer]

## 2022-03-07 NOTE — ED NOTES
Flushed pts feeding tube at this time using coke per verbal order from TRACY Talbert and was able to successfully unclog the tube. Tube flushed with 30 mL's of water also.     Adrianna Ceja, PARAMJIT  03/07/22 4652

## 2022-03-08 NOTE — ED PROVIDER NOTES
Subjective   75 yo male patient presents to the ED with feeding tube issue. Pt states that his feeding tube is clogged up. Patient states that he is on continuous feedings. Home health referred to the ED.        History provided by:  Patient   used: No        Review of Systems   Constitutional: Negative.    HENT: Negative.    Eyes: Negative.    Respiratory: Negative.    Cardiovascular: Negative.    Gastrointestinal: Negative.    Endocrine: Negative.    Genitourinary: Negative.    Musculoskeletal: Negative.    Skin: Negative.    Allergic/Immunologic: Negative.    Neurological: Negative.    Hematological: Negative.    Psychiatric/Behavioral: Negative.    All other systems reviewed and are negative.      Past Medical History:   Diagnosis Date   • Acute UTI (urinary tract infection) 2/20/2021   • Arthritis    • Borderline diabetes    • Coronary artery disease    • Diabetes mellitus (HCC)    • Elevated prostate specific antigen (PSA) 8/17/2020   • GERD (gastroesophageal reflux disease)    • Hypertension    • Left elbow pain        Allergies   Allergen Reactions   • Other Anaphylaxis and Swelling     Patient had a reaction to the COVID vaccine. Pt states his tongue and lips started swelling       Past Surgical History:   Procedure Laterality Date   • COLONOSCOPY     • ENDOSCOPY N/A 2/9/2021    Procedure: ESOPHAGOGASTRODUODENOSCOPY WITH BIOPSY dilitation;  Surgeon: Bela Mcdaniel MD;  Location: [facility] OR;  Service: Gastroenterology;  Laterality: N/A;   • ERCP N/A 2/21/2021    Procedure: ENDOSCOPIC RETROGRADE CHOLANGIOPANCREATOGRAPHY WITH STENT PLACEMENT;  Surgeon: Matthew Moreira MD;  Location: [facility] ENDOSCOPY;  Service: Gastroenterology;  Laterality: N/A;  with sphincterotomy, and brushing of common bile duct, and placement of [id number] wall stent     • PACEMAKER IMPLANTATION         Family History   Problem Relation Age of Onset   • Diabetes Mother    • Diabetes Sister    •  Diabetes Brother        Social History     Socioeconomic History   • Marital status:    Tobacco Use   • Smoking status: Never Smoker   • Smokeless tobacco: Never Used   Vaping Use   • Vaping Use: Never used   Substance and Sexual Activity   • Alcohol use: No   • Drug use: No   • Sexual activity: Defer           Objective   Physical Exam  Vitals and nursing note reviewed.   Constitutional:       Appearance: Normal appearance. He is normal weight.   HENT:      Head: Normocephalic and atraumatic.      Right Ear: External ear normal.      Left Ear: External ear normal.      Nose: Nose normal.      Mouth/Throat:      Mouth: Mucous membranes are moist.      Pharynx: Oropharynx is clear.   Eyes:      Extraocular Movements: Extraocular movements intact.      Conjunctiva/sclera: Conjunctivae normal.      Pupils: Pupils are equal, round, and reactive to light.   Cardiovascular:      Rate and Rhythm: Normal rate and regular rhythm.      Pulses: Normal pulses.      Heart sounds: Normal heart sounds.   Pulmonary:      Effort: Pulmonary effort is normal.      Breath sounds: Normal breath sounds.   Abdominal:      General: Abdomen is flat. Bowel sounds are normal.      Palpations: Abdomen is soft.   Musculoskeletal:         General: Normal range of motion.      Cervical back: Normal range of motion and neck supple.   Skin:     General: Skin is warm and dry.      Capillary Refill: Capillary refill takes less than 2 seconds.   Neurological:      General: No focal deficit present.      Mental Status: He is alert and oriented to person, place, and time. Mental status is at baseline.   Psychiatric:         Mood and Affect: Mood normal.         Behavior: Behavior normal.         Thought Content: Thought content normal.         Judgment: Judgment normal.         Procedures           ED Course  ED Course as of 03/07/22 1925   Mon Mar 07, 2022   1924 Nursing staff unclogged feeding tube. Patient dc to f/u with PCP and continue home  health care and continuous feedings.  [ML]      ED Course User Index  [ML] Cher Pitts PA                                                 MDM  Number of Diagnoses or Management Options  Risk of Complications, Morbidity, and/or Mortality  Presenting problems: minimal  Diagnostic procedures: minimal  Management options: minimal    Patient Progress  Patient progress: improved      Final diagnoses:   Obstruction of feeding tube, initial encounter       ED Disposition  ED Disposition     ED Disposition   Discharge    Condition   Stable    Comment   --             Misti Nguyễn MD  [address] [place] [address]  [id number]  [facility] [address] [phone]    Schedule an appointment as soon as possible for a visit in 1 day           Medication List      No changes were made to your prescriptions during this visit.          Cher Pitts PA  03/07/22 7421

## 2022-03-15 ENCOUNTER — LAB REQUISITION (OUTPATIENT)
Dept: LAB | Facility: HOSPITAL | Age: 77
End: 2022-03-15

## 2022-03-15 DIAGNOSIS — R53.83 OTHER FATIGUE: ICD-10-CM

## 2022-03-15 DIAGNOSIS — R41.82 ALTERED MENTAL STATUS, UNSPECIFIED: ICD-10-CM

## 2022-03-15 LAB
ANION GAP SERPL CALCULATED.3IONS-SCNC: 6.3 MMOL/L (ref 5–15)
BACTERIA UR QL AUTO: ABNORMAL /HPF
BASOPHILS # BLD AUTO: 0.05 10*3/MM3 (ref 0–0.2)
BASOPHILS NFR BLD AUTO: 0.5 % (ref 0–1.5)
BILIRUB UR QL STRIP: NEGATIVE
BUN SERPL-MCNC: 21 MG/DL (ref 8–23)
BUN/CREAT SERPL: 38.9 (ref 7–25)
CALCIUM SPEC-SCNC: 8.2 MG/DL (ref 8.6–10.5)
CHLORIDE SERPL-SCNC: 91 MMOL/L (ref 98–107)
CLARITY UR: CLEAR
CO2 SERPL-SCNC: 30.7 MMOL/L (ref 22–29)
COLOR UR: YELLOW
CREAT SERPL-MCNC: 0.54 MG/DL (ref 0.76–1.27)
DEPRECATED RDW RBC AUTO: 42.9 FL (ref 37–54)
EGFRCR SERPLBLD CKD-EPI 2021: 103.9 ML/MIN/1.73
EOSINOPHIL # BLD AUTO: 0.32 10*3/MM3 (ref 0–0.4)
EOSINOPHIL NFR BLD AUTO: 3.2 % (ref 0.3–6.2)
ERYTHROCYTE [DISTWIDTH] IN BLOOD BY AUTOMATED COUNT: 14 % (ref 12.3–15.4)
GLUCOSE SERPL-MCNC: 175 MG/DL (ref 65–99)
GLUCOSE UR STRIP-MCNC: NEGATIVE MG/DL
HCT VFR BLD AUTO: 31.6 % (ref 37.5–51)
HGB BLD-MCNC: 9.5 G/DL (ref 13–17.7)
HGB UR QL STRIP.AUTO: ABNORMAL
HYALINE CASTS UR QL AUTO: ABNORMAL /LPF
IMM GRANULOCYTES # BLD AUTO: 0.04 10*3/MM3 (ref 0–0.05)
IMM GRANULOCYTES NFR BLD AUTO: 0.4 % (ref 0–0.5)
KETONES UR QL STRIP: NEGATIVE
LEUKOCYTE ESTERASE UR QL STRIP.AUTO: NEGATIVE
LYMPHOCYTES # BLD AUTO: 2.47 10*3/MM3 (ref 0.7–3.1)
LYMPHOCYTES NFR BLD AUTO: 24.4 % (ref 19.6–45.3)
MCH RBC QN AUTO: 25.3 PG (ref 26.6–33)
MCHC RBC AUTO-ENTMCNC: 30.1 G/DL (ref 31.5–35.7)
MCV RBC AUTO: 84 FL (ref 79–97)
MONOCYTES # BLD AUTO: 0.73 10*3/MM3 (ref 0.1–0.9)
MONOCYTES NFR BLD AUTO: 7.2 % (ref 5–12)
NEUTROPHILS NFR BLD AUTO: 6.52 10*3/MM3 (ref 1.7–7)
NEUTROPHILS NFR BLD AUTO: 64.3 % (ref 42.7–76)
NITRITE UR QL STRIP: NEGATIVE
NRBC BLD AUTO-RTO: 0 /100 WBC (ref 0–0.2)
PH UR STRIP.AUTO: 8 [PH] (ref 5–8)
PLATELET # BLD AUTO: 467 10*3/MM3 (ref 140–450)
PMV BLD AUTO: 9.5 FL (ref 6–12)
POTASSIUM SERPL-SCNC: 5.2 MMOL/L (ref 3.5–5.2)
PROT UR QL STRIP: NEGATIVE
RBC # BLD AUTO: 3.76 10*6/MM3 (ref 4.14–5.8)
RBC # UR STRIP: ABNORMAL /HPF
REF LAB TEST METHOD: ABNORMAL
SODIUM SERPL-SCNC: 128 MMOL/L (ref 136–145)
SP GR UR STRIP: 1.02 (ref 1–1.03)
SQUAMOUS #/AREA URNS HPF: ABNORMAL /HPF
UROBILINOGEN UR QL STRIP: ABNORMAL
WBC # UR STRIP: ABNORMAL /HPF
WBC NRBC COR # BLD: 10.13 10*3/MM3 (ref 3.4–10.8)

## 2022-03-15 PROCEDURE — 80048 BASIC METABOLIC PNL TOTAL CA: CPT | Performed by: INTERNAL MEDICINE

## 2022-03-15 PROCEDURE — 81001 URINALYSIS AUTO W/SCOPE: CPT | Performed by: INTERNAL MEDICINE

## 2022-03-15 PROCEDURE — 85025 COMPLETE CBC W/AUTO DIFF WBC: CPT | Performed by: INTERNAL MEDICINE

## 2022-03-18 DIAGNOSIS — R10.13 EPIGASTRIC PAIN: Primary | ICD-10-CM

## 2022-03-21 ENCOUNTER — TRANSCRIBE ORDERS (OUTPATIENT)
Dept: ADMINISTRATIVE | Facility: HOSPITAL | Age: 77
End: 2022-03-21

## 2022-03-21 DIAGNOSIS — K86.89 PANCREATIC INSUFFICIENCY: ICD-10-CM

## 2022-03-21 DIAGNOSIS — R53.83 MALAISE AND FATIGUE: ICD-10-CM

## 2022-03-21 DIAGNOSIS — K22.3 PERFORATION OF ESOPHAGUS: Primary | ICD-10-CM

## 2022-03-21 DIAGNOSIS — R10.13 EPIGASTRIC ABDOMINAL PAIN: Primary | ICD-10-CM

## 2022-03-21 DIAGNOSIS — R63.4 WEIGHT LOSS, ABNORMAL: ICD-10-CM

## 2022-03-21 DIAGNOSIS — R10.13 EPIGASTRIC PAIN: ICD-10-CM

## 2022-03-21 DIAGNOSIS — R53.81 MALAISE AND FATIGUE: ICD-10-CM

## 2022-03-22 ENCOUNTER — HOSPITAL ENCOUNTER (OUTPATIENT)
Dept: GENERAL RADIOLOGY | Facility: HOSPITAL | Age: 77
Discharge: HOME | End: 2022-03-22
Admitting: THORACIC SURGERY (CARDIOTHORACIC VASCULAR SURGERY)

## 2022-03-22 DIAGNOSIS — K22.3 PERFORATION OF ESOPHAGUS: ICD-10-CM

## 2022-03-22 PROCEDURE — 74246 X-RAY XM UPR GI TRC 2CNTRST: CPT

## 2022-03-22 PROCEDURE — 74246 X-RAY XM UPR GI TRC 2CNTRST: CPT | Performed by: RADIOLOGY

## 2022-03-28 ENCOUNTER — APPOINTMENT (OUTPATIENT)
Dept: GENERAL RADIOLOGY | Facility: HOSPITAL | Age: 77
End: 2022-03-28

## 2022-04-01 NOTE — PROGRESS NOTES
"Patient Assessment Instrument  Quality Indicators - Admission    Section B. Hearing, Speech Vision      Section C. Cognitive Patterns  Brief Interview for Mental Status (BIMS) was conducted.  Repetition of Three Words: Three words  Able to report correct year: Correct  Able to report correct month: Accurate within 5 days  Able to report correct day of the week: Correct  Able to recall \"sock\": Yes, no cue required  Able to recall \"blue\": Yes, no cue required  Able to recall \"bed\": Yes, after cuing    BIMS SUMMARY SCORE: 14 Cognitively intact Patient was able to complete the Brief  Interview for Mental Status    Section [id number]. Prior Functioning      Section WC3953. Prior Device Use      Section NA6082. Self Care Performance      Section BB6561. Self Care Discharge Goals      Section KX2513. Mobility Performance      Section FE7937. Mobility Discharge Goals      Section H. Bladder and Bowel      Section I. Active Diagnosis      Section J. Health Conditions      Section K. Swallowing/Nutritional Status      Section M. Skin Conditions      Section N. Medication      Section O. Special Treatments, Procedures, and Programs      OPTIONAL BRANCH FOR TRACKING FALLS  Fall(s) During Shift:    Signed by: Nurse Tai    " We received a fax from [employer] stating that he pt had been set up on a new machine on 3/29/2022 and needed a follow up appt for compliance before 6/27/2022. I was able to schedule pt and informed him to bring in his whole device to that appt. He did however inform that he doesn't think his pressured were set for him correctly. I advised pt to come bring his machine into the clincic sometime next week so that I can make sure he is using the machine with the correct pressures and that way he can start using it ASAP and will have been on for at least 30 days at his upcoming appt.

## 2022-04-06 ENCOUNTER — TRANSCRIBE ORDERS (OUTPATIENT)
Dept: ADMINISTRATIVE | Facility: HOSPITAL | Age: 77
End: 2022-04-06

## 2022-04-06 DIAGNOSIS — K22.3 PERFORATION OF ESOPHAGUS: Primary | ICD-10-CM

## 2022-04-08 ENCOUNTER — LAB REQUISITION (OUTPATIENT)
Dept: LAB | Facility: HOSPITAL | Age: 77
End: 2022-04-08

## 2022-04-08 DIAGNOSIS — D64.9 ANEMIA, UNSPECIFIED: ICD-10-CM

## 2022-04-08 LAB
ALBUMIN SERPL-MCNC: 2.96 G/DL (ref 3.5–5.2)
ALBUMIN/GLOB SERPL: 0.6 G/DL
ALP SERPL-CCNC: 154 U/L (ref 39–117)
ALT SERPL W P-5'-P-CCNC: 110 U/L (ref 1–41)
ANION GAP SERPL CALCULATED.3IONS-SCNC: 7.2 MMOL/L (ref 5–15)
AST SERPL-CCNC: 83 U/L (ref 1–40)
BASOPHILS # BLD AUTO: 0.06 10*3/MM3 (ref 0–0.2)
BASOPHILS NFR BLD AUTO: 0.7 % (ref 0–1.5)
BILIRUB SERPL-MCNC: 0.4 MG/DL (ref 0–1.2)
BUN SERPL-MCNC: 24 MG/DL (ref 8–23)
BUN/CREAT SERPL: 43.6 (ref 7–25)
CALCIUM SPEC-SCNC: 9.2 MG/DL (ref 8.6–10.5)
CHLORIDE SERPL-SCNC: 91 MMOL/L (ref 98–107)
CHOLEST SERPL-MCNC: 101 MG/DL (ref 0–200)
CO2 SERPL-SCNC: 28.8 MMOL/L (ref 22–29)
CREAT SERPL-MCNC: 0.55 MG/DL (ref 0.76–1.27)
DEPRECATED RDW RBC AUTO: 50.4 FL (ref 37–54)
EGFRCR SERPLBLD CKD-EPI 2021: 103.3 ML/MIN/1.73
EOSINOPHIL # BLD AUTO: 0.26 10*3/MM3 (ref 0–0.4)
EOSINOPHIL NFR BLD AUTO: 3 % (ref 0.3–6.2)
ERYTHROCYTE [DISTWIDTH] IN BLOOD BY AUTOMATED COUNT: 16.4 % (ref 12.3–15.4)
GLOBULIN UR ELPH-MCNC: 4.8 GM/DL
GLUCOSE SERPL-MCNC: 99 MG/DL (ref 65–99)
HBA1C MFR BLD: 7.4 % (ref 4.8–5.6)
HCT VFR BLD AUTO: 36.9 % (ref 37.5–51)
HDLC SERPL-MCNC: 40 MG/DL (ref 40–60)
HGB BLD-MCNC: 11.1 G/DL (ref 13–17.7)
IMM GRANULOCYTES # BLD AUTO: 0.03 10*3/MM3 (ref 0–0.05)
IMM GRANULOCYTES NFR BLD AUTO: 0.3 % (ref 0–0.5)
LDLC SERPL CALC-MCNC: 49 MG/DL (ref 0–100)
LDLC/HDLC SERPL: 1.28 {RATIO}
LYMPHOCYTES # BLD AUTO: 2.29 10*3/MM3 (ref 0.7–3.1)
LYMPHOCYTES NFR BLD AUTO: 26.4 % (ref 19.6–45.3)
MCH RBC QN AUTO: 25.6 PG (ref 26.6–33)
MCHC RBC AUTO-ENTMCNC: 30.1 G/DL (ref 31.5–35.7)
MCV RBC AUTO: 85 FL (ref 79–97)
MONOCYTES # BLD AUTO: 0.83 10*3/MM3 (ref 0.1–0.9)
MONOCYTES NFR BLD AUTO: 9.6 % (ref 5–12)
NEUTROPHILS NFR BLD AUTO: 5.19 10*3/MM3 (ref 1.7–7)
NEUTROPHILS NFR BLD AUTO: 60 % (ref 42.7–76)
NRBC BLD AUTO-RTO: 0 /100 WBC (ref 0–0.2)
PLATELET # BLD AUTO: 355 10*3/MM3 (ref 140–450)
PMV BLD AUTO: 10.3 FL (ref 6–12)
POTASSIUM SERPL-SCNC: 4.3 MMOL/L (ref 3.5–5.2)
PROT SERPL-MCNC: 7.8 G/DL (ref 6–8.5)
RBC # BLD AUTO: 4.34 10*6/MM3 (ref 4.14–5.8)
SODIUM SERPL-SCNC: 127 MMOL/L (ref 136–145)
TRIGL SERPL-MCNC: 50 MG/DL (ref 0–150)
VLDLC SERPL-MCNC: 12 MG/DL (ref 5–40)
WBC NRBC COR # BLD: 8.66 10*3/MM3 (ref 3.4–10.8)

## 2022-04-08 PROCEDURE — 85025 COMPLETE CBC W/AUTO DIFF WBC: CPT | Performed by: INTERNAL MEDICINE

## 2022-04-08 PROCEDURE — 80061 LIPID PANEL: CPT | Performed by: INTERNAL MEDICINE

## 2022-04-08 PROCEDURE — 83036 HEMOGLOBIN GLYCOSYLATED A1C: CPT | Performed by: INTERNAL MEDICINE

## 2022-04-08 PROCEDURE — 82306 VITAMIN D 25 HYDROXY: CPT | Performed by: INTERNAL MEDICINE

## 2022-04-08 PROCEDURE — 80053 COMPREHEN METABOLIC PANEL: CPT | Performed by: INTERNAL MEDICINE

## 2022-04-09 LAB — 25(OH)D3 SERPL-MCNC: 19.6 NG/ML (ref 30–100)

## 2022-04-10 ENCOUNTER — APPOINTMENT (OUTPATIENT)
Dept: CT IMAGING | Facility: HOSPITAL | Age: 77
End: 2022-04-10

## 2022-04-10 ENCOUNTER — HOSPITAL ENCOUNTER (EMERGENCY)
Facility: HOSPITAL | Age: 77
Discharge: HOME | End: 2022-04-10
Attending: EMERGENCY MEDICINE | Admitting: STUDENT IN AN ORGANIZED HEALTH CARE EDUCATION/TRAINING PROGRAM

## 2022-04-10 VITALS
DIASTOLIC BLOOD PRESSURE: 73 MMHG | TEMPERATURE: 97.5 F | BODY MASS INDEX: 18.82 KG/M2 | HEIGHT: 73 IN | WEIGHT: 142 LBS | SYSTOLIC BLOOD PRESSURE: 132 MMHG | HEART RATE: 83 BPM | OXYGEN SATURATION: 97 % | RESPIRATION RATE: 16 BRPM

## 2022-04-10 DIAGNOSIS — T85.528A JEJUNOSTOMY TUBE FELL OUT: Primary | ICD-10-CM

## 2022-04-10 LAB
ALBUMIN SERPL-MCNC: 2.75 G/DL (ref 3.5–5.2)
ALBUMIN/GLOB SERPL: 0.6 G/DL
ALP SERPL-CCNC: 150 U/L (ref 39–117)
ALT SERPL W P-5'-P-CCNC: 94 U/L (ref 1–41)
ANION GAP SERPL CALCULATED.3IONS-SCNC: 5.8 MMOL/L (ref 5–15)
AST SERPL-CCNC: 68 U/L (ref 1–40)
BASOPHILS # BLD AUTO: 0.06 10*3/MM3 (ref 0–0.2)
BASOPHILS NFR BLD AUTO: 0.7 % (ref 0–1.5)
BILIRUB SERPL-MCNC: 0.3 MG/DL (ref 0–1.2)
BUN SERPL-MCNC: 25 MG/DL (ref 8–23)
BUN/CREAT SERPL: 40.3 (ref 7–25)
CALCIUM SPEC-SCNC: 8.9 MG/DL (ref 8.6–10.5)
CHLORIDE SERPL-SCNC: 94 MMOL/L (ref 98–107)
CO2 SERPL-SCNC: 30.2 MMOL/L (ref 22–29)
CREAT SERPL-MCNC: 0.62 MG/DL (ref 0.76–1.27)
DEPRECATED RDW RBC AUTO: 51.1 FL (ref 37–54)
EGFRCR SERPLBLD CKD-EPI 2021: 99.7 ML/MIN/1.73
EOSINOPHIL # BLD AUTO: 0.17 10*3/MM3 (ref 0–0.4)
EOSINOPHIL NFR BLD AUTO: 2.1 % (ref 0.3–6.2)
ERYTHROCYTE [DISTWIDTH] IN BLOOD BY AUTOMATED COUNT: 16.3 % (ref 12.3–15.4)
GLOBULIN UR ELPH-MCNC: 4.5 GM/DL
GLUCOSE SERPL-MCNC: 89 MG/DL (ref 65–99)
HCT VFR BLD AUTO: 35.9 % (ref 37.5–51)
HGB BLD-MCNC: 10.7 G/DL (ref 13–17.7)
IMM GRANULOCYTES # BLD AUTO: 0.02 10*3/MM3 (ref 0–0.05)
IMM GRANULOCYTES NFR BLD AUTO: 0.2 % (ref 0–0.5)
LYMPHOCYTES # BLD AUTO: 1.72 10*3/MM3 (ref 0.7–3.1)
LYMPHOCYTES NFR BLD AUTO: 21.2 % (ref 19.6–45.3)
MCH RBC QN AUTO: 25.5 PG (ref 26.6–33)
MCHC RBC AUTO-ENTMCNC: 29.8 G/DL (ref 31.5–35.7)
MCV RBC AUTO: 85.5 FL (ref 79–97)
MONOCYTES # BLD AUTO: 0.82 10*3/MM3 (ref 0.1–0.9)
MONOCYTES NFR BLD AUTO: 10.1 % (ref 5–12)
NEUTROPHILS NFR BLD AUTO: 5.32 10*3/MM3 (ref 1.7–7)
NEUTROPHILS NFR BLD AUTO: 65.7 % (ref 42.7–76)
NRBC BLD AUTO-RTO: 0 /100 WBC (ref 0–0.2)
PLATELET # BLD AUTO: 276 10*3/MM3 (ref 140–450)
PMV BLD AUTO: 9.5 FL (ref 6–12)
POTASSIUM SERPL-SCNC: 4.3 MMOL/L (ref 3.5–5.2)
PROT SERPL-MCNC: 7.2 G/DL (ref 6–8.5)
RBC # BLD AUTO: 4.2 10*6/MM3 (ref 4.14–5.8)
SODIUM SERPL-SCNC: 130 MMOL/L (ref 136–145)
WBC NRBC COR # BLD: 8.11 10*3/MM3 (ref 3.4–10.8)

## 2022-04-10 PROCEDURE — 85025 COMPLETE CBC W/AUTO DIFF WBC: CPT | Performed by: PHYSICIAN ASSISTANT

## 2022-04-10 PROCEDURE — 71260 CT THORAX DX C+: CPT

## 2022-04-10 PROCEDURE — 80053 COMPREHEN METABOLIC PANEL: CPT | Performed by: PHYSICIAN ASSISTANT

## 2022-04-10 PROCEDURE — 74177 CT ABD & PELVIS W/CONTRAST: CPT

## 2022-04-10 PROCEDURE — 25010000002 IOPAMIDOL 61 % SOLUTION: Performed by: STUDENT IN AN ORGANIZED HEALTH CARE EDUCATION/TRAINING PROGRAM

## 2022-04-10 PROCEDURE — 99282 EMERGENCY DEPT VISIT SF MDM: CPT | Performed by: SURGERY

## 2022-04-10 PROCEDURE — 99283 EMERGENCY DEPT VISIT LOW MDM: CPT

## 2022-04-10 RX ADMIN — IOPAMIDOL 85 ML: 612 INJECTION, SOLUTION INTRAVENOUS at 10:11

## 2022-04-10 NOTE — ED PROVIDER NOTES
Subjective   Patient is a 76 year old male with significant past medical history positive for esophageal tear with Gtube, Jtube placement at , he's still NPO with continuous feedings his Jtube came out about 1.5 hours PTA. Patient called his surgeon that advised him to replace it himself and he didn't feel comfortable doing so. Patient denies any pain, SOA, cough, fever or any additional symptoms today.        History provided by:  Patient   used: No        Review of Systems   Constitutional: Negative.  Negative for fever.   HENT: Negative.    Respiratory: Negative.    Cardiovascular: Negative.  Negative for chest pain.   Gastrointestinal: Negative.  Negative for abdominal pain.   Endocrine: Negative.    Genitourinary: Negative.  Negative for dysuria.   Skin: Negative.         Gtube in place, Jtube at bedside out of skin    Neurological: Negative.    Psychiatric/Behavioral: Negative.    All other systems reviewed and are negative.      Past Medical History:   Diagnosis Date   • Acute UTI (urinary tract infection) 2/20/2021   • Arthritis    • Borderline diabetes    • Coronary artery disease    • Diabetes mellitus (HCC)    • Elevated prostate specific antigen (PSA) 8/17/2020   • GERD (gastroesophageal reflux disease)    • Hypertension    • Left elbow pain        Allergies   Allergen Reactions   • Other Anaphylaxis and Swelling     Patient had a reaction to the COVID vaccine. Pt states his tongue and lips started swelling       Past Surgical History:   Procedure Laterality Date   • COLONOSCOPY     • ENDOSCOPY N/A 2/9/2021    Procedure: ESOPHAGOGASTRODUODENOSCOPY WITH BIOPSY dilitation;  Surgeon: Bela Mcdaniel MD;  Location: [facility] OR;  Service: Gastroenterology;  Laterality: N/A;   • ERCP N/A 2/21/2021    Procedure: ENDOSCOPIC RETROGRADE CHOLANGIOPANCREATOGRAPHY WITH STENT PLACEMENT;  Surgeon: Matthew Moreira MD;  Location: [facility] ENDOSCOPY;  Service: Gastroenterology;   Laterality: N/A;  with sphincterotomy, and brushing of common bile duct, and placement of [id number] wall stent     • PACEMAKER IMPLANTATION         Family History   Problem Relation Age of Onset   • Diabetes Mother    • Diabetes Sister    • Diabetes Brother        Social History     Socioeconomic History   • Marital status:    Tobacco Use   • Smoking status: Never Smoker   • Smokeless tobacco: Never Used   Vaping Use   • Vaping Use: Never used   Substance and Sexual Activity   • Alcohol use: No   • Drug use: No   • Sexual activity: Defer           Objective   Physical Exam  Vitals and nursing note reviewed.   Constitutional:       General: He is not in acute distress.     Appearance: He is well-developed. He is not diaphoretic.   HENT:      Head: Normocephalic and atraumatic.      Right Ear: External ear normal.      Left Ear: External ear normal.      Nose: Nose normal.   Eyes:      Conjunctiva/sclera: Conjunctivae normal.      Pupils: Pupils are equal, round, and reactive to light.   Neck:      Vascular: No JVD.      Trachea: No tracheal deviation.   Cardiovascular:      Rate and Rhythm: Normal rate and regular rhythm.      Heart sounds: Normal heart sounds. No murmur heard.  Pulmonary:      Effort: Pulmonary effort is normal. No respiratory distress.      Breath sounds: Normal breath sounds. No wheezing.   Abdominal:      General: Bowel sounds are normal.      Palpations: Abdomen is soft.      Tenderness: There is no abdominal tenderness.      Comments: Gtube in place redness around area of insertion. Jtube out with redness around insertion side area.    Musculoskeletal:         General: No deformity. Normal range of motion.      Cervical back: Normal range of motion and neck supple.   Skin:     General: Skin is warm and dry.      Capillary Refill: Capillary refill takes less than 2 seconds.      Coloration: Skin is not pale.      Findings: No erythema or rash.   Neurological:      Mental Status: He is  alert and oriented to person, place, and time.      Cranial Nerves: No cranial nerve deficit.   Psychiatric:         Behavior: Behavior normal.         Thought Content: Thought content normal.         Procedures           ED Course  ED Course as of 04/10/22 1716   Sun Apr 10, 2022   0808 Unable to reinsert J tube. Dr. Horner unsuccessful. Discussed with Dr. Pena and he will evaluate patient at bedside.  [SM]   0850 Took over for Dalia Parekh at shift change.  Dr. Pena came in and tried to replace the J-tube.  This was unsuccessful.  He recommends contacting  for further treatment/evaluation and plan. [JI]   0904 Discussed with Dr. Santiago with cardiothoracic surgery at  along with the transfer physician Dr. Rapp.  They are agreeable to getting a CT of the chest with water-soluble oral contrast to see if there is any sign of perforation.  If there is perforation present they will accept him to .  If not then he will be placed on a full liquid diet until follow-up on Wednesday [JI]   1122 No sign of perforation on CT.  Patient and family were counseled about the signs and symptoms worsening along with appropriate treatment.  If he should develop pain or fever he should stop his previously planned full liquid diet. [JI]      ED Course User Index  [JI] Edward Thomas, PA  [SM] Dalia Parekh, APRN                                                 MDM  Number of Diagnoses or Management Options  Jejunostomy tube fell out: new and requires workup     Amount and/or Complexity of Data Reviewed  Clinical lab tests: reviewed  Tests in the radiology section of CPT®: reviewed and ordered        Final diagnoses:   Jejunostomy tube fell out       ED Disposition  ED Disposition     ED Disposition   Discharge    Condition   Stable    Comment   --             Kassy Choudhury MD  [address] [place] [address]  [place] [id number]  [phone]    On 4/13/2022  As scheduled    [facility] Emergency  Department  [address] [facility] [id number] [phone]    If symptoms worsen         Medication List      No changes were made to your prescriptions during this visit.          Edward Thomas PA  04/10/22 1719

## 2022-04-10 NOTE — CONSULTS
I received a consult for J-tube displacement from Dr. Horner in the emergency department    Briefly, the patient is a 76-year-old gentleman with history of pancreaticoduodenectomy by Dr. Hazel at the [facility] for chronic pancreatitis, per family at bedside.  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 Kenn Hunt with thoracic surgery at [facility].  He has been J-tube feed dependent due to a persistent esophageal leak.  The patient states that he stood up this morning and he noticed his J-tube fell out at 6 AM.  I questioned him multiple times whether he witnessed it fall out or found it out.      He has a G-tube that was only used for drainage and has never been flushed by family.  Has a follow-up with thoracic surgery later this coming week with an upper GI before.  His last upper GI was several weeks ago.    Abdomen is soft, nondistended, nontender.  G-tube in place.  J-tube site defect appears patent.  There is no drainage whatsoever from the J-tube site    Procedure: I attempted to replace a new 14 French jejunostomy tube with gentle pressure into the J-tube defect.  However, I was unable to find the tract back into the small bowel and wanted to avoid aggressive manipulation.    76-year-old gentleman who is J-tube feed dependent, complex surgical history including a pancreaticoduodenectomy and esophageal repair for perforation.    -I have suggested the ER obtain a CT chest/abdomen/pelvis with IV and oral water-soluble contrast.  They can then have the discussion with the thoracic surgeon on call and [facility] should be able to view our imaging.  They may wish to receive the patient to replace the J-tube versus use his G-tube if there is no evidence of active esophageal leak

## 2022-04-12 ENCOUNTER — APPOINTMENT (OUTPATIENT)
Dept: GENERAL RADIOLOGY | Facility: HOSPITAL | Age: 77
End: 2022-04-12

## 2022-04-14 ENCOUNTER — APPOINTMENT (OUTPATIENT)
Dept: GENERAL RADIOLOGY | Facility: HOSPITAL | Age: 77
End: 2022-04-14

## 2022-06-02 ENCOUNTER — TRANSCRIBE ORDERS (OUTPATIENT)
Dept: ONCOLOGY | Facility: HOSPITAL | Age: 77
End: 2022-06-02

## 2022-06-09 ENCOUNTER — INFUSION (OUTPATIENT)
Dept: ONCOLOGY | Facility: HOSPITAL | Age: 77
End: 2022-06-09

## 2022-06-09 VITALS
OXYGEN SATURATION: 97 % | SYSTOLIC BLOOD PRESSURE: 116 MMHG | HEART RATE: 87 BPM | TEMPERATURE: 97.8 F | DIASTOLIC BLOOD PRESSURE: 70 MMHG | RESPIRATION RATE: 18 BRPM

## 2022-06-09 DIAGNOSIS — I95.9 HYPOTENSION, UNSPECIFIED HYPOTENSION TYPE: Primary | ICD-10-CM

## 2022-06-09 LAB
CORTIS SERPL-MCNC: 20.06 MCG/DL
CORTIS SERPL-MCNC: 25.38 MCG/DL
CORTIS SERPL-MCNC: 7.03 MCG/DL

## 2022-06-09 PROCEDURE — 82533 TOTAL CORTISOL: CPT | Performed by: INTERNAL MEDICINE

## 2022-06-09 PROCEDURE — 96374 THER/PROPH/DIAG INJ IV PUSH: CPT

## 2022-06-09 PROCEDURE — 25010000002 COSYNTROPIN PER 0.25 MG: Performed by: INTERNAL MEDICINE

## 2022-06-09 PROCEDURE — 82024 ASSAY OF ACTH: CPT | Performed by: INTERNAL MEDICINE

## 2022-06-09 RX ORDER — COSYNTROPIN 0.25 MG/ML
0.25 INJECTION, POWDER, FOR SOLUTION INTRAMUSCULAR; INTRAVENOUS ONCE
Status: COMPLETED | OUTPATIENT
Start: 2022-06-09 | End: 2022-06-09

## 2022-06-09 RX ORDER — COSYNTROPIN 0.25 MG/ML
0.25 INJECTION, POWDER, FOR SOLUTION INTRAMUSCULAR; INTRAVENOUS ONCE
Status: CANCELLED | OUTPATIENT
Start: 2022-06-09

## 2022-06-09 RX ADMIN — COSYNTROPIN 0.25 MG: 0.25 INJECTION, POWDER, LYOPHILIZED, FOR SOLUTION INTRAMUSCULAR; INTRAVENOUS at 13:19

## 2022-06-10 LAB — ACTH PLAS-MCNC: 20.6 PG/ML (ref 7.2–63.3)

## 2022-06-23 ENCOUNTER — TREATMENT (OUTPATIENT)
Dept: PHYSICAL THERAPY | Facility: CLINIC | Age: 77
End: 2022-06-23

## 2022-06-23 DIAGNOSIS — R53.81 DEBILITY: Primary | ICD-10-CM

## 2022-06-23 DIAGNOSIS — R53.81 OTHER MALAISE: ICD-10-CM

## 2022-06-23 DIAGNOSIS — R26.81 UNSTEADY GAIT: ICD-10-CM

## 2022-06-23 PROCEDURE — 97162 PT EVAL MOD COMPLEX 30 MIN: CPT | Performed by: PHYSICAL THERAPIST

## 2022-06-23 NOTE — PROGRESS NOTES
Physical Therapy Initial Evaluation and Plan of Care    Patient: Sandro Argueta   : 1945  Diagnosis/ICD-10 Code:  Debility [R53.81]  Referring practitioner: Misti Nguyễn MD  Date of Initial Visit: 2022  Today's Date: 2022  Patient seen for 1 session         Visit Diagnoses:    ICD-10-CM ICD-9-CM   1. Debility  R53.81 799.3   2. Unsteady gait  R26.81 781.2   3. Other malaise  R53.81 780.79         Subjective Questionnaire: OCHOA 3256      Subjective Evaluation    History of Present Illness  Onset date: two years.  Mechanism of injury: Around two years ago the patient began to suffer from abdominal pain. The patient was diagnosed with pancreatitis and the patient lost around 40 pounds.  He required a whipple procedure and became diabetic following his surgery.  Two months following his surgery he began to return to prior level of status.   Around 2022, the patient suffered a ruptured esophagus.  The patient was hosptialized and required a feeding tube for three months.  He has lost a total of around 80 pounds in the past two years and now also suffers from orthostatic hypotension.  Pt was evaluated by his PCP and has now been referred to therapy for treatment of current deficits.  The patient has suffered from two falls in the past two months.        Patient Occupation: retired Quality of life: good    Pain  Current pain ratin  At best pain ratin  At worst pain ratin    Hand dominance: right    Patient Goals  Patient goals for therapy: decreased pain, improved balance, increased motion, increased strength, independence with ADLs/IADLs and return to sport/leisure activities             Objective          Postural Observations    Additional Postural Observation Details  Pt sitting with slumped posture with fwd head and post pelvic tilt; increased fwd head posture noted    Neurological Testing     Sensation     Lumbar   Left   Intact: light touch    Right   Intact: light  touch    Reflexes   Left   Patellar (L4): normal (2+)    Right   Patellar (L4): normal (2+)    Strength/Myotome Testing     Left Hip   Planes of Motion   Flexion: 4-    Right Hip   Planes of Motion   Flexion: 4-    Left Knee   Flexion: 4-  Extension: 4    Right Knee   Flexion: 3+  Extension: 4    Additional Strength Details  Finger to nose wnl; finger to nose eyes closed impaired left and right  Visual exam wnl  PEÑA wnl  BP sitting 152/86  BP standing 138/74      Ambulation     Comments   Pt amb with fwd flexed posture with ataxia and use of cane; pt noted to have a wooden cane that is not appropriate for his height; pt instructed that a walking stick or higher cane would help with trunk flexion    Functional Assessment     Comments  Impaired standing on foam and inclined/declined surfaces          Assessment & Plan     Assessment  Impairments: abnormal gait, abnormal muscle firing, abnormal or restricted ROM, activity intolerance, impaired balance, impaired physical strength, lacks appropriate home exercise program, safety issue and weight-bearing intolerance  Functional Limitations: carrying objects, lifting, walking, pulling, standing and unable to perform repetitive tasks  Assessment details: Pt is a 76 y/o male referred to therapy for improved balance and stability to reduce falls and improve function.  Pt noted to have an unsteady gait, OCHOA score of 32/56 and poor postural awareness and LE weakness.  Therapy will follow for improved leg stability and balance for improved functional independence.  Prognosis: good    Goals  Plan Goals: STG 6 weeks    1 Pt will be instructed in a HEP.  2 Pt will improve his OCHOA to 40 or greater.  3 Pt will demonstrate 4/5 gross LE strength.    LTG 12 weeks    1 Pt will improve his OCHOA to 45 or greater.  2 Pt will be able to ambulate 10 min on a TM without a rest break.  3 Pt will be able walk with improved velocity and safety, including turns, without an AD.  4 Pt will be  instructed in a progressive LE and postural stability program.    Plan  Therapy options: will be seen for skilled therapy services  Planned modality interventions: electrical stimulation/Russian stimulation, thermotherapy (hydrocollator packs), traction, ultrasound and thermotherapy (paraffin bath)  Planned therapy interventions: abdominal trunk stabilization, ADL retraining, balance/weight-bearing training, body mechanics training, flexibility, functional ROM exercises, gait training, home exercise program, IADL retraining, joint mobilization, manual therapy, neuromuscular re-education, postural training, soft tissue mobilization, spinal/joint mobilization, stretching and therapeutic activities  Frequency: 2x week  Duration in weeks: 12  Treatment plan discussed with: patient  Plan details: Will follow for optimal gains.    Moderate Evaluation  15224    Re-evaluation   94587      Therapeutic exercise  84282  Therapeutic activity    48511  Neuromuscular re-education   13789  Manual therapy   83357  Gait training  51796      Attended e-stim  50231  Unattended e-stim (Medicaid/Medicare)     Moist heat/cryotherapy 22394   Ultrasound   47110              Timed:         Manual Therapy:         mins  70137;     Therapeutic Exercise:         mins  21621;     Neuromuscular Avis:        mins  89536;    Therapeutic Activity:          mins  85400;     Gait Training:           mins  42669;     Ultrasound:          mins  91340;    Ionto                                   mins   33840  Self Care                            mins   14432  Canalith Repos         mins 83739      Un-Timed:  Electrical Stimulation:         mins  48188 ( );  Dry Needling          mins self-pay  Traction          mins 86039  Low Eval          Mins  97789  Mod Eval     40     Mins  76976  High Eval                            Mins  44440        Timed Treatment:      mins   Total Treatment:     40   mins          PT: Chuy Christian, PT      License Number: [id number]  Electronically signed by Chuy Christian, PT, 06/23/22, 2:25 PM EDT    Certification Period: 6/23/2022 thru 9/20/2022  I certify that the therapy services are furnished while this patient is under my care.  The services outlined above are required by this patient, and will be reviewed every 90 days.         Physician Signature:__________________________________________________    PHYSICIAN: Misti Nguyễn MD  NPI: [id number]                                      DATE:      Please sign and return via fax to [email or web address] . Thank you, [facility].

## 2022-06-27 ENCOUNTER — TREATMENT (OUTPATIENT)
Dept: PHYSICAL THERAPY | Facility: CLINIC | Age: 77
End: 2022-06-27

## 2022-06-27 DIAGNOSIS — R53.81 OTHER MALAISE: ICD-10-CM

## 2022-06-27 DIAGNOSIS — R53.81 DEBILITY: Primary | ICD-10-CM

## 2022-06-27 DIAGNOSIS — R26.81 UNSTEADY GAIT: ICD-10-CM

## 2022-06-27 PROCEDURE — 97110 THERAPEUTIC EXERCISES: CPT | Performed by: PHYSICAL THERAPIST

## 2022-06-27 PROCEDURE — 97112 NEUROMUSCULAR REEDUCATION: CPT | Performed by: PHYSICAL THERAPIST

## 2022-06-27 NOTE — PROGRESS NOTES
Physical Therapy Daily Treatment Note      Patient: Sandro Argueta   : 1945  Referring practitioner: Misti Nguyễn MD  Date of Initial Visit: Type: THERAPY  Noted: 2022  Today's Date: 2022  Patient seen for 2 sessions       Visit Diagnoses:    ICD-10-CM ICD-9-CM   1. Debility  R53.81 799.3   2. Unsteady gait  R26.81 781.2   3. Other malaise  R53.81 780.79       Subjective:  Patient arrives to therapy today w/ spouse.  Pt states he continues to have problems with his BP at times when he stands. Otherwise pt states of no new complaints/ concerns.     Objective   See Exercise, Manual, and Modality Logs for complete treatment.       Assessment/Plan:  Patient demonstrated good effort during today's session, and was provided w/ rest breaks as needed due to fatigue.  Pt's BP monitored throughout tx in seated and standing position.  BP in seated position:  114/67, w/ standing 111/69.  Pt was asymptomatic during tx.  Treatment initiated w/ therex as listed for improved bilateral LE strength, and functional mobility f/b neuro re-ed for improved static/ dynamic balance.  Pt was provided w/ cues and demonstration for form, and for max benefit.  Pt provided w/ contact guard to min assist during balance activities to reduce fall risk, and for improved patient safety.  No signs of distress or adverse reactions observed during, and/ or following tx.  Pt continues to benefit from therapy services, and will be progressed as tolerated to address goals, restore strength., and improve mobility.  Continue w/ PT's POC.        Timed:         Manual Therapy:         mins  01984;     Therapeutic Exercise:    31     mins  51266;     Neuromuscular Avis:  16      mins  90149;    Therapeutic Activity:          mins  09913;     Gait Training:           mins  31372;     Ultrasound:          mins  37273;    Ionto                                   mins   48183  Self Care                            mins   76317  [facility]          mins 02529      Un-Timed:  Electrical Stimulation:         mins  14460 ( );  Dry Needling          mins self-pay  Traction          mins 71139      Timed Treatment:  47    mins   Total Treatment:    47    mins    Jazmin Guerra. ZACKARY Cunha  [place] License: [id number]

## 2022-06-29 ENCOUNTER — TREATMENT (OUTPATIENT)
Dept: PHYSICAL THERAPY | Facility: CLINIC | Age: 77
End: 2022-06-29

## 2022-06-29 DIAGNOSIS — R53.81 DEBILITY: Primary | ICD-10-CM

## 2022-06-29 DIAGNOSIS — R26.81 UNSTEADY GAIT: ICD-10-CM

## 2022-06-29 DIAGNOSIS — R53.81 OTHER MALAISE: ICD-10-CM

## 2022-06-29 PROCEDURE — 97110 THERAPEUTIC EXERCISES: CPT | Performed by: PHYSICAL THERAPIST

## 2022-06-29 PROCEDURE — 97112 NEUROMUSCULAR REEDUCATION: CPT | Performed by: PHYSICAL THERAPIST

## 2022-06-29 NOTE — PROGRESS NOTES
Physical Therapy Daily Treatment Note      Patient: Sandro Argueta   : 1945  Referring practitioner: Misti Nguyễn MD  Date of Initial Visit: Type: THERAPY  Noted: 2022  Today's Date: 2022  Patient seen for 3 sessions       Visit Diagnoses:    ICD-10-CM ICD-9-CM   1. Debility  R53.81 799.3   2. Unsteady gait  R26.81 781.2   3. Other malaise  R53.81 780.79       Subjective Evaluation    History of Present Illness    Subjective comment: Pt had mild leg soreness folowing last session.       Objective   See Exercise, Manual, and Modality Logs for complete treatment.       Assessment & Plan     Assessment    Assessment details: Tx today consisted of sitting and standing exercises with added reps; followed by postural exercises with overhead reaching and added LE bike and standing proprioceptive training and standing exercises for improved stability and balance.  Pt demonstrated good effort today with no complaints and responded well to added exercises.  Pt was given a written HEP and was instructed to increase postural exercises.    Plan  Plan details: Will follow progressing core stability and increased leg stability.          Timed:         Manual Therapy:         mins  95284;     Therapeutic Exercise:    31     mins  32553;     Neuromuscular Avis:    14    mins  28023;    Therapeutic Activity:          mins  82702;     Gait Training:           mins  32297;     Ultrasound:          mins  19959;    Ionto                                   mins   74543  Self Care                            mins   06328  Canalith Repos         mins 79472      Un-Timed:  Electrical Stimulation:         mins  15167 ( );  Dry Needling          mins self-pay  Traction          mins 27343      Timed Treatment:   45   mins   Total Treatment:     45   mins    Chuy Christian PT  [place] License: [id number]      Electronically signed by Chuy Christian PT, 22, 9:34 AM EDT

## 2022-07-04 ENCOUNTER — APPOINTMENT (OUTPATIENT)
Dept: CT IMAGING | Facility: HOSPITAL | Age: 77
End: 2022-07-04

## 2022-07-04 ENCOUNTER — HOSPITAL ENCOUNTER (EMERGENCY)
Facility: HOSPITAL | Age: 77
Discharge: HOME | End: 2022-07-04
Attending: EMERGENCY MEDICINE | Admitting: STUDENT IN AN ORGANIZED HEALTH CARE EDUCATION/TRAINING PROGRAM

## 2022-07-04 VITALS
SYSTOLIC BLOOD PRESSURE: 143 MMHG | WEIGHT: 145 LBS | RESPIRATION RATE: 20 BRPM | TEMPERATURE: 97.3 F | OXYGEN SATURATION: 99 % | DIASTOLIC BLOOD PRESSURE: 71 MMHG | HEIGHT: 72 IN | BODY MASS INDEX: 19.64 KG/M2 | HEART RATE: 77 BPM

## 2022-07-04 DIAGNOSIS — L02.213 CHEST WALL ABSCESS: Primary | ICD-10-CM

## 2022-07-04 DIAGNOSIS — J90 LOCULATED PLEURAL EFFUSION: ICD-10-CM

## 2022-07-04 DIAGNOSIS — D73.89 SPLENIC LESION: ICD-10-CM

## 2022-07-04 LAB
ALBUMIN SERPL-MCNC: 3.6 G/DL (ref 3.5–5.2)
ALBUMIN/GLOB SERPL: 0.8 G/DL
ALP SERPL-CCNC: 84 U/L (ref 39–117)
ALT SERPL W P-5'-P-CCNC: 8 U/L (ref 1–41)
ANION GAP SERPL CALCULATED.3IONS-SCNC: 12.2 MMOL/L (ref 5–15)
APTT PPP: 28.6 SECONDS (ref 26.5–34.5)
AST SERPL-CCNC: 12 U/L (ref 1–40)
BACTERIA UR QL AUTO: ABNORMAL /HPF
BASOPHILS # BLD AUTO: 0.03 10*3/MM3 (ref 0–0.2)
BASOPHILS NFR BLD AUTO: 0.3 % (ref 0–1.5)
BILIRUB SERPL-MCNC: 0.2 MG/DL (ref 0–1.2)
BILIRUB UR QL STRIP: NEGATIVE
BUN SERPL-MCNC: 17 MG/DL (ref 8–23)
BUN/CREAT SERPL: 17.2 (ref 7–25)
CALCIUM SPEC-SCNC: 9.4 MG/DL (ref 8.6–10.5)
CHLORIDE SERPL-SCNC: 98 MMOL/L (ref 98–107)
CLARITY UR: CLEAR
CO2 SERPL-SCNC: 26.8 MMOL/L (ref 22–29)
COLOR UR: YELLOW
CREAT SERPL-MCNC: 0.99 MG/DL (ref 0.76–1.27)
CRP SERPL-MCNC: 5.91 MG/DL (ref 0–0.5)
D-LACTATE SERPL-SCNC: 1 MMOL/L (ref 0.5–2)
DEPRECATED RDW RBC AUTO: 47.8 FL (ref 37–54)
EGFRCR SERPLBLD CKD-EPI 2021: 78.9 ML/MIN/1.73
EOSINOPHIL # BLD AUTO: 0.23 10*3/MM3 (ref 0–0.4)
EOSINOPHIL NFR BLD AUTO: 2.3 % (ref 0.3–6.2)
ERYTHROCYTE [DISTWIDTH] IN BLOOD BY AUTOMATED COUNT: 15.6 % (ref 12.3–15.4)
ERYTHROCYTE [SEDIMENTATION RATE] IN BLOOD: 46 MM/HR (ref 0–20)
GLOBULIN UR ELPH-MCNC: 4.4 GM/DL
GLUCOSE SERPL-MCNC: 152 MG/DL (ref 65–99)
GLUCOSE UR STRIP-MCNC: NEGATIVE MG/DL
HCT VFR BLD AUTO: 32.2 % (ref 37.5–51)
HGB BLD-MCNC: 9.9 G/DL (ref 13–17.7)
HGB UR QL STRIP.AUTO: ABNORMAL
HYALINE CASTS UR QL AUTO: ABNORMAL /LPF
IMM GRANULOCYTES # BLD AUTO: 0.03 10*3/MM3 (ref 0–0.05)
IMM GRANULOCYTES NFR BLD AUTO: 0.3 % (ref 0–0.5)
INR PPP: 1.01 (ref 0.9–1.1)
KETONES UR QL STRIP: NEGATIVE
LEUKOCYTE ESTERASE UR QL STRIP.AUTO: NEGATIVE
LYMPHOCYTES # BLD AUTO: 2.15 10*3/MM3 (ref 0.7–3.1)
LYMPHOCYTES NFR BLD AUTO: 21.6 % (ref 19.6–45.3)
MCH RBC QN AUTO: 25.8 PG (ref 26.6–33)
MCHC RBC AUTO-ENTMCNC: 30.7 G/DL (ref 31.5–35.7)
MCV RBC AUTO: 84.1 FL (ref 79–97)
MONOCYTES # BLD AUTO: 0.68 10*3/MM3 (ref 0.1–0.9)
MONOCYTES NFR BLD AUTO: 6.8 % (ref 5–12)
NEUTROPHILS NFR BLD AUTO: 6.83 10*3/MM3 (ref 1.7–7)
NEUTROPHILS NFR BLD AUTO: 68.7 % (ref 42.7–76)
NITRITE UR QL STRIP: NEGATIVE
NRBC BLD AUTO-RTO: 0 /100 WBC (ref 0–0.2)
PH UR STRIP.AUTO: 7 [PH] (ref 5–8)
PLATELET # BLD AUTO: 335 10*3/MM3 (ref 140–450)
PMV BLD AUTO: 9 FL (ref 6–12)
POTASSIUM SERPL-SCNC: 4.5 MMOL/L (ref 3.5–5.2)
PROCALCITONIN SERPL-MCNC: 0.04 NG/ML (ref 0–0.25)
PROT SERPL-MCNC: 8 G/DL (ref 6–8.5)
PROT UR QL STRIP: NEGATIVE
PROTHROMBIN TIME: 13.5 SECONDS (ref 12.1–14.7)
RBC # BLD AUTO: 3.83 10*6/MM3 (ref 4.14–5.8)
RBC # UR STRIP: ABNORMAL /HPF
REF LAB TEST METHOD: ABNORMAL
SODIUM SERPL-SCNC: 137 MMOL/L (ref 136–145)
SP GR UR STRIP: 1.02 (ref 1–1.03)
SQUAMOUS #/AREA URNS HPF: ABNORMAL /HPF
UROBILINOGEN UR QL STRIP: ABNORMAL
WBC # UR STRIP: ABNORMAL /HPF
WBC NRBC COR # BLD: 9.95 10*3/MM3 (ref 3.4–10.8)

## 2022-07-04 PROCEDURE — 25010000002 IOPAMIDOL 61 % SOLUTION: Performed by: STUDENT IN AN ORGANIZED HEALTH CARE EDUCATION/TRAINING PROGRAM

## 2022-07-04 PROCEDURE — 85730 THROMBOPLASTIN TIME PARTIAL: CPT | Performed by: EMERGENCY MEDICINE

## 2022-07-04 PROCEDURE — 71260 CT THORAX DX C+: CPT

## 2022-07-04 PROCEDURE — 85025 COMPLETE CBC W/AUTO DIFF WBC: CPT | Performed by: EMERGENCY MEDICINE

## 2022-07-04 PROCEDURE — 36415 COLL VENOUS BLD VENIPUNCTURE: CPT

## 2022-07-04 PROCEDURE — 86140 C-REACTIVE PROTEIN: CPT | Performed by: EMERGENCY MEDICINE

## 2022-07-04 PROCEDURE — 74177 CT ABD & PELVIS W/CONTRAST: CPT

## 2022-07-04 PROCEDURE — 25010000002 VANCOMYCIN 5 G RECONSTITUTED SOLUTION: Performed by: EMERGENCY MEDICINE

## 2022-07-04 PROCEDURE — 83605 ASSAY OF LACTIC ACID: CPT | Performed by: EMERGENCY MEDICINE

## 2022-07-04 PROCEDURE — 99283 EMERGENCY DEPT VISIT LOW MDM: CPT

## 2022-07-04 PROCEDURE — 96365 THER/PROPH/DIAG IV INF INIT: CPT

## 2022-07-04 PROCEDURE — 85652 RBC SED RATE AUTOMATED: CPT | Performed by: EMERGENCY MEDICINE

## 2022-07-04 PROCEDURE — 80053 COMPREHEN METABOLIC PANEL: CPT | Performed by: EMERGENCY MEDICINE

## 2022-07-04 PROCEDURE — 85610 PROTHROMBIN TIME: CPT | Performed by: EMERGENCY MEDICINE

## 2022-07-04 PROCEDURE — 84145 PROCALCITONIN (PCT): CPT | Performed by: EMERGENCY MEDICINE

## 2022-07-04 PROCEDURE — 87040 BLOOD CULTURE FOR BACTERIA: CPT | Performed by: EMERGENCY MEDICINE

## 2022-07-04 PROCEDURE — 81001 URINALYSIS AUTO W/SCOPE: CPT | Performed by: STUDENT IN AN ORGANIZED HEALTH CARE EDUCATION/TRAINING PROGRAM

## 2022-07-04 RX ORDER — SODIUM CHLORIDE 0.9 % (FLUSH) 0.9 %
10 SYRINGE (ML) INJECTION AS NEEDED
Status: DISCONTINUED | OUTPATIENT
Start: 2022-07-04 | End: 2022-07-05 | Stop reason: HOSPADM

## 2022-07-04 RX ORDER — SULFAMETHOXAZOLE AND TRIMETHOPRIM 800; 160 MG/1; MG/1
1 TABLET ORAL 2 TIMES DAILY
Qty: 14 TABLET | Refills: 0 | Status: SHIPPED | OUTPATIENT
Start: 2022-07-04 | End: 2022-07-11

## 2022-07-04 RX ORDER — LIDOCAINE HYDROCHLORIDE 10 MG/ML
10 INJECTION, SOLUTION EPIDURAL; INFILTRATION; INTRACAUDAL; PERINEURAL ONCE
Status: COMPLETED | OUTPATIENT
Start: 2022-07-04 | End: 2022-07-04

## 2022-07-04 RX ADMIN — VANCOMYCIN HYDROCHLORIDE 1250 MG: 5 INJECTION, POWDER, LYOPHILIZED, FOR SOLUTION INTRAVENOUS at 20:46

## 2022-07-04 RX ADMIN — IOPAMIDOL 85 ML: 612 INJECTION, SOLUTION INTRAVENOUS at 20:07

## 2022-07-04 RX ADMIN — LIDOCAINE HYDROCHLORIDE 10 ML: 10 INJECTION, SOLUTION EPIDURAL; INFILTRATION; INTRACAUDAL; PERINEURAL at 22:29

## 2022-07-04 RX ADMIN — SODIUM CHLORIDE 1000 ML: 9 INJECTION, SOLUTION INTRAVENOUS at 18:57

## 2022-07-04 NOTE — ED PROVIDER NOTES
Subjective     History provided by:  Patient   used: No    Abscess  Location:  Torso  Torso abscess location:  L flank  Size:  4  Abscess quality: fluctuance, induration, painful, redness and warmth    Abscess quality: not draining, no itching and not weeping    Progression:  Worsening  Pain details:     Quality:  Dull    Severity:  Mild    Timing:  Constant    Progression:  Worsening  Chronicity:  New  Context: diabetes and immunosuppression    Context: not injected drug use, not insect bite/sting and not skin injury    Relieved by:  Nothing  Worsened by:  Nothing  Ineffective treatments:  None tried  Associated symptoms: no anorexia, no fatigue, no fever, no headaches, no nausea and no vomiting    Risk factors: no family hx of MRSA, no hx of MRSA and no prior abscess        Review of Systems   Constitutional: Negative for activity change, appetite change, chills, diaphoresis, fatigue and fever.   HENT: Negative for congestion, ear pain and sore throat.    Eyes: Negative for redness.   Respiratory: Negative for cough, chest tightness, shortness of breath and wheezing.    Cardiovascular: Negative for chest pain, palpitations and leg swelling.   Gastrointestinal: Negative for abdominal pain, anorexia, diarrhea, nausea and vomiting.   Genitourinary: Negative for dysuria and urgency.   Musculoskeletal: Negative for arthralgias, back pain, myalgias and neck pain.   Skin: Negative for pallor, rash and wound.   Neurological: Negative for dizziness, speech difficulty, weakness and headaches.   Psychiatric/Behavioral: Negative for agitation, behavioral problems, confusion and decreased concentration.   All other systems reviewed and are negative.      Past Medical History:   Diagnosis Date   • Acute UTI (urinary tract infection) 2/20/2021   • Arthritis    • Borderline diabetes    • Coronary artery disease    • Diabetes mellitus (HCC)    • Elevated prostate specific antigen (PSA) 8/17/2020   • GERD  (gastroesophageal reflux disease)    • Hypertension    • Left elbow pain        Allergies   Allergen Reactions   • Other Anaphylaxis and Swelling     Patient had a reaction to the COVID vaccine. Pt states his tongue and lips started swelling       Past Surgical History:   Procedure Laterality Date   • COLONOSCOPY     • ENDOSCOPY N/A 2/9/2021    Procedure: ESOPHAGOGASTRODUODENOSCOPY WITH BIOPSY dilitation;  Surgeon: Bela Mcdaniel MD;  Location: [facility] OR;  Service: Gastroenterology;  Laterality: N/A;   • ERCP N/A 2/21/2021    Procedure: ENDOSCOPIC RETROGRADE CHOLANGIOPANCREATOGRAPHY WITH STENT PLACEMENT;  Surgeon: Matthew Moreira MD;  Location: [facility] ENDOSCOPY;  Service: Gastroenterology;  Laterality: N/A;  with sphincterotomy, and brushing of common bile duct, and placement of [id number] wall stent     • PACEMAKER IMPLANTATION         Family History   Problem Relation Age of Onset   • Diabetes Mother    • Diabetes Sister    • Diabetes Brother        Social History     Socioeconomic History   • Marital status:    Tobacco Use   • Smoking status: Never Smoker   • Smokeless tobacco: Never Used   Vaping Use   • Vaping Use: Never used   Substance and Sexual Activity   • Alcohol use: No   • Drug use: No   • Sexual activity: Defer           Objective   Physical Exam  Vitals and nursing note reviewed.   Constitutional:       General: He is not in acute distress.     Appearance: Normal appearance. He is well-developed. He is not toxic-appearing or diaphoretic.   HENT:      Head: Normocephalic and atraumatic.      Right Ear: External ear normal.      Left Ear: External ear normal.      Nose: Nose normal.      Mouth/Throat:      Pharynx: No oropharyngeal exudate.      Tonsils: No tonsillar exudate.   Eyes:      General: Lids are normal.      Conjunctiva/sclera: Conjunctivae normal.      Pupils: Pupils are equal, round, and reactive to light.   Neck:      Thyroid: No thyromegaly.    Cardiovascular:      Rate and Rhythm: Normal rate and regular rhythm.      Pulses: Normal pulses.      Heart sounds: Normal heart sounds, S1 normal and S2 normal.   Pulmonary:      Effort: Pulmonary effort is normal. No tachypnea or respiratory distress.      Breath sounds: Normal breath sounds. No decreased breath sounds, wheezing or rales.   Chest:      Chest wall: Tenderness present.       Abdominal:      General: Bowel sounds are normal. There is no distension.      Palpations: Abdomen is soft.      Tenderness: There is no abdominal tenderness. There is no guarding or rebound.   Musculoskeletal:         General: No tenderness or deformity. Normal range of motion.      Cervical back: Full passive range of motion without pain, normal range of motion and neck supple.   Lymphadenopathy:      Cervical: No cervical adenopathy.   Skin:     General: Skin is warm and dry.      Coloration: Skin is not pale.      Findings: No erythema or rash.   Neurological:      Mental Status: He is alert and oriented to person, place, and time.      GCS: GCS eye subscore is 4. GCS verbal subscore is 5. GCS motor subscore is 6.      Cranial Nerves: No cranial nerve deficit.      Sensory: No sensory deficit.   Psychiatric:         Speech: Speech normal.         Behavior: Behavior normal.         Thought Content: Thought content normal.         Judgment: Judgment normal.         Incision & Drainage    Date/Time: 7/9/2022 7:27 AM  Performed by: Apollo Mcclellan MD  Authorized by: Apollo Mcclellan MD     Consent:     Consent obtained:  Verbal    Consent given by:  Patient    Risks discussed:  Bleeding, incomplete drainage, infection and pain  Location:     Type:  Abscess    Size:  3cm  Pre-procedure details:     Skin preparation:  Betadine  Anesthesia:     Anesthesia method:  Local infiltration    Local anesthetic:  Lidocaine 1% w/o epi  Procedure type:     Complexity:  Simple  Procedure details:     Ultrasound guidance: yes      Incision  types:  Single straight    Incision depth:  Dermal    Wound management:  Probed and deloculated    Drainage:  Serosanguinous    Wound treatment:  Wound left open  Post-procedure details:     Procedure completion:  Tolerated well, no immediate complications               ED Course  ED Course as of 07/04/22 2259 Mon Jul 04, 2022 2042 CT Chest With Contrast Diagnostic  1.  There is subcutaneous soft tissue fat stranding and edema with areas of skin thickening at the left lateral chest wall near the left ninth and 10th rib. There is at least a small peripherally enhancing loculated collection at the site measuring 2.5 x  1.7 x 2.0 cm. Some ill-defined soft tissue attenuation surrounds is immediately with an additional small focus seen just superior to this measuring up to 1.2 cm without definite of central fluid attenuation. While nonspecific, based on reported history  this most likely represents body wall abscess.  2.  Small left pleural effusion which has loculated components along the sidewall and posteriorly.  3.  The left chest demonstrates bands of scar versus atelectasis within the left upper lobe and lingula. There are ill-defined areas of consolidation seen in the medial right base, most likely atelectasis. Note that infectious or inflammatory infiltrate  could have an identical appearance to the findings in the left chest.  4.  Mild hepatomegaly and hepatic steatosis.  5.  Nonspecific ileus.  6.  Postsurgical changes throughout the abdomen.  7.  Nonspecific rounded hyperdense focus within the spleen, most likely a hemangioma but nonspecific. Present since at least February 2021.  8.  Nonspecific urinary bladder wall thickening. Correlate with urinalysis. [KP]   8409 Patient's left chest wall abscess opened with mild draining on the way back from CT. Will incise and drain.  Reassessed, well-appearing, no significant other complaint at this time.  Patient not septic. [KP]   2237 Incision and drainage  performed, no significant output after previous drainage.  Unable to culture.  Bedside physician ultrasound noted deeper pocket of fluid to likely tracking through tract from chest tube.  Patient to follow-up with CT surgery at next available appointment.  Bactrim prescribed for 7 days.  Patient and family agreeable to plan. [KP]   2247 Cardiothoracic surgeon at  is Dr. Kassy Hunt.   [KP]   2258 WBC, UA: 0-2 [KP]   2259 Bacteria, UA: None Seen [KP]   2259 Nitrite, UA: Negative  No UTI [KP]      ED Course User Index  [KP] Apollo Mcclellan MD                                           MDM  Number of Diagnoses or Management Options  Chest wall abscess: new and requires workup  Loculated pleural effusion: new and requires workup  Splenic lesion: new and requires workup     Amount and/or Complexity of Data Reviewed  Clinical lab tests: ordered and reviewed  Tests in the radiology section of CPT®: ordered and reviewed  Tests in the medicine section of CPT®: ordered and reviewed  Review and summarize past medical records: yes  Discuss the patient with other providers: yes  Independent visualization of images, tracings, or specimens: yes    Risk of Complications, Morbidity, and/or Mortality  Presenting problems: moderate  Diagnostic procedures: moderate  Management options: moderate    Patient Progress  Patient progress: stable      Final diagnoses:   Chest wall abscess   Loculated pleural effusion   Splenic lesion       ED Disposition  ED Disposition     ED Disposition   Discharge    Condition   Stable    Comment   --             Kassy Choudhury MD  [address] [place], [place] [id number] [phone]    Schedule an appointment as soon as possible for a visit            Medication List      New Prescriptions    sulfamethoxazole-trimethoprim 800-160 MG per tablet  Commonly known as: BACTRIM DS,SEPTRA DS  Take 1 tablet by mouth 2 (Two) Times a Day for 7 days.           Where to Get Your Medications      These  medications were sent to [facility] - [place], [place] - [address] [address] [place] [facility] [address] - [phone]  - [phone]   [address] [address] [place] [address]    Phone: [phone]   · sulfamethoxazole-trimethoprim 800-160 MG per tablet          Apollo Mcclellan MD  07/04/22 6207       Apollo Mcclellan MD  07/09/22 3753

## 2022-07-05 NOTE — DISCHARGE INSTRUCTIONS
Expect incomplete drainage of abscess based on ultrasound.  Must follow-up with cardiothoracic surgery and next verbal appointment.  Bactrim prescribed.  Do not hesitate to return for new onset or worsening symptoms.    Please also note to your surgery team incidental findings on CT of loculated pleural effusion on the left.  There is also a lesion on the spleen that was present in 2021.  He may follow-up with your primary care provider for this as repeat imaging may be indicated in the future.

## 2022-07-09 LAB
BACTERIA SPEC AEROBE CULT: NORMAL
BACTERIA SPEC AEROBE CULT: NORMAL

## 2022-07-11 ENCOUNTER — TREATMENT (OUTPATIENT)
Dept: PHYSICAL THERAPY | Facility: CLINIC | Age: 77
End: 2022-07-11

## 2022-07-11 DIAGNOSIS — R26.81 UNSTEADY GAIT: ICD-10-CM

## 2022-07-11 DIAGNOSIS — R53.81 OTHER MALAISE: ICD-10-CM

## 2022-07-11 DIAGNOSIS — R53.81 DEBILITY: Primary | ICD-10-CM

## 2022-07-11 PROCEDURE — 97112 NEUROMUSCULAR REEDUCATION: CPT | Performed by: PHYSICAL THERAPIST

## 2022-07-11 PROCEDURE — 97110 THERAPEUTIC EXERCISES: CPT | Performed by: PHYSICAL THERAPIST

## 2022-07-11 NOTE — PROGRESS NOTES
Physical Therapy Daily Treatment Note      Patient: Sandro Argueta   : 1945  Referring practitioner: Misti Nguyễn MD  Date of Initial Visit: Type: THERAPY  Noted: 2022  Today's Date: 2022  Patient seen for 4 sessions       Visit Diagnoses:    ICD-10-CM ICD-9-CM   1. Debility  R53.81 799.3   2. Unsteady gait  R26.81 781.2   3. Other malaise  R53.81 780.79       Subjective Evaluation    History of Present Illness    Subjective comment: Pt reports having an infection along his left lateral thoracic region.  The patient received treatment that included an I & D and an antibiotic.   Pt instructed to let therapy know if he has pain with session.       Objective   See Exercise, Manual, and Modality Logs for complete treatment.       Assessment & Plan     Assessment    Assessment details: Tx today consisted of exercises progress for improved leg stability; followed by limited postural exercises and proprioceptive activities.  Pt demonstrated good effort today with treatment and many exercises were limited due to left lateral trunk wound healing.  Pt requested to not perform bike or bicep curls.  Pt also request to not perform side stepping.    Plan  Plan details: Will follow for improved leg stability and improved balance.          Timed:         Manual Therapy:         mins  93116;     Therapeutic Exercise:    31     mins  71770;     Neuromuscular Avis:    14    mins  63167;    Therapeutic Activity:          mins  05499;     Gait Training:           mins  24896;     Ultrasound:          mins  51699;    Ionto                                   mins   83641  Self Care                            mins   17720  Canalith Repos         mins 24892      Un-Timed:  Electrical Stimulation:         mins  08168 ( );  Dry Needling          mins self-pay  Traction          mins 34513      Timed Treatment:   45   mins   Total Treatment:     45   mins    Chuy Christian PT  [place] License:  [id number]      Electronically signed by Chuy Christian, PT, 07/11/22, 11:10 AM EDT

## 2022-07-13 ENCOUNTER — TREATMENT (OUTPATIENT)
Dept: PHYSICAL THERAPY | Facility: CLINIC | Age: 77
End: 2022-07-13

## 2022-07-13 DIAGNOSIS — R26.81 UNSTEADY GAIT: ICD-10-CM

## 2022-07-13 DIAGNOSIS — R53.81 OTHER MALAISE: ICD-10-CM

## 2022-07-13 DIAGNOSIS — R53.81 DEBILITY: Primary | ICD-10-CM

## 2022-07-13 PROCEDURE — 97112 NEUROMUSCULAR REEDUCATION: CPT | Performed by: PHYSICAL THERAPIST

## 2022-07-13 PROCEDURE — 97110 THERAPEUTIC EXERCISES: CPT | Performed by: PHYSICAL THERAPIST

## 2022-07-13 NOTE — PROGRESS NOTES
Physical Therapy Daily Treatment Note      Patient: Sandro Argueta   : 1945  Referring practitioner: Misti Nguyễn MD  Date of Initial Visit: Type: THERAPY  Noted: 2022  Today's Date: 2022  Patient seen for 5 sessions       Visit Diagnoses:    ICD-10-CM ICD-9-CM   1. Debility  R53.81 799.3   2. Unsteady gait  R26.81 781.2   3. Other malaise  R53.81 780.79       Subjective   Patient reports that he is having no pain. Patient states that he done good following last treatment session. Patient reports that he gets fatigued when standing for little bit.    Objective   See Exercise, Manual, and Modality Logs for complete treatment.       Assessment/Plan  Patient tolerated treatment session good with rest breaks taken as needed by the patient. Educated patient to perform therex per his tolerance, patient verbalized understanding. No adverse reactions with treatment session. Treatment session consisted of exercises to improve strength and ROM to the LE's and activities to improve the balance. No pain noted following treatment session. Patient demonstrated difficulty when performing balance activities especially standing on foam with eyes closed. Continue per PT's POC, progress exercises per the patient's tolerance.    Timed:         Manual Therapy:         mins  24463;     Therapeutic Exercise:    31     mins  12134;     Neuromuscular Avis:    14    mins  23788;    Therapeutic Activity:          mins  11969;     Gait Training:           mins  13228;     Ultrasound:          mins  19194;    Ionto                                   mins   03504  Self Care                            mins   76126  Canalith Repos         mins 85355      Un-Timed:  Electrical Stimulation:         mins  80781 ( );  Dry Needling          mins self-pay  Traction          mins 28888      Timed Treatment:   45   mins   Total Treatment:     45   mins    Mery Devine PTA  [place] License: [id number]

## 2022-07-20 ENCOUNTER — TREATMENT (OUTPATIENT)
Dept: PHYSICAL THERAPY | Facility: CLINIC | Age: 77
End: 2022-07-20

## 2022-07-20 DIAGNOSIS — R53.81 OTHER MALAISE: ICD-10-CM

## 2022-07-20 DIAGNOSIS — R26.81 UNSTEADY GAIT: ICD-10-CM

## 2022-07-20 DIAGNOSIS — R53.81 DEBILITY: Primary | ICD-10-CM

## 2022-07-20 PROCEDURE — 97110 THERAPEUTIC EXERCISES: CPT | Performed by: PHYSICAL THERAPIST

## 2022-07-20 PROCEDURE — 97112 NEUROMUSCULAR REEDUCATION: CPT | Performed by: PHYSICAL THERAPIST

## 2022-07-20 NOTE — PROGRESS NOTES
Physical Therapy Daily Treatment Note      Patient: Sandro Argueta   : 1945  Referring practitioner: Misti Nguyễn MD  Date of Initial Visit: Type: THERAPY  Noted: 2022  Today's Date: 2022  Patient seen for 6 sessions       Visit Diagnoses:    ICD-10-CM ICD-9-CM   1. Debility  R53.81 799.3   2. Unsteady gait  R26.81 781.2   3. Other malaise  R53.81 780.79       Subjective:  Patient states he had f/u appointment with his PCP yesterday, and had gained some weight back.  Pt states MD  was pleased with his progress.      Objective   See Exercise, Manual, and Modality Logs for complete treatment.       Assessment/Plan:  Patient responded well to today's session, and was provided w/ rest breaks as needed due to fatigue. Treatment consisted of therex as listed for improved bilateral LE strength, and functional mobility f/b neuro re-ed for improved static/ dynamic balance.  Exercise progressed w/ resistance of   of ankle weights increased from 2 to 2.5 pounds with seated marches and LAQ's, and theraband resistance advanced from red to green with knee flexion and hip abduction.  Pt observed w/ good tolerance.  Pt was provided w/ cues and demonstration for max benefit, and to maintain form.  Pt noted w/ postural sway while performing semi tandem stance on foam, however patient was able to self correct, w/ min assist.  No signs of distress or adverse reactions observed during, and/ or following tx.  Pt continues to benefit from therapy services, and will be progressed as tolerated to address goals, and improve strength.  Continue w/ PT's POC.       Timed:         Manual Therapy:         mins  92621;     Therapeutic Exercise:    26     mins  18515;     Neuromuscular Avis:    20    mins  39026;    Therapeutic Activity:          mins  89754;     Gait Training:           mins  55290;     Ultrasound:          mins  10444;    Ionto                                   mins   85662  Self Care                             mins   34798  [facility]         mins 19012      Un-Timed:  Electrical Stimulation:         mins  66233 ( );  Dry Needling          mins self-pay  Traction          mins 18186      Timed Treatment:  46    mins   Total Treatment:   46    mins    Jazmin Guerra. Alphonse, ZACKARY  [place] License: [id number]

## 2022-07-25 ENCOUNTER — TREATMENT (OUTPATIENT)
Dept: PHYSICAL THERAPY | Facility: CLINIC | Age: 77
End: 2022-07-25

## 2022-07-25 DIAGNOSIS — R53.81 DEBILITY: Primary | ICD-10-CM

## 2022-07-25 DIAGNOSIS — R53.81 OTHER MALAISE: ICD-10-CM

## 2022-07-25 DIAGNOSIS — R26.81 UNSTEADY GAIT: ICD-10-CM

## 2022-07-25 PROCEDURE — 97112 NEUROMUSCULAR REEDUCATION: CPT | Performed by: PHYSICAL THERAPIST

## 2022-07-25 PROCEDURE — 97530 THERAPEUTIC ACTIVITIES: CPT | Performed by: PHYSICAL THERAPIST

## 2022-07-25 PROCEDURE — 97110 THERAPEUTIC EXERCISES: CPT | Performed by: PHYSICAL THERAPIST

## 2022-07-25 NOTE — PROGRESS NOTES
Physical Therapy Daily Treatment Note      Patient: Sandro Argueta   : 1945  Referring practitioner: Misti Nguyễn MD  Date of Initial Visit: Type: THERAPY  Noted: 2022  Today's Date: 2022  Patient seen for 7 sessions       Visit Diagnoses:    ICD-10-CM ICD-9-CM   1. Debility  R53.81 799.3   2. Unsteady gait  R26.81 781.2   3. Other malaise  R53.81 780.79       Subjective Evaluation    History of Present Illness    Subjective comment: Pt has no complaints today.       Objective   See Exercise, Manual, and Modality Logs for complete treatment.       Assessment & Plan     Assessment    Assessment details: Tx today consisted of exercises progressed with resistance and reps.  Pt responded well to tx with good effort and no distress today.  Pt was noted to have improved stamina and endurance with standing activities.  Pt did demonstrate impaired balance with st on fitter board today.    Plan  Plan details: Will follow progressing leg stability and improved endurance.  May add TM next session.          Timed:         Manual Therapy:         mins  53766;     Therapeutic Exercise:    31     mins  97111;     Neuromuscular Avis:    16    mins  45877;    Therapeutic Activity:     12     mins  49229;     Gait Training:           mins  68531;     Ultrasound:          mins  91264;    Ionto                                   mins   02686  Self Care                            mins   48123  Canalith Repos         mins 75675      Un-Timed:  Electrical Stimulation:         mins  03804 ( );  Dry Needling          mins self-pay  Traction          mins 37675      Timed Treatment:   59   mins   Total Treatment:     59   mins    Chuy Christian PT  [place] License: [id number]      Electronically signed by Chuy Christian PT, 22, 11:05 AM EDT

## 2022-07-27 ENCOUNTER — TREATMENT (OUTPATIENT)
Dept: PHYSICAL THERAPY | Facility: CLINIC | Age: 77
End: 2022-07-27

## 2022-07-27 ENCOUNTER — OFFICE VISIT (OUTPATIENT)
Dept: CARDIOLOGY | Facility: CLINIC | Age: 77
End: 2022-07-27

## 2022-07-27 VITALS
RESPIRATION RATE: 16 BRPM | SYSTOLIC BLOOD PRESSURE: 152 MMHG | DIASTOLIC BLOOD PRESSURE: 75 MMHG | BODY MASS INDEX: 20.99 KG/M2 | HEIGHT: 72 IN | WEIGHT: 155 LBS | HEART RATE: 65 BPM | OXYGEN SATURATION: 99 %

## 2022-07-27 DIAGNOSIS — I95.1 ORTHOSTATIC HYPOTENSION: ICD-10-CM

## 2022-07-27 DIAGNOSIS — R01.1 SYSTOLIC MURMUR: ICD-10-CM

## 2022-07-27 DIAGNOSIS — R53.81 DEBILITY: Primary | ICD-10-CM

## 2022-07-27 DIAGNOSIS — R53.81 OTHER MALAISE: ICD-10-CM

## 2022-07-27 DIAGNOSIS — R26.81 UNSTEADY GAIT: ICD-10-CM

## 2022-07-27 DIAGNOSIS — I49.5 SICK SINUS SYNDROME: Primary | ICD-10-CM

## 2022-07-27 PROCEDURE — 99204 OFFICE O/P NEW MOD 45 MIN: CPT | Performed by: INTERNAL MEDICINE

## 2022-07-27 PROCEDURE — 97110 THERAPEUTIC EXERCISES: CPT | Performed by: PHYSICAL THERAPIST

## 2022-07-27 PROCEDURE — 93000 ELECTROCARDIOGRAM COMPLETE: CPT | Performed by: INTERNAL MEDICINE

## 2022-07-27 PROCEDURE — 97112 NEUROMUSCULAR REEDUCATION: CPT | Performed by: PHYSICAL THERAPIST

## 2022-07-27 PROCEDURE — 97530 THERAPEUTIC ACTIVITIES: CPT | Performed by: PHYSICAL THERAPIST

## 2022-07-27 RX ORDER — FLUDROCORTISONE ACETATE 0.1 MG/1
0.1 TABLET ORAL DAILY
COMMUNITY
End: 2022-10-04 | Stop reason: SDUPTHER

## 2022-07-27 RX ORDER — DUTASTERIDE 0.5 MG/1
0.5 CAPSULE, LIQUID FILLED ORAL DAILY
COMMUNITY
End: 2022-12-27

## 2022-07-27 NOTE — PROGRESS NOTES
Physical Therapy Re Certification Of Plan of Care  Patient: Sandro Argueta   : 1945  Diagnosis/ICD-10 Code:  Debility [R53.81]  Referring practitioner: Misti Nguyễn MD  Date of Initial Visit: Type: THERAPY  Noted: 2022  Today's Date: 2022  Patient seen for 8 sessions         Visit Diagnoses:    ICD-10-CM ICD-9-CM   1. Debility  R53.81 799.3   2. Unsteady gait  R26.81 781.2   3. Other malaise  R53.81 780.79         Sandro Argueta reports:   Subjective Questionnaire: OCHOA 44/56  Clinical Progress: improved  Home Program Compliance: Yes  Treatment has included: therapeutic exercise, neuromuscular re-education, manual therapy, therapeutic activity, gait training, electrical stimulation, ultrasound, moist heat and cryotherapy      Subjective Evaluation    History of Present Illness    Subjective comment: Pt reports therapy has helped with improving his endurance and leg stability.         Objective          Strength/Myotome Testing     Left Hip   Planes of Motion   Flexion: 4    Right Hip   Planes of Motion   Flexion: 4    Left Knee   Flexion: 4  Extension: 4-    Right Knee   Flexion: 4  Extension: 4    Ambulation     Comments   Pt amb with increased velocity and improved stride length; pt amb with narow EDDIE          Assessment & Plan     Assessment  Impairments: abnormal gait, abnormal muscle firing, abnormal or restricted ROM, activity intolerance, impaired balance, impaired physical strength, lacks appropriate home exercise program, safety issue and weight-bearing intolerance  Functional Limitations: carrying objects, lifting, walking, pulling, standing and unable to perform repetitive tasks  Assessment details: Pt is a 76 y/o male referred to therapy for improved balance and stability to reduce falls and improve function. Pt has demonstrated good effort with a 12 point improvement with OCHOA test; improved gait velocity and improved leg stability.  Pt responded well to tx today with no  distress.  Prognosis: good    Goals  Plan Goals: STG 6 weeks    1 Pt will be instructed in a HEP.met  2 Pt will improve his OCHOA to 40 or greater.met  3 Pt will demonstrate 4/5 gross LE strength.met    LTG 12 weeks    1 Pt will improve his OCHOA to 45 or greater.progressing  2 Pt will be able to ambulate 10 min on a TM without a rest break.initiated at 5 min  3 Pt will be able walk with improved velocity and safety, including turns, without an AD.progressing  4 Pt will be instructed in a progressive LE and postural stability program.progressing    Plan  Therapy options: will be seen for skilled therapy services  Planned modality interventions: electrical stimulation/Russian stimulation, thermotherapy (hydrocollator packs), traction, ultrasound and thermotherapy (paraffin bath)  Planned therapy interventions: abdominal trunk stabilization, ADL retraining, balance/weight-bearing training, body mechanics training, flexibility, functional ROM exercises, gait training, home exercise program, IADL retraining, joint mobilization, manual therapy, neuromuscular re-education, postural training, soft tissue mobilization, spinal/joint mobilization, stretching and therapeutic activities  Frequency: 2x week  Duration in weeks: 8  Treatment plan discussed with: patient  Plan details: Will follow for optimal gains.    Moderate Evaluation  61438    Re-evaluation   64257      Therapeutic exercise  78639  Therapeutic activity    40714  Neuromuscular re-education   52249  Manual therapy   06315  Gait training  04316      Attended e-stim  74117  Unattended e-stim (Medicaid/Medicare)     Moist heat/cryotherapy 44966   Ultrasound   85384               Recommendations: Continue as planned  Timeframe: 2 months  Prognosis to achieve goals: good      Timed:         Manual Therapy:         mins  34667;     Therapeutic Exercise:    31     mins  29787;     Neuromuscular Avis:    13    mins  63981;    Therapeutic Activity:     12     mins   77898;     Gait Training:           mins  62962;     Ultrasound:          mins  16973;    Ionto                                   mins   48118  Self Care                            mins   16241  Canalith Repos         mins 02858      Un-Timed:  Electrical Stimulation:         mins  26034 ( );  Dry Needling          mins self-pay  Traction          mins 57018  Re-Eval                               mins  68696      Timed Treatment:   56   mins   Total Treatment:     56   mins          PT: Chuy Christian PT     [place] License:  [id number]    Electronically signed by Chuy Christian PT, 07/27/22, 10:56 AM EDT    Certification Period: 7/27/2022 thru 10/24/2022  I certify that the therapy services are furnished while this patient is under my care.  The services outlined above are required by this patient, and will be reviewed every 90 days.         Physician Signature:__________________________________________________    PHYSICIAN: Misti Nguyễn MD  NPI: [id number]                                      DATE:  :     Please sign and return via fax to [email or web address] . Thank you, [facility] Therapy

## 2022-07-27 NOTE — PROGRESS NOTES
Misti Nguyễn MD  Sandro Argueta  1945  07/27/2022    Patient Active Problem List   Diagnosis   • Left elbow pain   • Hypertension   • DM2 (diabetes mellitus, type 2) (HCC)   • Elevated prostate specific antigen (PSA)   • Epigastric pain   • Epigastric abdominal pain   • Elevated liver enzymes   • Weight loss, abnormal   • Acute hepatitis   • Anemia   • Obstructive jaundice   • Pneumothorax, left   • Cytokine release syndrome, grade 1   • Esophageal rupture   • Hypotension, unspecified       Dear Misti Nguyễn MD:    Alton Argueta is a 77 y.o. male with the problems as listed above, presents    Chief complaint: Establish cardiac care and follow-up to our office in a patient with history of sick sinus syndrome, status post permanent pacemaker implantation and recent orthostatic hypotension.    History of Present Illness: Mr. Argueta is a pleasant 77-year-old gentleman with history of apparently sick sinus syndrome for which he has had permanent dual-chamber pacemaker implanted initially 1996 with subsequent battery changes over the years with a [employer] medical device and recent history of orthostatic hypotension with dizziness and near syncope.  He wants to establish his cardiac care and follow-up through our office.  On further questioning he apparently has been having these orthostatic dizziness first quite some time.  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  Patient lost a lot of weight after that.  Around January 2022 he reportedly developed esophageal rupture for which she was again treated at [facility] with a repair of the esophagus.  He had prolonged recovery from that and has now been gaining weight and feeling better.  His orthostatic dizziness also seem to have gotten  better recently.  I have noted that he is currently on fludrocortisone 0.1 mg daily.  His blood pressure at home reported runs mostly in the 130s to 140s and occasional 150s systolic.  He denies any other cardiac symptoms such as chest pains or shortness of breath or palpitations or dizziness recently.  He has no history of known heart disease/coronary artery disease or cardiac arrhythmias.  His blood pressure recordings in the office are as follows: Supine blood pressure was 159/76 mmHg, sitting 142/81 and standing was 143/76 mmHg.         Allergies   Allergen Reactions   • Other Anaphylaxis and Swelling     Patient had a reaction to the COVID vaccine. Pt states his tongue and lips started swelling       Current Outpatient Medications:   •  dutasteride (AVODART) 0.5 MG capsule, Take 0.5 mg by mouth Daily., Disp: , Rfl:   •  fludrocortisone 0.1 MG tablet, Take 0.1 mg by mouth Daily., Disp: , Rfl:   •  insulin aspart (NovoLOG FlexPen) 100 UNIT/ML solution pen-injector sc pen, Inject 0-14 Units under the skin into the appropriate area as directed 3 (Three) Times a Day Before Meals. Blood glucose 150-199 mg/dL - 3 units, Blood glucose 200-249 mg/dL - 5 units, Blood glucose 250-299 mg/dL - 8 units, Blood glucose 300-349 mg/dL - 10 units, Blood glucose 350-400 mg/dL - 12 units, Blood glucose greater than 400 mg/dL - 14 units and call provider, Disp: 15 mL, Rfl: 12  •  Insulin Glargine, 1 Unit Dial, (TOUJEO) 300 UNIT/ML solution pen-injector injection, Inject 20 Units under the skin into the appropriate area as directed Daily., Disp: , Rfl:   •  lansoprazole (FIRST) 3 MG/ML suspension oral suspension, Administer 10 mL per J tube Daily., Disp: 300 mL, Rfl: 1  •  metoprolol tartrate (LOPRESSOR) 25 MG tablet, Administer 1 tablet per J tube Every 12 (Twelve) Hours for 30 days., Disp: 60 tablet, Rfl: 0  •  pancrelipase, Lip-Prot-Amyl, (CREON) 59760-12175 units capsule delayed-release particles capsule, Take 12,000 units of  lipase by mouth 3 (Three) Times a Day With Meals., Disp: , Rfl:     Past Medical History:   Diagnosis Date   • Acute UTI (urinary tract infection) 02/20/2021   • Arthritis    • Borderline diabetes    • Coronary artery disease    • Diabetes mellitus (HCC)    • Elevated prostate specific antigen (PSA) 08/17/2020   • Esophageal rupture    • GERD (gastroesophageal reflux disease)    • Hypertension    • Left elbow pain    • Pancreatitis      Past Surgical History:   Procedure Laterality Date   • COLONOSCOPY     • ENDOSCOPY N/A 02/09/2021    Procedure: ESOPHAGOGASTRODUODENOSCOPY WITH BIOPSY dilitation;  Surgeon: Bela Mcdaniel MD;  Location: [facility] OR;  Service: Gastroenterology;  Laterality: N/A;   • ERCP N/A 02/21/2021    Procedure: ENDOSCOPIC RETROGRADE CHOLANGIOPANCREATOGRAPHY WITH STENT PLACEMENT;  Surgeon: Matthew Moreira MD;  Location: [facility] ENDOSCOPY;  Service: Gastroenterology;  Laterality: N/A;  with sphincterotomy, and brushing of common bile duct, and placement of [id number] wall stent     • PACEMAKER IMPLANTATION     • THORACOTOMY     • WHIPPLE PROCEDURE       Family History   Problem Relation Age of Onset   • Diabetes Mother    • Diabetes Sister    • Heart failure Brother    • Heart disease Brother    • Diabetes Brother      Social History     Tobacco Use   • Smoking status: Never Smoker   • Smokeless tobacco: Never Used   Vaping Use   • Vaping Use: Never used   Substance Use Topics   • Alcohol use: No   • Drug use: No     Review of Systems   Constitutional: Negative for chills, fever, malaise/fatigue, weight gain and weight loss.   HENT: Positive for hearing loss. Negative for congestion and nosebleeds.    Cardiovascular: Negative for chest pain, irregular heartbeat, leg swelling and orthopnea.   Respiratory: Negative for cough, hemoptysis and shortness of breath.    Endocrine:        Has insulin-dependent diabetes mellitus.   Musculoskeletal: Negative.    Gastrointestinal:  "Negative for abdominal pain, change in bowel habit, hematemesis, melena and vomiting.   Genitourinary: Positive for frequency. Negative for dysuria and hematuria.   Neurological: Positive for dizziness and light-headedness. Negative for focal weakness, headaches and weakness.   Psychiatric/Behavioral: Negative.      Objective   Blood pressure 152/75, pulse 65, resp. rate 16, height 182.9 cm (72\"), weight 70.3 kg (155 lb), SpO2 99 %.  Body mass index is 21.02 kg/m².    Vitals reviewed.   Constitutional:       Appearance: Well-developed.   Eyes:      Conjunctiva/sclera: Conjunctivae normal.   HENT:      Head: Normocephalic.   Neck:      Thyroid: No thyromegaly.      Vascular: No JVD.      Trachea: No tracheal deviation.   Pulmonary:      Effort: No respiratory distress.      Breath sounds: Normal breath sounds. No wheezing. No rales.   Cardiovascular:      PMI at left midclavicular line. Normal rate. Regular rhythm. Normal S1. Normal S2.      Murmurs: There is a grade 3/6 mid frequency midsystolic murmur at the apex.      No gallop. No click. No rub.   Pulses:     Intact distal pulses.   Edema:     Peripheral edema absent.   Abdominal:      General: Bowel sounds are normal.      Palpations: Abdomen is soft. There is no abdominal mass.      Tenderness: There is no abdominal tenderness.   Musculoskeletal:      Cervical back: Normal range of motion and neck supple. Skin:     General: Skin is warm and dry.   Neurological:      Mental Status: Alert and oriented to person, place, and time.      Cranial Nerves: No cranial nerve deficit.       Lab Results   Component Value Date     07/04/2022    K 4.5 07/04/2022    CL 98 07/04/2022    CO2 26.8 07/04/2022    BUN 17 07/04/2022    CREATININE 0.99 07/04/2022    GLUCOSE 152 (H) 07/04/2022    CALCIUM 9.4 07/04/2022    AST 12 07/04/2022    ALT 8 07/04/2022    ALKPHOS 84 07/04/2022     No results found for: CKTOTAL  Lab Results   Component Value Date    WBC 9.95 07/04/2022    " HGB 9.9 (L) 07/04/2022    HCT 32.2 (L) 07/04/2022     07/04/2022     Lab Results   Component Value Date    INR 1.01 07/04/2022    INR 0.99 01/21/2022    INR 1.07 06/27/2021     Lab Results   Component Value Date    MG 2.2 03/11/2022     Lab Results   Component Value Date    TSH 1.620 06/27/2021    PSA 4.1 (H) 08/13/2020    TRIG 50 04/08/2022    HDL 40 04/08/2022    LDL 49 04/08/2022        ECG 12 Lead    Date/Time: 7/27/2022 8:39 AM  Performed by: Gerson Smith MD  Authorized by: Gerson Smith MD   Comparison: compared with previous ECG from 1/21/2022  Similar to previous ECG  Rhythm: sinus rhythm  Conduction: right bundle branch block  Comments: Appropriate atrial and ventricle pacing with magnet application.                    Assessment & Plan :   Diagnosis Plan   1. Sick sinus syndrome, s/p permanent pacemaker implantation with a [employer] medical device with battery change in 2019, seems to be functioning adequately.     2. Orthostatic hypotension, seems to be improving.     3. Systolic murmur  Adult Transthoracic Echo Complete        Recommendations:  Orders Placed This Encounter   Procedures   • ECG 12 Lead   • Adult Transthoracic Echo Complete w/ Color, Spectral and Contrast if necessary per protocol      1. Continue with Florinef for now but hopefully can get him off this as his nutritional and hydration status continues to improve.  2. I have encouraged him to keep himself well-hydrated at all times especially with hot temperatures.  3. Keep a check on the blood pressure at home and bring it with next visit.  4. We will enroll him in pacemaker clinic  5. We will obtain an echo Doppler study to evaluate the systolic murmur.     Return in about 3 months (around 10/27/2022).    As always, Misti Nguyễn MD  I appreciate very much the opportunity to participate in the cardiovascular care of your patients. Please do not hesitate to call me with any questions with regards to Sandro Argueta's  evaluation and management.       With Best Regards,        Gerson Smith MD, [facility]    Please note that portions of this note were completed with a voice recognition program.

## 2022-08-01 ENCOUNTER — TREATMENT (OUTPATIENT)
Dept: PHYSICAL THERAPY | Facility: CLINIC | Age: 77
End: 2022-08-01

## 2022-08-01 DIAGNOSIS — R26.81 UNSTEADY GAIT: ICD-10-CM

## 2022-08-01 DIAGNOSIS — R53.81 DEBILITY: Primary | ICD-10-CM

## 2022-08-01 DIAGNOSIS — R53.81 OTHER MALAISE: ICD-10-CM

## 2022-08-01 PROCEDURE — 97112 NEUROMUSCULAR REEDUCATION: CPT | Performed by: PHYSICAL THERAPIST

## 2022-08-01 PROCEDURE — 97530 THERAPEUTIC ACTIVITIES: CPT | Performed by: PHYSICAL THERAPIST

## 2022-08-01 PROCEDURE — 97110 THERAPEUTIC EXERCISES: CPT | Performed by: PHYSICAL THERAPIST

## 2022-08-01 NOTE — PROGRESS NOTES
Physical Therapy Daily Treatment Note      Patient: Sandro Argueta   : 1945  Referring practitioner: Misti Nguyễn MD  Date of Initial Visit: Type: THERAPY  Noted: 2022  Today's Date: 2022  Patient seen for 9 sessions       Visit Diagnoses:    ICD-10-CM ICD-9-CM   1. Debility  R53.81 799.3   2. Unsteady gait  R26.81 781.2   3. Other malaise  R53.81 780.79       Subjective Evaluation    History of Present Illness    Subjective comment: Pt reports that he responded well to last session with added TM walking.       Objective   See Exercise, Manual, and Modality Logs for complete treatment.       Assessment & Plan     Assessment    Assessment details: Tx today consisted of endurance training, sitting and standing exercises for improved leg stability and proprioceptive training for improved balance.  Pt demonstrated improvements with single leg stance with alt foot tapping and step overs.  Pt also responded well to increased duration on TM today. Pt noted to have right ankle weakness with foam tandem standing.    Plan  Plan details: Will follow           Timed:         Manual Therapy:         mins  54624;     Therapeutic Exercise:    31     mins  29940;     Neuromuscular Avis:    13    mins  22759;    Therapeutic Activity:     12     mins  17367;     Gait Training:           mins  45572;     Ultrasound:          mins  63950;    Ionto                                   mins   18382  Self Care                            mins   29768  Canalith Repos         mins 43089      Un-Timed:  Electrical Stimulation:         mins  55089 ( );  Dry Needling          mins self-pay  Traction          mins 61132      Timed Treatment:   56   mins   Total Treatment:     56   mins    Chuy Christian PT  [place] License: [id number]      Electronically signed by Chuy Christian PT, 22, 11:06 AM EDT

## 2022-08-02 ENCOUNTER — TRANSCRIBE ORDERS (OUTPATIENT)
Dept: ONCOLOGY | Facility: HOSPITAL | Age: 77
End: 2022-08-02

## 2022-08-02 ENCOUNTER — INFUSION (OUTPATIENT)
Dept: ONCOLOGY | Facility: HOSPITAL | Age: 77
End: 2022-08-02

## 2022-08-02 VITALS
SYSTOLIC BLOOD PRESSURE: 161 MMHG | DIASTOLIC BLOOD PRESSURE: 78 MMHG | OXYGEN SATURATION: 98 % | HEART RATE: 62 BPM | RESPIRATION RATE: 18 BRPM | TEMPERATURE: 98.2 F

## 2022-08-02 DIAGNOSIS — L02.213 CHEST WALL ABSCESS: Primary | ICD-10-CM

## 2022-08-02 LAB
ALBUMIN SERPL-MCNC: 3.81 G/DL (ref 3.5–5.2)
ALBUMIN/GLOB SERPL: 0.9 G/DL
ALP SERPL-CCNC: 81 U/L (ref 39–117)
ALT SERPL W P-5'-P-CCNC: 6 U/L (ref 1–41)
ANION GAP SERPL CALCULATED.3IONS-SCNC: 10.1 MMOL/L (ref 5–15)
AST SERPL-CCNC: 14 U/L (ref 1–40)
BASOPHILS # BLD AUTO: 0.04 10*3/MM3 (ref 0–0.2)
BASOPHILS NFR BLD AUTO: 0.7 % (ref 0–1.5)
BILIRUB SERPL-MCNC: 0.2 MG/DL (ref 0–1.2)
BUN SERPL-MCNC: 18 MG/DL (ref 8–23)
BUN/CREAT SERPL: 18 (ref 7–25)
CALCIUM SPEC-SCNC: 9.2 MG/DL (ref 8.6–10.5)
CHLORIDE SERPL-SCNC: 100 MMOL/L (ref 98–107)
CO2 SERPL-SCNC: 27.9 MMOL/L (ref 22–29)
CREAT SERPL-MCNC: 1 MG/DL (ref 0.76–1.27)
CRP SERPL-MCNC: 1.58 MG/DL (ref 0–0.5)
DEPRECATED RDW RBC AUTO: 53.2 FL (ref 37–54)
EGFRCR SERPLBLD CKD-EPI 2021: 78 ML/MIN/1.73
EOSINOPHIL # BLD AUTO: 0.13 10*3/MM3 (ref 0–0.4)
EOSINOPHIL NFR BLD AUTO: 2.1 % (ref 0.3–6.2)
ERYTHROCYTE [DISTWIDTH] IN BLOOD BY AUTOMATED COUNT: 16.4 % (ref 12.3–15.4)
GLOBULIN UR ELPH-MCNC: 4.1 GM/DL
GLUCOSE SERPL-MCNC: 192 MG/DL (ref 65–99)
HCT VFR BLD AUTO: 32.8 % (ref 37.5–51)
HGB BLD-MCNC: 10.2 G/DL (ref 13–17.7)
IMM GRANULOCYTES # BLD AUTO: 0.01 10*3/MM3 (ref 0–0.05)
IMM GRANULOCYTES NFR BLD AUTO: 0.2 % (ref 0–0.5)
LYMPHOCYTES # BLD AUTO: 1.89 10*3/MM3 (ref 0.7–3.1)
LYMPHOCYTES NFR BLD AUTO: 31.1 % (ref 19.6–45.3)
MCH RBC QN AUTO: 27.4 PG (ref 26.6–33)
MCHC RBC AUTO-ENTMCNC: 31.1 G/DL (ref 31.5–35.7)
MCV RBC AUTO: 88.2 FL (ref 79–97)
MONOCYTES # BLD AUTO: 0.49 10*3/MM3 (ref 0.1–0.9)
MONOCYTES NFR BLD AUTO: 8.1 % (ref 5–12)
NEUTROPHILS NFR BLD AUTO: 3.52 10*3/MM3 (ref 1.7–7)
NEUTROPHILS NFR BLD AUTO: 57.8 % (ref 42.7–76)
NRBC BLD AUTO-RTO: 0 /100 WBC (ref 0–0.2)
PLATELET # BLD AUTO: 235 10*3/MM3 (ref 140–450)
PMV BLD AUTO: 9.3 FL (ref 6–12)
POTASSIUM SERPL-SCNC: 4.3 MMOL/L (ref 3.5–5.2)
PROT SERPL-MCNC: 7.9 G/DL (ref 6–8.5)
RBC # BLD AUTO: 3.72 10*6/MM3 (ref 4.14–5.8)
SODIUM SERPL-SCNC: 138 MMOL/L (ref 136–145)
WBC NRBC COR # BLD: 6.08 10*3/MM3 (ref 3.4–10.8)

## 2022-08-02 PROCEDURE — 25010000002 DALBAVANCIN 500 MG RECONSTITUTED SOLUTION 1 EACH VIAL: Performed by: INTERNAL MEDICINE

## 2022-08-02 PROCEDURE — 87040 BLOOD CULTURE FOR BACTERIA: CPT

## 2022-08-02 PROCEDURE — 85025 COMPLETE CBC W/AUTO DIFF WBC: CPT

## 2022-08-02 PROCEDURE — 86140 C-REACTIVE PROTEIN: CPT | Performed by: INTERNAL MEDICINE

## 2022-08-02 PROCEDURE — 80053 COMPREHEN METABOLIC PANEL: CPT

## 2022-08-02 PROCEDURE — 96365 THER/PROPH/DIAG IV INF INIT: CPT

## 2022-08-02 RX ORDER — DEXTROSE MONOHYDRATE 50 MG/ML
100 INJECTION, SOLUTION INTRAVENOUS CONTINUOUS
Status: DISCONTINUED | OUTPATIENT
Start: 2022-08-02 | End: 2022-08-02 | Stop reason: HOSPADM

## 2022-08-02 RX ADMIN — DALBAVANCIN 1500 MG: 500 INJECTION, POWDER, FOR SOLUTION INTRAVENOUS at 11:49

## 2022-08-02 RX ADMIN — DEXTROSE MONOHYDRATE 100 ML: 50 INJECTION, SOLUTION INTRAVENOUS at 11:49

## 2022-08-07 LAB
BACTERIA SPEC AEROBE CULT: NORMAL
BACTERIA SPEC AEROBE CULT: NORMAL

## 2022-08-08 ENCOUNTER — TREATMENT (OUTPATIENT)
Dept: PHYSICAL THERAPY | Facility: CLINIC | Age: 77
End: 2022-08-08

## 2022-08-08 DIAGNOSIS — R53.81 OTHER MALAISE: ICD-10-CM

## 2022-08-08 DIAGNOSIS — R53.81 DEBILITY: Primary | ICD-10-CM

## 2022-08-08 DIAGNOSIS — R26.81 UNSTEADY GAIT: ICD-10-CM

## 2022-08-08 PROCEDURE — 97110 THERAPEUTIC EXERCISES: CPT | Performed by: PHYSICAL THERAPIST

## 2022-08-08 PROCEDURE — 97530 THERAPEUTIC ACTIVITIES: CPT | Performed by: PHYSICAL THERAPIST

## 2022-08-08 PROCEDURE — 97112 NEUROMUSCULAR REEDUCATION: CPT | Performed by: PHYSICAL THERAPIST

## 2022-08-08 NOTE — PROGRESS NOTES
Physical Therapy Daily Treatment Note      Patient: Sandro Argueta   : 1945  Referring practitioner: Misti Nguyễn MD  Date of Initial Visit: Type: THERAPY  Noted: 2022  Today's Date: 2022  Patient seen for 10 sessions       Visit Diagnoses:    ICD-10-CM ICD-9-CM   1. Debility  R53.81 799.3   2. Unsteady gait  R26.81 781.2   3. Other malaise  R53.81 780.79       Subjective Evaluation    History of Present Illness    Subjective comment: Pt reports he had to return back to his MD due to his infection on his lateral throacic returned.  Pt has no restrictions today.       Objective   See Exercise, Manual, and Modality Logs for complete treatment.       Assessment & Plan     Assessment    Assessment details: Tx today consisted of exercises for improved postural, LE and UE strength; followed by proprioceptive training and endurance training.  Pt responded well to added reps and increased duration with activities.  Pt noted to have improvement with single leg stance with alt foot tapping and pod step overs.  Pt reported no distress during session.    Plan  Plan details: Will follow progressing resistance and increased endurance for safe return to activities.          Timed:         Manual Therapy:         mins  32234;     Therapeutic Exercise:    31     mins  63299;   (16 min charged due to shared min)  Neuromuscular Avis:    16    mins  45668;    Therapeutic Activity:     11     mins  01072;     Gait Training:           mins  87221;     Ultrasound:          mins  11557;    Ionto                                   mins   95427  Self Care                            mins   07163  Canalith Repos         mins 81548      Un-Timed:  Electrical Stimulation:         mins  19562 ( );  Dry Needling          mins self-pay  Traction          mins 17249      Timed Treatment:   58   mins   Total Treatment:     58   Mins(42 min charged due to shared min)    Chuy Christian, PT  [place] License:  [id number]      Electronically signed by Chuy Christian, PT, 08/08/22, 10:59 AM EDT

## 2022-08-10 ENCOUNTER — TREATMENT (OUTPATIENT)
Dept: PHYSICAL THERAPY | Facility: CLINIC | Age: 77
End: 2022-08-10

## 2022-08-10 DIAGNOSIS — R53.81 DEBILITY: Primary | ICD-10-CM

## 2022-08-10 DIAGNOSIS — R26.81 UNSTEADY GAIT: ICD-10-CM

## 2022-08-10 DIAGNOSIS — R53.81 OTHER MALAISE: ICD-10-CM

## 2022-08-10 PROCEDURE — 97112 NEUROMUSCULAR REEDUCATION: CPT | Performed by: PHYSICAL THERAPIST

## 2022-08-10 PROCEDURE — 97110 THERAPEUTIC EXERCISES: CPT | Performed by: PHYSICAL THERAPIST

## 2022-08-10 NOTE — PROGRESS NOTES
Physical Therapy Daily Treatment Note      Patient: Sandro Argueta   : 1945  Referring practitioner: Misti Nguyễn MD  Date of Initial Visit: Type: THERAPY  Noted: 2022  Today's Date: 8/10/2022  Patient seen for 11 sessions       Visit Diagnoses:    ICD-10-CM ICD-9-CM   1. Debility  R53.81 799.3   2. Unsteady gait  R26.81 781.2   3. Other malaise  R53.81 780.79       Subjective:  Patient arrives to therapy today w/ spouse.  Pt reports he was able to get out and mow a little earlier in the week.  Pt states he feels his balance has improved since beginning therapy.     Objective   See Exercise, Manual, and Modality Logs for complete treatment.       Assessment/Plan:  Patient responded well to today's session, and was provided w/ rest breaks as needed due to fatigue.  Treatment consisted of therex and therapeutic activities as listed w/ continued focus on improved bilateral LE strength, and functional mobility f/b neuro re-ed to address balance.  Exericse progressed w/ resistance on LE bike increased from LV 3.5 to LV 3.6, and additional balance activities added to program including standing on incline/ decline surfaces.  Pt noted w/ overall improvement in balance since beginning therapy, however required contact guard assist to reduce fall risk and improve patient safety.  Pt continues to benefit from therapy services, and will be progressed as tolerated to address goals, reduce fall risk, and improve strength.  Continue w/ PT's POC.       Timed:         Manual Therapy:         mins  66236;     Therapeutic Exercise:     26    mins  90888;     Neuromuscular Avis:   22     mins  60034;    Therapeutic Activity:          mins  98954;     Gait Training:           mins  06899;     Ultrasound:          mins  69437;    Ionto                                   mins   22683  Self Care                            mins   38446  Canalith Repos         mins 87980      Un-Timed:  Electrical Stimulation:         mins   24395 ( );  Dry Needling          mins self-pay  Traction          mins 13167      Timed Treatment:  48    mins   Total Treatment:    48    mins    Jazmin Guerra. ZACKARY Cunha  [place] License: [id number]

## 2022-08-11 ENCOUNTER — CLINICAL SUPPORT NO REQUIREMENTS (OUTPATIENT)
Dept: CARDIOLOGY | Facility: CLINIC | Age: 77
End: 2022-08-11

## 2022-08-11 DIAGNOSIS — I49.5 SSS (SICK SINUS SYNDROME): Primary | ICD-10-CM

## 2022-08-11 PROCEDURE — 93280 PM DEVICE PROGR EVAL DUAL: CPT | Performed by: INTERNAL MEDICINE

## 2022-08-12 ENCOUNTER — TREATMENT (OUTPATIENT)
Dept: PHYSICAL THERAPY | Facility: CLINIC | Age: 77
End: 2022-08-12

## 2022-08-12 DIAGNOSIS — R26.81 UNSTEADY GAIT: ICD-10-CM

## 2022-08-12 DIAGNOSIS — R53.81 DEBILITY: Primary | ICD-10-CM

## 2022-08-12 DIAGNOSIS — R53.81 OTHER MALAISE: ICD-10-CM

## 2022-08-12 PROCEDURE — 97110 THERAPEUTIC EXERCISES: CPT | Performed by: PHYSICAL THERAPIST

## 2022-08-12 PROCEDURE — 97112 NEUROMUSCULAR REEDUCATION: CPT | Performed by: PHYSICAL THERAPIST

## 2022-08-12 NOTE — PROGRESS NOTES
Physical Therapy Daily Treatment Note      Patient: Sandro Argueta   : 1945  Referring practitioner: Misti Nguyễn MD  Date of Initial Visit: Type: THERAPY  Noted: 2022  Today's Date: 2022  Patient seen for 12 sessions       Visit Diagnoses:    ICD-10-CM ICD-9-CM   1. Debility  R53.81 799.3   2. Unsteady gait  R26.81 781.2   3. Other malaise  R53.81 780.79       Subjective Evaluation    History of Present Illness    Subjective comment: Pt reports he cut grass yesterday with no problems. Pt reports his side is healing more.       Objective   See Exercise, Manual, and Modality Logs for complete treatment.       Assessment & Plan     Assessment    Assessment details: Tx today consisted of exercises for improved leg and hip stability; followed by postural and UE stability exercises; proprioceptive training and endurance training for improved improved functional mobility.  Pt cont to have impairments with tandem walking and standing.  Pt was able to amb 9 min on TM with out rest break today.    Plan  Plan details: Will follow progressing leg stability and postural stability for improved ADLs and function.          Timed:         Manual Therapy:         mins  31465;     Therapeutic Exercise:    33     mins  84351;     Neuromuscular Avis:    14    mins  57498;    Therapeutic Activity:          mins  78905;     Gait Training:           mins  10214;     Ultrasound:          mins  86426;    Ionto                                   mins   94051  Self Care                            mins   24948  Canalith Repos         mins 59086      Un-Timed:  Electrical Stimulation:         mins  19974 ( );  Dry Needling          mins self-pay  Traction          mins 67820      Timed Treatment:   47   mins   Total Treatment:     47   mins    Chuy Christian PT  [place] License: [id number]      Electronically signed by Chuy Christian PT, 22, 11:02 AM EDT

## 2022-08-15 ENCOUNTER — TREATMENT (OUTPATIENT)
Dept: PHYSICAL THERAPY | Facility: CLINIC | Age: 77
End: 2022-08-15

## 2022-08-15 DIAGNOSIS — R53.81 OTHER MALAISE: ICD-10-CM

## 2022-08-15 DIAGNOSIS — R53.81 DEBILITY: Primary | ICD-10-CM

## 2022-08-15 DIAGNOSIS — R26.81 UNSTEADY GAIT: ICD-10-CM

## 2022-08-15 PROCEDURE — 97110 THERAPEUTIC EXERCISES: CPT | Performed by: PHYSICAL THERAPIST

## 2022-08-15 PROCEDURE — 97112 NEUROMUSCULAR REEDUCATION: CPT | Performed by: PHYSICAL THERAPIST

## 2022-08-15 NOTE — PROGRESS NOTES
Physical Therapy Daily Treatment Note      Patient: Sandro Argueta   : 1945  Referring practitioner: Misti Nguyễn MD  Date of Initial Visit: Type: THERAPY  Noted: 2022  Today's Date: 8/15/2022  Patient seen for 13 sessions       Visit Diagnoses:    ICD-10-CM ICD-9-CM   1. Debility  R53.81 799.3   2. Unsteady gait  R26.81 781.2   3. Other malaise  R53.81 780.79       Subjective Evaluation    History of Present Illness    Subjective comment: Pt reports he performed extra walking over the weekend.       Objective   See Exercise, Manual, and Modality Logs for complete treatment.       Assessment & Plan     Assessment    Assessment details: Tx today consisted of sitting exercises for improved leg stability followed by standing exercises and balance activities and ended with endurance training.  Pt able to amb 10 min on TM today with mild fatigue following session.  Pt demonstrated good effort today with noted impairments with tandem walking and rocker board balance.    Plan  Plan details: Will follow progressing leg stability and improved balance in order to improved outdoor ADLs.          Timed:         Manual Therapy:         mins  19812;     Therapeutic Exercise:    31     mins  29332;     Neuromuscular Avis:    14    mins  10098;    Therapeutic Activity:          mins  13713;     Gait Training:           mins  49841;     Ultrasound:          mins  42614;    Ionto                                   mins   48640  Self Care                            mins   20551  Canalith Repos         mins 00814      Un-Timed:  Electrical Stimulation:         mins  70665 ( );  Dry Needling          mins self-pay  Traction          mins 95480      Timed Treatment:  45    mins   Total Treatment:     45   mins    Chuy Christian PT  [place] License: [id number]      Electronically signed by Chuy Christian PT, 08/15/22, 10:59 AM EDT

## 2022-08-17 ENCOUNTER — TREATMENT (OUTPATIENT)
Dept: PHYSICAL THERAPY | Facility: CLINIC | Age: 77
End: 2022-08-17

## 2022-08-17 DIAGNOSIS — R53.81 OTHER MALAISE: ICD-10-CM

## 2022-08-17 DIAGNOSIS — R26.81 UNSTEADY GAIT: ICD-10-CM

## 2022-08-17 DIAGNOSIS — R53.81 DEBILITY: Primary | ICD-10-CM

## 2022-08-17 PROCEDURE — 97112 NEUROMUSCULAR REEDUCATION: CPT | Performed by: PHYSICAL THERAPIST

## 2022-08-17 PROCEDURE — 97110 THERAPEUTIC EXERCISES: CPT | Performed by: PHYSICAL THERAPIST

## 2022-08-17 NOTE — PROGRESS NOTES
Physical Therapy Daily Treatment Note      Patient: Sandro Argueta   : 1945  Referring practitioner: Misti Nguyễn MD  Date of Initial Visit: Type: THERAPY  Noted: 2022  Today's Date: 2022  Patient seen for 14 sessions       Visit Diagnoses:    ICD-10-CM ICD-9-CM   1. Debility  R53.81 799.3   2. Unsteady gait  R26.81 781.2   3. Other malaise  R53.81 780.79       Subjective   Patient reports that he done good following last treatment session. Patient states that he is having no pain prior to treatment session.    Objective   See Exercise, Manual, and Modality Logs for complete treatment.       Assessment/Plan  Reps increased on several exercises with no increase in pain noted. Patient tolerated treatment session well with rest breaks taken as needed by the patient. Educated patient to perform therex per his tolerance, patient verbalized understanding. Patient demonstrated difficulty with semi tandem balance on foam. Patient required rest breaks due to fatigue at times especially after walking on the treadmill. No pain noted following treatment session. Continue per PT's POC, progress exercises per the patient's tolerance.     Timed:         Manual Therapy:         mins  47617;     Therapeutic Exercise:    39    mins  11007;     Neuromuscular Avis:   16    mins  44985;    Therapeutic Activity:          mins  31941;     Gait Training:           mins  71211;     Ultrasound:          mins  51644;    Ionto                                   mins   83107  Self Care                            mins   66087  Canalith Repos         mins 08523      Un-Timed:  Electrical Stimulation:         mins  56510 (MC );  Dry Needling          mins self-pay  Traction          mins 00968      Timed Treatment:   55   mins   Total Treatment:     55   mins    Mery eDvine PTA  [place] License: [id number]

## 2022-08-19 ENCOUNTER — TREATMENT (OUTPATIENT)
Dept: PHYSICAL THERAPY | Facility: CLINIC | Age: 77
End: 2022-08-19

## 2022-08-19 DIAGNOSIS — R53.81 OTHER MALAISE: ICD-10-CM

## 2022-08-19 DIAGNOSIS — R26.81 UNSTEADY GAIT: ICD-10-CM

## 2022-08-19 DIAGNOSIS — R53.81 DEBILITY: Primary | ICD-10-CM

## 2022-08-19 PROCEDURE — 97112 NEUROMUSCULAR REEDUCATION: CPT | Performed by: PHYSICAL THERAPIST

## 2022-08-19 PROCEDURE — 97110 THERAPEUTIC EXERCISES: CPT | Performed by: PHYSICAL THERAPIST

## 2022-08-19 NOTE — PROGRESS NOTES
Physical Therapy Daily Treatment Note      Patient: Sandro Argueta   : 1945  Referring practitioner: Misti Nguyễn MD  Date of Initial Visit: Type: THERAPY  Noted: 2022  Today's Date: 2022  Patient seen for 15 sessions       Visit Diagnoses:    ICD-10-CM ICD-9-CM   1. Debility  R53.81 799.3   2. Unsteady gait  R26.81 781.2   3. Other malaise  R53.81 780.79       Subjective Evaluation    History of Present Illness    Subjective comment: Pt has no complaints today.       Objective   See Exercise, Manual, and Modality Logs for complete treatment.       Assessment & Plan     Assessment    Assessment details: Tx today consisted of exercises for improved leg and postural stability; proprioceptive training for improved balance and decreased fall risk and ended with endurance training.  Pt demonstrated good effort today with noted improved balance with tandem walking and foam standing.    Plan  Plan details: Will follow progressing stability and improved balance in order to improve home ADLs.          Timed:         Manual Therapy:         mins  14074;     Therapeutic Exercise:    33     mins  35538;     Neuromuscular Avis:    15    mins  40153;    Therapeutic Activity:          mins  21401;     Gait Training:           mins  37958;     Ultrasound:          mins  02520;    Ionto                                   mins   05632  Self Care                            mins   23051  Canalith Repos         mins 85072      Un-Timed:  Electrical Stimulation:         mins  89557 ( );  Dry Needling          mins self-pay  Traction          mins 87576      Timed Treatment:   48   mins   Total Treatment:     48   mins    Chuy Christian PT  [place] License: [id number]      Electronically signed by Chuy Christian PT, 22, 11:13 AM EDT

## 2022-08-22 ENCOUNTER — TREATMENT (OUTPATIENT)
Dept: PHYSICAL THERAPY | Facility: CLINIC | Age: 77
End: 2022-08-22

## 2022-08-22 DIAGNOSIS — R26.81 UNSTEADY GAIT: ICD-10-CM

## 2022-08-22 DIAGNOSIS — R53.81 OTHER MALAISE: ICD-10-CM

## 2022-08-22 DIAGNOSIS — R53.81 DEBILITY: Primary | ICD-10-CM

## 2022-08-22 PROCEDURE — 97110 THERAPEUTIC EXERCISES: CPT | Performed by: PHYSICAL THERAPIST

## 2022-08-22 PROCEDURE — 97112 NEUROMUSCULAR REEDUCATION: CPT | Performed by: PHYSICAL THERAPIST

## 2022-08-22 NOTE — PROGRESS NOTES
Physical Therapy Daily Treatment Note      Patient: Sandro Argueta   : 1945  Referring practitioner: Misti Nguyễn MD  Date of Initial Visit: Type: THERAPY  Noted: 2022  Today's Date: 2022  Patient seen for 16 sessions       Visit Diagnoses:    ICD-10-CM ICD-9-CM   1. Debility  R53.81 799.3   2. Unsteady gait  R26.81 781.2   3. Other malaise  R53.81 780.79       Subjective:  Patient reports doing well today, and states he has been getting outside more at home.  Pt reports continued progress w/ therapy.      Objective   See Exercise, Manual, and Modality Logs for complete treatment.       Assessment/Plan:  Patient responded well to today's session w/ minimal rest breaks required due to fatigue.  Treatment consisted of therex as listed for improved bilateral strength, flexibility and improved functional mobility f/b neuro re-ed to address balance deficits, and reduce fall risk.  Exercise progressed w/ resistance on LE bike increased from LV 3.5 to LV 3.6, and additional balance activities added to program including forward/ retro stepping over objects, and single limb stance simulated activities.  Pt noted w/ intermittent episodes of LOB and postural sway while performing activities on airex w/ eyes closed and head turns.  Contact guard assist provided for improved patient safety.  No signs of distress or adverse reactions observed during, and/ or following tx.  Pt continues to benefit from therapy services, and will be progressed as tolerated to address goals and improve strength.  Continue w/ PT's POC.       Timed:         Manual Therapy:         mins  18936;     Therapeutic Exercise:   29    mins  80937;     Neuromuscular Aivs:  17      mins  82893;    Therapeutic Activity:          mins  62474;     Gait Training:           mins  47766;     Ultrasound:          mins  80832;    Ionto                                   mins   04342  Self Care                            mins   49538  Delfina  Repos         mins 11764      Un-Timed:  Electrical Stimulation:         mins  41753 ( );  Dry Needling          mins self-pay  Traction          mins 05520      Timed Treatment:  46    mins   Total Treatment:     46   mins    Jazmin Guerra. ZACKARY Cunha  [place] License: [id number]

## 2022-08-23 ENCOUNTER — HOSPITAL ENCOUNTER (OUTPATIENT)
Dept: CARDIOLOGY | Facility: HOSPITAL | Age: 77
Discharge: HOME | End: 2022-08-23
Admitting: INTERNAL MEDICINE

## 2022-08-23 DIAGNOSIS — R01.1 SYSTOLIC MURMUR: ICD-10-CM

## 2022-08-23 PROCEDURE — 93306 TTE W/DOPPLER COMPLETE: CPT

## 2022-08-23 PROCEDURE — 93306 TTE W/DOPPLER COMPLETE: CPT | Performed by: INTERNAL MEDICINE

## 2022-08-24 ENCOUNTER — TREATMENT (OUTPATIENT)
Dept: PHYSICAL THERAPY | Facility: CLINIC | Age: 77
End: 2022-08-24

## 2022-08-24 DIAGNOSIS — R53.81 DEBILITY: Primary | ICD-10-CM

## 2022-08-24 DIAGNOSIS — R53.81 OTHER MALAISE: ICD-10-CM

## 2022-08-24 DIAGNOSIS — R26.81 UNSTEADY GAIT: ICD-10-CM

## 2022-08-24 PROCEDURE — 97110 THERAPEUTIC EXERCISES: CPT | Performed by: PHYSICAL THERAPIST

## 2022-08-24 PROCEDURE — 97112 NEUROMUSCULAR REEDUCATION: CPT | Performed by: PHYSICAL THERAPIST

## 2022-08-24 NOTE — PROGRESS NOTES
Physical Therapy Re Certification Of Plan of Care  Patient: Sandro Argueta   : 1945  Diagnosis/ICD-10 Code:  Debility [R53.81]  Referring practitioner: Misti Nguyễn MD  Date of Initial Visit: Type: THERAPY  Noted: 2022  Today's Date: 2022  Patient seen for 17 sessions         Visit Diagnoses:    ICD-10-CM ICD-9-CM   1. Debility  R53.81 799.3   2. Unsteady gait  R26.81 781.2   3. Other malaise  R53.81 780.79         Sandro Argueta reports:   Subjective Questionnaire: OCHOA 56/56  Clinical Progress: improved  Home Program Compliance: Yes  Treatment has included: therapeutic exercise, neuromuscular re-education, manual therapy, therapeutic activity, gait training, ultrasound, moist heat and cryotherapy      Subjective Evaluation    History of Present Illness    Subjective comment: Pt reports he will be ready for discharge next week.         Objective          Strength/Myotome Testing     Left Hip   Planes of Motion   Flexion: 4    Right Hip   Planes of Motion   Flexion: 4+    Left Knee   Flexion: 4  Extension: 4+    Right Knee   Flexion: 4+  Extension: 4+          Assessment & Plan     Assessment  Impairments: abnormal gait, abnormal muscle firing, abnormal or restricted ROM, activity intolerance, impaired balance, impaired physical strength, lacks appropriate home exercise program, safety issue and weight-bearing intolerance  Functional Limitations: carrying objects, lifting, walking, pulling, standing and unable to perform repetitive tasks  Assessment details: Pt is a 76 y/o male referred to therapy for improved balance and stability to reduce falls and improve function. Pt has demonstrated good effort with a 12 point improvement with OCHOA test to 56/56; improved gait velocity and improved leg stability.  Pt responded well to tx today and anticipate discharge next week.  Prognosis: good    Goals  Plan Goals: STG 6 weeks    1 Pt will be instructed in a HEP.met  2 Pt will improve his OCHOA to 40  or greater.met  3 Pt will demonstrate 4/5 gross LE strength.met    LTG 12 weeks    1 Pt will improve his OCHOA to 45 or greater.met  2 Pt will be able to ambulate 10 min on a TM without a rest break. met  3 Pt will be able walk with improved velocity and safety, including turns, without an AD.met  4 Pt will be instructed in a progressive LE and postural stability program.met    Plan  Therapy options: will be seen for skilled therapy services  Planned modality interventions: electrical stimulation/Russian stimulation, thermotherapy (hydrocollator packs), traction, ultrasound and thermotherapy (paraffin bath)  Planned therapy interventions: abdominal trunk stabilization, ADL retraining, balance/weight-bearing training, body mechanics training, flexibility, functional ROM exercises, gait training, home exercise program, IADL retraining, joint mobilization, manual therapy, neuromuscular re-education, postural training, soft tissue mobilization, spinal/joint mobilization, stretching and therapeutic activities  Frequency: 2x week  Duration in weeks: 2  Treatment plan discussed with: patient  Plan details: Will follow for optimal gains.    Moderate Evaluation  42099    Re-evaluation   44338      Therapeutic exercise  15111  Therapeutic activity    39077  Neuromuscular re-education   81468  Manual therapy   65768  Gait training  93298      Attended e-stim  16044  Unattended e-stim (Medicaid/Medicare)     Moist heat/cryotherapy 91869   Ultrasound   88256               Recommendations: Continue as planned  Timeframe: 2 weeks  Prognosis to achieve goals: good      Timed:         Manual Therapy:         mins  60650;     Therapeutic Exercise:    31     mins  40580;     Neuromuscular Avis:    11    mins  86596;    Therapeutic Activity:          mins  15288;     Gait Training:           mins  58850;     Ultrasound:          mins  67309;    Ionto                                   mins   38513  Self Care                             mins   03713  Canalith Repos         mins 45596      Un-Timed:  Electrical Stimulation:         mins  35069 ( );  Dry Needling          mins self-pay  Traction          mins 61509  Re-Eval                               mins  13738      Timed Treatment:   42   mins   Total Treatment:     42   mins          PT: Chuy Christian PT     [place] License:  [id number]    Electronically signed by Chuy Christian PT, 08/24/22, 11:17 AM EDT    Certification Period: 8/24/2022 thru 11/21/2022  I certify that the therapy services are furnished while this patient is under my care.  The services outlined above are required by this patient, and will be reviewed every 90 days.         Physician Signature:__________________________________________________    PHYSICIAN: Misti Nguyễn MD  NPI: [id number]                                      DATE:  :     Please sign and return via fax to [email or web address] . Thank you, [facility]

## 2022-08-26 ENCOUNTER — TREATMENT (OUTPATIENT)
Dept: PHYSICAL THERAPY | Facility: CLINIC | Age: 77
End: 2022-08-26

## 2022-08-26 DIAGNOSIS — R53.81 OTHER MALAISE: ICD-10-CM

## 2022-08-26 DIAGNOSIS — R53.81 DEBILITY: Primary | ICD-10-CM

## 2022-08-26 DIAGNOSIS — R26.81 UNSTEADY GAIT: ICD-10-CM

## 2022-08-26 PROCEDURE — 97110 THERAPEUTIC EXERCISES: CPT | Performed by: PHYSICAL THERAPIST

## 2022-08-26 PROCEDURE — 97112 NEUROMUSCULAR REEDUCATION: CPT | Performed by: PHYSICAL THERAPIST

## 2022-08-26 NOTE — PROGRESS NOTES
Physical Therapy Daily Treatment Note      Patient: Sandro Argueta   : 1945  Referring practitioner: Misti Nguyễn MD  Date of Initial Visit: Type: THERAPY  Noted: 2022  Today's Date: 2022  Patient seen for 18 sessions       Visit Diagnoses:    ICD-10-CM ICD-9-CM   1. Debility  R53.81 799.3   2. Unsteady gait  R26.81 781.2   3. Other malaise  R53.81 780.79       Subjective Evaluation    History of Present Illness    Subjective comment: Pt has no complaints today.       Objective   See Exercise, Manual, and Modality Logs for complete treatment.       Assessment & Plan     Assessment    Assessment details: Tx today consisted of exercises for improved stamina, leg stability and improved balance.  Pt cont to demonstrate good effort and had no complaints with tx today.  Pt responded well to added UE resistance today,    Plan  Plan details: Will follow progressing leg stability and improved balance.          Timed:         Manual Therapy:         mins  63923;     Therapeutic Exercise:    31     mins  22998;     Neuromuscular Avis:    11    mins  33635;    Therapeutic Activity:          mins  74670;     Gait Training:           mins  51792;     Ultrasound:          mins  94669;    Ionto                                   mins   91214  Self Care                            mins   91903  Canalith Repos         mins 02042      Un-Timed:  Electrical Stimulation:         mins  74149 ( );  Dry Needling          mins self-pay  Traction          mins 28429      Timed Treatment:   42   mins   Total Treatment:     42   mins    Chuy Christian PT  [place] License: [id number]      Electronically signed by Chuy Christian PT, 22, 11:11 AM EDT

## 2022-08-28 LAB
BH CV ECHO MEAS - ACS: 2.3 CM
BH CV ECHO MEAS - AI P1/2T: 652.4 MSEC
BH CV ECHO MEAS - AO MAX PG: 5.9 MMHG
BH CV ECHO MEAS - AO MEAN PG: 3 MMHG
BH CV ECHO MEAS - AO ROOT DIAM: 3 CM
BH CV ECHO MEAS - AO V2 MAX: 121 CM/SEC
BH CV ECHO MEAS - AO V2 VTI: 26.1 CM
BH CV ECHO MEAS - EDV(CUBED): 54.9 ML
BH CV ECHO MEAS - EDV(MOD-SP4): 76.4 ML
BH CV ECHO MEAS - EF(MOD-SP4): 58.8 %
BH CV ECHO MEAS - ESV(CUBED): 19.7 ML
BH CV ECHO MEAS - ESV(MOD-SP4): 31.5 ML
BH CV ECHO MEAS - FS: 28.9 %
BH CV ECHO MEAS - IVS/LVPW: 1.45 CM
BH CV ECHO MEAS - IVSD: 1.6 CM
BH CV ECHO MEAS - LA DIMENSION: 1.9 CM
BH CV ECHO MEAS - LAT PEAK E' VEL: 8.2 CM/SEC
BH CV ECHO MEAS - LV DIASTOLIC VOL/BSA (35-75): 40 CM2
BH CV ECHO MEAS - LV MASS(C)D: 183.4 GRAMS
BH CV ECHO MEAS - LV SYSTOLIC VOL/BSA (12-30): 16.5 CM2
BH CV ECHO MEAS - LVIDD: 3.8 CM
BH CV ECHO MEAS - LVIDS: 2.7 CM
BH CV ECHO MEAS - LVOT AREA: 3.8 CM2
BH CV ECHO MEAS - LVOT DIAM: 2.2 CM
BH CV ECHO MEAS - LVPWD: 1.1 CM
BH CV ECHO MEAS - MED PEAK E' VEL: 5.7 CM/SEC
BH CV ECHO MEAS - MV A MAX VEL: 58.2 CM/SEC
BH CV ECHO MEAS - MV E MAX VEL: 60.5 CM/SEC
BH CV ECHO MEAS - MV E/A: 1.04
BH CV ECHO MEAS - PA ACC TIME: 0.12 SEC
BH CV ECHO MEAS - PA PR(ACCEL): 25 MMHG
BH CV ECHO MEAS - RAP SYSTOLE: 10 MMHG
BH CV ECHO MEAS - RVSP: 31.4 MMHG
BH CV ECHO MEAS - SI(MOD-SP4): 23.5 ML/M2
BH CV ECHO MEAS - SV(MOD-SP4): 44.9 ML
BH CV ECHO MEAS - TAPSE (>1.6): 2.7 CM
BH CV ECHO MEAS - TR MAX PG: 21.4 MMHG
BH CV ECHO MEAS - TR MAX VEL: 231.3 CM/SEC
BH CV ECHO MEASUREMENTS AVERAGE E/E' RATIO: 8.71
LEFT ATRIUM VOLUME INDEX: 26.9 ML/M2
MAXIMAL PREDICTED HEART RATE: 144 BPM
STRESS TARGET HR: 122 BPM

## 2022-08-29 ENCOUNTER — TREATMENT (OUTPATIENT)
Dept: PHYSICAL THERAPY | Facility: CLINIC | Age: 77
End: 2022-08-29

## 2022-08-29 DIAGNOSIS — R53.81 DEBILITY: Primary | ICD-10-CM

## 2022-08-29 DIAGNOSIS — R26.81 UNSTEADY GAIT: ICD-10-CM

## 2022-08-29 DIAGNOSIS — R53.81 OTHER MALAISE: ICD-10-CM

## 2022-08-29 PROCEDURE — 97112 NEUROMUSCULAR REEDUCATION: CPT | Performed by: PHYSICAL THERAPIST

## 2022-08-29 PROCEDURE — 97110 THERAPEUTIC EXERCISES: CPT | Performed by: PHYSICAL THERAPIST

## 2022-08-29 NOTE — PROGRESS NOTES
Physical Therapy Daily Treatment Note      Patient: Sandro Argueta   : 1945  Referring practitioner: Misti Nguyễn MD  Date of Initial Visit: Type: THERAPY  Noted: 2022  Today's Date: 2022  Patient seen for 19 sessions       Visit Diagnoses:    ICD-10-CM ICD-9-CM   1. Debility  R53.81 799.3   2. Unsteady gait  R26.81 781.2   3. Other malaise  R53.81 780.79       Subjective Evaluation    History of Present Illness    Subjective comment: Pt has requested to shorten his session due to having to leave for a .       Objective   See Exercise, Manual, and Modality Logs for complete treatment.       Assessment & Plan     Assessment    Assessment details: Tx today consisted of exercises for improved leg stability; followed by proprioceptive training for improved balance and ended with endurance training for improved ability to perform outdoor activities.  Pt cont to respond well to exercises with no distress.    Plan  Plan details: Will follow for two sessions and discharge.          Timed:         Manual Therapy:         mins  62212;     Therapeutic Exercise:    24     mins  88142;     Neuromuscular Avis:  14      mins  93228;    Therapeutic Activity:          mins  13233;     Gait Training:           mins  78726;     Ultrasound:          mins  21110;    Ionto                                   mins   25011  Self Care                            mins   68149  Canalith Repos         mins 08767      Un-Timed:  Electrical Stimulation:         mins  14135 ( );  Dry Needling          mins self-pay  Traction          mins 30204      Timed Treatment:  38    mins   Total Treatment:     38   mins    Chuy Christian PT  [place] License: [id number]      Electronically signed by Chuy Christian PT, 22, 10:59 AM EDT

## 2022-08-31 ENCOUNTER — TREATMENT (OUTPATIENT)
Dept: PHYSICAL THERAPY | Facility: CLINIC | Age: 77
End: 2022-08-31

## 2022-08-31 DIAGNOSIS — R53.81 OTHER MALAISE: ICD-10-CM

## 2022-08-31 DIAGNOSIS — R53.81 DEBILITY: Primary | ICD-10-CM

## 2022-08-31 DIAGNOSIS — R26.81 UNSTEADY GAIT: ICD-10-CM

## 2022-08-31 PROCEDURE — 97112 NEUROMUSCULAR REEDUCATION: CPT | Performed by: PHYSICAL THERAPIST

## 2022-08-31 PROCEDURE — 97110 THERAPEUTIC EXERCISES: CPT | Performed by: PHYSICAL THERAPIST

## 2022-08-31 NOTE — PROGRESS NOTES
Physical Therapy Daily Treatment Note/Discharge      Patient: Sandro Argueta   : 1945  Referring practitioner: Misti Nguyễn MD  Date of Initial Visit: Type: THERAPY  Noted: 2022  Today's Date: 2022  Patient seen for 20 sessions       Visit Diagnoses:    ICD-10-CM ICD-9-CM   1. Debility  R53.81 799.3   2. Unsteady gait  R26.81 781.2   3. Other malaise  R53.81 780.79       Subjective Evaluation    History of Present Illness    Subjective comment: Pt reports being ready for discharge today.       Objective   See Exercise, Manual, and Modality Logs for complete treatment.       Assessment & Plan     Assessment    Assessment details: Tx today consisted of exercises and HEP review for improved leg stability; followed by proprioceptive home program review and ended with endurance training.  Pt demonstrated good effort today with no distress and good understanding.    Plan  Plan details: Pt has met all goals and is ready for discharge.  Pt instructed to contact therapy as needed.          Timed:         Manual Therapy:         mins  81999;     Therapeutic Exercise:    31     mins  79261;     Neuromuscular Avis:    12    mins  88881;    Therapeutic Activity:          mins  47828;     Gait Training:           mins  76532;     Ultrasound:          mins  62871;    Ionto                                   mins   74038  Self Care                            mins   45007  Canalith Repos         mins 64726      Un-Timed:  Electrical Stimulation:         mins  20909 ( );  Dry Needling          mins self-pay  Traction          mins 05323      Timed Treatment:   43   mins   Total Treatment:     43   mins    Chuy Christian PT  [place] License: [id number]      Electronically signed by Chuy Christian PT, 22, 11:08 AM EDT

## 2022-09-16 ENCOUNTER — TELEPHONE (OUTPATIENT)
Dept: CARDIOLOGY | Facility: CLINIC | Age: 77
End: 2022-09-16

## 2022-09-16 NOTE — TELEPHONE ENCOUNTER
Patients heart rate has been running between 48-51 and patient has a pacemaker. Patient had a spell yesterday where he stood up and his blood pressure was running 82/49. And sitting down it was 127/53 HR 50. Patient had a spell where he felt a little flushed and itz stumbled around and feeling bad and ended up in the floor. He didn't completley pass out but cannot remember all of the details. Advised if patient is in the situation he needs to go to the er. Patient says his pacer is suppose to be set at 60 and all of his blood pressures prior to yesterday was normal for him. They want to know what Dr. Smith advises that patient needs to do. He also did not get set up with a pacemaker check yet.     Thanks!

## 2022-09-19 NOTE — TELEPHONE ENCOUNTER
Can we try to get patient scheduled to have a urgent/emergent pacemaker interrogation to confirm proper function of his pacemaker.  His symptoms may also been related to his hypotension.  I also think he should be rescheduled to see Dr. Smith shortly after his pacemaker interrogation as well.

## 2022-09-21 ENCOUNTER — CLINICAL SUPPORT NO REQUIREMENTS (OUTPATIENT)
Dept: CARDIOLOGY | Facility: CLINIC | Age: 77
End: 2022-09-21

## 2022-09-21 DIAGNOSIS — I49.5 SSS (SICK SINUS SYNDROME): Primary | ICD-10-CM

## 2022-09-21 PROCEDURE — 93280 PM DEVICE PROGR EVAL DUAL: CPT | Performed by: INTERNAL MEDICINE

## 2022-10-04 ENCOUNTER — OFFICE VISIT (OUTPATIENT)
Dept: CARDIOLOGY | Facility: CLINIC | Age: 77
End: 2022-10-04

## 2022-10-04 VITALS
DIASTOLIC BLOOD PRESSURE: 67 MMHG | HEART RATE: 83 BPM | SYSTOLIC BLOOD PRESSURE: 98 MMHG | WEIGHT: 157 LBS | BODY MASS INDEX: 21.26 KG/M2 | HEIGHT: 72 IN

## 2022-10-04 DIAGNOSIS — I95.1 ORTHOSTATIC HYPOTENSION: ICD-10-CM

## 2022-10-04 DIAGNOSIS — I10 ESSENTIAL HYPERTENSION: ICD-10-CM

## 2022-10-04 DIAGNOSIS — I49.5 SSS (SICK SINUS SYNDROME): Primary | ICD-10-CM

## 2022-10-04 PROCEDURE — 99214 OFFICE O/P EST MOD 30 MIN: CPT | Performed by: NURSE PRACTITIONER

## 2022-10-04 RX ORDER — FLUDROCORTISONE ACETATE 0.1 MG/1
0.1 TABLET ORAL DAILY
Qty: 30 TABLET | Refills: 5 | Status: SHIPPED | OUTPATIENT
Start: 2022-10-04 | End: 2022-12-29 | Stop reason: SDUPTHER

## 2022-10-04 RX ORDER — METOPROLOL SUCCINATE 50 MG/1
50 TABLET, EXTENDED RELEASE ORAL DAILY
Qty: 30 TABLET | Refills: 5 | Status: SHIPPED | OUTPATIENT
Start: 2022-10-04 | End: 2022-12-29 | Stop reason: SDUPTHER

## 2022-10-04 RX ORDER — LISINOPRIL 10 MG/1
10 TABLET ORAL DAILY
COMMUNITY
Start: 2022-09-13 | End: 2022-10-04

## 2022-10-04 RX ORDER — SULFAMETHOXAZOLE AND TRIMETHOPRIM 800; 160 MG/1; MG/1
TABLET ORAL
COMMUNITY
Start: 2022-09-26 | End: 2022-12-27

## 2022-10-04 NOTE — PROGRESS NOTES
Misti Nguyễn MD  Sandro Argueta  1945  10/04/2022    Patient Active Problem List   Diagnosis   • Left elbow pain   • Hypertension   • DM2 (diabetes mellitus, type 2) (HCC)   • Elevated prostate specific antigen (PSA)   • Epigastric pain   • Epigastric abdominal pain   • Elevated liver enzymes   • Weight loss, abnormal   • Acute hepatitis   • Anemia   • Obstructive jaundice   • Pneumothorax, left   • Cytokine release syndrome, grade 1   • Esophageal rupture   • Hypotension, unspecified       Dear Misti Nguyễn MD:    Subjective     Chief Complaint   Patient presents with   • Follow-up           History of Present Illness:    Sandro Argueta is a 77 y.o. male with a past medical history of sick sinus syndrome status post permanent pacemaker implantation and recent orthostatic hypotension.  He presents today with his son-in-law present.  Recently, he had an episode of syncope which occurred when standing quickly.  Previously, he had been doing much better with fludrocortisone and metoprolol.  However, he is unsure why he is no longer taking the fludrocortisone but it is not in his medications any longer.  He did present to PCP for evaluation and is no longer taking metoprolol but now on lisinopril.  He has blood pressure logs with him today.  He has had significant orthostatic hypotension at home.          Allergies   Allergen Reactions   • Other Anaphylaxis and Swelling     Patient had a reaction to the COVID vaccine. Pt states his tongue and lips started swelling   :      Current Outpatient Medications:   •  fludrocortisone 0.1 MG tablet, Take 1 tablet by mouth Daily., Disp: 30 tablet, Rfl: 5  •  insulin aspart (NovoLOG FlexPen) 100 UNIT/ML solution pen-injector sc pen, Inject 0-14 Units under the skin into the appropriate area as directed 3 (Three) Times a Day Before Meals. Blood glucose 150-199 mg/dL - 3 units, Blood glucose 200-249 mg/dL - 5 units, Blood glucose 250-299 mg/dL - 8 units, Blood  "glucose 300-349 mg/dL - 10 units, Blood glucose 350-400 mg/dL - 12 units, Blood glucose greater than 400 mg/dL - 14 units and call provider, Disp: 15 mL, Rfl: 12  •  Insulin Glargine, 1 Unit Dial, (TOUJEO) 300 UNIT/ML solution pen-injector injection, Inject 20 Units under the skin into the appropriate area as directed Daily., Disp: , Rfl:   •  pancrelipase, Lip-Prot-Amyl, (CREON) 42757-12568 units capsule delayed-release particles capsule, Take 12,000 units of lipase by mouth 3 (Three) Times a Day With Meals., Disp: , Rfl:   •  sulfamethoxazole-trimethoprim (BACTRIM DS,SEPTRA DS) 800-160 MG per tablet, , Disp: , Rfl:   •  dutasteride (AVODART) 0.5 MG capsule, Take 0.5 mg by mouth Daily., Disp: , Rfl:   •  lansoprazole (FIRST) 3 MG/ML suspension oral suspension, Administer 10 mL per J tube Daily., Disp: 300 mL, Rfl: 1  •  metoprolol succinate XL (TOPROL-XL) 50 MG 24 hr tablet, Take 1 tablet by mouth Daily., Disp: 30 tablet, Rfl: 5      The following portions of the patient's history were reviewed and updated as appropriate: allergies, current medications, past family history, past medical history, past social history, past surgical history and problem list.    Social History     Tobacco Use   • Smoking status: Never Smoker   • Smokeless tobacco: Never Used   Vaping Use   • Vaping Use: Never used   Substance Use Topics   • Alcohol use: No   • Drug use: No       Review of Systems   Constitutional: Negative for decreased appetite and malaise/fatigue.   Cardiovascular: Positive for syncope. Negative for chest pain, dyspnea on exertion and palpitations.   Respiratory: Negative for cough and shortness of breath.        Objective   Vitals:    10/04/22 1515 10/04/22 1522 10/04/22 1523   BP: 118/73 102/61 98/67   BP Location: Left arm Left arm Right arm   Patient Position: Lying Sitting Standing   Pulse: 66 68 83   Weight: 71.2 kg (157 lb)     Height: 182.9 cm (72.01\")       Body mass index is 21.29 kg/m².        Vitals " reviewed.   Constitutional:       Appearance: Healthy appearance. Well-developed and not in distress.   HENT:      Head: Normocephalic and atraumatic.   Pulmonary:      Effort: Pulmonary effort is normal.      Breath sounds: Normal breath sounds. No wheezing. No rales.   Cardiovascular:      Normal rate. Regular rhythm.      Murmurs: There is no murmur.      . No S3 and S4 gallop.   Edema:     Peripheral edema absent.   Abdominal:      General: Bowel sounds are normal.      Palpations: Abdomen is soft.   Skin:     General: Skin is warm and dry.   Neurological:      Mental Status: Alert, oriented to person, place, and time and oriented to person, place and time.   Psychiatric:         Mood and Affect: Mood normal.         Behavior: Behavior normal.         Lab Results   Component Value Date     08/02/2022    K 4.3 08/02/2022     08/02/2022    CO2 27.9 08/02/2022    BUN 18 08/02/2022    CREATININE 1.00 08/02/2022    GLUCOSE 192 (H) 08/02/2022    CALCIUM 9.2 08/02/2022    AST 14 08/02/2022    ALT 6 08/02/2022    ALKPHOS 81 08/02/2022     No results found for: CKTOTAL  Lab Results   Component Value Date    WBC 6.08 08/02/2022    HGB 10.2 (L) 08/02/2022    HCT 32.8 (L) 08/02/2022     08/02/2022     Lab Results   Component Value Date    INR 1.01 07/04/2022    INR 0.99 01/21/2022    INR 1.07 06/27/2021     Lab Results   Component Value Date    MG 2.2 03/11/2022     Lab Results   Component Value Date    TSH 1.620 06/27/2021    PSA 4.1 (H) 08/13/2020    TRIG 50 04/08/2022    HDL 40 04/08/2022    LDL 49 04/08/2022      No results found for: BNP        Procedures      Assessment & Plan    Diagnosis Plan   1. SSS (sick sinus syndrome) (HCC)     2. Orthostatic hypotension, seems to be improving.  fludrocortisone 0.1 MG tablet   3. Essential hypertension  metoprolol succinate XL (TOPROL-XL) 50 MG 24 hr tablet                Recommendations:    1. Sick sinus syndrome status post PPM-recent pacemaker  interrogation noted.  Continue with routine interrogation  2. Orthostatic hypotension- since he was feeling much better and BP was improved with fludrocortisone, will resume fludrocortisone 0.1 mg daily.  We will also discontinue lisinopril.   3. Essential hypertension- we will resume metoprolol succinate 50 mg daily and continue to monitor with home BP logs.  4. Follow-up in 2 months or sooner if needed.    Plan of care discussed with Dr. Smith.    Return in about 2 months (around 12/4/2022) for Recheck.    As always, I appreciate very much the opportunity to participate in the cardiovascular care of your patients.      With Best Regards,    KAM Mendoza

## 2022-10-13 ENCOUNTER — TELEPHONE (OUTPATIENT)
Dept: CARDIOLOGY | Facility: CLINIC | Age: 77
End: 2022-10-13

## 2022-10-14 NOTE — TELEPHONE ENCOUNTER
I called and spoke to pt and he states that his medicine is working and wanted to let us know that his BP seems to be getting too high now. He states he took it this morning at 8am and it was 164/74  OR 61 sitting and standing 153/80 OR 71 before meds. Pt took his morning meds and took BP while on the phone and it was 184/75 OR 74 sitting but he had just got done walking around his neighborhood and BP standing was 148/71 pulse 73.

## 2022-10-26 ENCOUNTER — TELEPHONE (OUTPATIENT)
Dept: UROLOGY | Facility: CLINIC | Age: 77
End: 2022-10-26

## 2022-10-26 NOTE — TELEPHONE ENCOUNTER
A man form a patient assistance program to help them get free medicine called asking if we would send in a prescription for Creon     [employer]  Phone [phone]   Fax [phone]

## 2022-10-31 ENCOUNTER — TELEPHONE (OUTPATIENT)
Dept: CARDIOLOGY | Facility: CLINIC | Age: 77
End: 2022-10-31

## 2022-10-31 NOTE — TELEPHONE ENCOUNTER
Pt called in and wanted to know what he was suppose to betaking as far as metropolol succinate and fludrocortisone and I explained that per last note he takes half a tab of the fludrocortisone and 50mg tab of the metoprolol succinate and he understood and said that's what he is taking and just wanted to make sure and states that his bp is doing well as well.

## 2022-12-08 ENCOUNTER — APPOINTMENT (OUTPATIENT)
Dept: CT IMAGING | Facility: HOSPITAL | Age: 77
End: 2022-12-08

## 2022-12-08 ENCOUNTER — APPOINTMENT (OUTPATIENT)
Dept: GENERAL RADIOLOGY | Facility: HOSPITAL | Age: 77
End: 2022-12-08

## 2022-12-08 ENCOUNTER — HOSPITAL ENCOUNTER (EMERGENCY)
Facility: HOSPITAL | Age: 77
Discharge: HOME | End: 2022-12-08
Attending: EMERGENCY MEDICINE | Admitting: EMERGENCY MEDICINE

## 2022-12-08 VITALS
SYSTOLIC BLOOD PRESSURE: 149 MMHG | HEART RATE: 60 BPM | BODY MASS INDEX: 19.64 KG/M2 | TEMPERATURE: 97.9 F | OXYGEN SATURATION: 99 % | RESPIRATION RATE: 20 BRPM | HEIGHT: 72 IN | WEIGHT: 145 LBS | DIASTOLIC BLOOD PRESSURE: 83 MMHG

## 2022-12-08 DIAGNOSIS — S73.101A HIP SPRAIN, RIGHT, INITIAL ENCOUNTER: Primary | ICD-10-CM

## 2022-12-08 DIAGNOSIS — T80.818A EXTRAVASATION ACCIDENT, INITIAL ENCOUNTER: ICD-10-CM

## 2022-12-08 LAB
ALBUMIN SERPL-MCNC: 3.45 G/DL (ref 3.5–5.2)
ALBUMIN/GLOB SERPL: 0.7 G/DL
ALP SERPL-CCNC: 96 U/L (ref 39–117)
ALT SERPL W P-5'-P-CCNC: 15 U/L (ref 1–41)
ANION GAP SERPL CALCULATED.3IONS-SCNC: 10.6 MMOL/L (ref 5–15)
AST SERPL-CCNC: 17 U/L (ref 1–40)
BASOPHILS # BLD AUTO: 0.03 10*3/MM3 (ref 0–0.2)
BASOPHILS NFR BLD AUTO: 0.4 % (ref 0–1.5)
BILIRUB SERPL-MCNC: 0.2 MG/DL (ref 0–1.2)
BUN SERPL-MCNC: 19 MG/DL (ref 8–23)
BUN/CREAT SERPL: 17.6 (ref 7–25)
CALCIUM SPEC-SCNC: 9.1 MG/DL (ref 8.6–10.5)
CHLORIDE SERPL-SCNC: 98 MMOL/L (ref 98–107)
CO2 SERPL-SCNC: 29.4 MMOL/L (ref 22–29)
CREAT SERPL-MCNC: 1.08 MG/DL (ref 0.76–1.27)
CRP SERPL-MCNC: 8.94 MG/DL (ref 0–0.5)
DEPRECATED RDW RBC AUTO: 45.1 FL (ref 37–54)
EGFRCR SERPLBLD CKD-EPI 2021: 71.1 ML/MIN/1.73
EOSINOPHIL # BLD AUTO: 0.12 10*3/MM3 (ref 0–0.4)
EOSINOPHIL NFR BLD AUTO: 1.7 % (ref 0.3–6.2)
ERYTHROCYTE [DISTWIDTH] IN BLOOD BY AUTOMATED COUNT: 14.9 % (ref 12.3–15.4)
GLOBULIN UR ELPH-MCNC: 5 GM/DL
GLUCOSE SERPL-MCNC: 162 MG/DL (ref 65–99)
HCT VFR BLD AUTO: 35.9 % (ref 37.5–51)
HGB BLD-MCNC: 10.9 G/DL (ref 13–17.7)
HOLD SPECIMEN: NORMAL
HOLD SPECIMEN: NORMAL
IMM GRANULOCYTES # BLD AUTO: 0.02 10*3/MM3 (ref 0–0.05)
IMM GRANULOCYTES NFR BLD AUTO: 0.3 % (ref 0–0.5)
LYMPHOCYTES # BLD AUTO: 1.84 10*3/MM3 (ref 0.7–3.1)
LYMPHOCYTES NFR BLD AUTO: 26.7 % (ref 19.6–45.3)
MCH RBC QN AUTO: 25.1 PG (ref 26.6–33)
MCHC RBC AUTO-ENTMCNC: 30.4 G/DL (ref 31.5–35.7)
MCV RBC AUTO: 82.7 FL (ref 79–97)
MONOCYTES # BLD AUTO: 0.62 10*3/MM3 (ref 0.1–0.9)
MONOCYTES NFR BLD AUTO: 9 % (ref 5–12)
NEUTROPHILS NFR BLD AUTO: 4.26 10*3/MM3 (ref 1.7–7)
NEUTROPHILS NFR BLD AUTO: 61.9 % (ref 42.7–76)
NRBC BLD AUTO-RTO: 0 /100 WBC (ref 0–0.2)
PLATELET # BLD AUTO: 395 10*3/MM3 (ref 140–450)
PMV BLD AUTO: 9.1 FL (ref 6–12)
POTASSIUM SERPL-SCNC: 4.8 MMOL/L (ref 3.5–5.2)
PROT SERPL-MCNC: 8.4 G/DL (ref 6–8.5)
RBC # BLD AUTO: 4.34 10*6/MM3 (ref 4.14–5.8)
SODIUM SERPL-SCNC: 138 MMOL/L (ref 136–145)
TROPONIN T SERPL-MCNC: <0.01 NG/ML (ref 0–0.03)
WBC NRBC COR # BLD: 6.89 10*3/MM3 (ref 3.4–10.8)
WHOLE BLOOD HOLD COAG: NORMAL
WHOLE BLOOD HOLD SPECIMEN: NORMAL

## 2022-12-08 PROCEDURE — 25010000002 HYALURONIDASE (HUMAN) 150 UNIT/ML SOLUTION 1 ML VIAL: Performed by: EMERGENCY MEDICINE

## 2022-12-08 PROCEDURE — 25010000002 MORPHINE PER 10 MG: Performed by: NURSE PRACTITIONER

## 2022-12-08 PROCEDURE — 80053 COMPREHEN METABOLIC PANEL: CPT | Performed by: NURSE PRACTITIONER

## 2022-12-08 PROCEDURE — 93005 ELECTROCARDIOGRAM TRACING: CPT | Performed by: PHYSICIAN ASSISTANT

## 2022-12-08 PROCEDURE — 96374 THER/PROPH/DIAG INJ IV PUSH: CPT

## 2022-12-08 PROCEDURE — 96372 THER/PROPH/DIAG INJ SC/IM: CPT

## 2022-12-08 PROCEDURE — 85025 COMPLETE CBC W/AUTO DIFF WBC: CPT | Performed by: NURSE PRACTITIONER

## 2022-12-08 PROCEDURE — 73502 X-RAY EXAM HIP UNI 2-3 VIEWS: CPT

## 2022-12-08 PROCEDURE — 74177 CT ABD & PELVIS W/CONTRAST: CPT

## 2022-12-08 PROCEDURE — 84484 ASSAY OF TROPONIN QUANT: CPT | Performed by: NURSE PRACTITIONER

## 2022-12-08 PROCEDURE — 25010000002 ONDANSETRON PER 1 MG: Performed by: NURSE PRACTITIONER

## 2022-12-08 PROCEDURE — 99284 EMERGENCY DEPT VISIT MOD MDM: CPT

## 2022-12-08 PROCEDURE — 96375 TX/PRO/DX INJ NEW DRUG ADDON: CPT

## 2022-12-08 PROCEDURE — 25010000002 IOPAMIDOL 61 % SOLUTION: Performed by: EMERGENCY MEDICINE

## 2022-12-08 PROCEDURE — 86140 C-REACTIVE PROTEIN: CPT | Performed by: NURSE PRACTITIONER

## 2022-12-08 RX ORDER — SODIUM CHLORIDE 0.9 % (FLUSH) 0.9 %
10 SYRINGE (ML) INJECTION AS NEEDED
Status: DISCONTINUED | OUTPATIENT
Start: 2022-12-08 | End: 2022-12-09 | Stop reason: HOSPADM

## 2022-12-08 RX ORDER — ONDANSETRON 2 MG/ML
4 INJECTION INTRAMUSCULAR; INTRAVENOUS ONCE
Status: COMPLETED | OUTPATIENT
Start: 2022-12-08 | End: 2022-12-08

## 2022-12-08 RX ADMIN — MORPHINE SULFATE 4 MG: 4 INJECTION, SOLUTION INTRAMUSCULAR; INTRAVENOUS at 16:00

## 2022-12-08 RX ADMIN — IOPAMIDOL 80 ML: 612 INJECTION, SOLUTION INTRAVENOUS at 20:54

## 2022-12-08 RX ADMIN — SODIUM CHLORIDE 500 ML: 9 INJECTION, SOLUTION INTRAVENOUS at 16:01

## 2022-12-08 RX ADMIN — ONDANSETRON 4 MG: 2 INJECTION INTRAMUSCULAR; INTRAVENOUS at 16:00

## 2022-12-08 RX ADMIN — HYALURONIDASE (HUMAN RECOMBINANT) 150 UNITS: 150 INJECTION, SOLUTION SUBCUTANEOUS at 21:59

## 2022-12-08 NOTE — ED NOTES
MEDICAL SCREENING:    Reason for Visit: Right hip pain s/p syncope episode a couple of days ago.    Patient initially seen in triage.  The patient was advised further evaluation and diagnostic testing will be needed, some of the treatment and testing will be initiated in the lobby in order to begin the process.  The patient will be returned to the waiting area for the time being and possibly be re-assessed by a subsequent ED provider.  The patient will be brought back to the treatment area in as timely manner as possible.         Edward Thomas PA  12/08/22 1524

## 2022-12-09 NOTE — ED NOTES
Called CT to ask about patient going for scan, they said they are short staffed and are backed up with orders.

## 2022-12-09 NOTE — ED PROVIDER NOTES
Subjective   History of Present Illness  Patient is a 77-year-old male with significant past medical history positive for arthritis, CAD, type 2 diabetes, BPH, GERD, hypertension presenting to the ER complaints of fall.  Patient states that he has been seeing Dr. Smith for syncope and was actually at his office when he fell striking his right hip.  Patient reports intense right hip pain.  Patient denies any loss of bowel or bladder, saddle numbness, back pain, pelvic pain, instability or any additional symptoms today.  Patient denies any alleviating or worsening factors.  Patient reports no previous injury to that hip.  Patient denies any additional symptoms at this time.    History provided by:  Patient   used: No        Review of Systems   Constitutional: Negative.  Negative for fever.   HENT: Negative.    Respiratory: Negative.    Cardiovascular: Negative.  Negative for chest pain.   Gastrointestinal: Negative.  Negative for abdominal pain.   Endocrine: Negative.    Genitourinary: Negative.  Negative for dysuria.   Musculoskeletal: Positive for gait problem and myalgias.        Right hip pain   Skin: Negative.    Psychiatric/Behavioral: Negative.    All other systems reviewed and are negative.      Past Medical History:   Diagnosis Date   • Acute UTI (urinary tract infection) 02/20/2021   • Arthritis    • Borderline diabetes    • Coronary artery disease    • Diabetes mellitus (HCC)    • Elevated prostate specific antigen (PSA) 08/17/2020   • Esophageal rupture    • GERD (gastroesophageal reflux disease)    • Hypertension    • Left elbow pain    • Pancreatitis        Allergies   Allergen Reactions   • Other Anaphylaxis and Swelling     Patient had a reaction to the COVID vaccine. Pt states his tongue and lips started swelling       Past Surgical History:   Procedure Laterality Date   • COLONOSCOPY     • ENDOSCOPY N/A 02/09/2021    Procedure: ESOPHAGOGASTRODUODENOSCOPY WITH BIOPSY dilitation;   Surgeon: Bela Mcdaniel MD;  Location: [facility] OR;  Service: Gastroenterology;  Laterality: N/A;   • ERCP N/A 02/21/2021    Procedure: ENDOSCOPIC RETROGRADE CHOLANGIOPANCREATOGRAPHY WITH STENT PLACEMENT;  Surgeon: Matthew Moreira MD;  Location: [facility] ENDOSCOPY;  Service: Gastroenterology;  Laterality: N/A;  with sphincterotomy, and brushing of common bile duct, and placement of [id number] wall stent     • PACEMAKER IMPLANTATION     • THORACOTOMY     • WHIPPLE PROCEDURE         Family History   Problem Relation Age of Onset   • Diabetes Mother    • Diabetes Sister    • Heart failure Brother    • Heart disease Brother    • Diabetes Brother        Social History     Socioeconomic History   • Marital status:    Tobacco Use   • Smoking status: Never   • Smokeless tobacco: Never   Vaping Use   • Vaping Use: Never used   Substance and Sexual Activity   • Alcohol use: No   • Drug use: No   • Sexual activity: Defer           Objective   Physical Exam  Vitals and nursing note reviewed.   Constitutional:       General: He is not in acute distress.     Appearance: He is well-developed. He is not diaphoretic.   HENT:      Head: Normocephalic and atraumatic.      Right Ear: External ear normal.      Left Ear: External ear normal.      Nose: Nose normal.   Eyes:      Conjunctiva/sclera: Conjunctivae normal.      Pupils: Pupils are equal, round, and reactive to light.   Neck:      Vascular: No JVD.      Trachea: No tracheal deviation.   Cardiovascular:      Rate and Rhythm: Normal rate and regular rhythm.      Heart sounds: Normal heart sounds. No murmur heard.  Pulmonary:      Effort: Pulmonary effort is normal. No respiratory distress.      Breath sounds: Normal breath sounds. No wheezing.   Abdominal:      General: Bowel sounds are normal.      Palpations: Abdomen is soft.      Tenderness: There is no abdominal tenderness.   Musculoskeletal:         General: No deformity.      Cervical back:  Normal range of motion and neck supple.      Right hip: Tenderness present. Decreased range of motion.   Skin:     General: Skin is warm and dry.      Coloration: Skin is not pale.      Findings: No erythema or rash.   Neurological:      Mental Status: He is alert and oriented to person, place, and time.      Cranial Nerves: No cranial nerve deficit.   Psychiatric:         Behavior: Behavior normal.         Thought Content: Thought content normal.         Procedures           ED Course  ED Course as of 12/08/22 2236   Thu Dec 08, 2022   1554 EKG shows an atrial paced rhythm, rate 60.  IN interval 202, QRS duration 140, QTc 406 6 ms.  No evidence for STEMI. [CM]   1708 XR Hip With or Without Pelvis 2 - 3 View Right [SM]   2028 Endorsed to Dr. Kohli at shift change.  [SM]   2234 22:30 -CT scan abdomen/pelvis with IV contrast and right hip x-ray showed no fracture, also no intra-abdominal pathology.  Patient had an unfortunate extravasation of IV contrast in the right arm, protocol initiated.  On evaluation patient is in no acute distress, medically stable.  Injury occurred this past Monday, 5 days ago, with patient being asymptomatic till earlier this morning when he noticed that he has sharp pain in the right hip, and decreased ability to bear weight on the right lower extremity.  He is usually walking using assistance (a walker).  Advised patient to take OTC Tylenol, apply ice to the affected area and schedule follow-up appointment with Ortho if not better by Monday. [DS]      ED Course User Index  [CM] Scott Villafuerte MD  [DS] Seun Holcomb MD  [SM] Dalia Parekh, KAM     Exam was completed in the setting of the COVID-19 pandemic.  Counseling: Discussed today's findings, in addition to providing specific details for the plan of care.  Questions are answered and there is agreement with the plan.  Counseling was provided regarding the diagnosis and prognosis. Discussed supportive care, the specific  conditions for return, as well as the importance of follow-up. Advised to recheck here immediately with new or worsening symptoms.                                            MDM    Final diagnoses:   Hip sprain, right, initial encounter   Extravasation accident, initial encounter       ED Disposition  ED Disposition     ED Disposition   Discharge    Condition   Stable    Comment   --             Eduardo Nick, DO  [address] [place] [address] [phone]               Medication List      No changes were made to your prescriptions during this visit.          Seun Holcomb MD  12/08/22 6405

## 2022-12-09 NOTE — DISCHARGE INSTRUCTIONS
Please take OTC Tylenol 500 mg 1-2 tabs every 4-6 hours as needed for pain control, do not exceed 3000 mg / 24 hours of acetaminophen.  Apply an ice pack to the affected area.    If any new/acute symptoms or worsening of current presentation please go to the closest urgent care or emergency room for evaluation.    If right hip pain not better by Monday please follow-up with orthopedic surgery.

## 2022-12-09 NOTE — ED NOTES
Extravasation protocol started. Medication injected, not able to aspirate any fluids, area marked at 12cm long and 7cm wide. Pt has arm elevated with warm compress applied.

## 2022-12-10 LAB
QT INTERVAL: 466 MS
QTC INTERVAL: 466 MS

## 2022-12-19 ENCOUNTER — TRANSCRIBE ORDERS (OUTPATIENT)
Dept: ADMINISTRATIVE | Facility: HOSPITAL | Age: 77
End: 2022-12-19

## 2022-12-19 ENCOUNTER — HOSPITAL ENCOUNTER (OUTPATIENT)
Dept: GENERAL RADIOLOGY | Facility: HOSPITAL | Age: 77
Discharge: HOME | End: 2022-12-19

## 2022-12-19 DIAGNOSIS — R05.9 COUGH, UNSPECIFIED TYPE: Primary | ICD-10-CM

## 2022-12-19 DIAGNOSIS — R05.9 COUGH, UNSPECIFIED TYPE: ICD-10-CM

## 2022-12-19 DIAGNOSIS — M25.551 RIGHT HIP PAIN: ICD-10-CM

## 2022-12-19 DIAGNOSIS — M25.551 RIGHT HIP PAIN: Primary | ICD-10-CM

## 2022-12-19 PROCEDURE — 71046 X-RAY EXAM CHEST 2 VIEWS: CPT | Performed by: RADIOLOGY

## 2022-12-19 PROCEDURE — 71046 X-RAY EXAM CHEST 2 VIEWS: CPT

## 2022-12-20 ENCOUNTER — TRANSCRIBE ORDERS (OUTPATIENT)
Dept: ADMINISTRATIVE | Facility: HOSPITAL | Age: 77
End: 2022-12-20

## 2022-12-20 DIAGNOSIS — L02.91 CUTANEOUS ABSCESS, UNSPECIFIED SITE: Primary | ICD-10-CM

## 2022-12-22 ENCOUNTER — HOSPITAL ENCOUNTER (OUTPATIENT)
Dept: CT IMAGING | Facility: HOSPITAL | Age: 77
Discharge: HOME | End: 2022-12-22
Admitting: INTERNAL MEDICINE

## 2022-12-22 DIAGNOSIS — L02.91 CUTANEOUS ABSCESS, UNSPECIFIED SITE: ICD-10-CM

## 2022-12-22 PROCEDURE — 71250 CT THORAX DX C-: CPT

## 2022-12-22 PROCEDURE — 71250 CT THORAX DX C-: CPT | Performed by: RADIOLOGY

## 2022-12-27 ENCOUNTER — OFFICE VISIT (OUTPATIENT)
Dept: INFECTIOUS DISEASES | Facility: CLINIC | Age: 77
End: 2022-12-27

## 2022-12-27 VITALS
OXYGEN SATURATION: 98 % | BODY MASS INDEX: 21.16 KG/M2 | TEMPERATURE: 96.9 F | SYSTOLIC BLOOD PRESSURE: 118 MMHG | HEIGHT: 72 IN | HEART RATE: 64 BPM | WEIGHT: 156.2 LBS | DIASTOLIC BLOOD PRESSURE: 75 MMHG

## 2022-12-27 DIAGNOSIS — J86.0: ICD-10-CM

## 2022-12-27 DIAGNOSIS — J93.9 PNEUMOTHORAX, LEFT: Primary | ICD-10-CM

## 2022-12-27 DIAGNOSIS — J86.9 EMPYEMA, LEFT: ICD-10-CM

## 2022-12-27 PROCEDURE — 99214 OFFICE O/P EST MOD 30 MIN: CPT | Performed by: INTERNAL MEDICINE

## 2022-12-27 RX ORDER — DOXYCYCLINE HYCLATE 100 MG
100 TABLET ORAL EVERY 12 HOURS
Qty: 56 TABLET | Refills: 0 | Status: SHIPPED | OUTPATIENT
Start: 2022-12-27 | End: 2023-01-24

## 2022-12-27 RX ORDER — DOXYCYCLINE HYCLATE 100 MG/1
100 CAPSULE ORAL 2 TIMES DAILY
COMMUNITY

## 2022-12-27 NOTE — PROGRESS NOTES
[facility]         Referring Provider: No referring provider defined for this encounter.    Subjective      Chief Complaint  Abscess    History of Present Illness  Sandro Argueta is a 77 y.o. male who presents today to [facility] for Initial Consultation  . Past medical history is significant for Tritus, CAD, type 2 diabetes, BPH, hypertension with previous history significant for esophageal rupture, perforation with leakage of GI content into the thoracic cavity which required multiple surgeries and chest tubes and was treated with broad-spectrum antibiotic coverage as well as micafungin with subsequent repair of the esophagus and esophageal stent placement because of continuous leakage.    More recently patient has been having a left chest continuous drainage treated with oral antibiotic therapy on several occasions with temporary relief decreased drainage while he is taking doxycycline but then recurrence of the drainage as soon as he stops doxycycline.    Today he denies having any shortness of breath fever chills or night sweats denies any cough and denies any production of sputum.    Past Medical History:   Diagnosis Date   • Acute UTI (urinary tract infection) 02/20/2021   • Arthritis    • Borderline diabetes    • Coronary artery disease    • Diabetes mellitus (HCC)    • Elevated prostate specific antigen (PSA) 08/17/2020   • Esophageal rupture    • GERD (gastroesophageal reflux disease)    • Hypertension    • Left elbow pain    • Pancreatitis        Past Surgical History:   Procedure Laterality Date   • COLONOSCOPY     • ENDOSCOPY N/A 02/09/2021    Procedure: ESOPHAGOGASTRODUODENOSCOPY WITH BIOPSY dilitation;  Surgeon: Bela Mcdaniel MD;  Location: [facility];  Service: Gastroenterology;  Laterality: N/A;   • ERCP N/A 02/21/2021    Procedure: ENDOSCOPIC RETROGRADE CHOLANGIOPANCREATOGRAPHY WITH STENT PLACEMENT;  Surgeon: Harish  Matthew Pozo MD;  Location: [facility] ENDOSCOPY;  Service: Gastroenterology;  Laterality: N/A;  with sphincterotomy, and brushing of common bile duct, and placement of [id number] wall stent     • PACEMAKER IMPLANTATION     • THORACOTOMY     • WHIPPLE PROCEDURE         Social History     Socioeconomic History   • Marital status:    Tobacco Use   • Smoking status: Never   • Smokeless tobacco: Never   Vaping Use   • Vaping Use: Never used   Substance and Sexual Activity   • Alcohol use: No   • Drug use: No   • Sexual activity: Defer       Family History  family history includes Diabetes in his brother, mother, and sister; Heart disease in his brother; Heart failure in his brother.    Immunization History   Administered Date(s) Administered   • COVID-19 (PFIZER) PURPLE CAP 12/10/2021        Allergies  Allergies   Allergen Reactions   • Other Anaphylaxis and Swelling     Patient had a reaction to the COVID vaccine. Pt states his tongue and lips started swelling       The medication list has been reviewed and updated.   Current Medications    Current Outpatient Medications:   •  doxycycline (VIBRAMYCIN) 100 MG capsule, Take 100 mg by mouth 2 (Two) Times a Day., Disp: , Rfl:   •  fludrocortisone 0.1 MG tablet, Take 1 tablet by mouth Daily., Disp: 30 tablet, Rfl: 5  •  insulin aspart (NovoLOG FlexPen) 100 UNIT/ML solution pen-injector sc pen, Inject 0-14 Units under the skin into the appropriate area as directed 3 (Three) Times a Day Before Meals. Blood glucose 150-199 mg/dL - 3 units, Blood glucose 200-249 mg/dL - 5 units, Blood glucose 250-299 mg/dL - 8 units, Blood glucose 300-349 mg/dL - 10 units, Blood glucose 350-400 mg/dL - 12 units, Blood glucose greater than 400 mg/dL - 14 units and call provider, Disp: 15 mL, Rfl: 12  •  Insulin Glargine, 1 Unit Dial, (TOUJEO) 300 UNIT/ML solution pen-injector injection, Inject 20 Units under the skin into the appropriate area as directed Daily., Disp: , Rfl:   •   "metoprolol succinate XL (TOPROL-XL) 50 MG 24 hr tablet, Take 1 tablet by mouth Daily., Disp: 30 tablet, Rfl: 5  •  pancrelipase, Lip-Prot-Amyl, (CREON) 87053-40051 units capsule delayed-release particles capsule, Take 1 capsule by mouth 3 (Three) Times a Day With Meals for 90 days. (Patient taking differently: Take 12,000 units of lipase by mouth 3 (Three) Times a Day With Meals. Pt is not taking at this time), Disp: 270 capsule, Rfl: 0  •  doxycycline (VIBRAMYICN) 100 MG tablet, Take 1 tablet by mouth Every 12 (Twelve) Hours for 28 days., Disp: 56 tablet, Rfl: 0      Review of Systems    Review of Systems   Constitutional: Negative for activity change, appetite change, chills, diaphoresis and fever.   HENT: Negative for congestion, dental problem, drooling, mouth sores, sinus pressure and sneezing.    Eyes: Negative for blurred vision, double vision, photophobia and discharge.   Respiratory: Negative for apnea, cough, choking, chest tightness, shortness of breath and wheezing.         Positive drainage from the left chest   Cardiovascular: Negative for chest pain, palpitations and leg swelling.   Gastrointestinal: Negative for abdominal distention, abdominal pain, diarrhea, nausea and vomiting.   Genitourinary: Negative for dysuria, flank pain and frequency.   Musculoskeletal: Negative for arthralgias, gait problem, neck pain and neck stiffness.   Skin: Negative for rash.   Neurological: Negative for dizziness, tremors, seizures, syncope, speech difficulty, numbness, headache and confusion.   Psychiatric/Behavioral: Negative for agitation, behavioral problems, hallucinations and suicidal ideas.        Objective     Vital Signs:  /75 (BP Location: Right arm, Patient Position: Sitting, Cuff Size: Adult)   Pulse 64   Temp 96.9 °F (36.1 °C) (Temporal)   Ht 182.9 cm (72\")   Wt 70.9 kg (156 lb 3.2 oz)   SpO2 98%   BMI 21.18 kg/m²   Estimated body mass index is 21.18 kg/m² as calculated from the following:    " "Height as of this encounter: 182.9 cm (72\").    Weight as of this encounter: 70.9 kg (156 lb 3.2 oz).    Physical Exam  Constitutional:       General: He is not in acute distress.     Appearance: Normal appearance. He is normal weight. He is not ill-appearing, toxic-appearing or diaphoretic.   HENT:      Head: Normocephalic and atraumatic.      Nose: Nose normal. No congestion or rhinorrhea.      Mouth/Throat:      Mouth: Mucous membranes are moist.      Pharynx: Oropharynx is clear.   Eyes:      General: No scleral icterus.     Extraocular Movements: Extraocular movements intact.      Pupils: Pupils are equal, round, and reactive to light.   Neck:      Vascular: No carotid bruit.   Cardiovascular:      Rate and Rhythm: Normal rate and regular rhythm.      Pulses: Normal pulses.      Heart sounds: No murmur heard.  Pulmonary:      Effort: Pulmonary effort is normal. No respiratory distress.      Breath sounds: Normal breath sounds. No stridor. No wheezing, rhonchi or rales.      Comments: Decreased breath sounds to the left side and open area with minimal drainage  Chest:      Chest wall: No tenderness.   Abdominal:      General: Abdomen is flat. Bowel sounds are normal. There is no distension.      Palpations: Abdomen is soft.      Tenderness: There is no abdominal tenderness. There is no guarding or rebound.   Musculoskeletal:      Cervical back: Normal range of motion and neck supple. No rigidity or tenderness.   Lymphadenopathy:      Cervical: No cervical adenopathy.   Skin:     Coloration: Skin is not jaundiced.      Findings: No lesion or rash.   Neurological:      General: No focal deficit present.      Mental Status: He is alert and oriented to person, place, and time.   Psychiatric:         Mood and Affect: Mood normal.         Behavior: Behavior normal.          Result Review :  The following data was reviewed by Sylvia Talley MD     Lab Results  Lab Results   Component Value Date    WBC 6.89 " 12/08/2022    HGB 10.9 (L) 12/08/2022    HCT 35.9 (L) 12/08/2022    MCV 82.7 12/08/2022     12/08/2022     Lab Results   Component Value Date    GLUCOSE 162 (H) 12/08/2022    BUN 19 12/08/2022    CREATININE 1.08 12/08/2022    EGFRIFNONA >60 03/11/2022    EGFRIFAFRI >60 03/11/2022    BCR 17.6 12/08/2022    K 4.8 12/08/2022    CO2 29.4 (H) 12/08/2022    CALCIUM 9.1 12/08/2022    ALBUMIN 3.45 (L) 12/08/2022    AST 17 12/08/2022    ALT 15 12/08/2022      Lab Results   Component Value Date    CRP 8.94 (H) 12/08/2022        No results found for: ACANTHNAEG, AFBCX, BPERTUSSISCX, BLOODCX  No results found for: BCIDPCR, CXREFLEX, CSFCX, CULTURETIS  No results found for: CULTURES, HSVCX, URCX  No results found for: EYECULTURE, GCCX, HSVCULTURE, LABHSV  No results found for: LEGIONELLA, MRSACX, MUMPSCX, MYCOPLASCX  No results found for: NOCARDIACX, STOOLCX  No results found for: THROATCX, UNSTIMCULT, URINECX, CULTURE, VZVCULTUR  No results found for: VIRALCULTU, WOUNDCX    Radiology Results  CT Chest Without Contrast Diagnostic    Result Date: 12/22/2022  Impression: 1.  Development of left upper lobe airspace disease which may represent atelectasis combined with pneumonia. 2.  Slight increase in small left pleural effusion. 3.  Stable fibrosis and consolidation of the left lower lobe. 4.  Focal skin thickening with subcutaneous edema in the left midaxillary line in the left lower chest wall soft tissues. This has significantly improved from the previous exam with no definite evidence of residual abscess. Ultrasound may be helpful to better characterize given limitations of CT resolution. 5.  Other incidental and nonacute findings as detailed above which appear stable from previous.  This report was finalized on 12/22/2022 12:15 PM by Dr. Apollo Fabian MD.      CT Abdomen Pelvis With Contrast    Result Date: 12/8/2022  Impression: 1.  Limited noncontrast study with artifact from overlying arms degrading evaluation of  the upper abdomen. 2.  No acute traumatic injury in the abdomen or pelvis. No hip fracture. 3.  Moderate colonic stool burden likely reflective of constipation. 4.  Sigmoid diverticulosis. Signer Name: Davis Diego MD  Signed: 12/8/2022 9:12 PM  Workstation Name: [id number]  [facility]    XR Chest PA & Lateral    Result Date: 12/20/2022  Impression: Minimal consolidation in the left lung, likely chronic  This report was finalized on 12/20/2022 6:23 AM by Dr. Antoine Guevara MD.      XR Hip With or Without Pelvis 2 - 3 View Right    Result Date: 12/8/2022  Impression: No fracture or dislocation. Signer Name: Davis Diego MD  Signed: 12/8/2022 5:03 PM  [email or web address]  [facility]             Assessment / Plan        Diagnoses and all orders for this visit:    1. Pneumothorax, left (Primary)    2. Empyema, left (HCC)    3. Empyema with fistula (HCC)    Other orders  -     doxycycline (VIBRAMYICN) 100 MG tablet; Take 1 tablet by mouth Every 12 (Twelve) Hours for 28 days.  Dispense: 56 tablet; Refill: 0    I reviewed the CT scan results from July and the most recent CT scan done in December and the most recent CT scan definitely shows an area of infiltrate/recurrent empyema and possibly a lung abscess which is likely leading to his recurrent drainage from the left chest open area.    I did recommend a more prolonged course of doxycycline since doxycycline seems to be helping his symptoms but possibly last long enough of a duration to treat a deep-seated infection and referral to the CT surgeon Dr. Hunt at  since an excision of the purulent collection needs to be done to prevent further recurrent drainage from the left hemithorax.    I recommended for the patient and his son-in-law who was with him at bedside and seems to be very understanding supportive and caring to proceed with using spirometry often to prevent any further atelectasis.  Patient does have a CT  scan showing the possibility of pneumonia but clinically he does not have any symptoms suggestive of pneumonia at this time.    For now I extended his doxycycline course for another 4 weeks until he follows up with the CT surgeon.            Follow Up   Return in about 1 week (around 1/3/2023) for Follow up, With Dr. Talley.    Visit Diagnoses:    ICD-10-CM ICD-9-CM   1. Pneumothorax, left  J93.9 512.89   2. Empyema, left (HCC)  J86.9 510.9   3. Empyema with fistula (HCC)  J86.0 510.0       Patient was given instructions and counseling regarding his condition or for health maintenance advice. Please see specific information pulled into the AVS if appropriate.     This document has been electronically signed by Sylvia Talley MD   December 27, 2022 10:39 EST    Dictated Utilizing Dragon Dictation: Part of this note may be an electronic transcription/translation of spoken language to printed text using the Dragon Dictation System.

## 2022-12-29 DIAGNOSIS — I95.1 ORTHOSTATIC HYPOTENSION: ICD-10-CM

## 2022-12-29 DIAGNOSIS — I10 ESSENTIAL HYPERTENSION: ICD-10-CM

## 2022-12-29 RX ORDER — FLUDROCORTISONE ACETATE 0.1 MG/1
0.1 TABLET ORAL DAILY
Qty: 30 TABLET | Refills: 1 | Status: SHIPPED | OUTPATIENT
Start: 2022-12-29

## 2022-12-29 RX ORDER — METOPROLOL SUCCINATE 50 MG/1
50 TABLET, EXTENDED RELEASE ORAL DAILY
Qty: 30 TABLET | Refills: 1 | Status: SHIPPED | OUTPATIENT
Start: 2022-12-29 | End: 2023-02-24

## 2022-12-29 NOTE — TELEPHONE ENCOUNTER
Caller: Sandro Argueta    Relationship: Self    Best call back number: [phone]    Requested Prescriptions:   Requested Prescriptions     Pending Prescriptions Disp Refills   • metoprolol succinate XL (TOPROL-XL) 50 MG 24 hr tablet 30 tablet 5     Sig: Take 1 tablet by mouth Daily.   • fludrocortisone 0.1 MG tablet 30 tablet 5     Sig: Take 1 tablet by mouth Daily.        Pharmacy where request should be sent: [facility] [id number] [address] [facility] [address]  - [phone] [facility] [phone] FX     Additional details provided by patient: PT HAS 0 DAYS LEFT    Does the patient have less than a 3 day supply:  [] Yes  [x] No    Would you like a call back once the refill request has been completed: [x] Yes [] No    If the office needs to give you a call back, can they leave a voicemail: [x] Yes [] No    Misael Rangel Rep   12/29/22 10:00 EST

## 2023-01-20 ENCOUNTER — APPOINTMENT (OUTPATIENT)
Dept: CT IMAGING | Facility: HOSPITAL | Age: 78
End: 2023-01-20

## 2023-02-06 ENCOUNTER — TRANSCRIBE ORDERS (OUTPATIENT)
Dept: ADMINISTRATIVE | Facility: HOSPITAL | Age: 78
End: 2023-02-06
Payer: MEDICARE

## 2023-02-06 DIAGNOSIS — K22.3 PERFORATION OF ESOPHAGUS: Primary | ICD-10-CM

## 2023-02-13 ENCOUNTER — HOSPITAL ENCOUNTER (OUTPATIENT)
Dept: GENERAL RADIOLOGY | Facility: HOSPITAL | Age: 78
Discharge: HOME | End: 2023-02-13
Admitting: THORACIC SURGERY (CARDIOTHORACIC VASCULAR SURGERY)
Payer: MEDICARE

## 2023-02-13 DIAGNOSIS — K22.3 PERFORATION OF ESOPHAGUS: ICD-10-CM

## 2023-02-13 PROCEDURE — 74240 X-RAY XM UPR GI TRC 1CNTRST: CPT

## 2023-02-13 PROCEDURE — 74240 X-RAY XM UPR GI TRC 1CNTRST: CPT | Performed by: RADIOLOGY

## 2023-02-23 DIAGNOSIS — I10 ESSENTIAL HYPERTENSION: ICD-10-CM

## 2023-02-24 RX ORDER — METOPROLOL SUCCINATE 50 MG/1
TABLET, EXTENDED RELEASE ORAL
Qty: 30 TABLET | Refills: 2 | Status: SHIPPED | OUTPATIENT
Start: 2023-02-24

## 2023-04-10 ENCOUNTER — OFFICE VISIT (OUTPATIENT)
Dept: GASTROENTEROLOGY | Facility: CLINIC | Age: 78
End: 2023-04-10
Payer: MEDICARE

## 2023-04-10 VITALS — HEIGHT: 72 IN | WEIGHT: 163 LBS | BODY MASS INDEX: 22.08 KG/M2

## 2023-04-10 DIAGNOSIS — K86.89 PANCREATIC INSUFFICIENCY: Primary | ICD-10-CM

## 2023-04-10 PROCEDURE — 1159F MED LIST DOCD IN RCRD: CPT | Performed by: PHYSICIAN ASSISTANT

## 2023-04-10 PROCEDURE — 99213 OFFICE O/P EST LOW 20 MIN: CPT | Performed by: PHYSICIAN ASSISTANT

## 2023-04-10 PROCEDURE — 1160F RVW MEDS BY RX/DR IN RCRD: CPT | Performed by: PHYSICIAN ASSISTANT

## 2023-04-10 NOTE — PROGRESS NOTES
DATE:  2023    DIAGNOSIS: Pancreatic insufficiency    CHIEF COMPLAINT:  Chief Complaint   Patient presents with   • Abdominal Pain     History of Present Illness:  The patient resents for follow-up of pancreatic insufficiency after Whipple procedure for suspect pancreatic cancer which pathology ultimately revealed as chronic pancreatitis.  Interestingly, the patient does not recall ever having pancreatitis.  He is on Creon.  He takes 1 pill with snacks and 2 pills with meals.  He generally will have only have a snack or two b/w meals.  He feels well.  He does have a bowel movement daily to every other day.  His stool is a [place] score 4.  He states he has pretty good evacuation of his bowel but will occasionally have a bowel movement 30 minutes after the initial bowel movement in the morning.  On occasion the patient has noted that he will have episodes of gassiness and belching.  This occurs at least once a week.  He has put on some weight.  Currently, his weight is stable.  Overall, he is pleased with the effects of the Creon.    Interval History:  Patient states he has been doing very well since he was seen in clinic last.  He continues to take Creon; 2 pills with large meals and 1 pill with snacks.  He overall feels very well and has only occasional abdominal pain.  He states his bowels are moving properly and he feels like he evacuates his colon with each bowel movement.  Stools range from top 4-5 on the [place] stool scale.  He is status post Whipple procedure that was performed due to possible pancreatic cancer that ultimately was deemed chronic pancreatitis.  He states that the Creon is not covered on his insurance and he will need patient assistance recompleted as his has .    The following portions of the patient's history were reviewed and updated as appropriate: allergies, current medications, past family history, past medical history, past social history, past surgical history and problem  list.  REVIEW OF SYSTEMS:   Review of Systems   Constitutional: Negative for chills, fatigue and fever.   HENT: Negative for trouble swallowing.    Eyes: Negative.    Respiratory: Negative for cough, choking, chest tightness and shortness of breath.    Cardiovascular: Negative for chest pain.   Gastrointestinal: Positive for abdominal pain. Negative for abdominal distention, anal bleeding, blood in stool, constipation, diarrhea, nausea, rectal pain and vomiting.   Endocrine: Negative.    Genitourinary: Negative for difficulty urinating and dysuria.   Musculoskeletal: Negative for back pain and neck pain.   Skin: Negative.    Allergic/Immunologic: Negative for environmental allergies and food allergies.   Neurological: Negative for dizziness, light-headedness and headaches.   Hematological: Bruises/bleeds easily.   Psychiatric/Behavioral: Negative.        PAST MEDICAL HISTORY:  Past Medical History:   Diagnosis Date   • Acute UTI (urinary tract infection) 02/20/2021   • Arthritis    • Borderline diabetes    • Coronary artery disease    • Diabetes mellitus    • Elevated prostate specific antigen (PSA) 08/17/2020   • Esophageal rupture    • GERD (gastroesophageal reflux disease)    • Hypertension    • Left elbow pain    • Pancreatitis        PAST SURGICAL HISTORY:  Past Surgical History:   Procedure Laterality Date   • COLONOSCOPY     • ENDOSCOPY N/A 02/09/2021    Procedure: ESOPHAGOGASTRODUODENOSCOPY WITH BIOPSY dilitation;  Surgeon: Bela Mcdaniel MD;  Location: [facility];  Service: Gastroenterology;  Laterality: N/A;   • ERCP N/A 02/21/2021    Procedure: ENDOSCOPIC RETROGRADE CHOLANGIOPANCREATOGRAPHY WITH STENT PLACEMENT;  Surgeon: Matthew Moreira MD;  Location: [facility];  Service: Gastroenterology;  Laterality: N/A;  with sphincterotomy, and brushing of common bile duct, and placement of [id number] wall stent     • PACEMAKER IMPLANTATION     • THORACOTOMY     • WHIPPLE PROCEDURE          SOCIAL HISTORY:  Social History     Socioeconomic History   • Marital status:    Tobacco Use   • Smoking status: Never   • Smokeless tobacco: Never   Vaping Use   • Vaping Use: Never used   Substance and Sexual Activity   • Alcohol use: No   • Drug use: No   • Sexual activity: Defer       FAMILY HISTORY:  Family History   Problem Relation Age of Onset   • Diabetes Mother    • Diabetes Sister    • Heart failure Brother    • Heart disease Brother    • Diabetes Brother        MEDICATIONS:      Current Outpatient Medications:   •  doxycycline (VIBRAMYCIN) 100 MG capsule, Take 1 capsule by mouth 2 (Two) Times a Day., Disp: , Rfl:   •  fludrocortisone 0.1 MG tablet, Take 1 tablet by mouth Daily., Disp: 30 tablet, Rfl: 1  •  insulin aspart (NovoLOG FlexPen) 100 UNIT/ML solution pen-injector sc pen, Inject 0-14 Units under the skin into the appropriate area as directed 3 (Three) Times a Day Before Meals. Blood glucose 150-199 mg/dL - 3 units, Blood glucose 200-249 mg/dL - 5 units, Blood glucose 250-299 mg/dL - 8 units, Blood glucose 300-349 mg/dL - 10 units, Blood glucose 350-400 mg/dL - 12 units, Blood glucose greater than 400 mg/dL - 14 units and call provider, Disp: 15 mL, Rfl: 12  •  Insulin Glargine, 1 Unit Dial, (TOUJEO) 300 UNIT/ML solution pen-injector injection, Inject 20 Units under the skin into the appropriate area as directed Daily., Disp: , Rfl:   •  metoprolol succinate XL (TOPROL-XL) 50 MG 24 hr tablet, TAKE ONE TABLET BY MOUTH DAILY, Disp: 30 tablet, Rfl: 2  •  Pancrelipase, Lip-Prot-Amyl, (CREON) [id number] units capsule delayed-release particles capsule, Take 1 capsule by mouth 3 (Three) Times a Day With Meals. Take 2 capsules with large meals and 1 capsule with snacks, Disp: 240 capsule, Rfl: 11    ALLERGIES:    Allergies   Allergen Reactions   • Other Anaphylaxis and Swelling     Patient had a reaction to the COVID vaccine. Pt states his tongue and lips started swelling       VIST  "VITALS/PHYSICAL EXAM:  Ht 182.9 cm (72\")   Wt 73.9 kg (163 lb)   BMI 22.11 kg/m²   Physical Exam  Constitutional:       General: He is not in acute distress.     Appearance: Normal appearance. He is well-developed.   HENT:      Head: Normocephalic and atraumatic.   Eyes:      Pupils: Pupils are equal, round, and reactive to light.   Cardiovascular:      Rate and Rhythm: Normal rate and regular rhythm.      Heart sounds: Normal heart sounds.   Pulmonary:      Effort: Pulmonary effort is normal. No respiratory distress.      Breath sounds: Normal breath sounds. No wheezing, rhonchi or rales.   Abdominal:      General: Abdomen is flat. Bowel sounds are normal. There is no distension.      Palpations: Abdomen is soft. There is no mass.      Tenderness: There is no abdominal tenderness. There is no guarding or rebound.      Hernia: No hernia is present.   Musculoskeletal:         General: No swelling. Normal range of motion.      Cervical back: Normal range of motion and neck supple.      Right lower leg: No edema.      Left lower leg: No edema.   Skin:     General: Skin is warm and dry.   Neurological:      Mental Status: He is alert and oriented to person, place, and time.   Psychiatric:         Attention and Perception: Attention normal.         Mood and Affect: Mood normal.         Speech: Speech normal.         Behavior: Behavior normal. Behavior is cooperative.         Thought Content: Thought content normal.           PATHOLOGY:  NONE      ENDOSCOPY:  NONE      IMAGING:  CT Chest Without Contrast Diagnostic    Result Date: 12/22/2022  1.  Development of left upper lobe airspace disease which may represent atelectasis combined with pneumonia. 2.  Slight increase in small left pleural effusion. 3.  Stable fibrosis and consolidation of the left lower lobe. 4.  Focal skin thickening with subcutaneous edema in the left midaxillary line in the left lower chest wall soft tissues. This has significantly improved from " the previous exam with no definite evidence of residual abscess. Ultrasound may be helpful to better characterize given limitations of CT resolution. 5.  Other incidental and nonacute findings as detailed above which appear stable from previous.  This report was finalized on 12/22/2022 12:15 PM by Dr. Apollo Fabian MD.      FL Upper GI Single Contrast With KUB    Result Date: 2/13/2023  1.  Small hiatal hernia. 2.  Gastroesophageal reflux noted. 3.  Distal esophageal tertiary contractions noted.  This report was finalized on 2/13/2023 8:58 AM by Dr. Jarek Flores MD.      XR Chest PA & Lateral    Result Date: 12/20/2022  Minimal consolidation in the left lung, likely chronic  This report was finalized on 12/20/2022 6:23 AM by Dr. Antoine Guevara MD.            RECENT LABS:  Lab Results   Component Value Date    WBC 6.89 12/08/2022    HGB 10.9 (L) 12/08/2022    HCT 35.9 (L) 12/08/2022    MCV 82.7 12/08/2022    RDW 14.9 12/08/2022     12/08/2022    NEUTRORELPCT 61.9 12/08/2022    LYMPHORELPCT 26.7 12/08/2022    MONORELPCT 9.0 12/08/2022    EOSRELPCT 1.7 12/08/2022    BASORELPCT 0.4 12/08/2022    NEUTROABS 4.26 12/08/2022    LYMPHSABS 1.84 12/08/2022       Lab Results   Component Value Date     12/08/2022    K 4.8 12/08/2022    CO2 29.4 (H) 12/08/2022    CL 98 12/08/2022    BUN 19 12/08/2022    CREATININE 1.08 12/08/2022    EGFRIFNONA >60 03/11/2022    EGFRIFAFRI >60 03/11/2022    GLUCOSE 162 (H) 12/08/2022    CALCIUM 9.1 12/08/2022    ALKPHOS 96 12/08/2022    AST 17 12/08/2022    ALT 15 12/08/2022    BILITOT 0.2 12/08/2022    ALBUMIN 3.45 (L) 12/08/2022    PROTEINTOT 8.4 12/08/2022    MG 2.2 03/11/2022    PHOS 3.0 02/20/2021             ASSESSMENT & PLAN:  Patient will continue Creon 36,000 units - 114,000 unit capsules due to pancreatic insufficiency. We will work on getting patient assistance as this is not covered on his insurance.    Diagnosis Plan   1. Pancreatic insufficiency  Pancrelipase,  Lip-Prot-Amyl, (CREON) [id number] units capsule delayed-release particles capsule          Return in about 3 months (around 7/10/2023).        Electronically Signed by: Olegario Watson PA-C , April 12, 2023 16:16 EDT       CC:   Misti Nguyễn MD

## 2023-05-01 ENCOUNTER — TELEPHONE (OUTPATIENT)
Dept: CARDIOLOGY | Facility: CLINIC | Age: 78
End: 2023-05-01
Payer: MEDICARE

## 2023-05-01 DIAGNOSIS — I95.1 ORTHOSTATIC HYPOTENSION: ICD-10-CM

## 2023-05-01 RX ORDER — FLUDROCORTISONE ACETATE 0.1 MG/1
0.1 TABLET ORAL DAILY
Qty: 30 TABLET | Refills: 3 | Status: SHIPPED | OUTPATIENT
Start: 2023-05-01

## 2023-05-17 ENCOUNTER — INFUSION (OUTPATIENT)
Dept: ONCOLOGY | Facility: HOSPITAL | Age: 78
End: 2023-05-17
Payer: MEDICARE

## 2023-05-17 VITALS
SYSTOLIC BLOOD PRESSURE: 120 MMHG | RESPIRATION RATE: 18 BRPM | HEART RATE: 57 BPM | DIASTOLIC BLOOD PRESSURE: 63 MMHG | TEMPERATURE: 97.5 F | OXYGEN SATURATION: 96 %

## 2023-05-17 DIAGNOSIS — S10.92XA BLISTER OF FACE, NECK, AND SCALP EXCEPT EYE, INFECTED, INITIAL ENCOUNTER: Primary | ICD-10-CM

## 2023-05-17 DIAGNOSIS — L98.8 VASCULAR DISORDER OF SKIN: ICD-10-CM

## 2023-05-17 DIAGNOSIS — R79.82 ELEVATED C-REACTIVE PROTEIN (CRP): ICD-10-CM

## 2023-05-17 DIAGNOSIS — L08.9 BLISTER OF FACE, NECK, AND SCALP EXCEPT EYE, INFECTED, INITIAL ENCOUNTER: Primary | ICD-10-CM

## 2023-05-17 DIAGNOSIS — S00.02XA BLISTER OF FACE, NECK, AND SCALP EXCEPT EYE, INFECTED, INITIAL ENCOUNTER: Primary | ICD-10-CM

## 2023-05-17 DIAGNOSIS — S00.82XA BLISTER OF FACE, NECK, AND SCALP EXCEPT EYE, INFECTED, INITIAL ENCOUNTER: Primary | ICD-10-CM

## 2023-05-17 DIAGNOSIS — L08.9 INFECTION OF SKIN AND SUBCUTANEOUS TISSUE: ICD-10-CM

## 2023-05-17 LAB
ANION GAP SERPL CALCULATED.3IONS-SCNC: 10 MMOL/L (ref 5–15)
BASOPHILS # BLD AUTO: 0.06 10*3/MM3 (ref 0–0.2)
BASOPHILS NFR BLD AUTO: 0.8 % (ref 0–1.5)
BUN SERPL-MCNC: 18 MG/DL (ref 8–23)
BUN/CREAT SERPL: 14.8 (ref 7–25)
CALCIUM SPEC-SCNC: 8.8 MG/DL (ref 8.6–10.5)
CHLORIDE SERPL-SCNC: 101 MMOL/L (ref 98–107)
CO2 SERPL-SCNC: 28 MMOL/L (ref 22–29)
CREAT SERPL-MCNC: 1.22 MG/DL (ref 0.76–1.27)
CRP SERPL-MCNC: 7.66 MG/DL (ref 0–0.5)
DEPRECATED RDW RBC AUTO: 41.7 FL (ref 37–54)
EGFRCR SERPLBLD CKD-EPI 2021: 61.1 ML/MIN/1.73
EOSINOPHIL # BLD AUTO: 0.12 10*3/MM3 (ref 0–0.4)
EOSINOPHIL NFR BLD AUTO: 1.6 % (ref 0.3–6.2)
ERYTHROCYTE [DISTWIDTH] IN BLOOD BY AUTOMATED COUNT: 13.1 % (ref 12.3–15.4)
ERYTHROCYTE [SEDIMENTATION RATE] IN BLOOD: 88 MM/HR (ref 0–20)
GLUCOSE SERPL-MCNC: 134 MG/DL (ref 65–99)
HCT VFR BLD AUTO: 35.7 % (ref 37.5–51)
HGB BLD-MCNC: 11.1 G/DL (ref 13–17.7)
IMM GRANULOCYTES # BLD AUTO: 0.02 10*3/MM3 (ref 0–0.05)
IMM GRANULOCYTES NFR BLD AUTO: 0.3 % (ref 0–0.5)
LYMPHOCYTES # BLD AUTO: 1.85 10*3/MM3 (ref 0.7–3.1)
LYMPHOCYTES NFR BLD AUTO: 24 % (ref 19.6–45.3)
MCH RBC QN AUTO: 27.2 PG (ref 26.6–33)
MCHC RBC AUTO-ENTMCNC: 31.1 G/DL (ref 31.5–35.7)
MCV RBC AUTO: 87.5 FL (ref 79–97)
MONOCYTES # BLD AUTO: 0.69 10*3/MM3 (ref 0.1–0.9)
MONOCYTES NFR BLD AUTO: 8.9 % (ref 5–12)
NEUTROPHILS NFR BLD AUTO: 4.97 10*3/MM3 (ref 1.7–7)
NEUTROPHILS NFR BLD AUTO: 64.4 % (ref 42.7–76)
NRBC BLD AUTO-RTO: 0 /100 WBC (ref 0–0.2)
PLATELET # BLD AUTO: 380 10*3/MM3 (ref 140–450)
PMV BLD AUTO: 8.7 FL (ref 6–12)
POTASSIUM SERPL-SCNC: 4.4 MMOL/L (ref 3.5–5.2)
RBC # BLD AUTO: 4.08 10*6/MM3 (ref 4.14–5.8)
SODIUM SERPL-SCNC: 139 MMOL/L (ref 136–145)
WBC NRBC COR # BLD: 7.71 10*3/MM3 (ref 3.4–10.8)

## 2023-05-17 PROCEDURE — 85652 RBC SED RATE AUTOMATED: CPT

## 2023-05-17 PROCEDURE — 25010000002 CEFTRIAXONE: Performed by: INTERNAL MEDICINE

## 2023-05-17 PROCEDURE — 80048 BASIC METABOLIC PNL TOTAL CA: CPT

## 2023-05-17 PROCEDURE — 86140 C-REACTIVE PROTEIN: CPT

## 2023-05-17 PROCEDURE — 96367 TX/PROPH/DG ADDL SEQ IV INF: CPT

## 2023-05-17 PROCEDURE — 25010000002 DAPTOMYCIN PER 1 MG: Performed by: INTERNAL MEDICINE

## 2023-05-17 PROCEDURE — 96365 THER/PROPH/DIAG IV INF INIT: CPT

## 2023-05-17 PROCEDURE — 85025 COMPLETE CBC W/AUTO DIFF WBC: CPT

## 2023-05-17 RX ADMIN — DAPTOMYCIN 300 MG: 500 INJECTION, POWDER, LYOPHILIZED, FOR SOLUTION INTRAVENOUS at 15:53

## 2023-05-17 RX ADMIN — Medication 1 G: at 15:14

## 2023-05-18 ENCOUNTER — INFUSION (OUTPATIENT)
Dept: ONCOLOGY | Facility: HOSPITAL | Age: 78
End: 2023-05-18
Payer: MEDICARE

## 2023-05-18 VITALS
HEART RATE: 59 BPM | RESPIRATION RATE: 18 BRPM | DIASTOLIC BLOOD PRESSURE: 57 MMHG | TEMPERATURE: 97.1 F | SYSTOLIC BLOOD PRESSURE: 112 MMHG | OXYGEN SATURATION: 97 %

## 2023-05-18 DIAGNOSIS — L08.9 INFECTION OF SKIN AND SUBCUTANEOUS TISSUE: Primary | ICD-10-CM

## 2023-05-18 PROCEDURE — 96368 THER/DIAG CONCURRENT INF: CPT

## 2023-05-18 PROCEDURE — 96365 THER/PROPH/DIAG IV INF INIT: CPT

## 2023-05-18 PROCEDURE — 25010000002 DAPTOMYCIN PER 1 MG: Performed by: INTERNAL MEDICINE

## 2023-05-18 PROCEDURE — 25010000002 CEFTRIAXONE: Performed by: INTERNAL MEDICINE

## 2023-05-18 RX ADMIN — DAPTOMYCIN 300 MG: 500 INJECTION, POWDER, LYOPHILIZED, FOR SOLUTION INTRAVENOUS at 14:57

## 2023-05-18 RX ADMIN — Medication 1 G: at 15:00

## 2023-05-19 ENCOUNTER — TRANSCRIBE ORDERS (OUTPATIENT)
Dept: ADMINISTRATIVE | Facility: HOSPITAL | Age: 78
End: 2023-05-19
Payer: MEDICARE

## 2023-05-19 ENCOUNTER — INFUSION (OUTPATIENT)
Dept: ONCOLOGY | Facility: HOSPITAL | Age: 78
End: 2023-05-19
Payer: MEDICARE

## 2023-05-19 VITALS
TEMPERATURE: 99.5 F | SYSTOLIC BLOOD PRESSURE: 123 MMHG | DIASTOLIC BLOOD PRESSURE: 55 MMHG | OXYGEN SATURATION: 97 % | RESPIRATION RATE: 18 BRPM | HEART RATE: 54 BPM

## 2023-05-19 DIAGNOSIS — L98.8 VASCULAR DISORDER OF SKIN: ICD-10-CM

## 2023-05-19 DIAGNOSIS — L08.9 BLISTER OF FACE, NECK, AND SCALP EXCEPT EYE, INFECTED, INITIAL ENCOUNTER: Primary | ICD-10-CM

## 2023-05-19 DIAGNOSIS — L08.9 INFECTION OF SKIN AND SUBCUTANEOUS TISSUE: ICD-10-CM

## 2023-05-19 DIAGNOSIS — S00.82XA BLISTER OF FACE, NECK, AND SCALP EXCEPT EYE, INFECTED, INITIAL ENCOUNTER: Primary | ICD-10-CM

## 2023-05-19 DIAGNOSIS — S00.02XA BLISTER OF FACE, NECK, AND SCALP EXCEPT EYE, INFECTED, INITIAL ENCOUNTER: Primary | ICD-10-CM

## 2023-05-19 DIAGNOSIS — Z87.09 PERSONAL HISTORY OF UNSPECIFIED DISEASE OF RESPIRATORY SYSTEM: Primary | ICD-10-CM

## 2023-05-19 DIAGNOSIS — R79.82 ELEVATED C-REACTIVE PROTEIN (CRP): ICD-10-CM

## 2023-05-19 DIAGNOSIS — S10.92XA BLISTER OF FACE, NECK, AND SCALP EXCEPT EYE, INFECTED, INITIAL ENCOUNTER: Primary | ICD-10-CM

## 2023-05-19 LAB
ANION GAP SERPL CALCULATED.3IONS-SCNC: 11.3 MMOL/L (ref 5–15)
BASOPHILS # BLD AUTO: 0.05 10*3/MM3 (ref 0–0.2)
BASOPHILS NFR BLD AUTO: 0.5 % (ref 0–1.5)
BUN SERPL-MCNC: 14 MG/DL (ref 8–23)
BUN/CREAT SERPL: 11.9 (ref 7–25)
CALCIUM SPEC-SCNC: 8.7 MG/DL (ref 8.6–10.5)
CHLORIDE SERPL-SCNC: 99 MMOL/L (ref 98–107)
CO2 SERPL-SCNC: 25.7 MMOL/L (ref 22–29)
CREAT SERPL-MCNC: 1.18 MG/DL (ref 0.76–1.27)
CRP SERPL-MCNC: 10.3 MG/DL (ref 0–0.5)
DEPRECATED RDW RBC AUTO: 40.6 FL (ref 37–54)
EGFRCR SERPLBLD CKD-EPI 2021: 63.6 ML/MIN/1.73
EOSINOPHIL # BLD AUTO: 0.12 10*3/MM3 (ref 0–0.4)
EOSINOPHIL NFR BLD AUTO: 1.3 % (ref 0.3–6.2)
ERYTHROCYTE [DISTWIDTH] IN BLOOD BY AUTOMATED COUNT: 13 % (ref 12.3–15.4)
ERYTHROCYTE [SEDIMENTATION RATE] IN BLOOD: 102 MM/HR (ref 0–20)
GLUCOSE SERPL-MCNC: 232 MG/DL (ref 65–99)
HCT VFR BLD AUTO: 34.7 % (ref 37.5–51)
HGB BLD-MCNC: 10.6 G/DL (ref 13–17.7)
IMM GRANULOCYTES # BLD AUTO: 0.02 10*3/MM3 (ref 0–0.05)
IMM GRANULOCYTES NFR BLD AUTO: 0.2 % (ref 0–0.5)
LYMPHOCYTES # BLD AUTO: 2.03 10*3/MM3 (ref 0.7–3.1)
LYMPHOCYTES NFR BLD AUTO: 21.9 % (ref 19.6–45.3)
MCH RBC QN AUTO: 26.2 PG (ref 26.6–33)
MCHC RBC AUTO-ENTMCNC: 30.5 G/DL (ref 31.5–35.7)
MCV RBC AUTO: 85.9 FL (ref 79–97)
MONOCYTES # BLD AUTO: 0.81 10*3/MM3 (ref 0.1–0.9)
MONOCYTES NFR BLD AUTO: 8.8 % (ref 5–12)
NEUTROPHILS NFR BLD AUTO: 6.22 10*3/MM3 (ref 1.7–7)
NEUTROPHILS NFR BLD AUTO: 67.3 % (ref 42.7–76)
NRBC BLD AUTO-RTO: 0 /100 WBC (ref 0–0.2)
PLATELET # BLD AUTO: 370 10*3/MM3 (ref 140–450)
PMV BLD AUTO: 9.1 FL (ref 6–12)
POTASSIUM SERPL-SCNC: 4.8 MMOL/L (ref 3.5–5.2)
RBC # BLD AUTO: 4.04 10*6/MM3 (ref 4.14–5.8)
SODIUM SERPL-SCNC: 136 MMOL/L (ref 136–145)
WBC NRBC COR # BLD: 9.25 10*3/MM3 (ref 3.4–10.8)

## 2023-05-19 PROCEDURE — 80048 BASIC METABOLIC PNL TOTAL CA: CPT

## 2023-05-19 PROCEDURE — 96368 THER/DIAG CONCURRENT INF: CPT

## 2023-05-19 PROCEDURE — 85025 COMPLETE CBC W/AUTO DIFF WBC: CPT

## 2023-05-19 PROCEDURE — 25010000002 CEFTRIAXONE: Performed by: INTERNAL MEDICINE

## 2023-05-19 PROCEDURE — 96365 THER/PROPH/DIAG IV INF INIT: CPT

## 2023-05-19 PROCEDURE — 86140 C-REACTIVE PROTEIN: CPT

## 2023-05-19 PROCEDURE — 25010000002 DAPTOMYCIN PER 1 MG: Performed by: INTERNAL MEDICINE

## 2023-05-19 PROCEDURE — 96367 TX/PROPH/DG ADDL SEQ IV INF: CPT

## 2023-05-19 PROCEDURE — 85652 RBC SED RATE AUTOMATED: CPT

## 2023-05-19 RX ADMIN — Medication 1 G: at 15:13

## 2023-05-19 RX ADMIN — DAPTOMYCIN 300 MG: 500 INJECTION, POWDER, LYOPHILIZED, FOR SOLUTION INTRAVENOUS at 15:13

## 2023-05-20 ENCOUNTER — INFUSION (OUTPATIENT)
Dept: ONCOLOGY | Facility: HOSPITAL | Age: 78
End: 2023-05-20
Payer: MEDICARE

## 2023-05-20 DIAGNOSIS — L08.9 INFECTION OF SKIN AND SUBCUTANEOUS TISSUE: Primary | ICD-10-CM

## 2023-05-20 PROCEDURE — 96365 THER/PROPH/DIAG IV INF INIT: CPT

## 2023-05-20 PROCEDURE — 96368 THER/DIAG CONCURRENT INF: CPT

## 2023-05-20 PROCEDURE — 25010000002 CEFTRIAXONE PER 250 MG: Performed by: INTERNAL MEDICINE

## 2023-05-20 PROCEDURE — 25010000002 DAPTOMYCIN PER 1 MG: Performed by: INTERNAL MEDICINE

## 2023-05-20 PROCEDURE — 96367 TX/PROPH/DG ADDL SEQ IV INF: CPT

## 2023-05-20 RX ADMIN — CEFTRIAXONE 1 G: 1 INJECTION, POWDER, FOR SOLUTION INTRAMUSCULAR; INTRAVENOUS at 09:26

## 2023-05-21 ENCOUNTER — INFUSION (OUTPATIENT)
Dept: ONCOLOGY | Facility: HOSPITAL | Age: 78
End: 2023-05-21
Payer: MEDICARE

## 2023-05-21 DIAGNOSIS — R79.82 ELEVATED C-REACTIVE PROTEIN (CRP): ICD-10-CM

## 2023-05-21 DIAGNOSIS — S00.82XA BLISTER OF FACE, NECK, AND SCALP EXCEPT EYE, INFECTED, INITIAL ENCOUNTER: ICD-10-CM

## 2023-05-21 DIAGNOSIS — L08.9 INFECTION OF SKIN AND SUBCUTANEOUS TISSUE: Primary | ICD-10-CM

## 2023-05-21 DIAGNOSIS — L08.9 BLISTER OF FACE, NECK, AND SCALP EXCEPT EYE, INFECTED, INITIAL ENCOUNTER: ICD-10-CM

## 2023-05-21 DIAGNOSIS — L98.8 VASCULAR DISORDER OF SKIN: ICD-10-CM

## 2023-05-21 DIAGNOSIS — S00.02XA BLISTER OF FACE, NECK, AND SCALP EXCEPT EYE, INFECTED, INITIAL ENCOUNTER: ICD-10-CM

## 2023-05-21 DIAGNOSIS — S10.92XA BLISTER OF FACE, NECK, AND SCALP EXCEPT EYE, INFECTED, INITIAL ENCOUNTER: ICD-10-CM

## 2023-05-21 LAB
ANION GAP SERPL CALCULATED.3IONS-SCNC: 9.7 MMOL/L (ref 5–15)
BASOPHILS # BLD AUTO: 0.06 10*3/MM3 (ref 0–0.2)
BASOPHILS NFR BLD AUTO: 0.9 % (ref 0–1.5)
BUN SERPL-MCNC: 13 MG/DL (ref 8–23)
BUN/CREAT SERPL: 13.4 (ref 7–25)
CALCIUM SPEC-SCNC: 8.8 MG/DL (ref 8.6–10.5)
CHLORIDE SERPL-SCNC: 100 MMOL/L (ref 98–107)
CO2 SERPL-SCNC: 26.3 MMOL/L (ref 22–29)
CREAT SERPL-MCNC: 0.97 MG/DL (ref 0.76–1.27)
CRP SERPL-MCNC: 6.07 MG/DL (ref 0–0.5)
DEPRECATED RDW RBC AUTO: 41.5 FL (ref 37–54)
EGFRCR SERPLBLD CKD-EPI 2021: 80.4 ML/MIN/1.73
EOSINOPHIL # BLD AUTO: 0.15 10*3/MM3 (ref 0–0.4)
EOSINOPHIL NFR BLD AUTO: 2.3 % (ref 0.3–6.2)
ERYTHROCYTE [DISTWIDTH] IN BLOOD BY AUTOMATED COUNT: 12.9 % (ref 12.3–15.4)
ERYTHROCYTE [SEDIMENTATION RATE] IN BLOOD: 89 MM/HR (ref 0–20)
GLUCOSE SERPL-MCNC: 157 MG/DL (ref 65–99)
HCT VFR BLD AUTO: 36.5 % (ref 37.5–51)
HGB BLD-MCNC: 11 G/DL (ref 13–17.7)
IMM GRANULOCYTES # BLD AUTO: 0.02 10*3/MM3 (ref 0–0.05)
IMM GRANULOCYTES NFR BLD AUTO: 0.3 % (ref 0–0.5)
LYMPHOCYTES # BLD AUTO: 1.65 10*3/MM3 (ref 0.7–3.1)
LYMPHOCYTES NFR BLD AUTO: 25.5 % (ref 19.6–45.3)
MCH RBC QN AUTO: 26.3 PG (ref 26.6–33)
MCHC RBC AUTO-ENTMCNC: 30.1 G/DL (ref 31.5–35.7)
MCV RBC AUTO: 87.3 FL (ref 79–97)
MONOCYTES # BLD AUTO: 0.53 10*3/MM3 (ref 0.1–0.9)
MONOCYTES NFR BLD AUTO: 8.2 % (ref 5–12)
NEUTROPHILS NFR BLD AUTO: 4.07 10*3/MM3 (ref 1.7–7)
NEUTROPHILS NFR BLD AUTO: 62.8 % (ref 42.7–76)
NRBC BLD AUTO-RTO: 0 /100 WBC (ref 0–0.2)
PLATELET # BLD AUTO: 366 10*3/MM3 (ref 140–450)
PMV BLD AUTO: 9 FL (ref 6–12)
POTASSIUM SERPL-SCNC: 4.4 MMOL/L (ref 3.5–5.2)
RBC # BLD AUTO: 4.18 10*6/MM3 (ref 4.14–5.8)
SODIUM SERPL-SCNC: 136 MMOL/L (ref 136–145)
WBC NRBC COR # BLD: 6.48 10*3/MM3 (ref 3.4–10.8)

## 2023-05-21 PROCEDURE — 96368 THER/DIAG CONCURRENT INF: CPT

## 2023-05-21 PROCEDURE — 85652 RBC SED RATE AUTOMATED: CPT

## 2023-05-21 PROCEDURE — 85025 COMPLETE CBC W/AUTO DIFF WBC: CPT

## 2023-05-21 PROCEDURE — 80048 BASIC METABOLIC PNL TOTAL CA: CPT

## 2023-05-21 PROCEDURE — 86140 C-REACTIVE PROTEIN: CPT

## 2023-05-21 PROCEDURE — 96365 THER/PROPH/DIAG IV INF INIT: CPT

## 2023-05-21 PROCEDURE — 96367 TX/PROPH/DG ADDL SEQ IV INF: CPT

## 2023-05-21 PROCEDURE — 25010000002 CEFTRIAXONE PER 250 MG: Performed by: INTERNAL MEDICINE

## 2023-05-21 PROCEDURE — 25010000002 DAPTOMYCIN PER 1 MG: Performed by: INTERNAL MEDICINE

## 2023-05-21 RX ADMIN — DAPTOMYCIN 300 MG: 500 INJECTION, POWDER, LYOPHILIZED, FOR SOLUTION INTRAVENOUS at 09:30

## 2023-05-21 RX ADMIN — CEFTRIAXONE 1 G: 1 INJECTION, POWDER, FOR SOLUTION INTRAMUSCULAR; INTRAVENOUS at 09:29

## 2023-05-22 ENCOUNTER — INFUSION (OUTPATIENT)
Dept: ONCOLOGY | Facility: HOSPITAL | Age: 78
End: 2023-05-22
Payer: MEDICARE

## 2023-05-22 VITALS
DIASTOLIC BLOOD PRESSURE: 67 MMHG | RESPIRATION RATE: 18 BRPM | SYSTOLIC BLOOD PRESSURE: 123 MMHG | HEART RATE: 74 BPM | TEMPERATURE: 97.3 F | OXYGEN SATURATION: 98 %

## 2023-05-22 DIAGNOSIS — L08.9 INFECTION OF SKIN AND SUBCUTANEOUS TISSUE: Primary | ICD-10-CM

## 2023-05-22 PROCEDURE — 96365 THER/PROPH/DIAG IV INF INIT: CPT

## 2023-05-22 PROCEDURE — 25010000002 DAPTOMYCIN PER 1 MG: Performed by: INTERNAL MEDICINE

## 2023-05-22 PROCEDURE — 96368 THER/DIAG CONCURRENT INF: CPT

## 2023-05-22 PROCEDURE — 25010000002 CEFTRIAXONE PER 250 MG: Performed by: INTERNAL MEDICINE

## 2023-05-22 RX ADMIN — DAPTOMYCIN 300 MG: 500 INJECTION, POWDER, LYOPHILIZED, FOR SOLUTION INTRAVENOUS at 15:05

## 2023-05-22 RX ADMIN — CEFTRIAXONE 1 G: 1 INJECTION, POWDER, FOR SOLUTION INTRAMUSCULAR; INTRAVENOUS at 15:05

## 2023-05-23 ENCOUNTER — INFUSION (OUTPATIENT)
Dept: ONCOLOGY | Facility: HOSPITAL | Age: 78
End: 2023-05-23
Payer: MEDICARE

## 2023-05-23 VITALS
DIASTOLIC BLOOD PRESSURE: 66 MMHG | OXYGEN SATURATION: 98 % | SYSTOLIC BLOOD PRESSURE: 138 MMHG | HEART RATE: 61 BPM | TEMPERATURE: 99.3 F | RESPIRATION RATE: 18 BRPM

## 2023-05-23 DIAGNOSIS — S10.92XA BLISTER OF FACE, NECK, AND SCALP EXCEPT EYE, INFECTED, INITIAL ENCOUNTER: Primary | ICD-10-CM

## 2023-05-23 DIAGNOSIS — L98.8 VASCULAR DISORDER OF SKIN: ICD-10-CM

## 2023-05-23 DIAGNOSIS — R79.82 ELEVATED C-REACTIVE PROTEIN (CRP): ICD-10-CM

## 2023-05-23 DIAGNOSIS — S00.02XA BLISTER OF FACE, NECK, AND SCALP EXCEPT EYE, INFECTED, INITIAL ENCOUNTER: Primary | ICD-10-CM

## 2023-05-23 DIAGNOSIS — L08.9 INFECTION OF SKIN AND SUBCUTANEOUS TISSUE: ICD-10-CM

## 2023-05-23 DIAGNOSIS — L08.9 BLISTER OF FACE, NECK, AND SCALP EXCEPT EYE, INFECTED, INITIAL ENCOUNTER: Primary | ICD-10-CM

## 2023-05-23 DIAGNOSIS — S00.82XA BLISTER OF FACE, NECK, AND SCALP EXCEPT EYE, INFECTED, INITIAL ENCOUNTER: Primary | ICD-10-CM

## 2023-05-23 LAB
ANION GAP SERPL CALCULATED.3IONS-SCNC: 9.2 MMOL/L (ref 5–15)
BASOPHILS # BLD AUTO: 0.06 10*3/MM3 (ref 0–0.2)
BASOPHILS NFR BLD AUTO: 0.9 % (ref 0–1.5)
BUN SERPL-MCNC: 15 MG/DL (ref 8–23)
BUN/CREAT SERPL: 13.5 (ref 7–25)
CALCIUM SPEC-SCNC: 8.7 MG/DL (ref 8.6–10.5)
CHLORIDE SERPL-SCNC: 100 MMOL/L (ref 98–107)
CO2 SERPL-SCNC: 27.8 MMOL/L (ref 22–29)
CREAT SERPL-MCNC: 1.11 MG/DL (ref 0.76–1.27)
CRP SERPL-MCNC: 2.65 MG/DL (ref 0–0.5)
DEPRECATED RDW RBC AUTO: 40.9 FL (ref 37–54)
EGFRCR SERPLBLD CKD-EPI 2021: 68.4 ML/MIN/1.73
EOSINOPHIL # BLD AUTO: 0.13 10*3/MM3 (ref 0–0.4)
EOSINOPHIL NFR BLD AUTO: 1.9 % (ref 0.3–6.2)
ERYTHROCYTE [DISTWIDTH] IN BLOOD BY AUTOMATED COUNT: 12.9 % (ref 12.3–15.4)
ERYTHROCYTE [SEDIMENTATION RATE] IN BLOOD: 66 MM/HR (ref 0–20)
GLUCOSE SERPL-MCNC: 269 MG/DL (ref 65–99)
HCT VFR BLD AUTO: 31.4 % (ref 37.5–51)
HGB BLD-MCNC: 9.5 G/DL (ref 13–17.7)
IMM GRANULOCYTES # BLD AUTO: 0.02 10*3/MM3 (ref 0–0.05)
IMM GRANULOCYTES NFR BLD AUTO: 0.3 % (ref 0–0.5)
LYMPHOCYTES # BLD AUTO: 1.79 10*3/MM3 (ref 0.7–3.1)
LYMPHOCYTES NFR BLD AUTO: 26.1 % (ref 19.6–45.3)
MCH RBC QN AUTO: 26.2 PG (ref 26.6–33)
MCHC RBC AUTO-ENTMCNC: 30.3 G/DL (ref 31.5–35.7)
MCV RBC AUTO: 86.7 FL (ref 79–97)
MONOCYTES # BLD AUTO: 0.5 10*3/MM3 (ref 0.1–0.9)
MONOCYTES NFR BLD AUTO: 7.3 % (ref 5–12)
NEUTROPHILS NFR BLD AUTO: 4.37 10*3/MM3 (ref 1.7–7)
NEUTROPHILS NFR BLD AUTO: 63.5 % (ref 42.7–76)
NRBC BLD AUTO-RTO: 0 /100 WBC (ref 0–0.2)
PLATELET # BLD AUTO: 355 10*3/MM3 (ref 140–450)
PMV BLD AUTO: 8.6 FL (ref 6–12)
POTASSIUM SERPL-SCNC: 4.4 MMOL/L (ref 3.5–5.2)
RBC # BLD AUTO: 3.62 10*6/MM3 (ref 4.14–5.8)
SODIUM SERPL-SCNC: 137 MMOL/L (ref 136–145)
WBC NRBC COR # BLD: 6.87 10*3/MM3 (ref 3.4–10.8)

## 2023-05-23 PROCEDURE — 80048 BASIC METABOLIC PNL TOTAL CA: CPT

## 2023-05-23 PROCEDURE — 25010000002 CEFTRIAXONE PER 250 MG: Performed by: INTERNAL MEDICINE

## 2023-05-23 PROCEDURE — 85652 RBC SED RATE AUTOMATED: CPT

## 2023-05-23 PROCEDURE — 96367 TX/PROPH/DG ADDL SEQ IV INF: CPT

## 2023-05-23 PROCEDURE — 96365 THER/PROPH/DIAG IV INF INIT: CPT

## 2023-05-23 PROCEDURE — 96368 THER/DIAG CONCURRENT INF: CPT

## 2023-05-23 PROCEDURE — 85025 COMPLETE CBC W/AUTO DIFF WBC: CPT

## 2023-05-23 PROCEDURE — 25010000002 DAPTOMYCIN PER 1 MG: Performed by: INTERNAL MEDICINE

## 2023-05-23 PROCEDURE — 86140 C-REACTIVE PROTEIN: CPT

## 2023-05-23 RX ADMIN — CEFTRIAXONE 1 G: 1 INJECTION, POWDER, FOR SOLUTION INTRAMUSCULAR; INTRAVENOUS at 14:48

## 2023-05-23 RX ADMIN — DAPTOMYCIN 300 MG: 500 INJECTION, POWDER, LYOPHILIZED, FOR SOLUTION INTRAVENOUS at 14:48

## 2023-05-24 ENCOUNTER — INFUSION (OUTPATIENT)
Dept: ONCOLOGY | Facility: HOSPITAL | Age: 78
End: 2023-05-24
Payer: MEDICARE

## 2023-05-24 VITALS
RESPIRATION RATE: 18 BRPM | SYSTOLIC BLOOD PRESSURE: 135 MMHG | HEART RATE: 72 BPM | DIASTOLIC BLOOD PRESSURE: 66 MMHG | OXYGEN SATURATION: 97 % | TEMPERATURE: 97.8 F

## 2023-05-24 DIAGNOSIS — L08.9 INFECTION OF SKIN AND SUBCUTANEOUS TISSUE: Primary | ICD-10-CM

## 2023-05-24 PROCEDURE — 96368 THER/DIAG CONCURRENT INF: CPT

## 2023-05-24 PROCEDURE — 25010000002 DAPTOMYCIN PER 1 MG: Performed by: INTERNAL MEDICINE

## 2023-05-24 PROCEDURE — 96367 TX/PROPH/DG ADDL SEQ IV INF: CPT

## 2023-05-24 PROCEDURE — 25010000002 CEFTRIAXONE PER 250 MG: Performed by: INTERNAL MEDICINE

## 2023-05-24 PROCEDURE — 96365 THER/PROPH/DIAG IV INF INIT: CPT

## 2023-05-24 RX ADMIN — DAPTOMYCIN 300 MG: 500 INJECTION, POWDER, LYOPHILIZED, FOR SOLUTION INTRAVENOUS at 15:05

## 2023-05-24 RX ADMIN — CEFTRIAXONE 1 G: 1 INJECTION, POWDER, FOR SOLUTION INTRAMUSCULAR; INTRAVENOUS at 15:05

## 2023-05-25 ENCOUNTER — INFUSION (OUTPATIENT)
Dept: ONCOLOGY | Facility: HOSPITAL | Age: 78
End: 2023-05-25
Payer: MEDICARE

## 2023-05-25 VITALS
RESPIRATION RATE: 18 BRPM | TEMPERATURE: 97.3 F | HEART RATE: 65 BPM | DIASTOLIC BLOOD PRESSURE: 74 MMHG | SYSTOLIC BLOOD PRESSURE: 155 MMHG | OXYGEN SATURATION: 97 %

## 2023-05-25 DIAGNOSIS — S00.82XA BLISTER OF FACE, NECK, AND SCALP EXCEPT EYE, INFECTED, INITIAL ENCOUNTER: Primary | ICD-10-CM

## 2023-05-25 DIAGNOSIS — L98.8 VASCULAR DISORDER OF SKIN: ICD-10-CM

## 2023-05-25 DIAGNOSIS — S00.02XA BLISTER OF FACE, NECK, AND SCALP EXCEPT EYE, INFECTED, INITIAL ENCOUNTER: Primary | ICD-10-CM

## 2023-05-25 DIAGNOSIS — L08.9 INFECTION OF SKIN AND SUBCUTANEOUS TISSUE: ICD-10-CM

## 2023-05-25 DIAGNOSIS — L08.9 BLISTER OF FACE, NECK, AND SCALP EXCEPT EYE, INFECTED, INITIAL ENCOUNTER: Primary | ICD-10-CM

## 2023-05-25 DIAGNOSIS — S10.92XA BLISTER OF FACE, NECK, AND SCALP EXCEPT EYE, INFECTED, INITIAL ENCOUNTER: Primary | ICD-10-CM

## 2023-05-25 DIAGNOSIS — R79.82 ELEVATED C-REACTIVE PROTEIN (CRP): ICD-10-CM

## 2023-05-25 LAB
ANION GAP SERPL CALCULATED.3IONS-SCNC: 7 MMOL/L (ref 5–15)
BASOPHILS # BLD AUTO: 0.07 10*3/MM3 (ref 0–0.2)
BASOPHILS NFR BLD AUTO: 1.1 % (ref 0–1.5)
BUN SERPL-MCNC: 15 MG/DL (ref 8–23)
BUN/CREAT SERPL: 14.9 (ref 7–25)
CALCIUM SPEC-SCNC: 8.9 MG/DL (ref 8.6–10.5)
CHLORIDE SERPL-SCNC: 101 MMOL/L (ref 98–107)
CO2 SERPL-SCNC: 30 MMOL/L (ref 22–29)
CREAT SERPL-MCNC: 1.01 MG/DL (ref 0.76–1.27)
CRP SERPL-MCNC: 1.4 MG/DL (ref 0–0.5)
DEPRECATED RDW RBC AUTO: 40.9 FL (ref 37–54)
EGFRCR SERPLBLD CKD-EPI 2021: 76.6 ML/MIN/1.73
EOSINOPHIL # BLD AUTO: 0.24 10*3/MM3 (ref 0–0.4)
EOSINOPHIL NFR BLD AUTO: 3.9 % (ref 0.3–6.2)
ERYTHROCYTE [DISTWIDTH] IN BLOOD BY AUTOMATED COUNT: 13.1 % (ref 12.3–15.4)
ERYTHROCYTE [SEDIMENTATION RATE] IN BLOOD: 74 MM/HR (ref 0–20)
GLUCOSE SERPL-MCNC: 133 MG/DL (ref 65–99)
HCT VFR BLD AUTO: 32.4 % (ref 37.5–51)
HGB BLD-MCNC: 9.9 G/DL (ref 13–17.7)
IMM GRANULOCYTES # BLD AUTO: 0.01 10*3/MM3 (ref 0–0.05)
IMM GRANULOCYTES NFR BLD AUTO: 0.2 % (ref 0–0.5)
LYMPHOCYTES # BLD AUTO: 2.2 10*3/MM3 (ref 0.7–3.1)
LYMPHOCYTES NFR BLD AUTO: 35.4 % (ref 19.6–45.3)
MCH RBC QN AUTO: 26.3 PG (ref 26.6–33)
MCHC RBC AUTO-ENTMCNC: 30.6 G/DL (ref 31.5–35.7)
MCV RBC AUTO: 86.2 FL (ref 79–97)
MONOCYTES # BLD AUTO: 0.52 10*3/MM3 (ref 0.1–0.9)
MONOCYTES NFR BLD AUTO: 8.4 % (ref 5–12)
NEUTROPHILS NFR BLD AUTO: 3.18 10*3/MM3 (ref 1.7–7)
NEUTROPHILS NFR BLD AUTO: 51 % (ref 42.7–76)
NRBC BLD AUTO-RTO: 0 /100 WBC (ref 0–0.2)
PLATELET # BLD AUTO: 384 10*3/MM3 (ref 140–450)
PMV BLD AUTO: 8.6 FL (ref 6–12)
POTASSIUM SERPL-SCNC: 4.5 MMOL/L (ref 3.5–5.2)
RBC # BLD AUTO: 3.76 10*6/MM3 (ref 4.14–5.8)
SODIUM SERPL-SCNC: 138 MMOL/L (ref 136–145)
WBC NRBC COR # BLD: 6.22 10*3/MM3 (ref 3.4–10.8)

## 2023-05-25 PROCEDURE — 85025 COMPLETE CBC W/AUTO DIFF WBC: CPT

## 2023-05-25 PROCEDURE — 86140 C-REACTIVE PROTEIN: CPT

## 2023-05-25 PROCEDURE — 25010000002 CEFTRIAXONE PER 250 MG: Performed by: INTERNAL MEDICINE

## 2023-05-25 PROCEDURE — 96368 THER/DIAG CONCURRENT INF: CPT

## 2023-05-25 PROCEDURE — 96365 THER/PROPH/DIAG IV INF INIT: CPT

## 2023-05-25 PROCEDURE — 85652 RBC SED RATE AUTOMATED: CPT

## 2023-05-25 PROCEDURE — 80048 BASIC METABOLIC PNL TOTAL CA: CPT

## 2023-05-25 PROCEDURE — 25010000002 DAPTOMYCIN PER 1 MG: Performed by: INTERNAL MEDICINE

## 2023-05-25 RX ADMIN — DAPTOMYCIN 300 MG: 500 INJECTION, POWDER, LYOPHILIZED, FOR SOLUTION INTRAVENOUS at 15:27

## 2023-05-25 RX ADMIN — CEFTRIAXONE 1 G: 1 INJECTION, POWDER, FOR SOLUTION INTRAMUSCULAR; INTRAVENOUS at 15:28

## 2023-05-26 ENCOUNTER — INFUSION (OUTPATIENT)
Dept: ONCOLOGY | Facility: HOSPITAL | Age: 78
End: 2023-05-26
Payer: MEDICARE

## 2023-05-26 VITALS
TEMPERATURE: 97.7 F | DIASTOLIC BLOOD PRESSURE: 73 MMHG | SYSTOLIC BLOOD PRESSURE: 134 MMHG | HEART RATE: 71 BPM | OXYGEN SATURATION: 98 % | RESPIRATION RATE: 18 BRPM

## 2023-05-26 DIAGNOSIS — L08.9 INFECTION OF SKIN AND SUBCUTANEOUS TISSUE: Primary | ICD-10-CM

## 2023-05-26 PROCEDURE — 96365 THER/PROPH/DIAG IV INF INIT: CPT

## 2023-05-26 PROCEDURE — 96368 THER/DIAG CONCURRENT INF: CPT

## 2023-05-26 PROCEDURE — 25010000002 CEFTRIAXONE PER 250 MG: Performed by: INTERNAL MEDICINE

## 2023-05-26 RX ADMIN — CEFTRIAXONE 1 G: 1 INJECTION, POWDER, FOR SOLUTION INTRAMUSCULAR; INTRAVENOUS at 14:53

## 2023-05-29 DIAGNOSIS — I10 ESSENTIAL HYPERTENSION: ICD-10-CM

## 2023-05-30 RX ORDER — METOPROLOL SUCCINATE 50 MG/1
TABLET, EXTENDED RELEASE ORAL
Qty: 30 TABLET | Refills: 2 | Status: SHIPPED | OUTPATIENT
Start: 2023-05-30

## 2023-06-02 ENCOUNTER — HOSPITAL ENCOUNTER (OUTPATIENT)
Dept: CT IMAGING | Facility: HOSPITAL | Age: 78
Discharge: HOME | End: 2023-06-02

## 2023-06-02 DIAGNOSIS — Z87.09 PERSONAL HISTORY OF UNSPECIFIED DISEASE OF RESPIRATORY SYSTEM: ICD-10-CM

## 2023-06-02 PROCEDURE — 71250 CT THORAX DX C-: CPT

## 2023-06-02 PROCEDURE — 71250 CT THORAX DX C-: CPT | Performed by: RADIOLOGY

## 2023-06-13 ENCOUNTER — TRANSCRIBE ORDERS (OUTPATIENT)
Dept: ADMINISTRATIVE | Facility: HOSPITAL | Age: 78
End: 2023-06-13
Payer: MEDICARE

## 2023-06-13 ENCOUNTER — HOSPITAL ENCOUNTER (OUTPATIENT)
Dept: GENERAL RADIOLOGY | Facility: HOSPITAL | Age: 78
Discharge: HOME | End: 2023-06-13
Payer: MEDICARE

## 2023-06-13 DIAGNOSIS — R19.00 SWOLLEN ABDOMEN: Primary | ICD-10-CM

## 2023-06-13 DIAGNOSIS — R19.00 SWOLLEN ABDOMEN: ICD-10-CM

## 2023-06-13 PROCEDURE — 74018 RADEX ABDOMEN 1 VIEW: CPT

## 2023-06-13 PROCEDURE — 74018 RADEX ABDOMEN 1 VIEW: CPT | Performed by: RADIOLOGY

## 2023-06-20 PROBLEM — I49.5 SICK SINUS SYNDROME: Status: ACTIVE | Noted: 2023-06-20

## 2023-08-27 DIAGNOSIS — I10 ESSENTIAL HYPERTENSION: ICD-10-CM

## 2023-08-28 RX ORDER — METOPROLOL SUCCINATE 50 MG/1
TABLET, EXTENDED RELEASE ORAL
Qty: 30 TABLET | Refills: 2 | Status: SHIPPED | OUTPATIENT
Start: 2023-08-28

## 2023-10-16 RX ORDER — LOSARTAN POTASSIUM 50 MG/1
50 TABLET ORAL DAILY
Qty: 30 TABLET | Refills: 3 | Status: SHIPPED | OUTPATIENT
Start: 2023-10-16

## 2023-10-17 ENCOUNTER — OFFICE VISIT (OUTPATIENT)
Dept: CARDIOLOGY | Facility: CLINIC | Age: 78
End: 2023-10-17
Payer: MEDICARE

## 2023-10-17 VITALS
HEART RATE: 61 BPM | SYSTOLIC BLOOD PRESSURE: 124 MMHG | DIASTOLIC BLOOD PRESSURE: 64 MMHG | BODY MASS INDEX: 23.62 KG/M2 | WEIGHT: 174.4 LBS | OXYGEN SATURATION: 96 % | HEIGHT: 72 IN

## 2023-10-17 DIAGNOSIS — I49.5 SICK SINUS SYNDROME: Primary | ICD-10-CM

## 2023-10-17 DIAGNOSIS — I95.1 ORTHOSTATIC HYPOTENSION: ICD-10-CM

## 2023-10-17 DIAGNOSIS — I10 ESSENTIAL HYPERTENSION: ICD-10-CM

## 2023-10-17 PROCEDURE — 99213 OFFICE O/P EST LOW 20 MIN: CPT | Performed by: INTERNAL MEDICINE

## 2023-10-17 PROCEDURE — 3078F DIAST BP <80 MM HG: CPT | Performed by: INTERNAL MEDICINE

## 2023-10-17 PROCEDURE — 3074F SYST BP LT 130 MM HG: CPT | Performed by: INTERNAL MEDICINE

## 2023-10-17 NOTE — LETTER
October 19, 2023       No Recipients    Patient: Sandro Argueta   YOB: 1945   Date of Visit: 10/17/2023     Dear Misti Nguyễn MD:       Thank you for referring Sandro Argueta to me for evaluation. Below are the relevant portions of my assessment and plan of care.    If you have questions, please do not hesitate to call me. I look forward to following Sandro along with you.         Sincerely,        Gerson Smith MD        CC:   No Recipients    Gerson Smith MD  10/19/23 1745  Sign when Signing Visit  Misti Nguyễn MD  Sandro Argueta  1945  10/17/2023    Patient Active Problem List   Diagnosis   • Left elbow pain   • Essential hypertension   • DM2 (diabetes mellitus, type 2)   • Elevated prostate specific antigen (PSA)   • Epigastric pain   • Epigastric abdominal pain   • Elevated liver enzymes   • Weight loss, abnormal   • Acute hepatitis   • Anemia   • Obstructive jaundice   • Pneumothorax, left   • Cytokine release syndrome, grade 1   • Esophageal rupture   • Hypotension, unspecified   • Empyema with fistula   • Infection of skin and subcutaneous tissue   • Sick sinus syndrome, s/p permanent dual-chamber pacemaker implantation with battery replacement in 2019 with a [employer] medical device.       Dear Misti Nguyễn MD:    Subjective     Sandro Argueta is a 78 y.o. male with the problems as listed above, presents    Chief complaint: Follow-up of sick sinus syndrome and orthostatic hypotension.    History of Present Illness: Mr. Argueta is a pleasant 78-year-old  male with history of sick sinus syndrome, status post permanent pacemaker implantation with a [employer] medical device with battery replacement in 2019.  He has previous history of orthostatic hypotension.  He is here for regular cardiology follow-up.  On today's visit he denies any complaints of palpitations, dizziness or syncope.  He denies any orthostatic symptoms.  Overall he feels pretty  "good.    Allergies   Allergen Reactions   • Other Anaphylaxis and Swelling     Patient had a reaction to the COVID vaccine. Pt states his tongue and lips started swelling   :    Current Outpatient Medications:   •  losartan (COZAAR) 50 MG tablet, TAKE 1 TABLET BY MOUTH DAILY, Disp: 30 tablet, Rfl: 3  •  metoprolol succinate XL (TOPROL-XL) 50 MG 24 hr tablet, TAKE 1 TABLET BY MOUTH DAILY, Disp: 30 tablet, Rfl: 2  •  Pancrelipase, Lip-Prot-Amyl, (CREON) [id number] units capsule delayed-release particles capsule, Take 1 capsule by mouth 3 (Three) Times a Day With Meals. Take 2 capsules with large meals and 1 capsule with snacks, Disp: 240 capsule, Rfl: 11    The following portions of the patient's history were reviewed and updated as appropriate: allergies, current medications, past family history, past medical history, past social history, past surgical history and problem list.    Social History     Tobacco Use   • Smoking status: Never   • Smokeless tobacco: Never   Vaping Use   • Vaping Use: Never used   Substance Use Topics   • Alcohol use: No   • Drug use: No     Review of Systems   Constitutional: Negative for chills and fever.   HENT:  Negative for nosebleeds and sore throat.    Respiratory:  Negative for cough, hemoptysis and wheezing.    Gastrointestinal:  Negative for abdominal pain, hematemesis, hematochezia, melena, nausea and vomiting.   Genitourinary:  Negative for dysuria and hematuria.   Neurological:  Negative for headaches.     Objective   Vitals:    10/17/23 1310   BP: 124/64   Pulse: 61   SpO2: 96%   Weight: 79.1 kg (174 lb 6.4 oz)   Height: 182.9 cm (72\")     Body mass index is 23.65 kg/m².    Vitals reviewed.   Constitutional:       Appearance: Well-developed.   Eyes:      Conjunctiva/sclera: Conjunctivae normal.   HENT:      Head: Normocephalic.   Neck:      Thyroid: No thyromegaly.      Vascular: No JVD.      Trachea: No tracheal deviation.   Pulmonary:      Effort: No respiratory distress. " "     Breath sounds: Normal breath sounds. No wheezing. No rales.   Cardiovascular:      PMI at left midclavicular line. Normal rate. Regular rhythm. Normal S1. Normal S2.       Murmurs: There is no murmur.      No gallop.  No click. No rub.   Pulses:     Intact distal pulses.   Edema:     Peripheral edema absent.   Abdominal:      General: Bowel sounds are normal.      Palpations: Abdomen is soft. There is no abdominal mass.      Tenderness: There is no abdominal tenderness.   Musculoskeletal:      Cervical back: Normal range of motion and neck supple. Skin:     General: Skin is warm and dry.   Neurological:      Mental Status: Alert and oriented to person, place, and time.      Cranial Nerves: No cranial nerve deficit.       Lab Results   Component Value Date     05/25/2023    K 4.5 05/25/2023     05/25/2023    CO2 30.0 (H) 05/25/2023    BUN 15 05/25/2023    CREATININE 1.01 05/25/2023    GLUCOSE 133 (H) 05/25/2023    CALCIUM 8.9 05/25/2023    AST 17 12/08/2022    ALT 15 12/08/2022    ALKPHOS 96 12/08/2022     No results found for: \"CKTOTAL\"  Lab Results   Component Value Date    WBC 6.22 05/25/2023    HGB 9.9 (L) 05/25/2023    HCT 32.4 (L) 05/25/2023     05/25/2023     Lab Results   Component Value Date    INR 1.01 07/04/2022    INR 0.99 01/21/2022    INR 1.07 06/27/2021     Lab Results   Component Value Date    MG 2.2 03/11/2022     Lab Results   Component Value Date    TSH 1.620 06/27/2021    PSA 4.1 (H) 08/13/2020    TRIG 50 04/08/2022    HDL 40 04/08/2022    LDL 49 04/08/2022        Assessment & Plan    Diagnosis Plan   1. Sick sinus syndrome, s/p permanent dual-chamber pacemaker implantation with battery replacement in 2019 with a [employer] medical device, seems to functioning well.        2. Orthostatic hypotension, improved.        3. Essential hypertension, controlled.          Recommendations  Continue with losartan and metoprolol succinate at current doses.    Return in about 7 months " (around 5/17/2024).    As always, Misti Nguyễn MD  I appreciate very much the opportunity to participate in the cardiovascular care of your patients. Please do not hesitate to call me with any questions with regards to Sandro Argueta's evaluation and management.       With Best Regards,        Gerson Smith MD, [facility]    Please note that portions of this note were completed with a voice recognition program.

## 2023-10-17 NOTE — PROGRESS NOTES
Misti Nguyễn MD  Sandro Argueta  1945  10/17/2023    Patient Active Problem List   Diagnosis    Left elbow pain    Essential hypertension    DM2 (diabetes mellitus, type 2)    Elevated prostate specific antigen (PSA)    Epigastric pain    Epigastric abdominal pain    Elevated liver enzymes    Weight loss, abnormal    Acute hepatitis    Anemia    Obstructive jaundice    Pneumothorax, left    Cytokine release syndrome, grade 1    Esophageal rupture    Hypotension, unspecified    Empyema with fistula    Infection of skin and subcutaneous tissue    Sick sinus syndrome, s/p permanent dual-chamber pacemaker implantation with battery replacement in 2019 with a [employer] medical device.       Dear Misti Nguyễn MD:    Alton Argueta is a 78 y.o. male with the problems as listed above, presents    Chief complaint: Follow-up of sick sinus syndrome and orthostatic hypotension.    History of Present Illness: Mr. Argueta is a pleasant 78-year-old  male with history of sick sinus syndrome, status post permanent pacemaker implantation with a [employer] medical device with battery replacement in 2019.  He has previous history of orthostatic hypotension.  He is here for regular cardiology follow-up.  On today's visit he denies any complaints of palpitations, dizziness or syncope.  He denies any orthostatic symptoms.  Overall he feels pretty good.    Allergies   Allergen Reactions    Other Anaphylaxis and Swelling     Patient had a reaction to the COVID vaccine. Pt states his tongue and lips started swelling   :    Current Outpatient Medications:     losartan (COZAAR) 50 MG tablet, TAKE 1 TABLET BY MOUTH DAILY, Disp: 30 tablet, Rfl: 3    metoprolol succinate XL (TOPROL-XL) 50 MG 24 hr tablet, TAKE 1 TABLET BY MOUTH DAILY, Disp: 30 tablet, Rfl: 2    Pancrelipase, Lip-Prot-Amyl, (CREON) [id number] units capsule delayed-release particles capsule, Take 1 capsule by mouth 3 (Three) Times a Day With  "Meals. Take 2 capsules with large meals and 1 capsule with snacks, Disp: 240 capsule, Rfl: 11    The following portions of the patient's history were reviewed and updated as appropriate: allergies, current medications, past family history, past medical history, past social history, past surgical history and problem list.    Social History     Tobacco Use    Smoking status: Never    Smokeless tobacco: Never   Vaping Use    Vaping Use: Never used   Substance Use Topics    Alcohol use: No    Drug use: No     Review of Systems   Constitutional: Negative for chills and fever.   HENT:  Negative for nosebleeds and sore throat.    Respiratory:  Negative for cough, hemoptysis and wheezing.    Gastrointestinal:  Negative for abdominal pain, hematemesis, hematochezia, melena, nausea and vomiting.   Genitourinary:  Negative for dysuria and hematuria.   Neurological:  Negative for headaches.     Objective   Vitals:    10/17/23 1310   BP: 124/64   Pulse: 61   SpO2: 96%   Weight: 79.1 kg (174 lb 6.4 oz)   Height: 182.9 cm (72\")     Body mass index is 23.65 kg/m².    Vitals reviewed.   Constitutional:       Appearance: Well-developed.   Eyes:      Conjunctiva/sclera: Conjunctivae normal.   HENT:      Head: Normocephalic.   Neck:      Thyroid: No thyromegaly.      Vascular: No JVD.      Trachea: No tracheal deviation.   Pulmonary:      Effort: No respiratory distress.      Breath sounds: Normal breath sounds. No wheezing. No rales.   Cardiovascular:      PMI at left midclavicular line. Normal rate. Regular rhythm. Normal S1. Normal S2.       Murmurs: There is no murmur.      No gallop.  No click. No rub.   Pulses:     Intact distal pulses.   Edema:     Peripheral edema absent.   Abdominal:      General: Bowel sounds are normal.      Palpations: Abdomen is soft. There is no abdominal mass.      Tenderness: There is no abdominal tenderness.   Musculoskeletal:      Cervical back: Normal range of motion and neck supple. Skin:     " "General: Skin is warm and dry.   Neurological:      Mental Status: Alert and oriented to person, place, and time.      Cranial Nerves: No cranial nerve deficit.       Lab Results   Component Value Date     05/25/2023    K 4.5 05/25/2023     05/25/2023    CO2 30.0 (H) 05/25/2023    BUN 15 05/25/2023    CREATININE 1.01 05/25/2023    GLUCOSE 133 (H) 05/25/2023    CALCIUM 8.9 05/25/2023    AST 17 12/08/2022    ALT 15 12/08/2022    ALKPHOS 96 12/08/2022     No results found for: \"CKTOTAL\"  Lab Results   Component Value Date    WBC 6.22 05/25/2023    HGB 9.9 (L) 05/25/2023    HCT 32.4 (L) 05/25/2023     05/25/2023     Lab Results   Component Value Date    INR 1.01 07/04/2022    INR 0.99 01/21/2022    INR 1.07 06/27/2021     Lab Results   Component Value Date    MG 2.2 03/11/2022     Lab Results   Component Value Date    TSH 1.620 06/27/2021    PSA 4.1 (H) 08/13/2020    TRIG 50 04/08/2022    HDL 40 04/08/2022    LDL 49 04/08/2022        Assessment & Plan    Diagnosis Plan   1. Sick sinus syndrome, s/p permanent dual-chamber pacemaker implantation with battery replacement in 2019 with a [employer] medical device, seems to functioning well.        2. Orthostatic hypotension, improved.        3. Essential hypertension, controlled.          Recommendations  Continue with losartan and metoprolol succinate at current doses.    Return in about 7 months (around 5/17/2024).    As always, Misti Nguyễn MD  I appreciate very much the opportunity to participate in the cardiovascular care of your patients. Please do not hesitate to call me with any questions with regards to Sandro Argueta's evaluation and management.       With Best Regards,        Gerson Smith MD, [facility]    Please note that portions of this note were completed with a voice recognition program.          "

## 2023-10-24 ENCOUNTER — TRANSCRIBE ORDERS (OUTPATIENT)
Dept: ADMINISTRATIVE | Facility: HOSPITAL | Age: 78
End: 2023-10-24
Payer: MEDICARE

## 2023-10-24 ENCOUNTER — HOSPITAL ENCOUNTER (OUTPATIENT)
Dept: GENERAL RADIOLOGY | Facility: HOSPITAL | Age: 78
Discharge: HOME | End: 2023-10-24
Admitting: PSYCHIATRY & NEUROLOGY
Payer: MEDICARE

## 2023-10-24 DIAGNOSIS — M54.9 BACK PAIN, UNSPECIFIED BACK LOCATION, UNSPECIFIED BACK PAIN LATERALITY, UNSPECIFIED CHRONICITY: ICD-10-CM

## 2023-10-24 DIAGNOSIS — M54.9 BACK PAIN, UNSPECIFIED BACK LOCATION, UNSPECIFIED BACK PAIN LATERALITY, UNSPECIFIED CHRONICITY: Primary | ICD-10-CM

## 2023-10-24 PROCEDURE — 72110 X-RAY EXAM L-2 SPINE 4/>VWS: CPT | Performed by: RADIOLOGY

## 2023-10-24 PROCEDURE — 72110 X-RAY EXAM L-2 SPINE 4/>VWS: CPT

## 2023-10-25 ENCOUNTER — TRANSCRIBE ORDERS (OUTPATIENT)
Dept: PHYSICAL THERAPY | Facility: CLINIC | Age: 78
End: 2023-10-25
Payer: MEDICARE

## 2023-10-25 ENCOUNTER — TREATMENT (OUTPATIENT)
Dept: PHYSICAL THERAPY | Facility: CLINIC | Age: 78
End: 2023-10-25
Payer: MEDICARE

## 2023-10-25 DIAGNOSIS — M54.50 CHRONIC BILATERAL LOW BACK PAIN WITHOUT SCIATICA: Primary | ICD-10-CM

## 2023-10-25 DIAGNOSIS — M53.86 DECREASED ROM OF LUMBAR SPINE: ICD-10-CM

## 2023-10-25 DIAGNOSIS — G89.29 CHRONIC BILATERAL LOW BACK PAIN WITHOUT SCIATICA: Primary | ICD-10-CM

## 2023-10-25 DIAGNOSIS — M54.50 LOW BACK PAIN, UNSPECIFIED BACK PAIN LATERALITY, UNSPECIFIED CHRONICITY, UNSPECIFIED WHETHER SCIATICA PRESENT: Primary | ICD-10-CM

## 2023-10-25 PROCEDURE — 97140 MANUAL THERAPY 1/> REGIONS: CPT | Performed by: PHYSICAL THERAPIST

## 2023-10-25 PROCEDURE — 97162 PT EVAL MOD COMPLEX 30 MIN: CPT | Performed by: PHYSICAL THERAPIST

## 2023-10-25 PROCEDURE — 97110 THERAPEUTIC EXERCISES: CPT | Performed by: PHYSICAL THERAPIST

## 2023-10-25 NOTE — PROGRESS NOTES
Physical Therapy Initial Evaluation and Plan of Care    Patient: Sandro Argueta   : 1945  Diagnosis/ICD-10 Code:  Chronic bilateral low back pain without sciatica [M54.50, G89.29]  Referring practitioner: Misti Nguyễn MD  Date of Initial Visit: 10/25/2023  Today's Date: 10/25/2023  Patient seen for 1 session         Visit Diagnoses:    ICD-10-CM ICD-9-CM   1. Chronic bilateral low back pain without sciatica  M54.50 724.2    G89.29 338.29   2. Decreased ROM of lumbar spine  M53.86 724.9         Subjective Questionnaire: Oswestry: 60%      Subjective Evaluation    History of Present Illness  Onset date: 3 months.  Mechanism of injury: Pt has suffered from fluctuating lower back pain for the past forty years. The patient has had constant lower back pain for the past three months with no improvements. The cause of the pain is uncertain. The patient was evaluated by his PCP and the patient was ordered an x-ray of the lumbar spine and was advised to start therapy.  Pt reports the pain increased with bending over. He has had a pain in his back that seemed to correlate to his heart beat one night.  Pt instructed to notify his MD due to cardiac concerns.  He reports his heart was checked out less than two weeks ago.      Precautions and Work Restrictions: pacemakerQuality of life: good    Pain  Current pain ratin  At best pain ratin  At worst pain rating: 10  Location: lower back  Quality: dull ache and throbbing  Relieving factors: medications and rest  Aggravating factors: lifting, movement, standing, ambulation, prolonged positioning, repetitive movement and outstretched reach  Progression: worsening    Hand dominance: right    Patient Goals  Patient goals for therapy: decreased pain, increased motion, increased strength, independence with ADLs/IADLs and return to sport/leisure activities             Objective          Postural Observations    Additional Postural Observation Details  Slumped  posture with fwd     Palpation     Additional Palpation Details  No tenderness    Neurological Testing     Sensation     Lumbar   Left   Intact: light touch    Right   Intact: light touch    Reflexes   Left   Patellar (L4): normal (2+)    Right   Patellar (L4): normal (2+)    Active Range of Motion     Lumbar   Flexion: 75 degrees   Extension: 50 degrees   Left rotation: WFL  Right rotation: 75 degrees     Strength/Myotome Testing     Left Hip   Planes of Motion   Flexion: 4-    Right Hip   Planes of Motion   Flexion: 4-    Left Knee   Flexion: 4+  Extension: 4+    Right Knee   Flexion: 4+  Extension: 4+    Tests       Thoracic   Negative slump.     Lumbar   Positive repeated flexion.   Negative repeated extension.           Assessment & Plan       Assessment  Impairments: abnormal coordination, abnormal gait, abnormal muscle firing, abnormal muscle tone, abnormal or restricted ROM, activity intolerance, impaired physical strength, lacks appropriate home exercise program, pain with function and safety issue   Functional limitations: carrying objects, lifting, sleeping, walking, pulling, pushing, uncomfortable because of pain, sitting, standing, stooping, reaching behind back, reaching overhead and unable to perform repetitive tasks   Assessment details: Pt is a 79 y/o male referred to therapy for treatment of back pain.  Pt noted to have increased pain with fwd flexion and right rotation with no radicular symptoms.   X-ray reviewed, and symptoms consistent with a compression fracture/ posterior disc bulge with no neurological impingement.  Therapy will follow for improved core stability and decreased pain.    Prognosis: fair    Goals  Plan Goals: STG 6 weeks    1 Pt will be instructed in a HEP.  2 Pt will report pain no greater than 5/10 with ADLs.  3 Pt will demonstrate 4+/5 LE strength.    LTG 12 weeks    1 Pt will improve his Mod Oswestry to less than 20%.  2 Pt will report pain no greater than 3/10 with moderate  lifting.  3 Pt will be independent with a progressive core stability program.        Plan  Therapy options: will be seen for skilled therapy services  Planned modality interventions: cryotherapy and thermotherapy (paraffin bath)  Planned therapy interventions: abdominal trunk stabilization, ADL retraining, balance/weight-bearing training, body mechanics training, compression, flexibility, functional ROM exercises, gait training, home exercise program, IADL retraining, joint mobilization, manual therapy, motor coordination training, neuromuscular re-education, postural training, soft tissue mobilization, spinal/joint mobilization, strengthening, stretching and therapeutic activities  Frequency: 2x week  Duration in weeks: 12  Treatment plan discussed with: patient  Plan details: Will follow for optimal gains.  Moderate Evaluation  59400  Re-evaluation   94514      Therapeutic exercise  14485  Therapeutic activity    73301  Neuromuscular re-education   08053  Manual therapy   79806  Gait training  60311                  Timed:         Manual Therapy:    12     mins  39253;     Therapeutic Exercise:    12     mins  47843;     Neuromuscular Avis:        mins  33832;    Therapeutic Activity:          mins  29101;     Gait Training:           mins  65073;     Ultrasound:          mins  01961;    Ionto                                   mins   79077  Self Care                            mins   23212  Canalith Repos         mins 31012      Un-Timed:  Electrical Stimulation:         mins  03376 ( );  Dry Needling          mins self-pay  Traction          mins 27768  Low Eval          Mins  97733  Mod Eval     30     Mins  99796  High Eval                            Mins  64805        Timed Treatment:  24    mins   Total Treatment:   54     mins          PT: Chuy Christian PT     License Number: [id number]  Electronically signed by Chuy Christian PT, 10/25/23, 3:08 PM EDT    Certification Period: 10/25/2023  thru 1/22/2024  I certify that the therapy services are furnished while this patient is under my care.  The services outlined above are required by this patient, and will be reviewed every 90 days.         Physician Signature:__________________________________________________    PHYSICIAN: Misti Nguyễn MD  NPI: [id number]                                      DATE:      Please sign and return via fax to [email or web address] . Thank you, [facility].

## 2023-10-26 ENCOUNTER — TREATMENT (OUTPATIENT)
Dept: PHYSICAL THERAPY | Facility: CLINIC | Age: 78
End: 2023-10-26
Payer: MEDICARE

## 2023-10-26 DIAGNOSIS — M54.50 CHRONIC BILATERAL LOW BACK PAIN WITHOUT SCIATICA: Primary | ICD-10-CM

## 2023-10-26 DIAGNOSIS — M53.86 DECREASED ROM OF LUMBAR SPINE: ICD-10-CM

## 2023-10-26 DIAGNOSIS — G89.29 CHRONIC BILATERAL LOW BACK PAIN WITHOUT SCIATICA: Primary | ICD-10-CM

## 2023-10-26 NOTE — PROGRESS NOTES
Physical Therapy Daily Treatment Note      Patient: Sandro Argueta   : 1945  Referring practitioner: Misti Nguyễn MD  Date of Initial Visit: Type: THERAPY  Noted: 10/25/2023  Today's Date: 10/26/2023  Patient seen for 2 sessions       Visit Diagnoses:    ICD-10-CM ICD-9-CM   1. Chronic bilateral low back pain without sciatica  M54.50 724.2    G89.29 338.29   2. Decreased ROM of lumbar spine  M53.86 724.9       Subjective Evaluation    History of Present Illness    Subjective comment: Pt has 4/10 pain today.  Pt responded well to recent massage.       Objective   See Exercise, Manual, and Modality Logs for complete treatment.       Assessment & Plan       Assessment  Assessment details: Tx today consisted of exercises for improved postural and core stability; followed by stm with biofreeze in sidelying and ended with ice for decreased pain.  Pt demonstrated good effort with no distress and reported 2/10 post pain.    Plan  Plan details: Will cont to follow with conservative approach for decreased pain and improved mobility.          Timed:         Manual Therapy:    12     mins  25766;     Therapeutic Exercise:    18     mins  12178;     Neuromuscular Avis:        mins  98762;    Therapeutic Activity:          mins  83814;     Gait Training:           mins  99213;     Ultrasound:          mins  38812;    Ionto                                   mins   45565  Self Care                            mins   93812  Canalith Repos         mins 72474      Un-Timed:  Electrical Stimulation:         mins  82278 ( );  Dry Needling          mins self-pay  Traction          mins 34390      Timed Treatment:   30   mins   Total Treatment:     36   mins(6 min ice)    Chuy Christian PT  [place] License: [id number]      Electronically signed by Chuy Christian PT, 10/26/23, 10:06 AM EDT

## 2023-11-01 ENCOUNTER — TREATMENT (OUTPATIENT)
Dept: PHYSICAL THERAPY | Facility: CLINIC | Age: 78
End: 2023-11-01
Payer: MEDICARE

## 2023-11-01 DIAGNOSIS — G89.29 CHRONIC BILATERAL LOW BACK PAIN WITHOUT SCIATICA: Primary | ICD-10-CM

## 2023-11-01 DIAGNOSIS — M53.86 DECREASED ROM OF LUMBAR SPINE: ICD-10-CM

## 2023-11-01 DIAGNOSIS — M54.50 CHRONIC BILATERAL LOW BACK PAIN WITHOUT SCIATICA: Primary | ICD-10-CM

## 2023-11-01 PROCEDURE — 97110 THERAPEUTIC EXERCISES: CPT | Performed by: PHYSICAL THERAPIST

## 2023-11-01 PROCEDURE — 97140 MANUAL THERAPY 1/> REGIONS: CPT | Performed by: PHYSICAL THERAPIST

## 2023-11-01 NOTE — PROGRESS NOTES
Physical Therapy Daily Treatment Note      Patient: Sandro Argueta   : 1945  Referring practitioner: Misti Nguyễn MD  Date of Initial Visit: Type: THERAPY  Noted: 10/25/2023  Today's Date: 2023  Patient seen for 3 sessions       Visit Diagnoses:    ICD-10-CM ICD-9-CM   1. Chronic bilateral low back pain without sciatica  M54.50 724.2    G89.29 338.29   2. Decreased ROM of lumbar spine  M53.86 724.9       Subjective Evaluation    History of Present Illness    Subjective comment: Pt has 3/10 pain today and reports his pain cont to improve.     Objective   See Exercise, Manual, and Modality Logs for complete treatment.       Assessment & Plan       Assessment  Assessment details: Tx today consisted of exercises for improved core stability and centralization of symptoms; followed by stm to lower back in side lying.  Exercises progressed in reps and lat rows added today.  Pt reported no pain following session today.    Plan  Plan details: Will follow progressing core stability and decreased pain.          Timed:         Manual Therapy:    14     mins  77130;     Therapeutic Exercise:    24     mins  18235;     Neuromuscular Avis:        mins  27123;    Therapeutic Activity:          mins  52485;     Gait Training:           mins  77920;     Ultrasound:          mins  10040;    Ionto                                   mins   67995  Self Care                            mins   74062  Canalith Repos         mins 33249      Un-Timed:  Electrical Stimulation:         mins  37924 ( );  Dry Needling          mins self-pay  Traction          mins 21527      Timed Treatment:   38   mins   Total Treatment:     38   mins    Chuy Christian PT  [place] License: [id number]      Electronically signed by Chuy Christian PT, 23, 3:55 PM EDT

## 2023-11-02 ENCOUNTER — TREATMENT (OUTPATIENT)
Dept: PHYSICAL THERAPY | Facility: CLINIC | Age: 78
End: 2023-11-02
Payer: MEDICARE

## 2023-11-02 DIAGNOSIS — M53.86 DECREASED ROM OF LUMBAR SPINE: ICD-10-CM

## 2023-11-02 DIAGNOSIS — M54.50 CHRONIC BILATERAL LOW BACK PAIN WITHOUT SCIATICA: Primary | ICD-10-CM

## 2023-11-02 DIAGNOSIS — G89.29 CHRONIC BILATERAL LOW BACK PAIN WITHOUT SCIATICA: Primary | ICD-10-CM

## 2023-11-02 PROCEDURE — 97110 THERAPEUTIC EXERCISES: CPT | Performed by: PHYSICAL THERAPIST

## 2023-11-02 PROCEDURE — 97140 MANUAL THERAPY 1/> REGIONS: CPT | Performed by: PHYSICAL THERAPIST

## 2023-11-02 NOTE — PROGRESS NOTES
Physical Therapy Daily Treatment Note      Patient: Sandro Argueta   : 1945  Referring practitioner: Misti Nguyễn MD  Date of Initial Visit: Type: THERAPY  Noted: 10/25/2023  Today's Date: 2023  Patient seen for 4 sessions       Visit Diagnoses:    ICD-10-CM ICD-9-CM   1. Chronic bilateral low back pain without sciatica  M54.50 724.2    G89.29 338.29   2. Decreased ROM of lumbar spine  M53.86 724.9       Subjective Evaluation    History of Present Illness    Subjective comment: Pt has 3/10 pain today.     Objective   See Exercise, Manual, and Modality Logs for complete treatment.       Assessment & Plan       Assessment  Assessment details: Tx today consisted of exercises for improved core stability and centralization of symptoms; followed by stm to lower back in side lying.  Exercises progressed in reps and bridges added today.  Pt reported no pain following session today.    Plan  Plan details: Will follow progressing core stability and decreased pain.      Timed:         Manual Therapy:    14     mins  64264;     Therapeutic Exercise:    25     mins  66135;     Neuromuscular Avis:        mins  96979;    Therapeutic Activity:          mins  50340;     Gait Training:          mins  27513;     Ultrasound:          mins  95107;    Ionto                                   mins   09610  Self Care                            mins   12437  Canalith Repos         mins 73366      Un-Timed:  Electrical Stimulation:         mins  85877 ( );  Dry Needling          mins self-pay  Traction          mins 93918      Timed Treatment:   39   mins   Total Treatment:     39   mins    Chuy Christian PT  [place] License: [id number]      Electronically signed by Chuy Christian PT, 23, 3:49 PM EDT

## 2023-11-06 ENCOUNTER — TREATMENT (OUTPATIENT)
Dept: PHYSICAL THERAPY | Facility: CLINIC | Age: 78
End: 2023-11-06
Payer: MEDICARE

## 2023-11-06 DIAGNOSIS — M54.50 CHRONIC BILATERAL LOW BACK PAIN WITHOUT SCIATICA: Primary | ICD-10-CM

## 2023-11-06 DIAGNOSIS — M53.86 DECREASED ROM OF LUMBAR SPINE: ICD-10-CM

## 2023-11-06 DIAGNOSIS — G89.29 CHRONIC BILATERAL LOW BACK PAIN WITHOUT SCIATICA: Primary | ICD-10-CM

## 2023-11-06 PROCEDURE — 97140 MANUAL THERAPY 1/> REGIONS: CPT | Performed by: PHYSICAL THERAPIST

## 2023-11-06 PROCEDURE — 97110 THERAPEUTIC EXERCISES: CPT | Performed by: PHYSICAL THERAPIST

## 2023-11-06 NOTE — PROGRESS NOTES
Physical Therapy Daily Treatment Note      Patient: Sandro Argueta   : 1945  Referring practitioner: Misti Nguyễn MD  Date of Initial Visit: Type: THERAPY  Noted: 10/25/2023  Today's Date: 2023  Patient seen for 5 sessions       Visit Diagnoses:    ICD-10-CM ICD-9-CM   1. Chronic bilateral low back pain without sciatica  M54.50 724.2    G89.29 338.29   2. Decreased ROM of lumbar spine  M53.86 724.9       Subjective Evaluation    History of Present Illness    Subjective comment: /71' HR 48 and O2 sats 98%.  Pt reports being light headed today.  Pt has 2/10 pain today.       Objective   See Exercise, Manual, and Modality Logs for complete treatment.       Assessment & Plan       Assessment  Assessment details: Tx today consisted of exercises for improved core stability and centralization of symptoms; followed by stm to lower back in side lying.  Pt required less cues with postural and supine core stability exercises today.  Pt reported no pain following session today.    Plan  Plan details: Will follow progressing core stability and decreased pain. Pt instructed that if he continued to have lightheadedness; he should contact his PCP.          Timed:         Manual Therapy:    14     mins  73838;     Therapeutic Exercise:    25     mins  83656;     Neuromuscular Avis:        mins  57334;    Therapeutic Activity:          mins  65421;     Gait Training:           mins  18482;     Ultrasound:          mins  75589;    Ionto                                   mins   34147  Self Care                            mins   07704  Canalith Repos         mins 30249      Un-Timed:  Electrical Stimulation:         mins  99571 (MC );  Dry Needling          mins self-pay  Traction          mins 81488      Timed Treatment:   39   mins   Total Treatment:     39   mins    Chuy Christian PT  [place] License: [id number]      Electronically signed by Chuy Christian PT, 23, 11:11 AM EST

## 2023-11-08 ENCOUNTER — TELEPHONE (OUTPATIENT)
Dept: CARDIOLOGY | Facility: CLINIC | Age: 78
End: 2023-11-08
Payer: MEDICARE

## 2023-11-08 NOTE — TELEPHONE ENCOUNTER
Caller: Sandro Argueta     Relationship: SELF     Best call back number: [phone]    What is your medical concern? PT'S PACEMAKER IS SET TO 60, HIS HEART RATE THE LAST FEW CHECKS HAVE BEEN, 48, 52, ETC, BELOW 60. BP IS STAYING PRETTY WELL GOOD, ONE INSTANCE WAS AROUND 199/47, HE WANTS TO KNOW IF HIS PACEMAKER BEING SET TO 60 AND HEART RATE BEING LOWER IS OK TO HAVE.     How long has this issue been going on? LAST WEEK OR 2    Is your provider already aware of this issue? YES TO A DEGREE WITH THE LOWER HEART RATE    Have you been treated for this issue? DOES TAKE MEDICATION FOR BP AND HEART RATE

## 2023-11-09 ENCOUNTER — TREATMENT (OUTPATIENT)
Dept: PHYSICAL THERAPY | Facility: CLINIC | Age: 78
End: 2023-11-09
Payer: MEDICARE

## 2023-11-09 DIAGNOSIS — G89.29 CHRONIC BILATERAL LOW BACK PAIN WITHOUT SCIATICA: Primary | ICD-10-CM

## 2023-11-09 DIAGNOSIS — M54.50 CHRONIC BILATERAL LOW BACK PAIN WITHOUT SCIATICA: Primary | ICD-10-CM

## 2023-11-09 DIAGNOSIS — M53.86 DECREASED ROM OF LUMBAR SPINE: ICD-10-CM

## 2023-11-09 PROCEDURE — 97140 MANUAL THERAPY 1/> REGIONS: CPT | Performed by: PHYSICAL THERAPIST

## 2023-11-09 PROCEDURE — 97110 THERAPEUTIC EXERCISES: CPT | Performed by: PHYSICAL THERAPIST

## 2023-11-09 NOTE — PROGRESS NOTES
Physical Therapy Daily Treatment Note      Patient: Sandro Argueta   : 1945  Referring practitioner: Misti Nguyễn MD  Date of Initial Visit: Type: THERAPY  Noted: 10/25/2023  Today's Date: 2023  Patient seen for 6 sessions       Visit Diagnoses:    ICD-10-CM ICD-9-CM   1. Chronic bilateral low back pain without sciatica  M54.50 724.2    G89.29 338.29   2. Decreased ROM of lumbar spine  M53.86 724.9       Subjective Evaluation    History of Present Illness    Subjective comment: Pt has 2/10 pain today.       Objective   See Exercise, Manual, and Modality Logs for complete treatment.       Assessment & Plan       Assessment  Assessment details: Tx today consisted of exercises for improved core stability and centralization of symptoms; followed by stm to lower back in side lying. Pt responded well to added st hip ext, st hip abd and laq.  Pt reported no pain following session today.    Plan  Plan details: Will follow progressing core stability and decreased pain. Pt will be followed for two sessions and will be discharged.          Timed:         Manual Therapy:    14     mins  11689;     Therapeutic Exercise:    25     mins  46893;     Neuromuscular Avis:        mins  21307;    Therapeutic Activity:          mins  60604;     Gait Training:           mins  19577;     Ultrasound:          mins  05568;    Ionto                                   mins   15008  Self Care                            mins   38654  Canalith Repos         mins 58009      Un-Timed:  Electrical Stimulation:         mins  03012 ( );  Dry Needling          mins self-pay  Traction          mins 79680      Timed Treatment:   39   mins   Total Treatment:     39   mins    Chuy Christian PT  [place] License: [id number]      Electronically signed by Chuy Christian PT, 23, 8:54 AM EST

## 2023-11-10 NOTE — TELEPHONE ENCOUNTER
Called pt and he stated he does not have any symptoms. I gave him the next apt for his pace interr. 12/19/2023 at 10:30 am.

## 2023-11-10 NOTE — TELEPHONE ENCOUNTER
Looks like base rate is set to 60.  Is he having symptoms?  We can try to get his pacemaker reinterrogated in July it looks to be functioning well.

## 2023-11-14 ENCOUNTER — TREATMENT (OUTPATIENT)
Dept: PHYSICAL THERAPY | Facility: CLINIC | Age: 78
End: 2023-11-14
Payer: MEDICARE

## 2023-11-14 DIAGNOSIS — M54.50 CHRONIC BILATERAL LOW BACK PAIN WITHOUT SCIATICA: Primary | ICD-10-CM

## 2023-11-14 DIAGNOSIS — G89.29 CHRONIC BILATERAL LOW BACK PAIN WITHOUT SCIATICA: Primary | ICD-10-CM

## 2023-11-14 DIAGNOSIS — M53.86 DECREASED ROM OF LUMBAR SPINE: ICD-10-CM

## 2023-11-14 PROCEDURE — 97110 THERAPEUTIC EXERCISES: CPT | Performed by: PHYSICAL THERAPIST

## 2023-11-14 PROCEDURE — 97140 MANUAL THERAPY 1/> REGIONS: CPT | Performed by: PHYSICAL THERAPIST

## 2023-11-14 NOTE — PROGRESS NOTES
Physical Therapy Daily Treatment Note      Patient: Sandro Argueta   : 1945  Referring practitioner: Misti Nguyễn MD  Date of Initial Visit: Type: THERAPY  Noted: 10/25/2023  Today's Date: 2023  Patient seen for 7 sessions       Visit Diagnoses:    ICD-10-CM ICD-9-CM   1. Chronic bilateral low back pain without sciatica  M54.50 724.2    G89.29 338.29   2. Decreased ROM of lumbar spine  M53.86 724.9       Subjective Evaluation    History of Present Illness    Subjective comment: Pt reports no back pain.  Pt cont to report dizziness and reports his Pacemaker is not working like it should to keep his HR 60 or greater.  His BP today is 132/71 and HR 48.       Objective   See Exercise, Manual, and Modality Logs for complete treatment.       Assessment & Plan       Assessment  Assessment details: Tx today consisted of exercises for improved hip and core stability with noted improved mech today. Pt tx ended with stm to lower back for decreased pain.  Pt demonstrated good effort today and patient reported no pain following session.    Plan  Plan details: Will follow progressing core stability and decreased pain.          Timed:         Manual Therapy:    14     mins  09817;     Therapeutic Exercise:    28     mins  80447;     Neuromuscular Avis:        mins  72439;    Therapeutic Activity:          mins  39260;     Gait Training:           mins  38849;     Ultrasound:          mins  05483;    Ionto                                   mins   92565  Self Care                            mins   32649  Canalith Repos         mins 12364      Un-Timed:  Electrical Stimulation:         mins  18171 ( );  Dry Needling          mins self-pay  Traction          mins 22062      Timed Treatment:   42   mins   Total Treatment:     42   mins    Chuy Christian PT  [place] License: [id number]      Electronically signed by Chuy Christian PT, 23, 11:11 AM EST

## 2023-11-16 ENCOUNTER — CLINICAL SUPPORT NO REQUIREMENTS (OUTPATIENT)
Dept: CARDIOLOGY | Facility: CLINIC | Age: 78
End: 2023-11-16
Payer: MEDICARE

## 2023-11-16 ENCOUNTER — TREATMENT (OUTPATIENT)
Dept: PHYSICAL THERAPY | Facility: CLINIC | Age: 78
End: 2023-11-16
Payer: MEDICARE

## 2023-11-16 ENCOUNTER — OFFICE VISIT (OUTPATIENT)
Dept: CARDIOLOGY | Facility: CLINIC | Age: 78
End: 2023-11-16
Payer: MEDICARE

## 2023-11-16 VITALS
OXYGEN SATURATION: 100 % | HEIGHT: 72 IN | WEIGHT: 173.6 LBS | DIASTOLIC BLOOD PRESSURE: 65 MMHG | SYSTOLIC BLOOD PRESSURE: 128 MMHG | BODY MASS INDEX: 23.51 KG/M2 | HEART RATE: 88 BPM

## 2023-11-16 DIAGNOSIS — Z95.0 CARDIAC PACEMAKER IN SITU: Primary | ICD-10-CM

## 2023-11-16 DIAGNOSIS — I49.5 SICK SINUS SYNDROME: ICD-10-CM

## 2023-11-16 DIAGNOSIS — M53.86 DECREASED ROM OF LUMBAR SPINE: ICD-10-CM

## 2023-11-16 DIAGNOSIS — G47.33 OBSTRUCTIVE SLEEP APNEA: ICD-10-CM

## 2023-11-16 DIAGNOSIS — G89.29 CHRONIC BILATERAL LOW BACK PAIN WITHOUT SCIATICA: Primary | ICD-10-CM

## 2023-11-16 DIAGNOSIS — M54.50 CHRONIC BILATERAL LOW BACK PAIN WITHOUT SCIATICA: Primary | ICD-10-CM

## 2023-11-16 DIAGNOSIS — I95.1 ORTHOSTATIC HYPOTENSION: ICD-10-CM

## 2023-11-16 DIAGNOSIS — I10 ESSENTIAL HYPERTENSION: ICD-10-CM

## 2023-11-16 DIAGNOSIS — I49.3 FREQUENT PVCS: ICD-10-CM

## 2023-11-16 DIAGNOSIS — R07.2 PRECORDIAL PAIN: ICD-10-CM

## 2023-11-16 DIAGNOSIS — R06.09 DYSPNEA ON EXERTION: ICD-10-CM

## 2023-11-16 DIAGNOSIS — R53.83 FATIGUE, UNSPECIFIED TYPE: Primary | ICD-10-CM

## 2023-11-16 PROCEDURE — 3078F DIAST BP <80 MM HG: CPT | Performed by: INTERNAL MEDICINE

## 2023-11-16 PROCEDURE — 97140 MANUAL THERAPY 1/> REGIONS: CPT | Performed by: PHYSICAL THERAPIST

## 2023-11-16 PROCEDURE — 3074F SYST BP LT 130 MM HG: CPT | Performed by: INTERNAL MEDICINE

## 2023-11-16 PROCEDURE — 99214 OFFICE O/P EST MOD 30 MIN: CPT | Performed by: INTERNAL MEDICINE

## 2023-11-16 PROCEDURE — 97110 THERAPEUTIC EXERCISES: CPT | Performed by: PHYSICAL THERAPIST

## 2023-11-16 RX ORDER — METOPROLOL SUCCINATE 50 MG/1
75 TABLET, EXTENDED RELEASE ORAL DAILY
Qty: 45 TABLET | Refills: 2 | Status: SHIPPED | OUTPATIENT
Start: 2023-11-16

## 2023-11-16 NOTE — PROGRESS NOTES
Misti Nguyễn MD  Sandro Argueta  1945  11/16/2023    Patient Active Problem List   Diagnosis    Left elbow pain    Essential hypertension    DM2 (diabetes mellitus, type 2)    Elevated prostate specific antigen (PSA)    Epigastric pain    Epigastric abdominal pain    Elevated liver enzymes    Weight loss, abnormal    Acute hepatitis    Anemia    Obstructive jaundice    Pneumothorax, left    Cytokine release syndrome, grade 1    Esophageal rupture    Hypotension, unspecified    Empyema with fistula    Infection of skin and subcutaneous tissue    Sick sinus syndrome, s/p permanent dual-chamber pacemaker implantation with battery replacement in 2019 with a [employer] medical device.       Dear Misti Nguyễn MD:    Alton Argueta is a 78 y.o. male with the problems as listed above, presents    Chief Complaint   Patient presents with    Follow-up of frequent PVCs noted on the pacemaker interrogation and complains of daytime fatigue and somnolence.            History of Present Illness: Mr. Argueta is a pleasant 78-year-old  male with history of sick sinus syndrome, status post permanent dual-chamber pacemaker implantation with a [employer] medical device with battery replacement in 2019.  He was previously having a lot of orthostatic hypotension for which his medications were adjusted and this seemed to have improved.  However lately his and complaining of some increasing shortness of breath and daytime fatigue and somnolence.  He also was noted to have pulse rate in the 40s during blood pressure checks at home.  Hence he is brought in today for pacemaker interrogation that revealed intermittent episodes of ventricular bigeminy.  Denies any history of syncope.  His pacemaker function otherwise seems to be okay except for some mild increased thresholds on the atrial lead.  He does have some intermittent chest pains seem to occur at random and resolve spontaneously.  He brought blood  pressure readings from home that I reviewed and they are mostly in the range of 130s to 140s systolic 50s to 70s diastolic.  His pulse was recorded anywhere from 40s to 60s.    Complete and complains of Transthoracic Echocardiogram with Complete Doppler and Color Flow    Accession Number: [id number]   Date of Study: 8/23/22   Ordering Provider: Gerson Smith MD   Clinical Indications: Murmur or Click        Interpreting Physicians  Performing Staff   Gerson Smith MD Tech: Kacey Baldwin        Clinical Indication    Murmur or Click   Dx: Systolic murmur [R01.1 (ICD-10-CM)]     Interpretation Summary    Normal left ventricular cavity size and wall thickness noted. All left ventricular wall segments contract normally.  Left ventricular ejection fraction appears to be 56 - 60%.  Left ventricular diastolic function is consistent with (grade II w/high LAP) pseudonormalization.  The aortic valve is trileaflet and exhibits sclerosis. Mild to moderate aortic valve regurgitation is present. No aortic valve stenosis is present.  There are myxomatous changes of the mitral valve apparatus present. There is mild, bileaflet mitral valve thickening present. Mild mitral valve regurgitation is present. No significant mitral valve stenosis is present.  There is no evidence of pericardial effusion.    Allergies   Allergen Reactions    Other Anaphylaxis and Swelling     Patient had a reaction to the COVID vaccine. Pt states his tongue and lips started swelling   :    Current Outpatient Medications:     losartan (COZAAR) 50 MG tablet, TAKE 1 TABLET BY MOUTH DAILY, Disp: 30 tablet, Rfl: 3    metoprolol succinate XL (TOPROL-XL) 50 MG 24 hr tablet, Take 1.5 tablets by mouth Daily., Disp: 45 tablet, Rfl: 2    Pancrelipase, Lip-Prot-Amyl, (CREON) [id number] units capsule delayed-release particles capsule, Take 1 capsule by mouth 3 (Three) Times a Day With Meals. Take 2 capsules with large meals and 1 capsule with snacks, Disp:  "240 capsule, Rfl: 11    The following portions of the patient's history were reviewed and updated as appropriate: allergies, current medications, past family history, past medical history, past social history, past surgical history and problem list.    Social History     Tobacco Use    Smoking status: Never    Smokeless tobacco: Never   Vaping Use    Vaping Use: Never used   Substance Use Topics    Alcohol use: No    Drug use: No     Review of Systems   Constitutional: Positive for malaise/fatigue. Negative for chills and fever.   HENT:  Negative for nosebleeds and sore throat.    Respiratory:  Positive for shortness of breath. Negative for cough, hemoptysis and wheezing.    Gastrointestinal:  Negative for abdominal pain, hematemesis, hematochezia, melena, nausea and vomiting.   Genitourinary:  Negative for dysuria and hematuria.   Neurological:  Negative for headaches.     Objective   Vitals:    11/16/23 1416   BP: 128/65   Pulse: 88   SpO2: 100%   Weight: 78.7 kg (173 lb 9.6 oz)   Height: 182.9 cm (72\")     Body mass index is 23.54 kg/m².    Vitals reviewed.   Constitutional:       Appearance: Well-developed.   Eyes:      Conjunctiva/sclera: Conjunctivae normal.   HENT:      Head: Normocephalic.   Neck:      Thyroid: No thyromegaly.      Vascular: No JVD.      Trachea: No tracheal deviation.   Pulmonary:      Effort: No respiratory distress.      Breath sounds: Normal breath sounds. No wheezing. No rales.   Cardiovascular:      PMI at left midclavicular line. Normal rate. Regular rhythm. Normal S1. Normal S2.       Murmurs: There is a grade 3/6 midsystolic murmur at the LLSB.      No gallop.  No click. No rub.   Pulses:     Intact distal pulses.   Edema:     Peripheral edema absent.   Abdominal:      General: Bowel sounds are normal.      Palpations: Abdomen is soft. There is no abdominal mass.      Tenderness: There is no abdominal tenderness.   Musculoskeletal:      Cervical back: Normal range of motion and neck " "supple. Skin:     General: Skin is warm and dry.   Neurological:      Mental Status: Alert and oriented to person, place, and time.      Cranial Nerves: No cranial nerve deficit.         Lab Results   Component Value Date     05/25/2023    K 4.5 05/25/2023     05/25/2023    CO2 30.0 (H) 05/25/2023    BUN 15 05/25/2023    CREATININE 1.01 05/25/2023    GLUCOSE 133 (H) 05/25/2023    CALCIUM 8.9 05/25/2023    AST 17 12/08/2022    ALT 15 12/08/2022    ALKPHOS 96 12/08/2022     No results found for: \"CKTOTAL\"  Lab Results   Component Value Date    WBC 6.22 05/25/2023    HGB 9.9 (L) 05/25/2023    HCT 32.4 (L) 05/25/2023     05/25/2023     Lab Results   Component Value Date    INR 1.01 07/04/2022    INR 0.99 01/21/2022    INR 1.07 06/27/2021     Lab Results   Component Value Date    MG 2.2 03/11/2022     Lab Results   Component Value Date    TSH 1.620 06/27/2021    PSA 4.1 (H) 08/13/2020    TRIG 50 04/08/2022    HDL 40 04/08/2022    LDL 49 04/08/2022          Assessment & Plan    Diagnosis Plan   1. Fatigue, unspecified type        2. Precordial pain  Stress Test With Myocardial Perfusion (1 Day)      3. Sick sinus syndrome, s/p permanent dual-chamber pacemaker implantation with battery replacement in 2019 with a [employer] medical device, seems to functioning well.        4. Orthostatic hypotension, improved.        5. Essential hypertension, controlled.  metoprolol succinate XL (TOPROL-XL) 50 MG 24 hr tablet      6. Obstructive sleep apnea  Home Sleep Study      7. Frequent PVCs        8. Dyspnea on exertion  Adult Transthoracic Echo Complete w/ Color, Spectral and Contrast if necessary per protocol        Recommendations  For his frequent PVCs, will increase the dose of metoprolol succinate to 75 mg daily (1-1/2 of the 50 mg daily).  We will discontinue the losartan for now to avoid hypotension  We will evaluate his dyspnea with an echo Doppler study.  We will evaluate for obstructive sleep apnea with " a home sleep study.  Will evaluate for any myocardial ischemia with a Lexiscan sestamibi study.    Return in about 5 weeks (around 12/21/2023).    As always, Misti Nguyễn MD  I appreciate very much the opportunity to participate in the cardiovascular care of your patients. Please do not hesitate to call me with any questions with regards to Sandro Argueta's evaluation and management.       With Best Regards,        Gerson Smith MD, [facility]    Please note that portions of this note were completed with a voice recognition program.

## 2023-11-16 NOTE — PROGRESS NOTES
Physical Therapy Daily Treatment Note/Discharge      Patient: Sandro Argueta   : 1945  Referring practitioner: Misti Nguyễn MD  Date of Initial Visit: Type: THERAPY  Noted: 10/25/2023  Today's Date: 2023  Patient seen for 8 sessions       Visit Diagnoses:    ICD-10-CM ICD-9-CM   1. Chronic bilateral low back pain without sciatica  M54.50 724.2    G89.29 338.29   2. Decreased ROM of lumbar spine  M53.86 724.9       Subjective Evaluation    History of Present Illness    Subjective comment: Pt has no pain today and is ready for discharge.  /74 and HR 50.  Pt will see his cardiologist today due to fatigue and lower HR.       Objective   See Exercise, Manual, and Modality Logs for complete treatment.       Assessment & Plan       Assessment  Assessment details: Tx today consisted of HEP review and demonstration; back safety education and stm to lower back.  Pt demonstrated good understanding of HEP and reported no pain following session.    Plan  Plan details: Pt has met max gains at this time and will be discharged.  Pt instructed to contact therapy as needed.          Timed:         Manual Therapy:    14     mins  80206;     Therapeutic Exercise:    27     mins  55314;     Neuromuscular Avis:        mins  91024;    Therapeutic Activity:          mins  16899;     Gait Training:           mins  80693;     Ultrasound:          mins  67222;    Ionto                                   mins   68966  Self Care                            mins   03463  Canalith Repos         mins 36604      Un-Timed:  Electrical Stimulation:         mins  54377 ( );  Dry Needling          mins self-pay  Traction          mins 10902      Timed Treatment:   41   mins   Total Treatment:     41   mins    Chuy Christian PT  [place] License: [id number]      Electronically signed by Chuy Christian PT, 23, 11:08 AM EST

## 2023-11-24 DIAGNOSIS — I10 ESSENTIAL HYPERTENSION: ICD-10-CM

## 2023-11-27 RX ORDER — METOPROLOL SUCCINATE 50 MG/1
50 TABLET, EXTENDED RELEASE ORAL DAILY
Qty: 90 TABLET | OUTPATIENT
Start: 2023-11-27

## 2023-12-29 ENCOUNTER — HOSPITAL ENCOUNTER (OUTPATIENT)
Dept: CARDIOLOGY | Facility: HOSPITAL | Age: 78
Discharge: HOME | End: 2023-12-29
Payer: MEDICARE

## 2023-12-29 ENCOUNTER — HOSPITAL ENCOUNTER (OUTPATIENT)
Dept: NUCLEAR MEDICINE | Facility: HOSPITAL | Age: 78
Discharge: HOME | End: 2023-12-29
Payer: MEDICARE

## 2023-12-29 DIAGNOSIS — R06.09 DYSPNEA ON EXERTION: ICD-10-CM

## 2023-12-29 DIAGNOSIS — R07.2 PRECORDIAL PAIN: ICD-10-CM

## 2023-12-29 LAB
BH CV NUCLEAR PRIOR STUDY: 3
BH CV REST NUCLEAR ISOTOPE DOSE: 10.6 MCI
BH CV STRESS BP STAGE 1: NORMAL
BH CV STRESS COMMENTS STAGE 1: NORMAL
BH CV STRESS DOSE REGADENOSON STAGE 1: 0.4
BH CV STRESS DURATION MIN STAGE 1: 0
BH CV STRESS DURATION SEC STAGE 1: 10
BH CV STRESS HR STAGE 1: 95
BH CV STRESS NUCLEAR ISOTOPE DOSE: 30.6 MCI
BH CV STRESS PROTOCOL 1: NORMAL
BH CV STRESS RECOVERY BP: NORMAL MMHG
BH CV STRESS RECOVERY HR: 95 BPM
BH CV STRESS STAGE 1: 1
MAXIMAL PREDICTED HEART RATE: 143 BPM
PERCENT MAX PREDICTED HR: 66.43 %
STRESS BASELINE BP: NORMAL MMHG
STRESS BASELINE HR: 90 BPM
STRESS PERCENT HR: 78 %
STRESS POST PEAK BP: NORMAL MMHG
STRESS POST PEAK HR: 95 BPM
STRESS TARGET HR: 122 BPM

## 2023-12-29 PROCEDURE — A9500 TC99M SESTAMIBI: HCPCS | Performed by: INTERNAL MEDICINE

## 2023-12-29 PROCEDURE — 0 TECHNETIUM SESTAMIBI: Performed by: INTERNAL MEDICINE

## 2023-12-29 PROCEDURE — 93306 TTE W/DOPPLER COMPLETE: CPT

## 2023-12-29 PROCEDURE — 25010000002 REGADENOSON 0.4 MG/5ML SOLUTION: Performed by: INTERNAL MEDICINE

## 2023-12-29 PROCEDURE — 78452 HT MUSCLE IMAGE SPECT MULT: CPT

## 2023-12-29 PROCEDURE — 93017 CV STRESS TEST TRACING ONLY: CPT

## 2023-12-29 RX ORDER — REGADENOSON 0.08 MG/ML
0.4 INJECTION, SOLUTION INTRAVENOUS
Status: COMPLETED | OUTPATIENT
Start: 2023-12-29 | End: 2023-12-29

## 2023-12-29 RX ADMIN — TECHNETIUM TC 99M SESTAMIBI 1 DOSE: 1 INJECTION INTRAVENOUS at 07:51

## 2023-12-29 RX ADMIN — REGADENOSON 0.4 MG: 0.08 INJECTION, SOLUTION INTRAVENOUS at 08:34

## 2023-12-29 RX ADMIN — TECHNETIUM TC 99M SESTAMIBI 1 DOSE: 1 INJECTION INTRAVENOUS at 08:34

## 2023-12-31 LAB
BH CV ECHO MEAS - ACS: 2 CM
BH CV ECHO MEAS - AO MAX PG: 5.4 MMHG
BH CV ECHO MEAS - AO MEAN PG: 3 MMHG
BH CV ECHO MEAS - AO ROOT DIAM: 3.5 CM
BH CV ECHO MEAS - AO V2 MAX: 116 CM/SEC
BH CV ECHO MEAS - AO V2 VTI: 23.8 CM
BH CV ECHO MEAS - EDV(CUBED): 91.1 ML
BH CV ECHO MEAS - EDV(MOD-SP4): 56.4 ML
BH CV ECHO MEAS - EF(MOD-SP4): 42.2 %
BH CV ECHO MEAS - ESV(CUBED): 15.6 ML
BH CV ECHO MEAS - ESV(MOD-SP4): 32.6 ML
BH CV ECHO MEAS - FS: 44.4 %
BH CV ECHO MEAS - IVS/LVPW: 0.75 CM
BH CV ECHO MEAS - IVSD: 0.9 CM
BH CV ECHO MEAS - LA DIMENSION: 3.1 CM
BH CV ECHO MEAS - LAT PEAK E' VEL: 8.4 CM/SEC
BH CV ECHO MEAS - LV DIASTOLIC VOL/BSA (35-75): 28.2 CM2
BH CV ECHO MEAS - LV MASS(C)D: 164 GRAMS
BH CV ECHO MEAS - LV SYSTOLIC VOL/BSA (12-30): 16.3 CM2
BH CV ECHO MEAS - LVIDD: 4.5 CM
BH CV ECHO MEAS - LVIDS: 2.5 CM
BH CV ECHO MEAS - LVOT AREA: 3.5 CM2
BH CV ECHO MEAS - LVOT DIAM: 2.1 CM
BH CV ECHO MEAS - LVPWD: 1.2 CM
BH CV ECHO MEAS - MED PEAK E' VEL: 5.8 CM/SEC
BH CV ECHO MEAS - MV A MAX VEL: 98.5 CM/SEC
BH CV ECHO MEAS - MV E MAX VEL: 68.6 CM/SEC
BH CV ECHO MEAS - MV E/A: 0.7
BH CV ECHO MEAS - PA ACC TIME: 0.13 SEC
BH CV ECHO MEAS - RAP SYSTOLE: 10 MMHG
BH CV ECHO MEAS - RVSP: 36.8 MMHG
BH CV ECHO MEAS - SI(MOD-SP4): 11.9 ML/M2
BH CV ECHO MEAS - SV(MOD-SP4): 23.8 ML
BH CV ECHO MEAS - TAPSE (>1.6): 3 CM
BH CV ECHO MEAS - TR MAX PG: 26.8 MMHG
BH CV ECHO MEAS - TR MAX VEL: 259 CM/SEC
BH CV ECHO MEASUREMENTS AVERAGE E/E' RATIO: 9.66
LEFT ATRIUM VOLUME INDEX: 22.1 ML/M2

## 2024-01-02 LAB
BH CV NUCLEAR PRIOR STUDY: 3
BH CV REST NUCLEAR ISOTOPE DOSE: 10.6 MCI
BH CV STRESS BP STAGE 1: NORMAL
BH CV STRESS COMMENTS STAGE 1: NORMAL
BH CV STRESS DOSE REGADENOSON STAGE 1: 0.4
BH CV STRESS DURATION MIN STAGE 1: 0
BH CV STRESS DURATION SEC STAGE 1: 10
BH CV STRESS HR STAGE 1: 95
BH CV STRESS NUCLEAR ISOTOPE DOSE: 30.6 MCI
BH CV STRESS PROTOCOL 1: NORMAL
BH CV STRESS RECOVERY BP: NORMAL MMHG
BH CV STRESS RECOVERY HR: 95 BPM
BH CV STRESS STAGE 1: 1
LV EF NUC BP: 64 %
MAXIMAL PREDICTED HEART RATE: 143 BPM
PERCENT MAX PREDICTED HR: 66.43 %
STRESS BASELINE BP: NORMAL MMHG
STRESS BASELINE HR: 90 BPM
STRESS PERCENT HR: 78 %
STRESS POST PEAK BP: NORMAL MMHG
STRESS POST PEAK HR: 95 BPM
STRESS TARGET HR: 122 BPM

## 2024-01-11 ENCOUNTER — OFFICE VISIT (OUTPATIENT)
Dept: CARDIOLOGY | Facility: CLINIC | Age: 79
End: 2024-01-11
Payer: MEDICARE

## 2024-01-11 VITALS
SYSTOLIC BLOOD PRESSURE: 116 MMHG | HEART RATE: 60 BPM | DIASTOLIC BLOOD PRESSURE: 67 MMHG | HEIGHT: 72 IN | WEIGHT: 171 LBS | BODY MASS INDEX: 23.16 KG/M2 | OXYGEN SATURATION: 97 %

## 2024-01-11 DIAGNOSIS — I49.5 SICK SINUS SYNDROME: Primary | ICD-10-CM

## 2024-01-11 DIAGNOSIS — I10 ESSENTIAL HYPERTENSION: ICD-10-CM

## 2024-01-11 DIAGNOSIS — G47.30 SLEEP APNEA, UNSPECIFIED TYPE: ICD-10-CM

## 2024-01-11 PROCEDURE — 3078F DIAST BP <80 MM HG: CPT | Performed by: PHYSICIAN ASSISTANT

## 2024-01-11 PROCEDURE — 99214 OFFICE O/P EST MOD 30 MIN: CPT | Performed by: PHYSICIAN ASSISTANT

## 2024-01-11 PROCEDURE — 3074F SYST BP LT 130 MM HG: CPT | Performed by: PHYSICIAN ASSISTANT

## 2024-01-11 RX ORDER — INSULIN ASPART 100 [IU]/ML
INJECTION, SOLUTION INTRAVENOUS; SUBCUTANEOUS
COMMUNITY

## 2024-01-11 NOTE — PROGRESS NOTES
Misti Nguyễn MD  Sandro Argueta  1945  01/11/2024    Patient Active Problem List   Diagnosis    Left elbow pain    Essential hypertension    DM2 (diabetes mellitus, type 2)    Elevated prostate specific antigen (PSA)    Epigastric pain    Epigastric abdominal pain    Elevated liver enzymes    Weight loss, abnormal    Acute hepatitis    Anemia    Obstructive jaundice    Pneumothorax, left    Cytokine release syndrome, grade 1    Esophageal rupture    Hypotension, unspecified    Empyema with fistula    Infection of skin and subcutaneous tissue    Sick sinus syndrome, s/p permanent dual-chamber pacemaker implantation with battery replacement in 2019 with a [employer] medical device.       Dear Misti Nguyễn MD:    Subjective     History of Present Illness:    Chief Complaint   Patient presents with    Follow-up     Stress and echo       Sandro Argueta is a pleasant 78 y.o. male with a past medical history significant for Sick sinus syndrome status post permanent pacemaker implantation with [employer] medical device with generator change in 2019.  He also has history of significant orthostatic hypotension in the past.  He also has a history of diabetes mellitus.  He also has history of esophageal rupture that occurred in February 2022 following an episode of severe vomiting status post repair and stenting of the esophagus.  He also has history of Whipple procedure for at the time highly suspicious pancreatic cancer however following the procedure he was found to he was not cancerous.  He comes in today for cardiology follow-up.    Sandro comes in today after having echocardiogram and stress test performed.  Stress test showed normal myocardial perfusion with no evidence of ischemia.  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  Speaking to him today he reports he has been  "doing well he reports since dose adjustment with his metoprolol most of his symptoms have resolved he denies any recurrent chest pains today or further dyspnea.  He did also have sleep study performed which did show moderate sleep apnea..  He denies any falls, syncope, or near syncope.    Allergies   Allergen Reactions    Other Anaphylaxis and Swelling     Patient had a reaction to the COVID vaccine. Pt states his tongue and lips started swelling   :      Current Outpatient Medications:     Insulin Aspart (novoLOG) 100 UNIT/ML injection, Inject  under the skin into the appropriate area as directed., Disp: , Rfl:     losartan (COZAAR) 50 MG tablet, TAKE 1 TABLET BY MOUTH DAILY, Disp: 30 tablet, Rfl: 3    metoprolol succinate XL (TOPROL-XL) 50 MG 24 hr tablet, Take 1.5 tablets by mouth Daily., Disp: 45 tablet, Rfl: 2    Pancrelipase, Lip-Prot-Amyl, (CREON) [id number] units capsule delayed-release particles capsule, Take 1 capsule by mouth 3 (Three) Times a Day With Meals. Take 2 capsules with large meals and 1 capsule with snacks, Disp: 240 capsule, Rfl: 11    The following portions of the patient's history were reviewed and updated as appropriate: allergies, current medications, past family history, past medical history, past social history, past surgical history and problem list.    Social History     Tobacco Use    Smoking status: Never    Smokeless tobacco: Never   Vaping Use    Vaping Use: Never used   Substance Use Topics    Alcohol use: No    Drug use: No         Objective   Vitals:    01/11/24 1132   BP: 116/67   Pulse: 60   SpO2: 97%   Weight: 77.6 kg (171 lb)   Height: 182.9 cm (72\")     Body mass index is 23.19 kg/m².    ROS    Constitutional:       General: Not in acute distress.     Appearance: Healthy appearance. Well-developed and not in distress. Not diaphoretic.   Eyes:      Conjunctiva/sclera: Conjunctivae normal.      Pupils: Pupils are equal, round, and reactive to light.   HENT:      Head: " "Normocephalic and atraumatic.   Neck:      Vascular: No carotid bruit or JVD.   Pulmonary:      Effort: Pulmonary effort is normal. No respiratory distress.      Breath sounds: Normal breath sounds.   Cardiovascular:      Normal rate. Regular rhythm.   Edema:     Peripheral edema absent.   Skin:     General: Skin is cool.   Neurological:      Mental Status: Alert, oriented to person, place, and time and oriented to person, place and time.         Lab Results   Component Value Date     05/25/2023    K 4.5 05/25/2023     05/25/2023    CO2 30.0 (H) 05/25/2023    BUN 15 05/25/2023    CREATININE 1.01 05/25/2023    GLUCOSE 133 (H) 05/25/2023    CALCIUM 8.9 05/25/2023    AST 17 12/08/2022    ALT 15 12/08/2022    ALKPHOS 96 12/08/2022     No results found for: \"CKTOTAL\"  Lab Results   Component Value Date    WBC 6.22 05/25/2023    HGB 9.9 (L) 05/25/2023    HCT 32.4 (L) 05/25/2023     05/25/2023     Lab Results   Component Value Date    INR 1.01 07/04/2022    INR 0.99 01/21/2022    INR 1.07 06/27/2021     Lab Results   Component Value Date    MG 2.2 03/11/2022     Lab Results   Component Value Date    TSH 1.620 06/27/2021    PSA 4.1 (H) 08/13/2020    TRIG 50 04/08/2022    HDL 40 04/08/2022    LDL 49 04/08/2022      No results found for: \"BNP\"    During this visit the following were done:  Labs Reviewed []    Labs Ordered []    Radiology Reports Reviewed []    Radiology Ordered []    PCP Records Reviewed []    Referring Provider Records Reviewed []    ER Records Reviewed []    Hospital Records Reviewed []    History Obtained From Family []    Radiology Images Reviewed []    Other Reviewed []    Records Requested []       Procedures    Assessment & Plan    Diagnosis Plan   1. Sick sinus syndrome, s/p permanent dual-chamber pacemaker implantation with battery replacement in 2019 with a [employer] medical device.        2. Essential hypertension        3. 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 Smith after reviewing echocardiogram.  Given Sandro's higher risk for CELESTINO given his history of esophageal rupture this mitral valve mass is likely calcified chordae tendineae.  Currently he is asymptomatic and CELESTINO would be high risk we ultimately decided to continue monitoring for now.  I discussed these findings with the patient who expressed understanding.  Moderate obstructive sleep apnea  Discussed about referral for consideration of CPAP he is reluctant but ultimately agreed for the referral  Essential hypertension  Now better controlled.  I did ask him to bring blood pressure device into our office to evaluate its accuracy    Return in about 3 months (around 4/11/2024).    As always, I appreciate very much the opportunity to participate in the cardiovascular care of your patients.      With Best Regards,    Henrique Zuniga PA-C

## 2024-01-30 ENCOUNTER — OFFICE VISIT (OUTPATIENT)
Dept: PULMONOLOGY | Facility: CLINIC | Age: 79
End: 2024-01-30
Payer: MEDICARE

## 2024-01-30 VITALS
HEIGHT: 72 IN | WEIGHT: 173.2 LBS | TEMPERATURE: 97.1 F | DIASTOLIC BLOOD PRESSURE: 88 MMHG | OXYGEN SATURATION: 99 % | BODY MASS INDEX: 23.46 KG/M2 | HEART RATE: 78 BPM | SYSTOLIC BLOOD PRESSURE: 142 MMHG

## 2024-01-30 DIAGNOSIS — G47.33 OSA (OBSTRUCTIVE SLEEP APNEA): Primary | ICD-10-CM

## 2024-01-30 PROCEDURE — 99213 OFFICE O/P EST LOW 20 MIN: CPT | Performed by: NURSE PRACTITIONER

## 2024-01-30 PROCEDURE — 3077F SYST BP >= 140 MM HG: CPT | Performed by: NURSE PRACTITIONER

## 2024-01-30 PROCEDURE — 1160F RVW MEDS BY RX/DR IN RCRD: CPT | Performed by: NURSE PRACTITIONER

## 2024-01-30 PROCEDURE — 3079F DIAST BP 80-89 MM HG: CPT | Performed by: NURSE PRACTITIONER

## 2024-01-30 PROCEDURE — 1159F MED LIST DOCD IN RCRD: CPT | Performed by: NURSE PRACTITIONER

## 2024-01-30 RX ORDER — INSULIN LISPRO 100 [IU]/ML
INJECTION, SOLUTION INTRAVENOUS; SUBCUTANEOUS
COMMUNITY
Start: 2024-01-23

## 2024-01-30 NOTE — PROGRESS NOTES
"Chief Complaint  Sleep Apnea (HST in media)    Subjective        Sandro Argueta presents to [facility]  History of Present Illness    Mr. Argueta is a 78 year old male with a medical history significant for CAD, diabetes, GERD, hypertension and sleep apnea.    He presents today for evaluation of sleep apnea.  He states that he underwent a home sleep study.  The home sleep study is consistent with moderate sleep apnea.      Objective   Vital Signs:  /88   Pulse 78   Temp 97.1 °F (36.2 °C)   Ht 182.9 cm (72\")   Wt 78.6 kg (173 lb 3.2 oz)   SpO2 99%   BMI 23.49 kg/m²   Estimated body mass index is 23.49 kg/m² as calculated from the following:    Height as of this encounter: 182.9 cm (72\").    Weight as of this encounter: 78.6 kg (173 lb 3.2 oz).       BMI is within normal parameters. No other follow-up for BMI required.      Physical Exam     GENERAL APPEARANCE: Well developed, well nourished, alert and cooperative, and appears to be in no acute distress.    HEAD: normocephalic. Atraumatic.    EYES: PERRL, EOMI. Vision is grossly intact.    THROAT: Oral cavity and pharynx normal. No inflammation, swelling, exudate, or lesions.     NECK: Neck supple.  No thyromegaly.    CARDIAC: Normal S1 and S2. No S3, S4 or murmurs. Rhythm is regular.     RESPIRATORY:Bilateral air entry positive. Bilateral diminished breath sounds. No wheezing, crackles or rhonchi noted.    GI: Positive bowel sounds. Soft, nondistended, nontender.     MUSCULOSKELETAL: No significant deformity or joint abnormality. No edema. Peripheral pulses intact. No varicosities.    NEUROLOGICAL: Strength and sensation symmetric and intact throughout.     PSYCHIATRIC: The mental examination revealed the patient was oriented to person, place, and time. ]  Result Review :    The following data was reviewed by: KAM Hernandez on 01/30/2024:  Common labs          5/21/2023    09:14 5/23/2023    " 14:49 5/25/2023    15:53   Common Labs   Glucose 157  269  133    BUN 13  15  15    Creatinine 0.97  1.11  1.01    Sodium 136  137  138    Potassium 4.4  4.4  4.5    Chloride 100  100  101    Calcium 8.8  8.7  8.9    WBC 6.48  6.87  6.22    Hemoglobin 11.0  9.5  9.9    Hematocrit 36.5  31.4  32.4    Platelets 366  355  384                   Assessment and Plan     Diagnoses and all orders for this visit:    1. JONATHON (obstructive sleep apnea) (Primary)  -     Detailed AutoPAP Order        Moderate sleep apnea is noted on home sleep study.  Ordered autopap for him to start using nightly.   Patient was educated on positive airway pressure treatment.  As per CMS guidelines, more than 4 hours on 70% of observed nights is considered adherence. Patient was strongly encouraged to use CPAP as much as possible during sleep as more CPAP use is equal to more benefit. Use of heated humidification in positive airway pressure treatment to improve the adherence to the device.  In case of claustrophobia, we will provide the patient cognitive behavioral therapy and desensitization. Oral appliances use will be discussed with the patient in case of mild to moderate sleep apnea or if the patient with severe disease fail positive airway pressure treatment.       The patient was extensively educated on the consequences of untreated obstructive sleep apnea namely cardiovascular/metabolic disorder, neurocognitive deficit, daytime sleepiness, motor vehicle accidents, depression, mood disorders and reduced quality of life.  At the end of conversation, the patient voices understanding of the disease process and treatment modality.  Patient also understands the risk of untreated obstructive sleep apnea and benefit benefits of the treatment.    Counseling time was greater than 10 minutes.      Follow Up     Return in about 3 months (around 4/30/2024).  Patient was given instructions and counseling regarding his condition or for health maintenance  advice. Please see specific information pulled into the AVS if appropriate.

## 2024-02-02 ENCOUNTER — PATIENT ROUNDING (BHMG ONLY) (OUTPATIENT)
Dept: PULMONOLOGY | Facility: CLINIC | Age: 79
End: 2024-02-02
Payer: MEDICARE

## 2024-02-02 NOTE — PROGRESS NOTES
February 2, 2024    Hello, may I speak with Sandro Argueta?    My name is Harriet Raza      I am  with [facility]  [address]  [place] [address] [phone].    Before we get started may I verify your date of birth? 1945    I am calling to officially welcome you to our practice and ask about your recent visit. Is this a good time to talk? yes    Tell me about your visit with us. What things went well?  Everyone was nice, pleased with visit.       We're always looking for ways to make our patients' experiences even better. Do you have recommendations on ways we may improve?  no    Overall were you satisfied with your first visit to our practice? yes       I appreciate you taking the time to speak with me today. Is there anything else I can do for you? no      Thank you, and have a great day.

## 2024-02-07 RX ORDER — LOSARTAN POTASSIUM 50 MG/1
50 TABLET ORAL DAILY
Qty: 90 TABLET | Refills: 0 | Status: SHIPPED | OUTPATIENT
Start: 2024-02-07

## 2024-02-12 DIAGNOSIS — I10 ESSENTIAL HYPERTENSION: ICD-10-CM

## 2024-02-12 RX ORDER — METOPROLOL SUCCINATE 50 MG/1
75 TABLET, EXTENDED RELEASE ORAL DAILY
Qty: 45 TABLET | Refills: 2 | Status: SHIPPED | OUTPATIENT
Start: 2024-02-12

## 2024-05-06 RX ORDER — LOSARTAN POTASSIUM 50 MG/1
50 TABLET ORAL DAILY
Qty: 90 TABLET | Refills: 0 | Status: SHIPPED | OUTPATIENT
Start: 2024-05-06

## 2024-05-09 DIAGNOSIS — I10 ESSENTIAL HYPERTENSION: ICD-10-CM

## 2024-05-09 RX ORDER — METOPROLOL SUCCINATE 50 MG/1
75 TABLET, EXTENDED RELEASE ORAL DAILY
Qty: 135 TABLET | Refills: 1 | Status: SHIPPED | OUTPATIENT
Start: 2024-05-09

## 2024-05-21 ENCOUNTER — TELEPHONE (OUTPATIENT)
Dept: CARDIOLOGY | Facility: CLINIC | Age: 79
End: 2024-05-21

## 2024-05-21 ENCOUNTER — OFFICE VISIT (OUTPATIENT)
Dept: CARDIOLOGY | Facility: CLINIC | Age: 79
End: 2024-05-21
Payer: MEDICARE

## 2024-05-21 VITALS
RESPIRATION RATE: 16 BRPM | BODY MASS INDEX: 23.98 KG/M2 | OXYGEN SATURATION: 95 % | HEIGHT: 72 IN | HEART RATE: 59 BPM | SYSTOLIC BLOOD PRESSURE: 98 MMHG | WEIGHT: 177 LBS | DIASTOLIC BLOOD PRESSURE: 56 MMHG

## 2024-05-21 DIAGNOSIS — I10 ESSENTIAL HYPERTENSION: ICD-10-CM

## 2024-05-21 DIAGNOSIS — I49.5 SICK SINUS SYNDROME: Primary | ICD-10-CM

## 2024-05-21 PROCEDURE — 3078F DIAST BP <80 MM HG: CPT | Performed by: PHYSICIAN ASSISTANT

## 2024-05-21 PROCEDURE — 99213 OFFICE O/P EST LOW 20 MIN: CPT | Performed by: PHYSICIAN ASSISTANT

## 2024-05-21 PROCEDURE — 1159F MED LIST DOCD IN RCRD: CPT | Performed by: PHYSICIAN ASSISTANT

## 2024-05-21 PROCEDURE — 93000 ELECTROCARDIOGRAM COMPLETE: CPT | Performed by: PHYSICIAN ASSISTANT

## 2024-05-21 PROCEDURE — 3074F SYST BP LT 130 MM HG: CPT | Performed by: PHYSICIAN ASSISTANT

## 2024-05-21 PROCEDURE — 1160F RVW MEDS BY RX/DR IN RCRD: CPT | Performed by: PHYSICIAN ASSISTANT

## 2024-05-21 NOTE — TELEPHONE ENCOUNTER
Caller: Sandro Argueta    Relationship: Self    Best call back number: [phone]    What is the best time to reach you: ANYTIME    Who are you requesting to speak with (clinical staff, provider,  specific staff member): CLINICAL    Do you know the name of the person who called: N/A    What was the call regarding: PATIENT IS RETURNING A CALL TO [facility]- OFFICE. PATIENT STATES THE CALL WAS ABOUT PACE MAKER CHECK APPOINTMENT. PLEASE REACH BACK OUT.    Is it okay if the provider responds through MyChart: CALL

## 2024-05-21 NOTE — PROGRESS NOTES
Misti Nguyễn MD  Sandro Argueta  1945  05/21/2024    Patient Active Problem List   Diagnosis    Left elbow pain    Essential hypertension    DM2 (diabetes mellitus, type 2)    Elevated prostate specific antigen (PSA)    Epigastric pain    Epigastric abdominal pain    Elevated liver enzymes    Weight loss, abnormal    Acute hepatitis    Anemia    Obstructive jaundice    Pneumothorax, left    Cytokine release syndrome, grade 1    Esophageal rupture    Hypotension, unspecified    Empyema with fistula    Infection of skin and subcutaneous tissue    Sick sinus syndrome, s/p permanent dual-chamber pacemaker implantation with battery replacement in 2019 with a [employer] medical device.       Dear Misti Nguyễn MD:    Subjective     History of Present Illness:    Chief Complaint   Patient presents with    Follow-up     4 mos    Med Management     verbal       Sandro Argueta is a pleasant 79 y.o. male with a past medical history significant for Sick sinus syndrome status post permanent pacemaker implantation with [employer] medical device with generator change in 2019.  He also has history of significant orthostatic hypotension in the past.  He also has a history of diabetes mellitus.  He also has history of esophageal rupture that occurred in February 2022 following an episode of severe vomiting status post repair and stenting of the esophagus.  He also has history of Whipple procedure for at the time highly suspicious pancreatic cancer however following the procedure he was found to he was not cancerous.  He comes in today for cardiology follow-up.     Patient denies any chest pain, shortness of breath, palpitations, dizziness, syncope or near syncope. He reports he recently had labs from pcp. Blood pressure has been controlled and is even mildly hypotensive today.         Allergies   Allergen Reactions    Other Anaphylaxis and Swelling     Patient had a reaction to the COVID vaccine. Pt states his  "tongue and lips started swelling   :      Current Outpatient Medications:     Insulin Aspart (novoLOG) 100 UNIT/ML injection, Inject  under the skin into the appropriate area as directed., Disp: , Rfl:     Insulin Lispro (humaLOG) 100 UNIT/ML injection, , Disp: , Rfl:     losartan (COZAAR) 50 MG tablet, TAKE 1 TABLET BY MOUTH DAILY, Disp: 90 tablet, Rfl: 0    metoprolol succinate XL (TOPROL-XL) 50 MG 24 hr tablet, TAKE 1 AND 1/2 TABLET BY MOUTH DAILY, Disp: 135 tablet, Rfl: 1    Pancrelipase, Lip-Prot-Amyl, (CREON) 84948-901711 units capsule delayed-release particles capsule, Take 1 capsule by mouth 3 (Three) Times a Day With Meals. Take 2 capsules with large meals and 1 capsule with snacks, Disp: 240 capsule, Rfl: 11    cholecalciferol (VITAMIN D3) 1.25 MG (07334 UT) capsule, Take 1 capsule by mouth Every 7 (Seven) Days., Disp: , Rfl:     The following portions of the patient's history were reviewed and updated as appropriate: allergies, current medications, past family history, past medical history, past social history, past surgical history and problem list.    Social History     Tobacco Use    Smoking status: Never     Passive exposure: Never    Smokeless tobacco: Never   Vaping Use    Vaping status: Never Used   Substance Use Topics    Alcohol use: No    Drug use: No         Objective   Vitals:    05/21/24 1308 05/21/24 1312   BP: 95/54 98/56   Pulse: 60 59   Resp: 16    SpO2: 95%    Weight: 80.3 kg (177 lb)    Height: 182.9 cm (72\")      Body mass index is 24.01 kg/m².    ROS    Constitutional:       General: Not in acute distress.     Appearance: Healthy appearance. Well-developed and not in distress. Not diaphoretic.   Eyes:      Conjunctiva/sclera: Conjunctivae normal.      Pupils: Pupils are equal, round, and reactive to light.   HENT:      Head: Normocephalic and atraumatic.   Neck:      Vascular: No carotid bruit or JVD.   Pulmonary:      Effort: Pulmonary effort is normal. No respiratory distress.      " "Breath sounds: Normal breath sounds.   Cardiovascular:      Normal rate. Regular rhythm.   Edema:     Peripheral edema absent.   Skin:     General: Skin is cool.   Neurological:      Mental Status: Alert, oriented to person, place, and time and oriented to person, place and time.         Lab Results   Component Value Date     05/25/2023    K 4.5 05/25/2023     05/25/2023    CO2 30.0 (H) 05/25/2023    BUN 15 05/25/2023    CREATININE 1.01 05/25/2023    GLUCOSE 133 (H) 05/25/2023    CALCIUM 8.9 05/25/2023    AST 17 12/08/2022    ALT 15 12/08/2022    ALKPHOS 96 12/08/2022     No results found for: \"CKTOTAL\"  Lab Results   Component Value Date    WBC 6.22 05/25/2023    HGB 9.9 (L) 05/25/2023    HCT 32.4 (L) 05/25/2023     05/25/2023     Lab Results   Component Value Date    INR 1.01 07/04/2022    INR 0.99 01/21/2022    INR 1.07 06/27/2021     Lab Results   Component Value Date    MG 2.2 03/11/2022     Lab Results   Component Value Date    TSH 1.620 06/27/2021    PSA 4.1 (H) 08/13/2020    TRIG 50 04/08/2022    HDL 40 04/08/2022    LDL 49 04/08/2022      No results found for: \"BNP\"    During this visit the following were done:  Labs Reviewed []    Labs Ordered []    Radiology Reports Reviewed []    Radiology Ordered []    PCP Records Reviewed []    Referring Provider Records Reviewed []    ER Records Reviewed []    Hospital Records Reviewed []    History Obtained From Family []    Radiology Images Reviewed []    Other Reviewed []    Records Requested []         ECG 12 Lead    Date/Time: 5/21/2024 1:09 PM  Performed by: Henrique Zuniga PA-C    Authorized by: Henrique Zuniga PA-C  Comparison: compared with previous ECG   Similar to previous ECG  Rhythm: sinus rhythm  Conduction: right bundle branch block  Pacing: atrial sensed rhythm and 100% capture  Clinical impression: non-specific ECG          Assessment & Plan    Diagnosis Plan   1. Sick sinus syndrome, s/p permanent dual-chamber pacemaker " implantation with battery replacement in 2019 with a [employer] medical device.        2. Essential hypertension               Recommendations:  Sick sinus syndrome status post permanent pacemaker implantation  Patient asymptomatic patient due for pacemaker interrogation we will schedule this.  Essential hypertension  Currently well-controlled borderline low he reports he checks at home and typically is 130 mmHg systolic.      Return in about 6 months (around 11/21/2024).    As always, I appreciate very much the opportunity to participate in the cardiovascular care of your patients.      With Best Regards,    Henrique Zuniga PA-C

## 2024-05-22 ENCOUNTER — TELEPHONE (OUTPATIENT)
Dept: CARDIOLOGY | Facility: CLINIC | Age: 79
End: 2024-05-22
Payer: MEDICARE

## 2024-05-22 NOTE — TELEPHONE ENCOUNTER
Henrique Zuniga, SANTHOSH  [facility]  Can we get labs from pcp    Faxing request

## 2024-06-10 DIAGNOSIS — I10 ESSENTIAL HYPERTENSION: ICD-10-CM

## 2024-06-10 RX ORDER — METOPROLOL SUCCINATE 50 MG/1
75 TABLET, EXTENDED RELEASE ORAL DAILY
Qty: 135 TABLET | Refills: 0 | Status: SHIPPED | OUTPATIENT
Start: 2024-06-10

## 2024-06-13 ENCOUNTER — CLINICAL SUPPORT NO REQUIREMENTS (OUTPATIENT)
Dept: CARDIOLOGY | Facility: CLINIC | Age: 79
End: 2024-06-13
Payer: MEDICARE

## 2024-06-13 DIAGNOSIS — Z95.0 CARDIAC PACEMAKER IN SITU: Primary | ICD-10-CM

## 2024-08-02 RX ORDER — LOSARTAN POTASSIUM 50 MG/1
50 TABLET ORAL DAILY
Qty: 90 TABLET | Refills: 0 | Status: SHIPPED | OUTPATIENT
Start: 2024-08-02

## 2024-08-06 ENCOUNTER — HOSPITAL ENCOUNTER (OUTPATIENT)
Dept: GENERAL RADIOLOGY | Facility: HOSPITAL | Age: 79
Discharge: HOME | End: 2024-08-06
Admitting: INTERNAL MEDICINE
Payer: MEDICARE

## 2024-08-06 ENCOUNTER — TRANSCRIBE ORDERS (OUTPATIENT)
Dept: ADMINISTRATIVE | Facility: HOSPITAL | Age: 79
End: 2024-08-06
Payer: MEDICARE

## 2024-08-06 DIAGNOSIS — R04.2 COUGHING UP BLOOD: ICD-10-CM

## 2024-08-06 DIAGNOSIS — R04.2 COUGHING UP BLOOD: Primary | ICD-10-CM

## 2024-08-06 PROCEDURE — 71046 X-RAY EXAM CHEST 2 VIEWS: CPT

## 2024-08-06 PROCEDURE — 71046 X-RAY EXAM CHEST 2 VIEWS: CPT | Performed by: RADIOLOGY

## 2024-08-12 ENCOUNTER — TRANSCRIBE ORDERS (OUTPATIENT)
Dept: ADMINISTRATIVE | Facility: HOSPITAL | Age: 79
End: 2024-08-12
Payer: MEDICARE

## 2024-08-12 DIAGNOSIS — I51.7 CARDIOMEGALY: Primary | ICD-10-CM

## 2024-08-12 RX ORDER — LOSARTAN POTASSIUM 50 MG/1
50 TABLET ORAL DAILY
Qty: 90 TABLET | Refills: 0 | Status: SHIPPED | OUTPATIENT
Start: 2024-08-12

## 2024-09-09 ENCOUNTER — TRANSCRIBE ORDERS (OUTPATIENT)
Dept: ADMINISTRATIVE | Facility: HOSPITAL | Age: 79
End: 2024-09-09
Payer: MEDICARE

## 2024-09-09 ENCOUNTER — HOSPITAL ENCOUNTER (OUTPATIENT)
Dept: CT IMAGING | Facility: HOSPITAL | Age: 79
Discharge: HOME | End: 2024-09-09
Admitting: INTERNAL MEDICINE
Payer: MEDICARE

## 2024-09-09 DIAGNOSIS — R04.2 BLOOD IN SPUTUM: ICD-10-CM

## 2024-09-09 DIAGNOSIS — R04.2 BLOOD IN SPUTUM: Primary | ICD-10-CM

## 2024-09-09 PROCEDURE — 71260 CT THORAX DX C+: CPT

## 2024-09-09 PROCEDURE — 25510000001 IOPAMIDOL 61 % SOLUTION: Performed by: INTERNAL MEDICINE

## 2024-09-09 PROCEDURE — 71260 CT THORAX DX C+: CPT | Performed by: RADIOLOGY

## 2024-09-09 PROCEDURE — 82565 ASSAY OF CREATININE: CPT

## 2024-09-09 RX ORDER — IOPAMIDOL 612 MG/ML
100 INJECTION, SOLUTION INTRAVASCULAR
Status: COMPLETED | OUTPATIENT
Start: 2024-09-09 | End: 2024-09-09

## 2024-09-09 RX ADMIN — IOPAMIDOL 70 ML: 612 INJECTION, SOLUTION INTRAVENOUS at 13:21

## 2024-09-10 LAB — CREAT BLDA-MCNC: 1.1 MG/DL (ref 0.6–1.3)

## 2024-09-17 ENCOUNTER — OFFICE VISIT (OUTPATIENT)
Dept: UROLOGY | Facility: CLINIC | Age: 79
End: 2024-09-17
Payer: MEDICARE

## 2024-09-17 VITALS
BODY MASS INDEX: 23.16 KG/M2 | WEIGHT: 171 LBS | SYSTOLIC BLOOD PRESSURE: 136 MMHG | DIASTOLIC BLOOD PRESSURE: 76 MMHG | HEIGHT: 72 IN | HEART RATE: 67 BPM

## 2024-09-17 DIAGNOSIS — R97.20 ELEVATED PROSTATE SPECIFIC ANTIGEN (PSA): Primary | ICD-10-CM

## 2024-09-17 PROCEDURE — 84153 ASSAY OF PSA TOTAL: CPT | Performed by: UROLOGY

## 2024-09-17 PROCEDURE — 1160F RVW MEDS BY RX/DR IN RCRD: CPT | Performed by: UROLOGY

## 2024-09-17 PROCEDURE — 1159F MED LIST DOCD IN RCRD: CPT | Performed by: UROLOGY

## 2024-09-17 PROCEDURE — 99203 OFFICE O/P NEW LOW 30 MIN: CPT | Performed by: UROLOGY

## 2024-09-17 PROCEDURE — 84154 ASSAY OF PSA FREE: CPT | Performed by: UROLOGY

## 2024-09-17 PROCEDURE — 36415 COLL VENOUS BLD VENIPUNCTURE: CPT | Performed by: UROLOGY

## 2024-09-17 PROCEDURE — 3078F DIAST BP <80 MM HG: CPT | Performed by: UROLOGY

## 2024-09-17 PROCEDURE — 3075F SYST BP GE 130 - 139MM HG: CPT | Performed by: UROLOGY

## 2024-09-19 ENCOUNTER — PATIENT ROUNDING (BHMG ONLY) (OUTPATIENT)
Dept: UROLOGY | Facility: CLINIC | Age: 79
End: 2024-09-19
Payer: MEDICARE

## 2024-09-19 DIAGNOSIS — I10 ESSENTIAL HYPERTENSION: ICD-10-CM

## 2024-09-19 LAB
PSA FREE MFR SERPL: 22.4 %
PSA FREE SERPL-MCNC: 1.14 NG/ML
PSA SERPL-MCNC: 5.1 NG/ML (ref 0–4)

## 2024-09-19 RX ORDER — METOPROLOL SUCCINATE 50 MG/1
75 TABLET, EXTENDED RELEASE ORAL DAILY
Qty: 135 TABLET | Refills: 0 | Status: SHIPPED | OUTPATIENT
Start: 2024-09-19

## 2024-09-24 ENCOUNTER — TELEPHONE (OUTPATIENT)
Dept: UROLOGY | Facility: CLINIC | Age: 79
End: 2024-09-24
Payer: MEDICARE

## 2024-09-26 ENCOUNTER — OFFICE VISIT (OUTPATIENT)
Dept: PULMONOLOGY | Facility: CLINIC | Age: 79
End: 2024-09-26
Payer: MEDICARE

## 2024-09-26 VITALS
HEIGHT: 72 IN | DIASTOLIC BLOOD PRESSURE: 66 MMHG | WEIGHT: 173.6 LBS | OXYGEN SATURATION: 94 % | SYSTOLIC BLOOD PRESSURE: 122 MMHG | TEMPERATURE: 95.8 F | HEART RATE: 78 BPM | BODY MASS INDEX: 23.51 KG/M2

## 2024-09-26 DIAGNOSIS — R93.89 ABNORMAL CT OF THE CHEST: ICD-10-CM

## 2024-09-26 DIAGNOSIS — R04.2 HEMOPTYSIS: Primary | ICD-10-CM

## 2024-11-12 RX ORDER — LOSARTAN POTASSIUM 50 MG/1
50 TABLET ORAL DAILY
Qty: 90 TABLET | Refills: 0 | Status: SHIPPED | OUTPATIENT
Start: 2024-11-12

## 2024-11-12 NOTE — TELEPHONE ENCOUNTER
Patient needs:     Losartan 50mg.     [facility] [address]     Thanks!

## 2024-11-21 ENCOUNTER — OFFICE VISIT (OUTPATIENT)
Dept: CARDIOLOGY | Facility: CLINIC | Age: 79
End: 2024-11-21
Payer: MEDICARE

## 2024-11-21 VITALS
HEIGHT: 72 IN | DIASTOLIC BLOOD PRESSURE: 61 MMHG | WEIGHT: 175.2 LBS | HEART RATE: 92 BPM | BODY MASS INDEX: 23.73 KG/M2 | OXYGEN SATURATION: 100 % | SYSTOLIC BLOOD PRESSURE: 112 MMHG

## 2024-11-21 DIAGNOSIS — I49.5 SICK SINUS SYNDROME: Primary | ICD-10-CM

## 2024-11-21 DIAGNOSIS — I10 ESSENTIAL HYPERTENSION: ICD-10-CM

## 2024-11-21 PROCEDURE — 1160F RVW MEDS BY RX/DR IN RCRD: CPT | Performed by: PHYSICIAN ASSISTANT

## 2024-11-21 PROCEDURE — 1159F MED LIST DOCD IN RCRD: CPT | Performed by: PHYSICIAN ASSISTANT

## 2024-11-21 PROCEDURE — 3074F SYST BP LT 130 MM HG: CPT | Performed by: PHYSICIAN ASSISTANT

## 2024-11-21 PROCEDURE — 3078F DIAST BP <80 MM HG: CPT | Performed by: PHYSICIAN ASSISTANT

## 2024-11-21 PROCEDURE — 99214 OFFICE O/P EST MOD 30 MIN: CPT | Performed by: PHYSICIAN ASSISTANT

## 2024-11-21 NOTE — PROGRESS NOTES
Misti Nguyễn MD  Sandro Argueta  1945  11/21/2024    Patient Active Problem List   Diagnosis    Left elbow pain    Essential hypertension    DM2 (diabetes mellitus, type 2)    Elevated prostate specific antigen (PSA)    Epigastric pain    Epigastric abdominal pain    Elevated liver enzymes    Weight loss, abnormal    Acute hepatitis    Anemia    Obstructive jaundice    Pneumothorax, left    Cytokine release syndrome, grade 1    Esophageal rupture    Hypotension, unspecified    Empyema with fistula    Infection of skin and subcutaneous tissue    Sick sinus syndrome, s/p permanent dual-chamber pacemaker implantation with battery replacement in 2019 with a [employer] medical device.    Hemoptysis    Abnormal CT of the chest       Dear Misti Nguyễn MD:    Subjective     History of Present Illness:    Chief Complaint   Patient presents with    Follow-up     6 Month follow up       Med Management     Verbal, no changes          Sandro Argueta is a pleasant 79 y.o. male with a past medical history significant for sick sinus syndrome status post permanent pacemaker implantation with [employer] medical device with generator change in 2019. He also has history of significant orthostatic hypotension in the past. He also has a history of diabetes mellitus. He also has history of esophageal rupture that occurred in February 2022 following an episode of severe vomiting status post repair and stenting of the esophagus. He also has history of Whipple procedure for at the time highly suspicious pancreatic cancer however following the procedure he was found to he was not cancerous (per patient report). He comes in today for cardiology follow-up.     Sandro reports that he has been doing well. Patient denies any chest pain, shortness of breath, palpitations, syncope or near syncope. He does report on rare occasion he has some dizziness/light headedness but has not fallen or synopsized. He denies any known causes  "for this lightheadedness.    Allergies   Allergen Reactions    Other Anaphylaxis and Swelling     Patient had a reaction to the COVID vaccine. Pt states his tongue and lips started swelling   :      Current Outpatient Medications:     cholecalciferol (VITAMIN D3) 1.25 MG (98907 UT) capsule, Take 1 capsule by mouth Every 7 (Seven) Days., Disp: , Rfl:     Insulin Aspart (novoLOG) 100 UNIT/ML injection, Inject  under the skin into the appropriate area as directed., Disp: , Rfl:     Insulin Lispro (humaLOG) 100 UNIT/ML injection, , Disp: , Rfl:     losartan (COZAAR) 50 MG tablet, Take 1 tablet by mouth Daily., Disp: 90 tablet, Rfl: 0    metoprolol succinate XL (TOPROL-XL) 50 MG 24 hr tablet, Take 1.5 tablets by mouth Daily., Disp: 135 tablet, Rfl: 0    Pancrelipase, Lip-Prot-Amyl, (CREON) [id number] units capsule delayed-release particles capsule, Take 1 capsule by mouth 3 (Three) Times a Day With Meals. Take 2 capsules with large meals and 1 capsule with snacks, Disp: 240 capsule, Rfl: 11    The following portions of the patient's history were reviewed and updated as appropriate: allergies, current medications, past family history, past medical history, past social history, past surgical history and problem list.    Social History     Tobacco Use    Smoking status: Never     Passive exposure: Never    Smokeless tobacco: Never   Vaping Use    Vaping status: Never Used   Substance Use Topics    Alcohol use: No    Drug use: No         Objective   Vitals:    11/21/24 1305   BP: 112/61   BP Location: Left arm   Patient Position: Sitting   Cuff Size: Adult   Pulse: 92   SpO2: 100%   Weight: 79.5 kg (175 lb 3.2 oz)   Height: 182.9 cm (72.01\")     Body mass index is 23.76 kg/m².    ROS    Constitutional:       General: Not in acute distress.     Appearance: Healthy appearance. Well-developed and not in distress. Not diaphoretic.   Eyes:      Conjunctiva/sclera: Conjunctivae normal.      Pupils: Pupils are equal, round, and " "reactive to light.   HENT:      Head: Normocephalic and atraumatic.   Neck:      Vascular: No carotid bruit or JVD.   Pulmonary:      Effort: Pulmonary effort is normal. No respiratory distress.      Breath sounds: Normal breath sounds.   Cardiovascular:      Normal rate. Regular rhythm.   Edema:     Peripheral edema absent.   Skin:     General: Skin is cool.   Neurological:      Mental Status: Alert, oriented to person, place, and time and oriented to person, place and time.         Lab Results   Component Value Date     05/25/2023    K 4.5 05/25/2023     05/25/2023    CO2 30.0 (H) 05/25/2023    BUN 15 05/25/2023    CREATININE 1.10 09/09/2024    GLUCOSE 133 (H) 05/25/2023    CALCIUM 8.9 05/25/2023    AST 17 12/08/2022    ALT 15 12/08/2022    ALKPHOS 96 12/08/2022     No results found for: \"CKTOTAL\"  Lab Results   Component Value Date    WBC 6.22 05/25/2023    HGB 9.9 (L) 05/25/2023    HCT 32.4 (L) 05/25/2023     05/25/2023     Lab Results   Component Value Date    INR 1.01 07/04/2022    INR 0.99 01/21/2022    INR 1.07 06/27/2021     Lab Results   Component Value Date    MG 2.2 03/11/2022     Lab Results   Component Value Date    TSH 1.620 06/27/2021    PSA 5.1 (H) 09/17/2024    TRIG 50 04/08/2022    HDL 40 04/08/2022    LDL 49 04/08/2022      No results found for: \"BNP\"    During this visit the following were done:  Labs Reviewed []    Labs Ordered []    Radiology Reports Reviewed []    Radiology Ordered []    PCP Records Reviewed []    Referring Provider Records Reviewed []    ER Records Reviewed []    Hospital Records Reviewed []    History Obtained From Family []    Radiology Images Reviewed []    Other Reviewed []    Records Requested []       Procedures    Assessment & Plan    Diagnosis Plan   1. Sick sinus syndrome, s/p permanent dual-chamber pacemaker implantation with battery replacement in 2019 with a [employer] medical device.        2. Essential hypertension             "     Recommendations:  Sick sinus syndrome  Will schedule patient for routine pacemaker interrogation  Lightheadedness/dizzy  Overall reports the symptoms are mild and infrequent has not fallen or had syncopal episode.  Encouraged him to try to check blood pressure regularly particularly during these episodes if he is near his device.  Encouraged good hydration.  Will see if any dysrhythmi are seen when pacemaker gets interrogated.  Essential hypertension  Appears well-controlled currently    Return in about 6 months (around 5/21/2025).    As always, I appreciate very much the opportunity to participate in the cardiovascular care of your patients.      With Best Regards,    Henrique Zuniga PA-C

## 2024-11-22 ENCOUNTER — TELEPHONE (OUTPATIENT)
Dept: CARDIOLOGY | Facility: CLINIC | Age: 79
End: 2024-11-22
Payer: MEDICARE

## 2024-11-22 NOTE — TELEPHONE ENCOUNTER
I have sent a fax request for his labs.     ----- Message from Henrique Zuniga sent at 11/21/2024  1:21 PM EST -----  Can we get labs from pcp

## 2024-12-12 ENCOUNTER — CLINICAL SUPPORT NO REQUIREMENTS (OUTPATIENT)
Dept: CARDIOLOGY | Facility: CLINIC | Age: 79
End: 2024-12-12
Payer: MEDICARE

## 2024-12-12 DIAGNOSIS — Z95.0 CARDIAC PACEMAKER IN SITU: Primary | ICD-10-CM

## 2025-01-15 ENCOUNTER — TELEPHONE (OUTPATIENT)
Dept: CARDIOLOGY | Facility: CLINIC | Age: 80
End: 2025-01-15

## 2025-01-15 ENCOUNTER — OFFICE VISIT (OUTPATIENT)
Dept: CARDIOLOGY | Facility: CLINIC | Age: 80
End: 2025-01-15
Payer: MEDICARE

## 2025-01-15 VITALS
HEIGHT: 72 IN | OXYGEN SATURATION: 97 % | DIASTOLIC BLOOD PRESSURE: 72 MMHG | SYSTOLIC BLOOD PRESSURE: 120 MMHG | BODY MASS INDEX: 23.86 KG/M2 | WEIGHT: 176.2 LBS | HEART RATE: 87 BPM

## 2025-01-15 DIAGNOSIS — I49.5 SICK SINUS SYNDROME: Primary | ICD-10-CM

## 2025-01-15 DIAGNOSIS — I10 ESSENTIAL HYPERTENSION: ICD-10-CM

## 2025-01-15 NOTE — PROGRESS NOTES
Misti Nguyễn MD  Sandro Argueta  1945  01/15/2025    Patient Active Problem List   Diagnosis    Left elbow pain    Essential hypertension    DM2 (diabetes mellitus, type 2)    Elevated prostate specific antigen (PSA)    Epigastric pain    Epigastric abdominal pain    Elevated liver enzymes    Weight loss, abnormal    Acute hepatitis    Anemia    Obstructive jaundice    Pneumothorax, left    Cytokine release syndrome, grade 1    Esophageal rupture    Hypotension, unspecified    Empyema with fistula    Infection of skin and subcutaneous tissue    Sick sinus syndrome, s/p permanent dual-chamber pacemaker implantation with battery replacement in 2019 with a [employer] medical device.    Hemoptysis    Abnormal CT of the chest       Dear Misti Nguyễn MD:    Subjective     History of Present Illness:    Chief Complaint   Patient presents with    Follow-up     Pacemaker     Med Management     No changes, verbal.          Sandro Argueta is a pleasant 79 y.o. male with a past medical history significant for sick sinus syndrome status post permanent pacemaker implantation with [employer] medical device with generator change in 2019. He also has history of significant orthostatic hypotension in the past. He also has a history of diabetes mellitus. He also has history of esophageal rupture that occurred in February 2022 following an episode of severe vomiting status post repair and stenting of the esophagus. He also has history of Whipple procedure for at the time highly suspicious pancreatic cancer however following the procedure he was found to he was not cancerous (per patient report). He comes in today for cardiology follow-up.    History of Present Illness  The patient has a documented history of sick sinus syndrome and has had a pacemaker implanted . The patient reports no episodes of tachycardia, palpitations, chest pain, or dyspnea. There has been an improvement in symptoms of dizziness. The patient  "maintains an active lifestyle, engaging in activities such as household chores and house painting. Most recent interogation did show some atrial tachycardia but this was on July with interogation being in december with no other reoccurrence       Allergies   Allergen Reactions    Other Anaphylaxis and Swelling     Patient had a reaction to the COVID vaccine. Pt states his tongue and lips started swelling   :      Current Outpatient Medications:     cholecalciferol (VITAMIN D3) 1.25 MG (23538 UT) capsule, Take 1 capsule by mouth Every 7 (Seven) Days., Disp: , Rfl:     Insulin Aspart (novoLOG) 100 UNIT/ML injection, Inject  under the skin into the appropriate area as directed., Disp: , Rfl:     Insulin Lispro (humaLOG) 100 UNIT/ML injection, , Disp: , Rfl:     losartan (COZAAR) 50 MG tablet, Take 1 tablet by mouth Daily., Disp: 90 tablet, Rfl: 0    metoprolol succinate XL (TOPROL-XL) 50 MG 24 hr tablet, Take 1.5 tablets by mouth Daily., Disp: 135 tablet, Rfl: 0    Pancrelipase, Lip-Prot-Amyl, (CREON) [id number] units capsule delayed-release particles capsule, Take 1 capsule by mouth 3 (Three) Times a Day With Meals. Take 2 capsules with large meals and 1 capsule with snacks, Disp: 240 capsule, Rfl: 11    The following portions of the patient's history were reviewed and updated as appropriate: allergies, current medications, past family history, past medical history, past social history, past surgical history and problem list.    Social History     Tobacco Use    Smoking status: Never     Passive exposure: Never    Smokeless tobacco: Never   Vaping Use    Vaping status: Never Used   Substance Use Topics    Alcohol use: No    Drug use: No         Objective   Vitals:    01/15/25 1110   BP: 120/72   BP Location: Left arm   Patient Position: Sitting   Cuff Size: Adult   Pulse: 87   SpO2: 97%   Weight: 79.9 kg (176 lb 3.2 oz)   Height: 182.9 cm (72.01\")     Body mass index is 23.89 kg/m².    ROS    Constitutional:       " "General: Not in acute distress.     Appearance: Healthy appearance. Well-developed and not in distress. Not diaphoretic.   Eyes:      Conjunctiva/sclera: Conjunctivae normal.      Pupils: Pupils are equal, round, and reactive to light.   HENT:      Head: Normocephalic and atraumatic.   Neck:      Vascular: No carotid bruit or JVD.   Pulmonary:      Effort: Pulmonary effort is normal. No respiratory distress.      Breath sounds: Normal breath sounds.   Cardiovascular:      Normal rate. Regular rhythm.   Edema:     Peripheral edema absent.   Skin:     General: Skin is cool.   Neurological:      Mental Status: Alert, oriented to person, place, and time and oriented to person, place and time.         Lab Results   Component Value Date     05/25/2023    K 4.5 05/25/2023     05/25/2023    CO2 30.0 (H) 05/25/2023    BUN 15 05/25/2023    CREATININE 1.10 09/09/2024    GLUCOSE 133 (H) 05/25/2023    CALCIUM 8.9 05/25/2023    AST 17 12/08/2022    ALT 15 12/08/2022    ALKPHOS 96 12/08/2022     No results found for: \"CKTOTAL\"  Lab Results   Component Value Date    WBC 6.22 05/25/2023    HGB 9.9 (L) 05/25/2023    HCT 32.4 (L) 05/25/2023     05/25/2023     Lab Results   Component Value Date    INR 1.01 07/04/2022    INR 0.99 01/21/2022    INR 1.07 06/27/2021     Lab Results   Component Value Date    MG 2.2 03/11/2022     Lab Results   Component Value Date    TSH 1.620 06/27/2021    PSA 5.1 (H) 09/17/2024    TRIG 50 04/08/2022    HDL 40 04/08/2022    LDL 49 04/08/2022      No results found for: \"BNP\"    During this visit the following were done:  Labs Reviewed []    Labs Ordered []    Radiology Reports Reviewed []    Radiology Ordered []    PCP Records Reviewed []    Referring Provider Records Reviewed []    ER Records Reviewed []    Hospital Records Reviewed []    History Obtained From Family []    Radiology Images Reviewed []    Other Reviewed []    Records Requested []       Procedures    Assessment & Plan   No " diagnosis found.         Assessment & Plan  1. Sick sinus syndrome  - Recent pacemaker interrogation on 07/19/2024 revealed atrial tachycardia lasting 22 minutes with a heart rate of 150-171 bpm  - No recurrences  - Pacemaker battery life estimated at 1.3 years  - Maintain current medication regimen  -Discussed the case with Dr. Smith who is agreeable  -Requested labs from PCP  PROCEDURE  Pacemaker implantation in 1996.      No follow-ups on file.    As always, I appreciate very much the opportunity to participate in the cardiovascular care of your patients.      With Best Regards,    Henrique Zuniga PA-C    Patient or patient representative verbalized consent for the use of Ambient Listening during the visit with  Henrique Zuniga PA-C for chart documentation. 1/15/2025  12:55 EST

## 2025-01-16 NOTE — TELEPHONE ENCOUNTER
Sent fax request.       ----- Message from Henrique Zuniga sent at 1/15/2025 11:47 AM EST -----  Can we get labs from pcp

## 2025-02-07 RX ORDER — LOSARTAN POTASSIUM 50 MG/1
50 TABLET ORAL DAILY
Qty: 90 TABLET | Refills: 0 | Status: SHIPPED | OUTPATIENT
Start: 2025-02-07

## 2025-02-18 NOTE — TELEPHONE ENCOUNTER
Patient needs:     Losartan 50mg.     [facility]     Patient is out.    Thanks!

## 2025-02-19 RX ORDER — LOSARTAN POTASSIUM 50 MG/1
50 TABLET ORAL DAILY
Qty: 90 TABLET | Refills: 0 | Status: SHIPPED | OUTPATIENT
Start: 2025-02-19

## 2025-03-31 DIAGNOSIS — I10 ESSENTIAL HYPERTENSION: ICD-10-CM

## 2025-03-31 RX ORDER — METOPROLOL SUCCINATE 50 MG/1
75 TABLET, EXTENDED RELEASE ORAL DAILY
Qty: 135 TABLET | Refills: 0 | Status: SHIPPED | OUTPATIENT
Start: 2025-03-31

## 2025-06-11 ENCOUNTER — CLINICAL SUPPORT NO REQUIREMENTS (OUTPATIENT)
Dept: CARDIOLOGY | Facility: CLINIC | Age: 80
End: 2025-06-11
Payer: MEDICARE

## 2025-06-11 DIAGNOSIS — Z95.0 CARDIAC PACEMAKER IN SITU: Primary | ICD-10-CM

## 2025-07-11 ENCOUNTER — TELEPHONE (OUTPATIENT)
Dept: CARDIOLOGY | Facility: CLINIC | Age: 80
End: 2025-07-11
Payer: COMMERCIAL

## 2025-07-15 ENCOUNTER — OFFICE VISIT (OUTPATIENT)
Dept: CARDIOLOGY | Facility: CLINIC | Age: 80
End: 2025-07-15
Payer: MEDICARE

## 2025-07-15 VITALS
SYSTOLIC BLOOD PRESSURE: 138 MMHG | WEIGHT: 173.8 LBS | HEART RATE: 60 BPM | OXYGEN SATURATION: 98 % | HEIGHT: 72 IN | DIASTOLIC BLOOD PRESSURE: 72 MMHG | BODY MASS INDEX: 23.54 KG/M2

## 2025-07-15 DIAGNOSIS — I10 ESSENTIAL HYPERTENSION: ICD-10-CM

## 2025-07-15 DIAGNOSIS — I49.5 SICK SINUS SYNDROME: Primary | ICD-10-CM

## 2025-07-15 PROCEDURE — 99214 OFFICE O/P EST MOD 30 MIN: CPT | Performed by: PHYSICIAN ASSISTANT

## 2025-07-15 PROCEDURE — 3075F SYST BP GE 130 - 139MM HG: CPT | Performed by: PHYSICIAN ASSISTANT

## 2025-07-15 PROCEDURE — 3078F DIAST BP <80 MM HG: CPT | Performed by: PHYSICIAN ASSISTANT

## 2025-07-15 PROCEDURE — 93000 ELECTROCARDIOGRAM COMPLETE: CPT | Performed by: PHYSICIAN ASSISTANT

## 2025-07-15 NOTE — PROGRESS NOTES
Misti Nguyễn MD  Sandro Argueta  1945  07/15/2025    Patient Active Problem List   Diagnosis    Left elbow pain    Essential hypertension    DM2 (diabetes mellitus, type 2)    Elevated prostate specific antigen (PSA)    Epigastric pain    Epigastric abdominal pain    Elevated liver enzymes    Weight loss, abnormal    Acute hepatitis    Anemia    Obstructive jaundice    Pneumothorax, left    Cytokine release syndrome, grade 1    Esophageal rupture    Hypotension, unspecified    Empyema with fistula    Infection of skin and subcutaneous tissue    Sick sinus syndrome, s/p permanent dual-chamber pacemaker implantation with battery replacement in 2019 with a [employer] medical device.    Hemoptysis    Abnormal CT of the chest       Dear Misti Nguyễn MD:    Subjective     History of Present Illness:    Chief Complaint   Patient presents with    Follow-up     ROUTINE       Sandro Argueta is a pleasant 80 y.o. male with a past medical history significant for sick sinus syndrome status post permanent pacemaker implantation with [employer] medical device with generator change in 2019. He also has history of significant orthostatic hypotension in the past. He also has a history of diabetes mellitus. He also has history of esophageal rupture that occurred in February 2022 following an episode of severe vomiting status post repair and stenting of the esophagus. He also has history of Whipple procedure for at the time highly suspicious pancreatic cancer however following the procedure he was found to he was not cancerous (per patient report). He comes in today for cardiology follow-up.    History of Present Illness  The patient, an 81-year-old male, presents for management of hypertension. He reports no cardiac symptoms, including chest pain or dyspnea, and is able to perform household chores without difficulty. He denies any history of falls or syncope. His blood pressure readings average 170 mmHg systolic at  "home. The patient utilizes a home sphygmomanometer but does not adhere to the recommended 5-minute rest period prior to measurement, often checking his blood pressure after consuming four cups of coffee. H        SOCIAL HISTORY  . Drinks 4 cups of coffee daily.       Allergies   Allergen Reactions    Other Anaphylaxis and Swelling     Patient had a reaction to the COVID vaccine. Pt states his tongue and lips started swelling   :      Current Outpatient Medications:     Insulin Aspart (novoLOG) 100 UNIT/ML injection, Inject  under the skin into the appropriate area as directed., Disp: , Rfl:     Insulin Lispro (humaLOG) 100 UNIT/ML injection, , Disp: , Rfl:     losartan (COZAAR) 50 MG tablet, Take 1 tablet by mouth Daily., Disp: 90 tablet, Rfl: 0    metoprolol succinate XL (TOPROL-XL) 50 MG 24 hr tablet, Take 1.5 tablets by mouth Daily., Disp: 135 tablet, Rfl: 0    Pancrelipase, Lip-Prot-Amyl, (CREON) 15722-929124 units capsule delayed-release particles capsule, Take 1 capsule by mouth 3 (Three) Times a Day With Meals. Take 2 capsules with large meals and 1 capsule with snacks, Disp: 240 capsule, Rfl: 11    cholecalciferol (VITAMIN D3) 1.25 MG (99406 UT) capsule, Take 1 capsule by mouth Every 7 (Seven) Days., Disp: , Rfl:     The following portions of the patient's history were reviewed and updated as appropriate: allergies, current medications, past family history, past medical history, past social history, past surgical history and problem list.    Social History     Tobacco Use    Smoking status: Never     Passive exposure: Never    Smokeless tobacco: Never   Vaping Use    Vaping status: Never Used   Substance Use Topics    Alcohol use: No    Drug use: No         Objective   Vitals:    07/15/25 1111   BP: 138/72   Pulse: 60   SpO2: 98%   Weight: 78.8 kg (173 lb 12.8 oz)   Height: 182.9 cm (72\")     Body mass index is 23.57 kg/m².    ROS    Constitutional:       General: Not in acute distress.     " "Appearance: Healthy appearance. Well-developed and not in distress. Not diaphoretic.   Eyes:      Conjunctiva/sclera: Conjunctivae normal.      Pupils: Pupils are equal, round, and reactive to light.   HENT:      Head: Normocephalic and atraumatic.   Neck:      Vascular: No carotid bruit or JVD.   Pulmonary:      Effort: Pulmonary effort is normal. No respiratory distress.      Breath sounds: Normal breath sounds.   Cardiovascular:      Normal rate. Regular rhythm.   Edema:     Peripheral edema absent.   Skin:     General: Skin is cool.   Neurological:      Mental Status: Alert, oriented to person, place, and time and oriented to person, place and time.         Lab Results   Component Value Date     05/25/2023    K 4.5 05/25/2023     05/25/2023    CO2 30.0 (H) 05/25/2023    BUN 15 05/25/2023    CREATININE 1.10 09/09/2024    GLUCOSE 133 (H) 05/25/2023    CALCIUM 8.9 05/25/2023    AST 17 12/08/2022    ALT 15 12/08/2022    ALKPHOS 96 12/08/2022     No results found for: \"CKTOTAL\"  Lab Results   Component Value Date    WBC 6.22 05/25/2023    HGB 9.9 (L) 05/25/2023    HCT 32.4 (L) 05/25/2023     05/25/2023     Lab Results   Component Value Date    INR 1.01 07/04/2022    INR 0.99 01/21/2022    INR 1.07 06/27/2021     Lab Results   Component Value Date    MG 2.2 03/11/2022     Lab Results   Component Value Date    TSH 1.620 06/27/2021    PSA 5.1 (H) 09/17/2024    TRIG 50 04/08/2022    HDL 40 04/08/2022    LDL 49 04/08/2022      No results found for: \"BNP\"    During this visit the following were done:  Labs Reviewed []    Labs Ordered []    Radiology Reports Reviewed []    Radiology Ordered []    PCP Records Reviewed []    Referring Provider Records Reviewed []    ER Records Reviewed []    Hospital Records Reviewed []    History Obtained From Family []    Radiology Images Reviewed []    Other Reviewed []    Records Requested []         ECG 12 Lead    Date/Time: 7/15/2025 11:40 AM  Performed by: Efrain" Henrique PATTERSON PA-C    Authorized by: Henrique Zuniga PA-C  Comparison: compared with previous ECG   Similar to previous ECG  Rhythm: sinus rhythm  Pacing: atrial sensed rhythm and 100% capture  Clinical impression: non-specific ECG          Assessment & Plan    Diagnosis Plan   1. Sick sinus syndrome, s/p permanent dual-chamber pacemaker implantation with battery replacement in 2019 with a [employer] medical device.  ECG 12 Lead      2. Essential hypertension  ECG 12 Lead               Assessment & Plan  1. Blood pressure management:  - Consistently high readings, averaging 170 systolic at home rang but not checking blood pressure accurately as he does not sit for at least 5 minutes and often checks after drinking multiple cups of coffee.  - Today's systolic 130, acceptable for age; aim to maintain below 130.  This also leads me to think that his blood pressure device may be inaccurate so I asked him to bring blood pressure device with him at next visit  -  discussed with him on proper way to check blood pressure and provided a instructions for  - Avoid checking BP within 30 minutes of eating  - No medication changes  - Consider nighttime medication timing    2.  Sick sinus syndrome  -Doing well clinically continue with routine interrogations      No follow-ups on file.    As always, I appreciate very much the opportunity to participate in the cardiovascular care of your patients.      With Best Regards,    Henrique Zuniga PA-C    Patient or patient representative verbalized consent for the use of Ambient Listening during the visit with  Henrique Zuniga PA-C for chart documentation. 7/15/2025  12:22 EDT

## 2025-08-21 RX ORDER — LOSARTAN POTASSIUM 50 MG/1
50 TABLET ORAL DAILY
Qty: 90 TABLET | Refills: 0 | Status: SHIPPED | OUTPATIENT
Start: 2025-08-21

## (undated) DEVICE — THE BITE BLOCK MAXI, LATEX FREE STRAP IS USED TO PROTECT THE ENDOSCOPE INSERTION TUBE FROM BEING BITTEN BY THE PATIENT.

## (undated) DEVICE — GOWN,REINF,POLY,ECL,PP SLV,XL: Brand: MEDLINE

## (undated) DEVICE — FRCP BX RADJAW4 NDL 2.8 240CM LG OG BX40

## (undated) DEVICE — TRIPLE LUMEN SPHINCTEROTOME: Brand: ULTRATOME XL

## (undated) DEVICE — WIREGUIDED CYTOLOGY BRUSH: Brand: RX CYTOLOGY BRUSH

## (undated) DEVICE — SYR LUERLOK 20CC BX/50

## (undated) DEVICE — SYR LUERLOK 30CC

## (undated) DEVICE — SYR LUERLOK 50ML

## (undated) DEVICE — BOWL UTIL STRL 32OZ

## (undated) DEVICE — TRIPLE LUMEN ERCP CANNULA: Brand: TANDEM XL

## (undated) DEVICE — GW JAGWIRE HIPRFM STFF SHFT STR .035IN 450CM

## (undated) DEVICE — TUBING, SUCTION, 3/16" X 10', STRAIGHT: Brand: MEDLINE

## (undated) DEVICE — Device

## (undated) DEVICE — TUBING, SUCTION, 1/4" X 20', STRAIGHT: Brand: MEDLINE INDUSTRIES, INC.

## (undated) DEVICE — Device: Brand: DEFENDO AIR/WATER/SUCTION AND BIOPSY VALVE

## (undated) DEVICE — SINGLE PORT MANIFOLD: Brand: NEPTUNE 2

## (undated) DEVICE — GW JAG STR .035IN 450CM